# Patient Record
Sex: MALE | Race: WHITE | Employment: OTHER | URBAN - METROPOLITAN AREA
[De-identification: names, ages, dates, MRNs, and addresses within clinical notes are randomized per-mention and may not be internally consistent; named-entity substitution may affect disease eponyms.]

---

## 2017-01-30 ENCOUNTER — OFFICE VISIT (OUTPATIENT)
Dept: FAMILY MEDICINE CLINIC | Age: 72
End: 2017-01-30

## 2017-01-30 VITALS
TEMPERATURE: 97.7 F | WEIGHT: 264 LBS | HEART RATE: 73 BPM | SYSTOLIC BLOOD PRESSURE: 136 MMHG | DIASTOLIC BLOOD PRESSURE: 78 MMHG | BODY MASS INDEX: 33.9 KG/M2

## 2017-01-30 DIAGNOSIS — M10.9 ACUTE GOUT, UNSPECIFIED CAUSE, UNSPECIFIED SITE: ICD-10-CM

## 2017-01-30 DIAGNOSIS — I10 ESSENTIAL HYPERTENSION: Primary | ICD-10-CM

## 2017-01-30 PROCEDURE — 99214 OFFICE O/P EST MOD 30 MIN: CPT | Performed by: FAMILY MEDICINE

## 2017-01-30 RX ORDER — ALLOPURINOL 300 MG/1
300 TABLET ORAL DAILY
Qty: 30 TABLET | Refills: 5 | Status: SHIPPED | OUTPATIENT
Start: 2017-01-30 | End: 2017-07-13 | Stop reason: SDUPTHER

## 2017-02-02 RX ORDER — AMLODIPINE BESYLATE 5 MG/1
5 TABLET ORAL DAILY
Qty: 30 TABLET | Refills: 6 | Status: SHIPPED | OUTPATIENT
Start: 2017-02-02 | End: 2017-08-16 | Stop reason: SDUPTHER

## 2017-02-09 ENCOUNTER — TELEPHONE (OUTPATIENT)
Dept: FAMILY MEDICINE CLINIC | Age: 72
End: 2017-02-09

## 2017-02-09 NOTE — TELEPHONE ENCOUNTER
I don't think a muscle relaxer will help- just anti inflammatory medication like Aleve.  He can take 2 twice a day

## 2017-02-09 NOTE — TELEPHONE ENCOUNTER
Pt states that he caught the \"crud\" that has been going around. He states that he has been coughing so hard that he thinks he pulled a muscle in his chest. He doesn't think it's a broken rib because he has had them in the past and this pain is not constant like that was. He says the pain gets worse when coughing and sneezing. He is flying out the day after tomorrow but wanted to see if he needed a muscle relaxer or something until this is improved. He is willing to be seen if you need him to come in.  Please advise

## 2017-02-18 DIAGNOSIS — I10 ESSENTIAL HYPERTENSION: ICD-10-CM

## 2017-02-20 RX ORDER — ATORVASTATIN CALCIUM 20 MG/1
TABLET, FILM COATED ORAL
Qty: 90 TABLET | Refills: 3 | Status: SHIPPED | OUTPATIENT
Start: 2017-02-20 | End: 2018-02-06 | Stop reason: SDUPTHER

## 2017-02-20 RX ORDER — HYDROCHLOROTHIAZIDE 25 MG/1
TABLET ORAL
Qty: 90 TABLET | Refills: 3 | Status: SHIPPED | OUTPATIENT
Start: 2017-02-20 | End: 2017-12-05 | Stop reason: SDUPTHER

## 2017-03-27 RX ORDER — CITALOPRAM 20 MG/1
TABLET ORAL
Qty: 90 TABLET | Refills: 1 | Status: SHIPPED | OUTPATIENT
Start: 2017-03-27 | End: 2017-09-01 | Stop reason: SDUPTHER

## 2017-03-30 ENCOUNTER — OFFICE VISIT (OUTPATIENT)
Dept: FAMILY MEDICINE CLINIC | Age: 72
End: 2017-03-30

## 2017-03-30 VITALS
DIASTOLIC BLOOD PRESSURE: 52 MMHG | WEIGHT: 261 LBS | BODY MASS INDEX: 33.51 KG/M2 | SYSTOLIC BLOOD PRESSURE: 110 MMHG | OXYGEN SATURATION: 98 % | HEART RATE: 61 BPM

## 2017-03-30 DIAGNOSIS — S39.011D ABDOMINAL MUSCLE STRAIN, SUBSEQUENT ENCOUNTER: Primary | ICD-10-CM

## 2017-03-30 PROCEDURE — 99213 OFFICE O/P EST LOW 20 MIN: CPT | Performed by: FAMILY MEDICINE

## 2017-03-30 RX ORDER — HYDROXYZINE HYDROCHLORIDE 25 MG/1
TABLET, FILM COATED ORAL
Qty: 90 TABLET | Refills: 3 | Status: SHIPPED | OUTPATIENT
Start: 2017-03-30 | End: 2019-03-11 | Stop reason: SDUPTHER

## 2017-04-24 RX ORDER — PROPRANOLOL HYDROCHLORIDE 120 MG/1
CAPSULE, EXTENDED RELEASE ORAL
Qty: 30 CAPSULE | Refills: 0 | Status: SHIPPED | OUTPATIENT
Start: 2017-04-24 | End: 2017-05-21 | Stop reason: SDUPTHER

## 2017-05-22 RX ORDER — PROPRANOLOL HYDROCHLORIDE 120 MG/1
CAPSULE, EXTENDED RELEASE ORAL
Qty: 30 CAPSULE | Refills: 0 | Status: SHIPPED | OUTPATIENT
Start: 2017-05-22 | End: 2017-06-16 | Stop reason: SDUPTHER

## 2017-06-19 RX ORDER — PROPRANOLOL HYDROCHLORIDE 120 MG/1
CAPSULE, EXTENDED RELEASE ORAL
Qty: 30 CAPSULE | Refills: 1 | Status: SHIPPED | OUTPATIENT
Start: 2017-06-19 | End: 2017-08-16 | Stop reason: SDUPTHER

## 2017-07-03 ENCOUNTER — OFFICE VISIT (OUTPATIENT)
Dept: FAMILY MEDICINE CLINIC | Age: 72
End: 2017-07-03

## 2017-07-03 VITALS
TEMPERATURE: 98.3 F | BODY MASS INDEX: 33.11 KG/M2 | HEART RATE: 57 BPM | SYSTOLIC BLOOD PRESSURE: 115 MMHG | WEIGHT: 258 LBS | OXYGEN SATURATION: 98 % | DIASTOLIC BLOOD PRESSURE: 66 MMHG | HEIGHT: 74 IN

## 2017-07-03 DIAGNOSIS — Z00.00 ROUTINE GENERAL MEDICAL EXAMINATION AT A HEALTH CARE FACILITY: Primary | ICD-10-CM

## 2017-07-03 PROCEDURE — G0438 PPPS, INITIAL VISIT: HCPCS | Performed by: FAMILY MEDICINE

## 2017-07-03 ASSESSMENT — LIFESTYLE VARIABLES
HOW OFTEN DO YOU HAVE A DRINK CONTAINING ALCOHOL: 3
HAVE YOU OR SOMEONE ELSE BEEN INJURED AS A RESULT OF YOUR DRINKING: 0
HOW OFTEN DURING THE LAST YEAR HAVE YOU FAILED TO DO WHAT WAS NORMALLY EXPECTED FROM YOU BECAUSE OF DRINKING: 0
HOW OFTEN DURING THE LAST YEAR HAVE YOU HAD A FEELING OF GUILT OR REMORSE AFTER DRINKING: 0
HAS A RELATIVE, FRIEND, DOCTOR, OR ANOTHER HEALTH PROFESSIONAL EXPRESSED CONCERN ABOUT YOUR DRINKING OR SUGGESTED YOU CUT DOWN: 0
HOW OFTEN DURING THE LAST YEAR HAVE YOU BEEN UNABLE TO REMEMBER WHAT HAPPENED THE NIGHT BEFORE BECAUSE YOU HAD BEEN DRINKING: 0
HOW MANY STANDARD DRINKS CONTAINING ALCOHOL DO YOU HAVE ON A TYPICAL DAY: 0
AUDIT-C TOTAL SCORE: 3
HOW OFTEN DURING THE LAST YEAR HAVE YOU NEEDED AN ALCOHOLIC DRINK FIRST THING IN THE MORNING TO GET YOURSELF GOING AFTER A NIGHT OF HEAVY DRINKING: 0
HOW OFTEN DO YOU HAVE SIX OR MORE DRINKS ON ONE OCCASION: 0

## 2017-07-03 ASSESSMENT — PATIENT HEALTH QUESTIONNAIRE - PHQ9
SUM OF ALL RESPONSES TO PHQ QUESTIONS 1-9: 2
SUM OF ALL RESPONSES TO PHQ QUESTIONS 1-9: 2

## 2017-07-03 ASSESSMENT — ANXIETY QUESTIONNAIRES: GAD7 TOTAL SCORE: 1

## 2017-07-05 ENCOUNTER — NURSE ONLY (OUTPATIENT)
Dept: FAMILY MEDICINE CLINIC | Age: 72
End: 2017-07-05

## 2017-07-05 DIAGNOSIS — E78.5 HYPERLIPIDEMIA, UNSPECIFIED HYPERLIPIDEMIA TYPE: ICD-10-CM

## 2017-07-05 DIAGNOSIS — Z00.00 ROUTINE GENERAL MEDICAL EXAMINATION AT A HEALTH CARE FACILITY: ICD-10-CM

## 2017-07-05 DIAGNOSIS — I10 ESSENTIAL HYPERTENSION: ICD-10-CM

## 2017-07-05 LAB
A/G RATIO: 1.8 (ref 1.1–2.2)
ALBUMIN SERPL-MCNC: 4.2 G/DL (ref 3.4–5)
ALP BLD-CCNC: 77 U/L (ref 40–129)
ALT SERPL-CCNC: 26 U/L (ref 10–40)
ANION GAP SERPL CALCULATED.3IONS-SCNC: 12 MMOL/L (ref 3–16)
AST SERPL-CCNC: 19 U/L (ref 15–37)
BILIRUB SERPL-MCNC: 0.3 MG/DL (ref 0–1)
BUN BLDV-MCNC: 20 MG/DL (ref 7–20)
CALCIUM SERPL-MCNC: 9.4 MG/DL (ref 8.3–10.6)
CHLORIDE BLD-SCNC: 100 MMOL/L (ref 99–110)
CHOLESTEROL, TOTAL: 188 MG/DL (ref 0–199)
CO2: 28 MMOL/L (ref 21–32)
CREAT SERPL-MCNC: 1.1 MG/DL (ref 0.8–1.3)
GFR AFRICAN AMERICAN: >60
GFR NON-AFRICAN AMERICAN: >60
GLOBULIN: 2.4 G/DL
GLUCOSE BLD-MCNC: 112 MG/DL (ref 70–99)
HDLC SERPL-MCNC: 57 MG/DL (ref 40–60)
LDL CHOLESTEROL CALCULATED: 96 MG/DL
POTASSIUM SERPL-SCNC: 4.5 MMOL/L (ref 3.5–5.1)
PROSTATE SPECIFIC ANTIGEN: 1.02 NG/ML (ref 0–4)
SODIUM BLD-SCNC: 140 MMOL/L (ref 136–145)
TOTAL PROTEIN: 6.6 G/DL (ref 6.4–8.2)
TRIGL SERPL-MCNC: 174 MG/DL (ref 0–150)
URIC ACID, SERUM: 4.4 MG/DL (ref 3.5–7.2)
VLDLC SERPL CALC-MCNC: 35 MG/DL

## 2017-07-05 PROCEDURE — 36415 COLL VENOUS BLD VENIPUNCTURE: CPT | Performed by: FAMILY MEDICINE

## 2017-07-06 LAB
ESTIMATED AVERAGE GLUCOSE: 116.9 MG/DL
HBA1C MFR BLD: 5.7 %

## 2017-07-13 DIAGNOSIS — M10.9 ACUTE GOUT, UNSPECIFIED CAUSE, UNSPECIFIED SITE: ICD-10-CM

## 2017-07-13 RX ORDER — ALLOPURINOL 300 MG/1
TABLET ORAL
Qty: 30 TABLET | Refills: 0 | Status: SHIPPED | OUTPATIENT
Start: 2017-07-13 | End: 2017-08-16 | Stop reason: SDUPTHER

## 2017-09-01 ENCOUNTER — OFFICE VISIT (OUTPATIENT)
Dept: FAMILY MEDICINE CLINIC | Age: 72
End: 2017-09-01

## 2017-09-01 VITALS
SYSTOLIC BLOOD PRESSURE: 114 MMHG | WEIGHT: 264 LBS | BODY MASS INDEX: 33.9 KG/M2 | HEART RATE: 51 BPM | OXYGEN SATURATION: 98 % | DIASTOLIC BLOOD PRESSURE: 58 MMHG

## 2017-09-01 DIAGNOSIS — F32.A DEPRESSION, UNSPECIFIED DEPRESSION TYPE: Primary | ICD-10-CM

## 2017-09-01 PROCEDURE — 99213 OFFICE O/P EST LOW 20 MIN: CPT | Performed by: FAMILY MEDICINE

## 2017-09-01 RX ORDER — CITALOPRAM 40 MG/1
40 TABLET ORAL DAILY
Qty: 30 TABLET | Refills: 3 | Status: SHIPPED | OUTPATIENT
Start: 2017-09-01 | End: 2017-12-15 | Stop reason: SDUPTHER

## 2017-12-05 DIAGNOSIS — I10 ESSENTIAL HYPERTENSION: ICD-10-CM

## 2017-12-05 RX ORDER — HYDROCHLOROTHIAZIDE 25 MG/1
TABLET ORAL
Qty: 90 TABLET | Refills: 0 | Status: SHIPPED | OUTPATIENT
Start: 2017-12-05 | End: 2018-02-28 | Stop reason: SDUPTHER

## 2017-12-06 DIAGNOSIS — M10.9 ACUTE GOUT, UNSPECIFIED CAUSE, UNSPECIFIED SITE: ICD-10-CM

## 2017-12-06 RX ORDER — AMLODIPINE BESYLATE 5 MG/1
TABLET ORAL
Qty: 30 TABLET | Refills: 0 | Status: SHIPPED | OUTPATIENT
Start: 2017-12-06 | End: 2018-01-03 | Stop reason: SDUPTHER

## 2017-12-06 RX ORDER — PROPRANOLOL HYDROCHLORIDE 120 MG/1
CAPSULE, EXTENDED RELEASE ORAL
Qty: 30 CAPSULE | Refills: 0 | Status: SHIPPED | OUTPATIENT
Start: 2017-12-06 | End: 2018-01-03 | Stop reason: SDUPTHER

## 2017-12-06 RX ORDER — ALLOPURINOL 300 MG/1
TABLET ORAL
Qty: 30 TABLET | Refills: 0 | Status: SHIPPED | OUTPATIENT
Start: 2017-12-06 | End: 2018-01-08 | Stop reason: SDUPTHER

## 2017-12-15 ENCOUNTER — OFFICE VISIT (OUTPATIENT)
Dept: FAMILY MEDICINE CLINIC | Age: 72
End: 2017-12-15

## 2017-12-15 VITALS
BODY MASS INDEX: 34.28 KG/M2 | WEIGHT: 267 LBS | OXYGEN SATURATION: 98 % | DIASTOLIC BLOOD PRESSURE: 68 MMHG | TEMPERATURE: 97.5 F | SYSTOLIC BLOOD PRESSURE: 124 MMHG | HEART RATE: 55 BPM

## 2017-12-15 DIAGNOSIS — F51.01 PRIMARY INSOMNIA: Primary | ICD-10-CM

## 2017-12-15 DIAGNOSIS — M25.561 CHRONIC PAIN OF RIGHT KNEE: ICD-10-CM

## 2017-12-15 DIAGNOSIS — F32.A DEPRESSION, UNSPECIFIED DEPRESSION TYPE: ICD-10-CM

## 2017-12-15 DIAGNOSIS — G89.29 CHRONIC PAIN OF RIGHT KNEE: ICD-10-CM

## 2017-12-15 DIAGNOSIS — R10.9 LEFT SIDED ABDOMINAL PAIN: ICD-10-CM

## 2017-12-15 PROCEDURE — 99214 OFFICE O/P EST MOD 30 MIN: CPT | Performed by: FAMILY MEDICINE

## 2017-12-15 RX ORDER — ZOLPIDEM TARTRATE 10 MG/1
10 TABLET ORAL NIGHTLY PRN
Qty: 14 TABLET | Refills: 0 | Status: SHIPPED | OUTPATIENT
Start: 2017-12-15 | End: 2017-12-29

## 2017-12-15 RX ORDER — CITALOPRAM 40 MG/1
TABLET ORAL
Qty: 30 TABLET | Refills: 5 | Status: SHIPPED | OUTPATIENT
Start: 2017-12-15 | End: 2018-04-24 | Stop reason: SDUPTHER

## 2017-12-15 NOTE — PROGRESS NOTES
Subjective:      Patient ID: Coty Rawls is a 67 y.o. male. Coty Rawls is a 67 y.o. male. Patient presents with:  Cough    Patient has been having left sided abdominal pain that is worse with coughing. Notes he had a \"tear\" of the abdominal wall in April. Has been dealing with the pain well. Notes no chest pain or shortness of breath. Has had increasing insomnia. The patients PMH, surgical history, family history, medications, allergies were all reviewed and updated as appropriate today. Knee Pain    The incident occurred more than 1 week ago. There was no injury mechanism. The pain is present in the right knee. The quality of the pain is described as aching. The pain is moderate. The pain has been constant since onset. He reports no foreign bodies present. Nothing aggravates the symptoms. He has tried nothing for the symptoms. The treatment provided no relief. Review of Systems    Objective:   Physical Exam   Constitutional: He is oriented to person, place, and time. He appears well-developed and well-nourished. HENT:   Head: Normocephalic and atraumatic. Right Ear: External ear normal.   Left Ear: External ear normal.   Nose: Nose normal.   Mouth/Throat: Oropharynx is clear and moist.   Eyes: Conjunctivae and EOM are normal. Pupils are equal, round, and reactive to light. Neck: Normal range of motion. Neck supple. Cardiovascular: Normal rate, regular rhythm, normal heart sounds and intact distal pulses. Exam reveals no gallop and no friction rub. No murmur heard. Pulmonary/Chest: Effort normal and breath sounds normal. No respiratory distress. He has no wheezes. Abdominal: Soft. Bowel sounds are normal. He exhibits no distension. There is no tenderness. Musculoskeletal: Normal range of motion. He exhibits no edema or tenderness. Neurological: He is alert and oriented to person, place, and time. He has normal reflexes. Skin: Skin is warm and dry.    Psychiatric:

## 2018-01-03 RX ORDER — PROPRANOLOL HYDROCHLORIDE 120 MG/1
CAPSULE, EXTENDED RELEASE ORAL
Qty: 30 CAPSULE | Refills: 5 | Status: SHIPPED | OUTPATIENT
Start: 2018-01-03 | End: 2018-06-10 | Stop reason: SDUPTHER

## 2018-01-03 RX ORDER — AMLODIPINE BESYLATE 5 MG/1
TABLET ORAL
Qty: 30 TABLET | Refills: 5 | Status: SHIPPED | OUTPATIENT
Start: 2018-01-03 | End: 2018-08-13 | Stop reason: SDUPTHER

## 2018-01-08 DIAGNOSIS — M10.9 ACUTE GOUT, UNSPECIFIED CAUSE, UNSPECIFIED SITE: ICD-10-CM

## 2018-01-09 RX ORDER — ALLOPURINOL 300 MG/1
TABLET ORAL
Qty: 30 TABLET | Refills: 3 | Status: SHIPPED | OUTPATIENT
Start: 2018-01-09 | End: 2018-07-30 | Stop reason: SDUPTHER

## 2018-01-22 ENCOUNTER — OFFICE VISIT (OUTPATIENT)
Dept: DERMATOLOGY | Age: 73
End: 2018-01-22

## 2018-01-22 DIAGNOSIS — L57.0 ACTINIC KERATOSES: ICD-10-CM

## 2018-01-22 DIAGNOSIS — D22.9 MULTIPLE BENIGN NEVI: Primary | ICD-10-CM

## 2018-01-22 DIAGNOSIS — Z12.83 SCREENING EXAM FOR SKIN CANCER: ICD-10-CM

## 2018-01-22 DIAGNOSIS — Z85.828 HISTORY OF NONMELANOMA SKIN CANCER: ICD-10-CM

## 2018-01-22 DIAGNOSIS — D48.5 NEOPLASM OF UNCERTAIN BEHAVIOR OF SKIN: ICD-10-CM

## 2018-01-22 PROCEDURE — 17000 DESTRUCT PREMALG LESION: CPT | Performed by: DERMATOLOGY

## 2018-01-22 PROCEDURE — 11100 PR BIOPSY OF SKIN LESION: CPT | Performed by: DERMATOLOGY

## 2018-01-22 PROCEDURE — 17003 DESTRUCT PREMALG LES 2-14: CPT | Performed by: DERMATOLOGY

## 2018-01-22 PROCEDURE — 99213 OFFICE O/P EST LOW 20 MIN: CPT | Performed by: DERMATOLOGY

## 2018-01-22 NOTE — PATIENT INSTRUCTIONS
rubbing alcohol or hydrogen peroxide.  Continue this regimen until the area is pink and healed. Depending on the size and location of your cryosurgery site, healing may take 2 to 4 weeks.  The area may continue to be pink for several weeks, and over the next few months may become darker or lighter than the surrounding skin. This may be a permanent change. Biopsy Wound Care Instructions    · Keep the bandage in place for 24 hours. · Cleanse the wound with mild soapy water daily   Gently dry the area.  Apply Vaseline or petroleum jelly to the wound using a cotton tipped applicator.  Cover with a clean bandage.  Repeat this process until the biopsy site is healed.  If you had stitches placed, continue treating the site until the stitches are removed. Remember to make an appointment to return to have your stitches removed by our staff.  You may shower and bathe as usual.       ** Biopsy results generally take around 7 business days to come back. If you have not heard from us by then, please call the office at [phone] between 8AM and 4PM Monday through Friday.

## 2018-01-22 NOTE — PROGRESS NOTES
[facility]) Dermatology  Kiersten AyalaRondasharri [address]      Zay Castorena  1945    67 y.o. male     Date of Visit: 1/22/2018    Last Visit: 8mo    Chief Complaint: Skin check    History of Present Illness:  1. Here for skin/mole check. No new moles. No moles changing in size, shape, color. No moles associated w/ pain, bleeding, pruritus.   -Tries to avoid sun when possible. Wears SPF 30 sunscreen intermittently. 2. 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 2a. Complains of a painful lesion on R lower leg. 3. History of actinic keratoses s/p cryotherapy. Unsure of new lesions. Derm History:   -Asteatotic dermatitis - lidex oint     Review of Systems:  Constitutional: Reports general sense of well-being. Skin: No interval severe sunburns. Allergies: Reviewed and updated. Past Medical History, Surgical History, Medications and Allergies reviewed.      Past Medical History:   Diagnosis Date    Hypercholesteremia     Hypertension     Low back pain 7/7/2014    Strabismus      Past Surgical History:   Procedure Laterality Date    COLONOSCOPY  2011    CYST REMOVAL      ESOPHAGOGASTRIC FUNDOPLICATION      EXTERNAL EAR SURGERY      cancer spots    EYE SURGERY      STRABISMUS SURGERY      TONSILLECTOMY      UPPER GASTROINTESTINAL ENDOSCOPY  2011    UPPER GASTROINTESTINAL ENDOSCOPY  9/26/2014    gastritis barretts biopsy taken       Allergies   Allergen Reactions    Sulfa Antibiotics Anaphylaxis    Lisinopril Swelling     Face swelled     Outpatient Prescriptions Marked as Taking for the 1/22/18 encounter (Office Visit) with Kiersten Ayala MD   Medication Sig Dispense Refill    allopurinol (ZYLOPRIM) 300 MG tablet TAKE 1 TABLET BY MOUTH DAILY 30 tablet 3    propranolol (INDERAL LA) 120 MG extended release capsule TAKE 1 CAPSULE BY MOUTH DAILY 30 capsule 5    amLODIPine (NORVASC) 5 MG tablet TAKE 1 TABLET BY MOUTH DAILY 30 tablet 5    citalopram (CELEXA) 40 MG tablet TAKE 1 TABLET BY MOUTH DAILY 30 tablet 5    hydrochlorothiazide (HYDRODIURIL) 25 MG tablet TAKE 1 TABLET BY MOUTH DAILY 90 tablet 0    atorvastatin (LIPITOR) 20 MG tablet TAKE 1 TABLET BY MOUTH EVERY DAY 90 tablet 3    FIBER PO Take by mouth      fluocinonide (LIDEX) 0.05 % ointment Apply sparingly to affected area(s) up to bid prn 60 g 1    omeprazole (PRILOSEC) 20 MG capsule Take 40 mg by mouth daily      aspirin 81 MG tablet Take 81 mg by mouth daily.  Multiple Vitamins-Minerals (THERAPEUTIC MULTIVITAMIN-MINERALS) tablet Take 1 tablet by mouth daily.  loratadine (CLARITIN) 10 MG tablet Take 10 mg by mouth daily. Physical Examination     The following were examined and determined to be normal: Psych/Neuro, Scalp/hair, Conjunctivae/eyelids, Gums/teeth/lips, Neck, Nails/digits and Genitalia/groin/buttocks. The following were examined and determined to be abnormal: Head/face, Breast/axilla/chest, Abdomen, Back, RUE, LUE, RLE and LLE. -General: Well-appearing, NAD  1. Scattered on the trunk and extremities are multiple well-defined round and oval symmetric smoothly-bordered uniformly brown macules and papules. 2. L posterior ear - scar clear  2a. R pretibial - 1cm round ill-defined thick keratotic pink papule       3. R forehead 2 - ill-defined irregularly-shaped roughly-scaling thin pink macules/papules       Assessment and Plan     1. Benign acquired melanocytic nevi  -Recommend monthly self skin exams   -Educated regarding the ABCDEs of melanoma detection   -Call for any new/changing moles or concerning lesions  -Reviewed sun protective behavior -- sun avoidance during the peak hours of the day, sun-protective clothing (including hat and sunglasses), sunscreen use (water resistant, broad spectrum, SPF at least 30, need for reapplication every 2 to 3 hours), avoidance of tanning beds   -Return for full skin exam in 1 year (sooner if indicated)        2. History of NMSC   2a.  Neoplasm of uncertain behavior of skin - R/o SCC, R pretibial  -Discussed possible diagnosis. Patient agreeable to biopsy. Verbal consent obtained after risks (infection, bleeding, scar), benefits and alternatives explained. -Area(s) to be biopsied were marked with a surgical pen. Site(s) were cleansed with alcohol. Local anesthesia achieved with 1% lidocaine with epinephrine/sodium bicarbonate. Shave biopsy performed with a razor blade. Hemostasis was achieved with aluminum chloride. The wound(s) were dressed with petrolatum and covered with a bandage. Specimen(s) sent to pathology. Pt educated re: risk of bleeding, infection, scar and wound care instructions. 3. Actinic keratosis(es)  -Edu re: relationship with chronic cumulative sun exposure, low premalignant potential.   -2 lesion(s) treated w/ liquid nitrogen x 2 cycles - forehead. Edu re: risk of blister formation, discomfort, scar, hypopigmentation. Discussed wound care.

## 2018-01-26 ENCOUNTER — TELEPHONE (OUTPATIENT)
Dept: DERMATOLOGY | Age: 73
End: 2018-01-26

## 2018-01-26 RX ORDER — ZOLPIDEM TARTRATE 10 MG/1
TABLET ORAL
Qty: 14 TABLET | Refills: 0 | Status: SHIPPED | OUTPATIENT
Start: 2018-01-26 | End: 2018-02-25

## 2018-01-26 NOTE — TELEPHONE ENCOUNTER
Spoke with patient and informed him of biopsy result from 1-22-18. 1. Location: Right Pretibial      Result: Hypertrophic actinic keratosis, focally transected at deep margin. Plan: Pre-cancer. Return in 1-2- months for cryotherapy.     Patient scheduled for 4-2-18 Katherine  2:30

## 2018-01-26 NOTE — TELEPHONE ENCOUNTER
Last seen 12/15/17  Filled 12/15/17 #14  Med agreement not on file  UDS not on file  OARRS 1/26/18 scanned and routed to you

## 2018-02-06 RX ORDER — ATORVASTATIN CALCIUM 20 MG/1
TABLET, FILM COATED ORAL
Qty: 90 TABLET | Refills: 0 | Status: SHIPPED | OUTPATIENT
Start: 2018-02-06 | End: 2018-05-02 | Stop reason: SDUPTHER

## 2018-02-09 RX ORDER — HYDROXYZINE HYDROCHLORIDE 25 MG/1
TABLET, FILM COATED ORAL
Qty: 90 TABLET | Refills: 0 | Status: SHIPPED | OUTPATIENT
Start: 2018-02-09 | End: 2019-11-15 | Stop reason: SDUPTHER

## 2018-02-28 DIAGNOSIS — I10 ESSENTIAL HYPERTENSION: ICD-10-CM

## 2018-02-28 RX ORDER — HYDROCHLOROTHIAZIDE 25 MG/1
TABLET ORAL
Qty: 90 TABLET | Refills: 1 | Status: SHIPPED | OUTPATIENT
Start: 2018-02-28 | End: 2018-08-17 | Stop reason: SDUPTHER

## 2018-03-09 RX ORDER — ZOLPIDEM TARTRATE 10 MG/1
TABLET ORAL
Qty: 14 TABLET | Refills: 0 | Status: SHIPPED | OUTPATIENT
Start: 2018-03-09 | End: 2018-04-05 | Stop reason: SDUPTHER

## 2018-04-02 ENCOUNTER — PROCEDURE VISIT (OUTPATIENT)
Dept: DERMATOLOGY | Age: 73
End: 2018-04-02

## 2018-04-02 DIAGNOSIS — L57.0 ACTINIC KERATOSES: Primary | ICD-10-CM

## 2018-04-02 PROCEDURE — 17000 DESTRUCT PREMALG LESION: CPT | Performed by: DERMATOLOGY

## 2018-04-05 RX ORDER — ZOLPIDEM TARTRATE 10 MG/1
TABLET ORAL
Qty: 14 TABLET | Refills: 0 | Status: SHIPPED | OUTPATIENT
Start: 2018-04-05 | End: 2018-06-14 | Stop reason: SDUPTHER

## 2018-04-24 DIAGNOSIS — F32.A DEPRESSION, UNSPECIFIED DEPRESSION TYPE: ICD-10-CM

## 2018-04-24 RX ORDER — CITALOPRAM 40 MG/1
40 TABLET ORAL DAILY
Qty: 30 TABLET | Refills: 5 | Status: SHIPPED | OUTPATIENT
Start: 2018-04-24 | End: 2018-10-17 | Stop reason: SDUPTHER

## 2018-05-02 RX ORDER — ATORVASTATIN CALCIUM 20 MG/1
TABLET, FILM COATED ORAL
Qty: 90 TABLET | Refills: 0 | Status: SHIPPED | OUTPATIENT
Start: 2018-05-02 | End: 2018-07-30 | Stop reason: SDUPTHER

## 2018-06-11 RX ORDER — PROPRANOLOL HYDROCHLORIDE 120 MG/1
CAPSULE, EXTENDED RELEASE ORAL
Qty: 30 CAPSULE | Refills: 0 | Status: SHIPPED | OUTPATIENT
Start: 2018-06-11 | End: 2018-07-08 | Stop reason: SDUPTHER

## 2018-06-14 DIAGNOSIS — F51.01 PRIMARY INSOMNIA: Primary | ICD-10-CM

## 2018-06-14 RX ORDER — ZOLPIDEM TARTRATE 10 MG/1
TABLET ORAL
Qty: 14 TABLET | Refills: 0 | Status: SHIPPED | OUTPATIENT
Start: 2018-06-14 | End: 2018-07-26 | Stop reason: SDUPTHER

## 2018-07-02 ENCOUNTER — TELEPHONE (OUTPATIENT)
Dept: FAMILY MEDICINE CLINIC | Age: 73
End: 2018-07-02

## 2018-07-02 ENCOUNTER — OFFICE VISIT (OUTPATIENT)
Dept: ORTHOPEDIC SURGERY | Age: 73
End: 2018-07-02

## 2018-07-02 VITALS
HEIGHT: 74 IN | WEIGHT: 260 LBS | DIASTOLIC BLOOD PRESSURE: 78 MMHG | BODY MASS INDEX: 33.37 KG/M2 | SYSTOLIC BLOOD PRESSURE: 128 MMHG | HEART RATE: 88 BPM

## 2018-07-02 DIAGNOSIS — M25.571 RIGHT ANKLE PAIN, UNSPECIFIED CHRONICITY: Primary | ICD-10-CM

## 2018-07-02 DIAGNOSIS — S86.811A STRAIN OF CALF MUSCLE, RIGHT, INITIAL ENCOUNTER: ICD-10-CM

## 2018-07-02 PROCEDURE — L4361 PNEUMA/VAC WALK BOOT PRE OTS: HCPCS | Performed by: PHYSICIAN ASSISTANT

## 2018-07-02 PROCEDURE — 99203 OFFICE O/P NEW LOW 30 MIN: CPT | Performed by: PHYSICIAN ASSISTANT

## 2018-07-02 NOTE — PROGRESS NOTES
/ rigid orthosis to improve their function. The orthosis will assist in protecting the affected area, provide functional support and facilitate healing. Patient was instructed to progress ambulation weight bearing as tolerated in the device. The patient was educated and fit by a healthcare professional with expert knowledge and specialization in brace application while under the direct supervision of the physician. Verbal and written instructions for the use of and application of this item were provided. They were instructed to contact the office immediately should the brace result in increased pain, decreased sensation, increased swelling or worsening of the condition. Treatment Plan: Today we've gone over the diagnosis and the recommendations. We will go ahead and get him into a high tide walking boot. Also recommended crutches for protected weightbearing as needed. He can continue with ice and oral anti-inflammatories. I'll have him follow-up with one of our foot and ankle specialists and 2 weeks for repeat clinical exam and reevaluation.

## 2018-07-09 ENCOUNTER — TELEPHONE (OUTPATIENT)
Dept: ORTHOPEDIC SURGERY | Age: 73
End: 2018-07-09

## 2018-07-09 RX ORDER — PROPRANOLOL HYDROCHLORIDE 120 MG/1
CAPSULE, EXTENDED RELEASE ORAL
Qty: 30 CAPSULE | Refills: 0 | Status: SHIPPED | OUTPATIENT
Start: 2018-07-09 | End: 2018-08-07 | Stop reason: SDUPTHER

## 2018-07-26 DIAGNOSIS — F51.01 PRIMARY INSOMNIA: ICD-10-CM

## 2018-07-27 RX ORDER — ZOLPIDEM TARTRATE 10 MG/1
TABLET ORAL
Qty: 14 TABLET | Refills: 0 | Status: SHIPPED | OUTPATIENT
Start: 2018-07-27 | End: 2018-08-30 | Stop reason: SDUPTHER

## 2018-07-30 DIAGNOSIS — M10.9 ACUTE GOUT, UNSPECIFIED CAUSE, UNSPECIFIED SITE: ICD-10-CM

## 2018-07-30 RX ORDER — ATORVASTATIN CALCIUM 20 MG/1
TABLET, FILM COATED ORAL
Qty: 90 TABLET | Refills: 0 | Status: SHIPPED | OUTPATIENT
Start: 2018-07-30 | End: 2018-10-23 | Stop reason: SDUPTHER

## 2018-07-30 RX ORDER — ALLOPURINOL 300 MG/1
TABLET ORAL
Qty: 30 TABLET | Refills: 0 | Status: SHIPPED | OUTPATIENT
Start: 2018-07-30 | End: 2018-08-23 | Stop reason: SDUPTHER

## 2018-08-07 RX ORDER — PROPRANOLOL HYDROCHLORIDE 120 MG/1
CAPSULE, EXTENDED RELEASE ORAL
Qty: 30 CAPSULE | Refills: 5 | Status: SHIPPED | OUTPATIENT
Start: 2018-08-07 | End: 2019-01-02 | Stop reason: SDUPTHER

## 2018-08-13 RX ORDER — AMLODIPINE BESYLATE 5 MG/1
TABLET ORAL
Qty: 30 TABLET | Refills: 5 | Status: SHIPPED | OUTPATIENT
Start: 2018-08-13 | End: 2019-01-09 | Stop reason: SDUPTHER

## 2018-08-17 DIAGNOSIS — I10 ESSENTIAL HYPERTENSION: ICD-10-CM

## 2018-08-17 RX ORDER — HYDROCHLOROTHIAZIDE 25 MG/1
TABLET ORAL
Qty: 90 TABLET | Refills: 0 | Status: SHIPPED | OUTPATIENT
Start: 2018-08-17 | End: 2018-11-08 | Stop reason: SDUPTHER

## 2018-08-23 DIAGNOSIS — M10.9 ACUTE GOUT, UNSPECIFIED CAUSE, UNSPECIFIED SITE: ICD-10-CM

## 2018-08-23 RX ORDER — ALLOPURINOL 300 MG/1
TABLET ORAL
Qty: 30 TABLET | Refills: 0 | Status: SHIPPED | OUTPATIENT
Start: 2018-08-23 | End: 2018-09-17 | Stop reason: SDUPTHER

## 2018-08-30 DIAGNOSIS — F51.01 PRIMARY INSOMNIA: ICD-10-CM

## 2018-08-31 RX ORDER — ZOLPIDEM TARTRATE 10 MG/1
TABLET ORAL
Qty: 14 TABLET | Refills: 0 | Status: SHIPPED | OUTPATIENT
Start: 2018-08-31 | End: 2018-10-03 | Stop reason: SDUPTHER

## 2018-09-21 ENCOUNTER — TELEPHONE (OUTPATIENT)
Dept: FAMILY MEDICINE CLINIC | Age: 73
End: 2018-09-21

## 2018-09-21 DIAGNOSIS — E78.5 HYPERLIPIDEMIA, UNSPECIFIED HYPERLIPIDEMIA TYPE: ICD-10-CM

## 2018-09-21 DIAGNOSIS — I10 ESSENTIAL HYPERTENSION: ICD-10-CM

## 2018-09-21 DIAGNOSIS — Z12.5 SCREENING FOR PROSTATE CANCER: ICD-10-CM

## 2018-09-21 DIAGNOSIS — M10.9 GOUT, UNSPECIFIED CAUSE, UNSPECIFIED CHRONICITY, UNSPECIFIED SITE: Primary | ICD-10-CM

## 2018-09-21 DIAGNOSIS — R73.9 HYPERGLYCEMIA: ICD-10-CM

## 2018-10-01 ENCOUNTER — NURSE ONLY (OUTPATIENT)
Dept: FAMILY MEDICINE CLINIC | Age: 73
End: 2018-10-01

## 2018-10-01 DIAGNOSIS — R73.9 HYPERGLYCEMIA: ICD-10-CM

## 2018-10-01 DIAGNOSIS — M10.9 GOUT, UNSPECIFIED CAUSE, UNSPECIFIED CHRONICITY, UNSPECIFIED SITE: ICD-10-CM

## 2018-10-01 DIAGNOSIS — I10 ESSENTIAL HYPERTENSION: ICD-10-CM

## 2018-10-01 DIAGNOSIS — E78.5 HYPERLIPIDEMIA, UNSPECIFIED HYPERLIPIDEMIA TYPE: ICD-10-CM

## 2018-10-01 DIAGNOSIS — Z12.5 SCREENING FOR PROSTATE CANCER: ICD-10-CM

## 2018-10-01 DIAGNOSIS — I10 ESSENTIAL HYPERTENSION: Primary | ICD-10-CM

## 2018-10-01 LAB
A/G RATIO: 1.7 (ref 1.1–2.2)
ALBUMIN SERPL-MCNC: 4.4 G/DL (ref 3.4–5)
ALP BLD-CCNC: 84 U/L (ref 40–129)
ALT SERPL-CCNC: 22 U/L (ref 10–40)
ANION GAP SERPL CALCULATED.3IONS-SCNC: 14 MMOL/L (ref 3–16)
AST SERPL-CCNC: 18 U/L (ref 15–37)
BILIRUB SERPL-MCNC: 0.4 MG/DL (ref 0–1)
BUN BLDV-MCNC: 16 MG/DL (ref 7–20)
CALCIUM SERPL-MCNC: 9.1 MG/DL (ref 8.3–10.6)
CHLORIDE BLD-SCNC: 101 MMOL/L (ref 99–110)
CHOLESTEROL, TOTAL: 175 MG/DL (ref 0–199)
CO2: 25 MMOL/L (ref 21–32)
CREAT SERPL-MCNC: 1.2 MG/DL (ref 0.8–1.3)
GFR AFRICAN AMERICAN: >60
GFR NON-AFRICAN AMERICAN: 59
GLOBULIN: 2.6 G/DL
GLUCOSE BLD-MCNC: 123 MG/DL (ref 70–99)
HDLC SERPL-MCNC: 49 MG/DL (ref 40–60)
LDL CHOLESTEROL CALCULATED: 90 MG/DL
POTASSIUM SERPL-SCNC: 4.6 MMOL/L (ref 3.5–5.1)
PROSTATE SPECIFIC ANTIGEN: 0.83 NG/ML (ref 0–4)
SODIUM BLD-SCNC: 140 MMOL/L (ref 136–145)
TOTAL PROTEIN: 7 G/DL (ref 6.4–8.2)
TRIGL SERPL-MCNC: 180 MG/DL (ref 0–150)
URIC ACID, SERUM: 4.3 MG/DL (ref 3.5–7.2)
VLDLC SERPL CALC-MCNC: 36 MG/DL

## 2018-10-01 PROCEDURE — 36415 COLL VENOUS BLD VENIPUNCTURE: CPT | Performed by: FAMILY MEDICINE

## 2018-10-02 LAB
ESTIMATED AVERAGE GLUCOSE: 119.8 MG/DL
HBA1C MFR BLD: 5.8 %

## 2018-10-03 ENCOUNTER — TELEPHONE (OUTPATIENT)
Dept: FAMILY MEDICINE CLINIC | Age: 73
End: 2018-10-03

## 2018-10-03 DIAGNOSIS — F51.01 PRIMARY INSOMNIA: ICD-10-CM

## 2018-10-03 NOTE — TELEPHONE ENCOUNTER
Dr. Clemente Richardson:    Notes: Maxx Chan completed on 10/1    This patient has an upcoming appointment with you for Hyperlipidemia. In planning for that visit I have completed the following pre-visit planning:     Pre-Visit Planning Checklist:  Patient contacted: yes  Verified patient by name and date of birth: yes    Health Maintenance items reviewed:    Colon Cancer Screening:  patient would like to discuss at next visit. Labs and procedures pended: None  Labs and procedures discussed with patient: yes  Reminded patient to check with their insurance company about coverage for lab tests and lab location: no    Preliminary Medication Reconciliation: was performed. Reminded patient to arrive early: yes    Please complete the med-reconciliation and sign the appropriate labs as soon as possible.       Bria Ware MA  Pre-Services Specialist

## 2018-10-04 RX ORDER — ZOLPIDEM TARTRATE 10 MG/1
TABLET ORAL
Qty: 14 TABLET | Refills: 0 | Status: SHIPPED | OUTPATIENT
Start: 2018-10-04 | End: 2018-11-03

## 2018-10-17 ENCOUNTER — OFFICE VISIT (OUTPATIENT)
Dept: FAMILY MEDICINE CLINIC | Age: 73
End: 2018-10-17
Payer: MEDICARE

## 2018-10-17 VITALS
DIASTOLIC BLOOD PRESSURE: 60 MMHG | HEART RATE: 63 BPM | BODY MASS INDEX: 34.15 KG/M2 | OXYGEN SATURATION: 98 % | WEIGHT: 266 LBS | SYSTOLIC BLOOD PRESSURE: 126 MMHG

## 2018-10-17 DIAGNOSIS — Z23 NEED FOR PROPHYLACTIC VACCINATION AND INOCULATION AGAINST INFLUENZA: ICD-10-CM

## 2018-10-17 DIAGNOSIS — F32.A DEPRESSION, UNSPECIFIED DEPRESSION TYPE: Primary | ICD-10-CM

## 2018-10-17 DIAGNOSIS — I10 ESSENTIAL HYPERTENSION: ICD-10-CM

## 2018-10-17 DIAGNOSIS — Z12.11 SCREENING FOR COLON CANCER: ICD-10-CM

## 2018-10-17 PROCEDURE — G0008 ADMIN INFLUENZA VIRUS VAC: HCPCS | Performed by: FAMILY MEDICINE

## 2018-10-17 PROCEDURE — 99214 OFFICE O/P EST MOD 30 MIN: CPT | Performed by: FAMILY MEDICINE

## 2018-10-17 PROCEDURE — 90662 IIV NO PRSV INCREASED AG IM: CPT | Performed by: FAMILY MEDICINE

## 2018-10-17 RX ORDER — CITALOPRAM 40 MG/1
40 TABLET ORAL DAILY
Qty: 30 TABLET | Refills: 5 | Status: SHIPPED | OUTPATIENT
Start: 2018-10-17 | End: 2019-03-15 | Stop reason: SDUPTHER

## 2018-10-17 ASSESSMENT — PATIENT HEALTH QUESTIONNAIRE - PHQ9
SUM OF ALL RESPONSES TO PHQ QUESTIONS 1-9: 2
2. FEELING DOWN, DEPRESSED OR HOPELESS: 1
SUM OF ALL RESPONSES TO PHQ9 QUESTIONS 1 & 2: 2
1. LITTLE INTEREST OR PLEASURE IN DOING THINGS: 1
SUM OF ALL RESPONSES TO PHQ QUESTIONS 1-9: 2

## 2018-10-23 RX ORDER — ATORVASTATIN CALCIUM 20 MG/1
TABLET, FILM COATED ORAL
Qty: 90 TABLET | Refills: 1 | Status: SHIPPED | OUTPATIENT
Start: 2018-10-23 | End: 2019-04-14 | Stop reason: SDUPTHER

## 2019-01-03 RX ORDER — PROPRANOLOL HYDROCHLORIDE 120 MG/1
CAPSULE, EXTENDED RELEASE ORAL
Qty: 30 CAPSULE | Refills: 5 | Status: SHIPPED | OUTPATIENT
Start: 2019-01-03 | End: 2019-03-11

## 2019-01-09 RX ORDER — AMLODIPINE BESYLATE 5 MG/1
TABLET ORAL
Qty: 30 TABLET | Refills: 0 | Status: SHIPPED | OUTPATIENT
Start: 2019-01-09 | End: 2019-02-08 | Stop reason: SDUPTHER

## 2019-01-28 ENCOUNTER — OFFICE VISIT (OUTPATIENT)
Dept: DERMATOLOGY | Age: 74
End: 2019-01-28
Payer: MEDICARE

## 2019-01-28 DIAGNOSIS — L57.0 ACTINIC KERATOSES: ICD-10-CM

## 2019-01-28 DIAGNOSIS — Z12.83 SCREENING EXAM FOR SKIN CANCER: ICD-10-CM

## 2019-01-28 DIAGNOSIS — D22.9 MULTIPLE BENIGN NEVI: Primary | ICD-10-CM

## 2019-01-28 DIAGNOSIS — Z85.828 HISTORY OF NONMELANOMA SKIN CANCER: ICD-10-CM

## 2019-01-28 PROCEDURE — 17003 DESTRUCT PREMALG LES 2-14: CPT | Performed by: DERMATOLOGY

## 2019-01-28 PROCEDURE — 17000 DESTRUCT PREMALG LESION: CPT | Performed by: DERMATOLOGY

## 2019-01-28 PROCEDURE — 99213 OFFICE O/P EST LOW 20 MIN: CPT | Performed by: DERMATOLOGY

## 2019-02-11 RX ORDER — AMLODIPINE BESYLATE 5 MG/1
TABLET ORAL
Qty: 90 TABLET | Refills: 0 | Status: SHIPPED | OUTPATIENT
Start: 2019-02-11 | End: 2019-06-06 | Stop reason: SDUPTHER

## 2019-02-11 RX ORDER — AMLODIPINE BESYLATE 5 MG/1
TABLET ORAL
Qty: 30 TABLET | Refills: 0 | Status: SHIPPED | OUTPATIENT
Start: 2019-02-11 | End: 2019-02-11 | Stop reason: SDUPTHER

## 2019-03-11 ENCOUNTER — OFFICE VISIT (OUTPATIENT)
Dept: FAMILY MEDICINE CLINIC | Age: 74
End: 2019-03-11
Payer: MEDICARE

## 2019-03-11 VITALS
HEART RATE: 57 BPM | OXYGEN SATURATION: 98 % | WEIGHT: 269 LBS | DIASTOLIC BLOOD PRESSURE: 66 MMHG | SYSTOLIC BLOOD PRESSURE: 114 MMHG | BODY MASS INDEX: 34.54 KG/M2

## 2019-03-11 DIAGNOSIS — R00.1 BRADYCARDIA: Primary | ICD-10-CM

## 2019-03-11 DIAGNOSIS — R01.1 MURMUR: ICD-10-CM

## 2019-03-11 PROCEDURE — G0444 DEPRESSION SCREEN ANNUAL: HCPCS | Performed by: FAMILY MEDICINE

## 2019-03-11 PROCEDURE — 99214 OFFICE O/P EST MOD 30 MIN: CPT | Performed by: FAMILY MEDICINE

## 2019-03-11 ASSESSMENT — PATIENT HEALTH QUESTIONNAIRE - PHQ9
6. FEELING BAD ABOUT YOURSELF - OR THAT YOU ARE A FAILURE OR HAVE LET YOURSELF OR YOUR FAMILY DOWN: 0
SUM OF ALL RESPONSES TO PHQ9 QUESTIONS 1 & 2: 6
SUM OF ALL RESPONSES TO PHQ QUESTIONS 1-9: 17
5. POOR APPETITE OR OVEREATING: 3
3. TROUBLE FALLING OR STAYING ASLEEP: 3
7. TROUBLE CONCENTRATING ON THINGS, SUCH AS READING THE NEWSPAPER OR WATCHING TELEVISION: 0
1. LITTLE INTEREST OR PLEASURE IN DOING THINGS: 3
SUM OF ALL RESPONSES TO PHQ QUESTIONS 1-9: 17
9. THOUGHTS THAT YOU WOULD BE BETTER OFF DEAD, OR OF HURTING YOURSELF: 0
4. FEELING TIRED OR HAVING LITTLE ENERGY: 3
10. IF YOU CHECKED OFF ANY PROBLEMS, HOW DIFFICULT HAVE THESE PROBLEMS MADE IT FOR YOU TO DO YOUR WORK, TAKE CARE OF THINGS AT HOME, OR GET ALONG WITH OTHER PEOPLE: 3
2. FEELING DOWN, DEPRESSED OR HOPELESS: 3
8. MOVING OR SPEAKING SO SLOWLY THAT OTHER PEOPLE COULD HAVE NOTICED. OR THE OPPOSITE, BEING SO FIGETY OR RESTLESS THAT YOU HAVE BEEN MOVING AROUND A LOT MORE THAN USUAL: 2

## 2019-03-18 ENCOUNTER — HOSPITAL ENCOUNTER (OUTPATIENT)
Dept: NON INVASIVE DIAGNOSTICS | Age: 74
Discharge: HOME | End: 2019-03-18
Payer: MEDICARE

## 2019-03-18 DIAGNOSIS — R01.1 MURMUR: ICD-10-CM

## 2019-03-18 LAB
LV EF: 58 %
LVEF MODALITY: NORMAL

## 2019-03-18 PROCEDURE — 6360000004 HC RX CONTRAST MEDICATION: Performed by: FAMILY MEDICINE

## 2019-03-18 PROCEDURE — C8929 TTE W OR WO FOL WCON,DOPPLER: HCPCS

## 2019-03-18 RX ADMIN — PERFLUTREN 2 MG: 6.52 INJECTION, SUSPENSION INTRAVENOUS at 14:27

## 2019-03-19 ENCOUNTER — TELEPHONE (OUTPATIENT)
Dept: FAMILY MEDICINE CLINIC | Age: 74
End: 2019-03-19

## 2019-04-08 ENCOUNTER — OFFICE VISIT (OUTPATIENT)
Dept: CARDIOLOGY CLINIC | Age: 74
End: 2019-04-08
Payer: MEDICARE

## 2019-04-08 VITALS
HEIGHT: 74 IN | DIASTOLIC BLOOD PRESSURE: 86 MMHG | WEIGHT: 270.2 LBS | SYSTOLIC BLOOD PRESSURE: 152 MMHG | BODY MASS INDEX: 34.68 KG/M2 | HEART RATE: 48 BPM

## 2019-04-08 DIAGNOSIS — R07.9 CHEST PAIN, UNSPECIFIED TYPE: Primary | ICD-10-CM

## 2019-04-08 DIAGNOSIS — I10 ESSENTIAL HYPERTENSION: ICD-10-CM

## 2019-04-08 PROBLEM — I25.10 CORONARY ARTERY DISEASE INVOLVING NATIVE CORONARY ARTERY OF NATIVE HEART WITHOUT ANGINA PECTORIS: Status: RESOLVED | Noted: 2019-04-08 | Resolved: 2019-04-08

## 2019-04-08 PROBLEM — Z95.1 S/P CABG (CORONARY ARTERY BYPASS GRAFT): Status: ACTIVE | Noted: 2019-04-08

## 2019-04-08 PROBLEM — I25.10 CORONARY ARTERY DISEASE INVOLVING NATIVE CORONARY ARTERY OF NATIVE HEART WITHOUT ANGINA PECTORIS: Status: ACTIVE | Noted: 2019-04-08

## 2019-04-08 PROCEDURE — 93000 ELECTROCARDIOGRAM COMPLETE: CPT | Performed by: INTERNAL MEDICINE

## 2019-04-08 PROCEDURE — 99204 OFFICE O/P NEW MOD 45 MIN: CPT | Performed by: INTERNAL MEDICINE

## 2019-04-08 RX ORDER — PROPRANOLOL HYDROCHLORIDE 120 MG/1
120 CAPSULE, EXTENDED RELEASE ORAL DAILY
COMMUNITY
End: 2019-11-06

## 2019-04-08 NOTE — PATIENT INSTRUCTIONS
1. Continue current medications and treatment    2. Increase activity level    3.  Follow up in 1 year

## 2019-04-08 NOTE — PROGRESS NOTES
[address] [facility]   Cardiovascular Evaluation    PATIENT: Buffy Luciano  DATE: 2019  MRN: [id number]  CSN: [id number]  : 1945    Primary Care Doctor/Referring provider: Johnathan Soulier, MD    Reason for evaluation/Chief complaint:   Bradycardia; Chest Pain; and New Patient    Subjective:    History of present illness on initial date of evaluation:   Buffy Luciano is a 68 y.o. patient who presents for evaluation of bradycardia. He reports that he has had some chest pain lately. This is not new for him. He states that during a recent colonoscopy his heart rate dropped to 39. He is on propranolol for migraine prevention and blood pressure. He reports he has not been active lately because he has been in a depression due to his wife being diagnosed with biliary cancer. Patient Active Problem List   Diagnosis    Low back pain    HLD (hyperlipidemia)    HTN (hypertension)    Unstable angina ([facility] [address]    Chest pain    ED (erectile dysfunction)    Angioedema         Cardiac Testing: I have reviewed the findings below. EKG:  ECHO:   STRESS TEST:  CATH:  BYPASS:  VASCULAR:    Past Medical History:   has a past medical history of Bradycardia, Coronary artery disease involving native coronary artery of native heart without angina pectoris, Hypercholesteremia, Hypertension, Low back pain, and Strabismus. Surgical History:   has a past surgical history that includes cyst removal; Esophagogastric fundoplication; Tonsillectomy; eye surgery; Strabismus surgery; External ear surgery; Colonoscopy (); Upper gastrointestinal endoscopy (); and Upper gastrointestinal endoscopy (2014). Social History:   reports that he has never smoked. He has never used smokeless tobacco. He reports that he drinks about 1.5 oz of alcohol per week. He reports that he does not use drugs.      Family History:  No evidence for sudden cardiac death or premature CAD    Medications:  Reviewed and are listed positives identified in the HPI, all other review of symptoms findings as below.      Review of Systems - History obtained from the patient  General ROS: negative for - chills, fever or night sweats  Psychological ROS: negative for - disorientation or hallucinations  Ophthalmic ROS: negative for - dry eyes, eye pain or loss of vision  ENT ROS: negative for - nasal discharge or sore throat  Allergy and Immunology ROS: negative for - hives or itchy/watery eyes  Hematological and Lymphatic ROS: negative for - jaundice or night sweats  Endocrine ROS: negative for - mood swings or temperature intolerance  Breast ROS: deferred  Respiratory ROS: negative for - hemoptysis or stridor  Cardiovascular ROS: negative for - chest pain, dyspnea on exertion or palpitations  Gastrointestinal ROS: no abdominal pain, change in bowel habits, or black or bloody stools  Genito-Urinary ROS: no dysuria, trouble voiding, or hematuria  Musculoskeletal ROS: negative for - gait disturbance, joint pain or joint stiffness  Neurological ROS: negative for - seizures or speech problems  Dermatological ROS: negative for - rash or skin lesion changes      Physical Examination:    BP (!) 152/86   Pulse (!) 48   Ht 6' 2\" (1.88 m)   Wt 270 lb 3.2 oz (122.6 kg)   BMI 34.69 kg/m²   BP (!) 152/86   Pulse (!) 48   Ht 6' 2\" (1.88 m)   Wt 270 lb 3.2 oz (122.6 kg)   BMI 34.69 kg/m²    Weight: 270 lb 3.2 oz (122.6 kg)     Wt Readings from Last 3 Encounters:   04/08/19 270 lb 3.2 oz (122.6 kg)   03/11/19 269 lb (122 kg)   10/17/18 266 lb (120.7 kg)     No intake or output data in the 24 hours ending 04/08/19 0835    General Appearance:  Alert, cooperative, no distress, appears stated age   Head:  Normocephalic, without obvious abnormality, atraumatic   Eyes:  PERRL, conjunctiva/corneas clear       Nose: Nares normal, no drainage or sinus tenderness   Throat: Lips, mucosa, and tongue normal   Neck: Supple, symmetrical, trachea midline, no adenopathy, thyroid: not enlarged, symmetric, no tenderness/mass/nodules, no carotid bruit or JVD       Lungs:   Clear to auscultation bilaterally, respirations unlabored   Chest Wall:  No tenderness or deformity   Heart:  Regular rhythm and normal rate; S1, S2 are normal; no murmur noted; no rub or gallop   Abdomen:   Soft, non-tender, bowel sounds active all four quadrants,  no masses, no organomegaly           Extremities: Extremities normal, atraumatic, no cyanosis or edema   Pulses: 2+ and symmetric   Skin: Skin color, texture, turgor normal, no rashes or lesions   Pysch: Normal mood and affect   Neurologic: Normal gross motor and sensory exam.         Labs  No results for input(s): WBC, HGB, HCT, MCV, PLT in the last 72 hours. No results for input(s): CREATININE, BUN, NA, K, CL, CO2 in the last 72 hours. No results for input(s): INR, PROTIME in the last 72 hours. No results for input(s): TROPONINI in the last 72 hours. Invalid input(s): PRO-BNP  No results for input(s): CHOL, HDL in the last 72 hours. Invalid input(s): LDL, TG      Imaging:  I have reviewed the below testing personally and my interpretation is below. EKG:  CXR:      Assessment:  68 y.o. patient with:  Problem List Items Addressed This Visit     HTN (hypertension)    Relevant Medications    propranolol (INDERAL LA) 120 MG extended release capsule    Chest pain - Primary    Relevant Orders    EKG 12 Lead (Completed)          Plan:  1. The patient was seen for >25 minutes. >50% of the time was devoted to giving the patient detailed instructions instructions on addressing diet, regular exercise, weight control, smoking abstention, medication compliance, and stress minimization. The patient was provided written and verbal instructions regarding risk factor modification. 2. Follow up in 1 year    Scribe's attestation:   This note was scribed in the presence of Dr. Aliza Boo by Daniella Armas RN    I, Dr. Aliza Boo, personally performed the services described in this documentation, as scribed by the above signed scribe in my presence. It is both accurate and complete to my knowledge. I agree with the details independently gathered by the clinical support staff, while the remaining scribed note accurately describes my personal service to the patient. All questions and concerns were addressed to the patient/family. Alternatives to my treatment were discussed. The note was completed using EMR. Every effort was made to ensure accuracy; however, inadvertent computerized transcription errors may be present.     Sondra Medina MD, [address], [place], [place]  [phone] [facility]  [phone] [facility] central  4/8/2019  8:35 AM

## 2019-04-15 ENCOUNTER — PATIENT MESSAGE (OUTPATIENT)
Dept: OTHER | Facility: CLINIC | Age: 74
End: 2019-04-15

## 2019-05-06 NOTE — TELEPHONE ENCOUNTER
From: Zac Kumar RN  To: Stalin Amado  Sent: 4/15/2019 3:00 PM EDT  Subject: You may be due for Colorectal Cancer Screening    April 15, 2019      Stalin Amado,     We are contacting you because our records show you might be due for a screening test for colorectal cancer, which is the most preventable cancer. Screening tests for colorectal cancer not only include colonoscopy, but also simple tests that you can do from the comfort of your home at little to no cost for you. You can work with your healthcare provider to determine which screening test is right for you and how often you should be screened. Please reply to this message if you would like to schedule a test.     If you have already had a colorectal cancer screening, nice work! Please reply to this message with when and where you had the test performed, so we can add a copy to your chart. Did you know that colorectal cancer is the second leading cause of cancer deaths for men and women 54-65 years of age? When colorectal cancer is caught early, there's over a 90% chance it can be treated successfully. However, the first signs of this type of cancer often go undetected, so screening tests are important even if you have no symptoms.     Sincerely,      Your health care team

## 2019-06-06 RX ORDER — AMLODIPINE BESYLATE 5 MG/1
TABLET ORAL
Qty: 90 TABLET | Refills: 3 | Status: SHIPPED | OUTPATIENT
Start: 2019-06-06 | End: 2019-12-09 | Stop reason: SDUPTHER

## 2019-06-06 NOTE — TELEPHONE ENCOUNTER
Requested Prescriptions     Pending Prescriptions Disp Refills    amLODIPine (NORVASC) 5 MG tablet 90 tablet 0

## 2019-06-26 DIAGNOSIS — F32.A DEPRESSION, UNSPECIFIED DEPRESSION TYPE: ICD-10-CM

## 2019-06-26 RX ORDER — CITALOPRAM 40 MG/1
40 TABLET ORAL DAILY
Qty: 90 TABLET | Refills: 5 | Status: SHIPPED | OUTPATIENT
Start: 2019-06-26 | End: 2020-08-17

## 2019-06-26 RX ORDER — CITALOPRAM 40 MG/1
40 TABLET ORAL DAILY
Qty: 90 TABLET | Refills: 0 | Status: CANCELLED | OUTPATIENT
Start: 2019-06-26

## 2019-08-07 RX ORDER — PROPRANOLOL HYDROCHLORIDE 120 MG/1
CAPSULE, EXTENDED RELEASE ORAL
Qty: 30 CAPSULE | Refills: 0 | OUTPATIENT
Start: 2019-08-07

## 2019-11-06 ENCOUNTER — OFFICE VISIT (OUTPATIENT)
Dept: FAMILY MEDICINE CLINIC | Age: 74
End: 2019-11-06
Payer: MEDICARE

## 2019-11-06 VITALS
WEIGHT: 267 LBS | SYSTOLIC BLOOD PRESSURE: 134 MMHG | BODY MASS INDEX: 34.28 KG/M2 | HEART RATE: 56 BPM | DIASTOLIC BLOOD PRESSURE: 82 MMHG | OXYGEN SATURATION: 99 %

## 2019-11-06 DIAGNOSIS — M10.9 GOUT, UNSPECIFIED CAUSE, UNSPECIFIED CHRONICITY, UNSPECIFIED SITE: ICD-10-CM

## 2019-11-06 DIAGNOSIS — I10 ESSENTIAL HYPERTENSION: ICD-10-CM

## 2019-11-06 DIAGNOSIS — Z12.5 SCREENING FOR PROSTATE CANCER: ICD-10-CM

## 2019-11-06 DIAGNOSIS — E78.5 HYPERLIPIDEMIA, UNSPECIFIED HYPERLIPIDEMIA TYPE: ICD-10-CM

## 2019-11-06 DIAGNOSIS — R73.9 HYPERGLYCEMIA: ICD-10-CM

## 2019-11-06 DIAGNOSIS — Z23 NEED FOR PROPHYLACTIC VACCINATION AND INOCULATION AGAINST INFLUENZA: Primary | ICD-10-CM

## 2019-11-06 LAB
A/G RATIO: 1.7 (ref 1.1–2.2)
ALBUMIN SERPL-MCNC: 4.5 G/DL (ref 3.4–5)
ALP BLD-CCNC: 78 U/L (ref 40–129)
ALT SERPL-CCNC: 20 U/L (ref 10–40)
ANION GAP SERPL CALCULATED.3IONS-SCNC: 16 MMOL/L (ref 3–16)
AST SERPL-CCNC: 16 U/L (ref 15–37)
BILIRUB SERPL-MCNC: 0.5 MG/DL (ref 0–1)
BUN BLDV-MCNC: 20 MG/DL (ref 7–20)
CALCIUM SERPL-MCNC: 9.7 MG/DL (ref 8.3–10.6)
CHLORIDE BLD-SCNC: 99 MMOL/L (ref 99–110)
CHOLESTEROL, TOTAL: 197 MG/DL (ref 0–199)
CO2: 26 MMOL/L (ref 21–32)
CREAT SERPL-MCNC: 1 MG/DL (ref 0.8–1.3)
GFR AFRICAN AMERICAN: >60
GFR NON-AFRICAN AMERICAN: >60
GLOBULIN: 2.6 G/DL
GLUCOSE BLD-MCNC: 123 MG/DL (ref 70–99)
HDLC SERPL-MCNC: 63 MG/DL (ref 40–60)
LDL CHOLESTEROL CALCULATED: 115 MG/DL
POTASSIUM SERPL-SCNC: 4.4 MMOL/L (ref 3.5–5.1)
PROSTATE SPECIFIC ANTIGEN: 0.72 NG/ML (ref 0–4)
SODIUM BLD-SCNC: 141 MMOL/L (ref 136–145)
TOTAL PROTEIN: 7.1 G/DL (ref 6.4–8.2)
TRIGL SERPL-MCNC: 97 MG/DL (ref 0–150)
URIC ACID, SERUM: 6.3 MG/DL (ref 3.5–7.2)
VLDLC SERPL CALC-MCNC: 19 MG/DL

## 2019-11-06 PROCEDURE — 36415 COLL VENOUS BLD VENIPUNCTURE: CPT | Performed by: FAMILY MEDICINE

## 2019-11-06 PROCEDURE — 99214 OFFICE O/P EST MOD 30 MIN: CPT | Performed by: FAMILY MEDICINE

## 2019-11-06 PROCEDURE — G0008 ADMIN INFLUENZA VIRUS VAC: HCPCS | Performed by: FAMILY MEDICINE

## 2019-11-06 PROCEDURE — 90653 IIV ADJUVANT VACCINE IM: CPT | Performed by: FAMILY MEDICINE

## 2019-11-07 LAB
ESTIMATED AVERAGE GLUCOSE: 119.8 MG/DL
HBA1C MFR BLD: 5.8 %

## 2019-11-15 RX ORDER — HYDROXYZINE HYDROCHLORIDE 25 MG/1
TABLET, FILM COATED ORAL
Qty: 90 TABLET | Refills: 0 | Status: SHIPPED | OUTPATIENT
Start: 2019-11-15 | End: 2020-05-20

## 2019-11-19 ENCOUNTER — OFFICE VISIT (OUTPATIENT)
Dept: FAMILY MEDICINE CLINIC | Age: 74
End: 2019-11-19
Payer: MEDICARE

## 2019-11-19 VITALS
DIASTOLIC BLOOD PRESSURE: 80 MMHG | HEART RATE: 66 BPM | SYSTOLIC BLOOD PRESSURE: 120 MMHG | OXYGEN SATURATION: 97 % | WEIGHT: 267 LBS | BODY MASS INDEX: 34.28 KG/M2

## 2019-11-19 DIAGNOSIS — L30.9 ECZEMA, UNSPECIFIED TYPE: Primary | ICD-10-CM

## 2019-11-19 PROCEDURE — 99213 OFFICE O/P EST LOW 20 MIN: CPT | Performed by: FAMILY MEDICINE

## 2019-11-19 PROCEDURE — 96372 THER/PROPH/DIAG INJ SC/IM: CPT | Performed by: FAMILY MEDICINE

## 2019-11-19 RX ORDER — MOMETASONE FUROATE 1 MG/G
CREAM TOPICAL
Qty: 60 G | Refills: 0 | Status: SHIPPED | OUTPATIENT
Start: 2019-11-19 | End: 2022-04-21

## 2019-11-19 RX ORDER — METHYLPREDNISOLONE SODIUM SUCCINATE 125 MG/2ML
125 INJECTION, POWDER, LYOPHILIZED, FOR SOLUTION INTRAMUSCULAR; INTRAVENOUS ONCE
Status: COMPLETED | OUTPATIENT
Start: 2019-11-19 | End: 2019-11-19

## 2019-11-19 RX ADMIN — METHYLPREDNISOLONE SODIUM SUCCINATE 125 MG: 125 INJECTION, POWDER, LYOPHILIZED, FOR SOLUTION INTRAMUSCULAR; INTRAVENOUS at 15:06

## 2019-11-27 ENCOUNTER — HOSPITAL ENCOUNTER (EMERGENCY)
Age: 74
Discharge: HOME | End: 2019-11-27
Attending: EMERGENCY MEDICINE
Payer: MEDICARE

## 2019-11-27 ENCOUNTER — TELEPHONE (OUTPATIENT)
Dept: FAMILY MEDICINE CLINIC | Age: 74
End: 2019-11-27

## 2019-11-27 VITALS
TEMPERATURE: 97.7 F | RESPIRATION RATE: 18 BRPM | HEIGHT: 74 IN | WEIGHT: 265 LBS | BODY MASS INDEX: 34.01 KG/M2 | OXYGEN SATURATION: 96 % | SYSTOLIC BLOOD PRESSURE: 165 MMHG | HEART RATE: 56 BPM | DIASTOLIC BLOOD PRESSURE: 98 MMHG

## 2019-11-27 DIAGNOSIS — L30.9 ECZEMA, UNSPECIFIED TYPE: ICD-10-CM

## 2019-11-27 DIAGNOSIS — R21 RASH AND OTHER NONSPECIFIC SKIN ERUPTION: Primary | ICD-10-CM

## 2019-11-27 PROCEDURE — 99282 EMERGENCY DEPT VISIT SF MDM: CPT

## 2019-11-27 PROCEDURE — 6370000000 HC RX 637 (ALT 250 FOR IP): Performed by: EMERGENCY MEDICINE

## 2019-11-27 RX ORDER — DIPHENHYDRAMINE HCL 25 MG
25 CAPSULE ORAL EVERY 4 HOURS PRN
Qty: 30 CAPSULE | Refills: 0 | Status: SHIPPED | OUTPATIENT
Start: 2019-11-27 | End: 2019-12-07

## 2019-11-27 RX ORDER — PREDNISONE 20 MG/1
40 TABLET ORAL DAILY
Qty: 10 TABLET | Refills: 0 | Status: SHIPPED | OUTPATIENT
Start: 2019-11-27 | End: 2019-12-02

## 2019-11-27 RX ORDER — PREDNISONE 20 MG/1
40 TABLET ORAL ONCE
Status: COMPLETED | OUTPATIENT
Start: 2019-11-27 | End: 2019-11-27

## 2019-11-27 RX ADMIN — PREDNISONE 40 MG: 20 TABLET ORAL at 06:22

## 2019-11-27 ASSESSMENT — ENCOUNTER SYMPTOMS
CONSTIPATION: 0
NAUSEA: 0
SHORTNESS OF BREATH: 0
VOMITING: 0
COUGH: 0
BACK PAIN: 0
DIARRHEA: 0
ABDOMINAL PAIN: 0
SORE THROAT: 0

## 2019-12-02 DIAGNOSIS — I10 ESSENTIAL HYPERTENSION: ICD-10-CM

## 2019-12-02 RX ORDER — HYDROCHLOROTHIAZIDE 25 MG/1
TABLET ORAL
Qty: 90 TABLET | Refills: 0 | Status: SHIPPED | OUTPATIENT
Start: 2019-12-02 | End: 2019-12-11

## 2019-12-09 RX ORDER — AMLODIPINE BESYLATE 5 MG/1
5 TABLET ORAL 2 TIMES DAILY
Qty: 180 TABLET | Refills: 3 | Status: SHIPPED | OUTPATIENT
Start: 2019-12-09 | End: 2019-12-27 | Stop reason: SINTOL

## 2019-12-11 ENCOUNTER — TELEPHONE (OUTPATIENT)
Dept: ORTHOPEDIC SURGERY | Age: 74
End: 2019-12-11

## 2019-12-11 ENCOUNTER — OFFICE VISIT (OUTPATIENT)
Dept: ORTHOPEDIC SURGERY | Age: 74
End: 2019-12-11
Payer: MEDICARE

## 2019-12-11 VITALS — HEIGHT: 74 IN | BODY MASS INDEX: 34.01 KG/M2 | WEIGHT: 264.99 LBS

## 2019-12-11 DIAGNOSIS — M25.561 RIGHT KNEE PAIN, UNSPECIFIED CHRONICITY: Primary | ICD-10-CM

## 2019-12-11 PROCEDURE — 99212 OFFICE O/P EST SF 10 MIN: CPT | Performed by: ORTHOPAEDIC SURGERY

## 2019-12-16 ENCOUNTER — OFFICE VISIT (OUTPATIENT)
Dept: ORTHOPEDIC SURGERY | Age: 74
End: 2019-12-16
Payer: MEDICARE

## 2019-12-16 VITALS — BODY MASS INDEX: 34.01 KG/M2 | HEIGHT: 74 IN | WEIGHT: 264.99 LBS

## 2019-12-16 DIAGNOSIS — S83.231D COMPLEX TEAR OF MEDIAL MENISCUS OF RIGHT KNEE AS CURRENT INJURY, SUBSEQUENT ENCOUNTER: Primary | ICD-10-CM

## 2019-12-16 PROCEDURE — 99214 OFFICE O/P EST MOD 30 MIN: CPT | Performed by: ORTHOPAEDIC SURGERY

## 2019-12-26 NOTE — PROGRESS NOTES
Constdarwinia Bimler    Age 76 y.o.    male    1945    N [id number]    1/7/2020  Arrival Time_____________  OR Time____________[address]     Procedure(s):  EXAM UNDER ANESTHESIA VIDEO ARTHROSCOPY RIGHT KNEE, PARTIAL MEDIAL MENISCECTOMY, CHONDROPLASTY, SYNOVECTOMY                      General    Surgeon(s):  Nohemy Stanley, MD       Phone [phone] ([place])     [address] [place] [address] [facility]  Cell Work  ______________________________________________________________________________________________________________________________________________________________________________________________________________________________________________________________________________________________________________________________________________________________    PCP _____________________________ Phone_________________     [email or web address] Date/Time_________________      H&P__________________Bringing      Chart              Epic    DOS           Called_______  EKG__________________Bringing      Chart              Epic    DOS           Called_______  LAB__________________ Bringing      Chart              Epic    DOS           Called_______  CardiacClearance _______Bringing      Chart              Epic    DOS           Called_______      Cardiologist________________________ Phone___________________________      ? Judaism concerns / Waiver on Chart            [employer]________________  ? Pre-op Instructions Given [place]          _________________________________  ? Directions to Surgery Center                          _________________________________  ? Transportation Home_______________      _________________________________  ?  Crutches/Walker__________________        _________________________________      ________Pre-op Orders   _______Transcribed    _______Wt.  ________Pharmacy          _______SCD  ______VTE     ______Beta Blocker  ________Consent             ________RAVINDER Culver

## 2020-01-03 ENCOUNTER — OFFICE VISIT (OUTPATIENT)
Dept: FAMILY MEDICINE CLINIC | Age: 75
End: 2020-01-03
Payer: MEDICARE

## 2020-01-03 VITALS
DIASTOLIC BLOOD PRESSURE: 80 MMHG | OXYGEN SATURATION: 97 % | BODY MASS INDEX: 34.28 KG/M2 | SYSTOLIC BLOOD PRESSURE: 144 MMHG | WEIGHT: 267 LBS | HEART RATE: 53 BPM

## 2020-01-03 PROCEDURE — 93000 ELECTROCARDIOGRAM COMPLETE: CPT | Performed by: FAMILY MEDICINE

## 2020-01-03 PROCEDURE — 99213 OFFICE O/P EST LOW 20 MIN: CPT | Performed by: FAMILY MEDICINE

## 2020-01-03 RX ORDER — PROMETHAZINE HYDROCHLORIDE AND CODEINE PHOSPHATE 6.25; 1 MG/5ML; MG/5ML
5 SYRUP ORAL 4 TIMES DAILY PRN
Qty: 240 ML | Refills: 0 | Status: SHIPPED | OUTPATIENT
Start: 2020-01-03 | End: 2020-01-08

## 2020-01-03 RX ORDER — AZITHROMYCIN 250 MG/1
250 TABLET, FILM COATED ORAL SEE ADMIN INSTRUCTIONS
Qty: 12 TABLET | Refills: 0 | Status: SHIPPED | OUTPATIENT
Start: 2020-01-03 | End: 2020-01-08

## 2020-01-03 ASSESSMENT — PATIENT HEALTH QUESTIONNAIRE - PHQ9
SUM OF ALL RESPONSES TO PHQ QUESTIONS 1-9: 0
1. LITTLE INTEREST OR PLEASURE IN DOING THINGS: 0
SUM OF ALL RESPONSES TO PHQ9 QUESTIONS 1 & 2: 0
SUM OF ALL RESPONSES TO PHQ QUESTIONS 1-9: 0
2. FEELING DOWN, DEPRESSED OR HOPELESS: 0

## 2020-01-03 NOTE — PROGRESS NOTES
this visit. Allergies:  Sulfa antibiotics; Amlodipine; and Lisinopril  History of allergic reaction to anesthesia:  No     Social History     Tobacco Use   Smoking Status Never Smoker   Smokeless Tobacco Never Used     The patient states he drinks 7 per week. Family History   Problem Relation Age of Onset    Stroke Mother 47    High Blood Pressure Mother     High Cholesterol Mother     Cancer Father         lung    Cancer Sister         breast    Cancer Brother         multiple myeloma       REVIEW OF SYSTEMS:    CONSTITUTIONAL:  negative  EYES:  negative  HEENT:  negative  RESPIRATORY:  negative  CARDIOVASCULAR:  negative  GASTROINTESTINAL:  negative  GENITOURINARY:  negative  INTEGUMENT/BREAST:  negative  HEMATOLOGIC/LYMPHATIC:  negative  ALLERGIC/IMMUNOLOGIC:  negative  ENDOCRINE:  negative  MUSCULOSKELETAL:  negative  NEUROLOGICAL:  negative    PHYSICAL EXAM:      BP (!) 144/80   Pulse 53   Wt 267 lb (121.1 kg)   SpO2 97%   BMI 34.28 kg/m²     CONSTITUTIONAL:  awake, alert, cooperative, no apparent distress, and appears stated age    Eyes:  Lids and lashes normal, pupils equal, round and reactive to light, extra ocular muscles intact, sclera clear, conjunctiva normal    Head/ENT:  Normocephalic, without obvious abnormality, atramatic, sinuses nontender on palpation, external ears without lesions, oral pharynx with moist mucus membranes, tonsils without erythema or exudates, gums normal and good dentition.     Neck:  Supple, symmetrical, trachea midline, no adenopathy, thyroid symmetric, not enlarged and no tenderness, skin normal, No carotid bruit    Heart:  Normal apical impulse, regular rate and rhythm, normal S1 and S2, no S3 or S4, and no murmur noted    Lungs:  No increased work of breathing, good air exchange, clear to auscultation bilaterally, no crackles or wheezing    Abdomen:  No scars, normal bowel sounds, soft, non-distended, non-tender, no masses palpated, no hepatosplenomegally    Extremities:  No clubbing, cyanosis, or edema    NEUROLOGIC:  Awake, alert, oriented to name, place and time. Cranial nerves II-XII are grossly intact. Motor is 5 out of 5 bilaterally. DATA:  EKG:  Date:  1/3/2020  I have reviewed EKG with the following interpretation:  Impression:  normal          ASSESSMENT AND PLAN:    1. Patient is a 76 y.o. male with above specified procedure planned on 1/7/2020 with Dr. Killian Escalante at [address] [place] [address] 2. Stop ASA/NSAIDS medications 7-10 days prior to procedure. 3.  Cleared for surgery  4.  Copy being sent to Dr. Macrina Johnston M.D    [address] [place] [address], [address] [place] [address]  [phone]

## 2020-01-06 ENCOUNTER — ANESTHESIA EVENT (OUTPATIENT)
Dept: OPERATING ROOM | Age: 75
End: 2020-01-06
Payer: MEDICARE

## 2020-01-06 ENCOUNTER — TELEPHONE (OUTPATIENT)
Dept: ORTHOPEDIC SURGERY | Age: 75
End: 2020-01-06

## 2020-01-07 ENCOUNTER — HOSPITAL ENCOUNTER (OUTPATIENT)
Age: 75
Setting detail: OUTPATIENT SURGERY
Discharge: HOME | End: 2020-01-07
Attending: ORTHOPAEDIC SURGERY | Admitting: ORTHOPAEDIC SURGERY
Payer: MEDICARE

## 2020-01-07 ENCOUNTER — ANESTHESIA (OUTPATIENT)
Dept: OPERATING ROOM | Age: 75
End: 2020-01-07
Payer: MEDICARE

## 2020-01-07 VITALS
DIASTOLIC BLOOD PRESSURE: 78 MMHG | SYSTOLIC BLOOD PRESSURE: 172 MMHG | TEMPERATURE: 97.3 F | HEART RATE: 64 BPM | BODY MASS INDEX: 33.88 KG/M2 | WEIGHT: 264 LBS | HEIGHT: 74 IN | RESPIRATION RATE: 20 BRPM | OXYGEN SATURATION: 98 %

## 2020-01-07 VITALS
TEMPERATURE: 97.7 F | RESPIRATION RATE: 5 BRPM | DIASTOLIC BLOOD PRESSURE: 73 MMHG | SYSTOLIC BLOOD PRESSURE: 121 MMHG | OXYGEN SATURATION: 98 %

## 2020-01-07 PROCEDURE — 7100000010 HC PHASE II RECOVERY - FIRST 15 MIN: Performed by: ORTHOPAEDIC SURGERY

## 2020-01-07 PROCEDURE — 2500000003 HC RX 250 WO HCPCS: Performed by: ANESTHESIOLOGY

## 2020-01-07 PROCEDURE — 2580000003 HC RX 258: Performed by: ORTHOPAEDIC SURGERY

## 2020-01-07 PROCEDURE — 3600000004 HC SURGERY LEVEL 4 BASE: Performed by: ORTHOPAEDIC SURGERY

## 2020-01-07 PROCEDURE — 3700000000 HC ANESTHESIA ATTENDED CARE: Performed by: ORTHOPAEDIC SURGERY

## 2020-01-07 PROCEDURE — 7100000011 HC PHASE II RECOVERY - ADDTL 15 MIN: Performed by: ORTHOPAEDIC SURGERY

## 2020-01-07 PROCEDURE — 7100000000 HC PACU RECOVERY - FIRST 15 MIN: Performed by: ORTHOPAEDIC SURGERY

## 2020-01-07 PROCEDURE — 6360000002 HC RX W HCPCS: Performed by: NURSE ANESTHETIST, CERTIFIED REGISTERED

## 2020-01-07 PROCEDURE — 2580000003 HC RX 258: Performed by: ANESTHESIOLOGY

## 2020-01-07 PROCEDURE — 2500000003 HC RX 250 WO HCPCS: Performed by: NURSE ANESTHETIST, CERTIFIED REGISTERED

## 2020-01-07 PROCEDURE — 2709999900 HC NON-CHARGEABLE SUPPLY: Performed by: ORTHOPAEDIC SURGERY

## 2020-01-07 PROCEDURE — 3600000014 HC SURGERY LEVEL 4 ADDTL 15MIN: Performed by: ORTHOPAEDIC SURGERY

## 2020-01-07 PROCEDURE — 6370000000 HC RX 637 (ALT 250 FOR IP): Performed by: ANESTHESIOLOGY

## 2020-01-07 PROCEDURE — 2500000003 HC RX 250 WO HCPCS: Performed by: ORTHOPAEDIC SURGERY

## 2020-01-07 PROCEDURE — 7100000001 HC PACU RECOVERY - ADDTL 15 MIN: Performed by: ORTHOPAEDIC SURGERY

## 2020-01-07 PROCEDURE — 6360000002 HC RX W HCPCS: Performed by: ANESTHESIOLOGY

## 2020-01-07 PROCEDURE — 3700000001 HC ADD 15 MINUTES (ANESTHESIA): Performed by: ORTHOPAEDIC SURGERY

## 2020-01-07 PROCEDURE — 6360000002 HC RX W HCPCS: Performed by: ORTHOPAEDIC SURGERY

## 2020-01-07 RX ORDER — DIPHENHYDRAMINE HYDROCHLORIDE 50 MG/ML
12.5 INJECTION INTRAMUSCULAR; INTRAVENOUS
Status: DISCONTINUED | OUTPATIENT
Start: 2020-01-07 | End: 2020-01-07 | Stop reason: HOSPADM

## 2020-01-07 RX ORDER — SODIUM CHLORIDE 0.9 % (FLUSH) 0.9 %
10 SYRINGE (ML) INJECTION EVERY 12 HOURS SCHEDULED
Status: DISCONTINUED | OUTPATIENT
Start: 2020-01-07 | End: 2020-01-07 | Stop reason: HOSPADM

## 2020-01-07 RX ORDER — OXYCODONE HYDROCHLORIDE AND ACETAMINOPHEN 5; 325 MG/1; MG/1
1 TABLET ORAL PRN
Status: COMPLETED | OUTPATIENT
Start: 2020-01-07 | End: 2020-01-07

## 2020-01-07 RX ORDER — SODIUM CHLORIDE, SODIUM LACTATE, POTASSIUM CHLORIDE, AND CALCIUM CHLORIDE .6; .31; .03; .02 G/100ML; G/100ML; G/100ML; G/100ML
IRRIGANT IRRIGATION PRN
Status: DISCONTINUED | OUTPATIENT
Start: 2020-01-07 | End: 2020-01-07 | Stop reason: ALTCHOICE

## 2020-01-07 RX ORDER — MORPHINE SULFATE 2 MG/ML
1 INJECTION, SOLUTION INTRAMUSCULAR; INTRAVENOUS EVERY 5 MIN PRN
Status: DISCONTINUED | OUTPATIENT
Start: 2020-01-07 | End: 2020-01-07 | Stop reason: HOSPADM

## 2020-01-07 RX ORDER — OXYCODONE HYDROCHLORIDE AND ACETAMINOPHEN 5; 325 MG/1; MG/1
2 TABLET ORAL PRN
Status: COMPLETED | OUTPATIENT
Start: 2020-01-07 | End: 2020-01-07

## 2020-01-07 RX ORDER — HYDROCODONE BITARTRATE AND ACETAMINOPHEN 5; 325 MG/1; MG/1
1 TABLET ORAL EVERY 4 HOURS PRN
Qty: 40 TABLET | Refills: 0 | Status: SHIPPED | OUTPATIENT
Start: 2020-01-07 | End: 2020-01-14

## 2020-01-07 RX ORDER — SODIUM CHLORIDE 0.9 % (FLUSH) 0.9 %
10 SYRINGE (ML) INJECTION PRN
Status: DISCONTINUED | OUTPATIENT
Start: 2020-01-07 | End: 2020-01-07 | Stop reason: HOSPADM

## 2020-01-07 RX ORDER — HYDRALAZINE HYDROCHLORIDE 20 MG/ML
5 INJECTION INTRAMUSCULAR; INTRAVENOUS EVERY 10 MIN PRN
Status: DISCONTINUED | OUTPATIENT
Start: 2020-01-07 | End: 2020-01-07 | Stop reason: HOSPADM

## 2020-01-07 RX ORDER — PROPOFOL 10 MG/ML
INJECTION, EMULSION INTRAVENOUS PRN
Status: DISCONTINUED | OUTPATIENT
Start: 2020-01-07 | End: 2020-01-07 | Stop reason: SDUPTHER

## 2020-01-07 RX ORDER — LABETALOL HYDROCHLORIDE 5 MG/ML
5 INJECTION, SOLUTION INTRAVENOUS EVERY 10 MIN PRN
Status: DISCONTINUED | OUTPATIENT
Start: 2020-01-07 | End: 2020-01-07 | Stop reason: HOSPADM

## 2020-01-07 RX ORDER — ONDANSETRON 2 MG/ML
4 INJECTION INTRAMUSCULAR; INTRAVENOUS
Status: DISCONTINUED | OUTPATIENT
Start: 2020-01-07 | End: 2020-01-07 | Stop reason: HOSPADM

## 2020-01-07 RX ORDER — MIDAZOLAM HYDROCHLORIDE 1 MG/ML
INJECTION INTRAMUSCULAR; INTRAVENOUS PRN
Status: DISCONTINUED | OUTPATIENT
Start: 2020-01-07 | End: 2020-01-07 | Stop reason: SDUPTHER

## 2020-01-07 RX ORDER — LIDOCAINE HYDROCHLORIDE 10 MG/ML
1 INJECTION, SOLUTION EPIDURAL; INFILTRATION; INTRACAUDAL; PERINEURAL
Status: DISCONTINUED | OUTPATIENT
Start: 2020-01-07 | End: 2020-01-07 | Stop reason: HOSPADM

## 2020-01-07 RX ORDER — HYDRALAZINE HYDROCHLORIDE 20 MG/ML
5 INJECTION INTRAMUSCULAR; INTRAVENOUS ONCE
Status: COMPLETED | OUTPATIENT
Start: 2020-01-07 | End: 2020-01-07

## 2020-01-07 RX ORDER — PROMETHAZINE HYDROCHLORIDE 25 MG/ML
6.25 INJECTION, SOLUTION INTRAMUSCULAR; INTRAVENOUS
Status: DISCONTINUED | OUTPATIENT
Start: 2020-01-07 | End: 2020-01-07 | Stop reason: HOSPADM

## 2020-01-07 RX ORDER — MORPHINE SULFATE 2 MG/ML
2 INJECTION, SOLUTION INTRAMUSCULAR; INTRAVENOUS EVERY 5 MIN PRN
Status: DISCONTINUED | OUTPATIENT
Start: 2020-01-07 | End: 2020-01-07 | Stop reason: HOSPADM

## 2020-01-07 RX ORDER — MEPERIDINE HYDROCHLORIDE 50 MG/ML
12.5 INJECTION INTRAMUSCULAR; INTRAVENOUS; SUBCUTANEOUS EVERY 5 MIN PRN
Status: DISCONTINUED | OUTPATIENT
Start: 2020-01-07 | End: 2020-01-07 | Stop reason: HOSPADM

## 2020-01-07 RX ORDER — ONDANSETRON 2 MG/ML
INJECTION INTRAMUSCULAR; INTRAVENOUS PRN
Status: DISCONTINUED | OUTPATIENT
Start: 2020-01-07 | End: 2020-01-07 | Stop reason: SDUPTHER

## 2020-01-07 RX ORDER — ASPIRIN 325 MG
325 TABLET, DELAYED RELEASE (ENTERIC COATED) ORAL DAILY
Qty: 14 TABLET | Refills: 0 | Status: SHIPPED | OUTPATIENT
Start: 2020-01-07 | End: 2022-09-09

## 2020-01-07 RX ORDER — SODIUM CHLORIDE, SODIUM LACTATE, POTASSIUM CHLORIDE, CALCIUM CHLORIDE 600; 310; 30; 20 MG/100ML; MG/100ML; MG/100ML; MG/100ML
INJECTION, SOLUTION INTRAVENOUS CONTINUOUS
Status: DISCONTINUED | OUTPATIENT
Start: 2020-01-07 | End: 2020-01-07 | Stop reason: HOSPADM

## 2020-01-07 RX ORDER — GLYCOPYRROLATE 0.2 MG/ML
INJECTION INTRAMUSCULAR; INTRAVENOUS PRN
Status: DISCONTINUED | OUTPATIENT
Start: 2020-01-07 | End: 2020-01-07 | Stop reason: SDUPTHER

## 2020-01-07 RX ORDER — FENTANYL CITRATE 50 UG/ML
INJECTION, SOLUTION INTRAMUSCULAR; INTRAVENOUS PRN
Status: DISCONTINUED | OUTPATIENT
Start: 2020-01-07 | End: 2020-01-07 | Stop reason: SDUPTHER

## 2020-01-07 RX ORDER — LIDOCAINE HYDROCHLORIDE 20 MG/ML
INJECTION, SOLUTION INFILTRATION; PERINEURAL PRN
Status: DISCONTINUED | OUTPATIENT
Start: 2020-01-07 | End: 2020-01-07 | Stop reason: SDUPTHER

## 2020-01-07 RX ORDER — DEXAMETHASONE SODIUM PHOSPHATE 10 MG/ML
INJECTION INTRAMUSCULAR; INTRAVENOUS PRN
Status: DISCONTINUED | OUTPATIENT
Start: 2020-01-07 | End: 2020-01-07 | Stop reason: SDUPTHER

## 2020-01-07 RX ORDER — BUPIVACAINE HYDROCHLORIDE 2.5 MG/ML
INJECTION, SOLUTION INFILTRATION; PERINEURAL PRN
Status: DISCONTINUED | OUTPATIENT
Start: 2020-01-07 | End: 2020-01-07 | Stop reason: ALTCHOICE

## 2020-01-07 RX ADMIN — FENTANYL CITRATE 50 MCG: 50 INJECTION INTRAMUSCULAR; INTRAVENOUS at 10:44

## 2020-01-07 RX ADMIN — LABETALOL HYDROCHLORIDE 5 MG: 5 INJECTION INTRAVENOUS at 12:07

## 2020-01-07 RX ADMIN — DEXAMETHASONE SODIUM PHOSPHATE 10 MG: 10 INJECTION INTRAMUSCULAR; INTRAVENOUS at 10:43

## 2020-01-07 RX ADMIN — Medication 3 G: at 10:35

## 2020-01-07 RX ADMIN — HYDRALAZINE HYDROCHLORIDE 5 MG: 20 INJECTION INTRAMUSCULAR; INTRAVENOUS at 12:28

## 2020-01-07 RX ADMIN — PROPOFOL 200 MG: 10 INJECTION, EMULSION INTRAVENOUS at 10:40

## 2020-01-07 RX ADMIN — GLYCOPYRROLATE 0.2 MG: 0.2 INJECTION, SOLUTION INTRAMUSCULAR; INTRAVENOUS at 10:48

## 2020-01-07 RX ADMIN — ONDANSETRON 4 MG: 2 INJECTION INTRAMUSCULAR; INTRAVENOUS at 10:43

## 2020-01-07 RX ADMIN — SODIUM CHLORIDE, POTASSIUM CHLORIDE, SODIUM LACTATE AND CALCIUM CHLORIDE: 600; 310; 30; 20 INJECTION, SOLUTION INTRAVENOUS at 09:50

## 2020-01-07 RX ADMIN — OXYCODONE HYDROCHLORIDE AND ACETAMINOPHEN 2 TABLET: 5; 325 TABLET ORAL at 12:00

## 2020-01-07 RX ADMIN — LIDOCAINE HYDROCHLORIDE 60 MG: 20 INJECTION, SOLUTION INFILTRATION; PERINEURAL at 10:39

## 2020-01-07 RX ADMIN — MIDAZOLAM HYDROCHLORIDE 1 MG: 2 INJECTION, SOLUTION INTRAMUSCULAR; INTRAVENOUS at 10:35

## 2020-01-07 RX ADMIN — FENTANYL CITRATE 50 MCG: 50 INJECTION INTRAMUSCULAR; INTRAVENOUS at 10:55

## 2020-01-07 RX ADMIN — GLYCOPYRROLATE 0.2 MG: 0.2 INJECTION, SOLUTION INTRAMUSCULAR; INTRAVENOUS at 10:58

## 2020-01-07 RX ADMIN — FENTANYL CITRATE 50 MCG: 50 INJECTION INTRAMUSCULAR; INTRAVENOUS at 10:51

## 2020-01-07 RX ADMIN — SODIUM CHLORIDE, POTASSIUM CHLORIDE, SODIUM LACTATE AND CALCIUM CHLORIDE: 600; 310; 30; 20 INJECTION, SOLUTION INTRAVENOUS at 10:35

## 2020-01-07 ASSESSMENT — PULMONARY FUNCTION TESTS
PIF_VALUE: 9
PIF_VALUE: 10
PIF_VALUE: 16
PIF_VALUE: 13
PIF_VALUE: 16
PIF_VALUE: 15
PIF_VALUE: 17
PIF_VALUE: 18
PIF_VALUE: 1
PIF_VALUE: 11
PIF_VALUE: 15
PIF_VALUE: 15
PIF_VALUE: 13
PIF_VALUE: 2
PIF_VALUE: 16
PIF_VALUE: 1
PIF_VALUE: 9
PIF_VALUE: 2
PIF_VALUE: 12
PIF_VALUE: 13
PIF_VALUE: 13
PIF_VALUE: 19
PIF_VALUE: 11
PIF_VALUE: 11
PIF_VALUE: 15
PIF_VALUE: 13
PIF_VALUE: 0
PIF_VALUE: 32
PIF_VALUE: 5
PIF_VALUE: 14
PIF_VALUE: 19
PIF_VALUE: 14
PIF_VALUE: 2
PIF_VALUE: 12
PIF_VALUE: 15
PIF_VALUE: 13
PIF_VALUE: 1
PIF_VALUE: 11

## 2020-01-07 ASSESSMENT — PAIN SCALES - GENERAL
PAINLEVEL_OUTOF10: 6
PAINLEVEL_OUTOF10: 2
PAINLEVEL_OUTOF10: 7
PAINLEVEL_OUTOF10: 5
PAINLEVEL_OUTOF10: 7
PAINLEVEL_OUTOF10: 4
PAINLEVEL_OUTOF10: 7

## 2020-01-07 ASSESSMENT — PAIN - FUNCTIONAL ASSESSMENT: PAIN_FUNCTIONAL_ASSESSMENT: 0-10

## 2020-01-07 ASSESSMENT — PAIN DESCRIPTION - DESCRIPTORS: DESCRIPTORS: DULL;SHARP

## 2020-01-07 NOTE — PROGRESS NOTES
Advanced pt from lying to sitting without adverse event/pt denies complaints of dizziness/ponv/ RESP  WNL/ surgical drsg/circulation checks on operative limb unchanged from my  last pacu assessment entry     Pt states pain is at a tolerable level-2     instructed pt if no void in 8 hours contact physician or go to ER    Discharge instructions reviewed with patient/responsible adult and understanding verbalized. Discharge instructions signed and copies given. Rx    Hydrocodone/asa                Given to pts responsible adult/instructed not to drive/ingest alcohol while taking narcotic medications. Instructed pt/family member Last dose of narcotics given in recovery room was      Percocet    2 tablets      @ 1200            Am/pm  . Medication information  sheet given to pt/family-all questions answered     If you are taking any anxioytics  or additional pain medicine please confirm with you ordering physician before  you start a new medicine      Please follow narcotic administration as prescribed by your surgeon- any questions call your surgeon. If you have any problems contact your surgeon and  Go to the emergency room.     Operative drsg unchanged from my initial pacu assessment

## 2020-01-07 NOTE — H&P
I have reviewed the history and physical and examined the patient and find no relevant changes. I have reviewed with the patient and/or family the risks, benefits, and alternatives to the procedure. Controlled Substance Monitoring:    Acute and Chronic Pain Monitoring:   RX Monitoring 1/7/2020   Attestation -   Acute Pain Prescriptions Severe pain not adequately treated with lower dose. Periodic Controlled Substance Monitoring No signs of potential drug abuse or diversion identified. Patient being given increased dosage/quantity of opoid pain medication in excess of OSMB guidelines which noted a 30 MED daily of opioids due to the fact that he/she has undergone orthopaedic surgery as outlined in rule 4731-11-13. Dosages and further duration of the pain medication will be re-evaluated at her post op visit. Patient was educated on dosing expectations and limits of prescribing as a result of the new [employer] [employer] rules enacted August 31, 2017. [place]ARRS report has also been utilized to screen for any abuse history or suspicious activity as outlined in [place]. All efforts have been taken to prevent abuse potential and misuse of opioid medications including education, screening, and close clinical follow up.

## 2020-01-07 NOTE — ANESTHESIA PRE PROCEDURE
Historical Provider, MD   Multiple Vitamins-Minerals (THERAPEUTIC MULTIVITAMIN-MINERALS) tablet Take 1 tablet by mouth daily. Yes Historical Provider, MD   hydrOXYzine (ATARAX) 25 MG tablet TAKE 1 TABLET BY MOUTH THREE TIMES DAILY AS NEEDED FOR ITCHING 11/15/19   KANDY Isaacs - CNP   fluocinonide (LIDEX) 0.05 % ointment Apply sparingly to affected area(s) up to bid prn 2/1/16   Kimi Muniz MD       Current medications:    Current Facility-Administered Medications   Medication Dose Route Frequency Provider Last Rate Last Dose    ceFAZolin (ANCEF) 3 g in dextrose 5 % 100 mL IVPB  3 g Intravenous On Call to Akosua George MD        lactated ringers infusion   Intravenous Continuous Zoie Way  mL/hr at 01/07/20 0950      sodium chloride flush 0.9 % injection 10 mL  10 mL Intravenous 2 times per day Zoie Way MD        sodium chloride flush 0.9 % injection 10 mL  10 mL Intravenous PRN Zoie Way MD        lidocaine PF 1 % injection 1 mL  1 mL Intradermal Once PRN Zoie Way MD           Allergies: Allergies   Allergen Reactions    Sulfa Antibiotics Anaphylaxis    Amlodipine Hives    Lisinopril Swelling     Face swelled       Problem List:    Patient Active Problem List   Diagnosis Code    Low back pain M54.5    HLD (hyperlipidemia) E78.5    HTN (hypertension) I10    Unstable angina (HCC) I20.0    Chest pain R07.9    ED (erectile dysfunction) N52.9    Angioedema T78. 3XXA       Past Medical History:        Diagnosis Date    Bradycardia     ED (erectile dysfunction)     Hypercholesteremia     Hypertension     Low back pain 7/7/2014    Strabismus        Past Surgical History:        Procedure Laterality Date    COLONOSCOPY  2011    CYST REMOVAL      ESOPHAGOGASTRIC FUNDOPLICATION      EXTERNAL EAR SURGERY      cancer spots    EYE SURGERY Left     for strabsismus    STRABISMUS SURGERY      TONSILLECTOMY      UPPER GASTROINTESTINAL ENDOSCOPY  2011 input(s): POCGLU, POCNA, POCK, POCCL, POCBUN, POCHEMO, POCHCT in the last 72 hours. Coags: No results found for: PROTIME, INR, APTT    HCG (If Applicable): No results found for: PREGTESTUR, PREGSERUM, HCG, HCGQUANT     ABGs: No results found for: PHART, PO2ART, FDM9XAJ, RIR4VCQ, BEART, E7GWXABL     Type & Screen (If Applicable):  No results found for: LABABO, LABRH    Anesthesia Evaluation    Airway: Mallampati: III  TM distance: <3 FB   Neck ROM: limited  Mouth opening: > = 3 FB Dental: normal exam         Pulmonary:                             ROS comment: Resolving Bronchitis, dry cough remaining   Cardiovascular:    (+) hypertension:, hyperlipidemia    (-)  angina                Neuro/Psych:   Negative Neuro/Psych ROS  (+) neuromuscular disease:,             GI/Hepatic/Renal: Neg GI/Hepatic/Renal ROS            Endo/Other: Negative Endo/Other ROS                    Abdominal:           Vascular: negative vascular ROS. Anesthesia Plan      general     ASA 3     (Risks, benefits and alternatives of GA discussed with pt. Questions answered. Willing to proceed.)  Induction: intravenous. Anesthetic plan and risks discussed with patient.                       Tre Lawson MD   1/7/2020

## 2020-01-07 NOTE — OP NOTE
elected to  go ahead and have arthroscopic surgery done to address his meniscus  pathology. We talked about the fact that his arthritis could not be  cured by arthroscopy. He realized concerns regarding infection, deep  vein thrombosis, pulmonary embolism, arthrofibrosis, delayed  rehabilitation, anesthetic complications, death, cardiopulmonary issues,  etc.  All questions were answered. I did nilton this man's leg in the  preanesthesia holding area. DETAILS OF THE PROCEDURE:  The patient was taken to the operating room  and placed on the operating table in the supine position. General  anesthesia was induced and maintained without difficulty. A time-out  was done. The leg was then prepped and draped. Examination under  anesthesia demonstrated some mild patellofemoral crepitus, but no other  significant findings. After the leg was positioned, prepped, and draped, routine arthroscopic  portals were established and a tricompartmental examination was done. The medial compartment was examined first.    The medial compartment demonstrated an obvious large degenerative tear  of the posterior horn of the medial meniscus with a large flap catching  the joint. Using a combination of the straight and upbiting Duckbill  baskets as well as a 4.5 incisor resector, a partial medial meniscectomy  was completed removing about 30% of the meniscus posteriorly. This was  brought back to a carefully balanced stable rim upon probing. There was some diffuse grade 3 chondromalacia of the medial femoral  condyle and a chondroplasty was performed again utilizing the 4.5  incisor. Moving to the intercondylar notch, the ACL was visible and the PCL was  intact. The lateral compartment demonstrated some early finding of lateral  tibial plateau and a very limited chondroplasty was completed here. The  meniscus itself was okay.     The patellofemoral compartment demonstrated grade 3 chondromalacia of  the patella and a chondroplasty was completed utilizing the 4.5 incisor  smoothening this tissue as much as possible. There was an adhesion running from the anterior aspect of the medial  meniscus to the fat pad and associated synovitis of the medial gutter,  lateral gutter, and across the anterior aspect of the knee. All  pathologic synovium was resected again utilizing the 4.5 incisor. No further pathology was demonstrated. The knee was copiously irrigated  and the instruments were removed. A 30 mL of Marcaine was instilled  into the portals and then dry sterile dressing was applied over  Steri-Strips and Monocryl. The patient went to the recovery room post  procedure having tolerated the operation well.         Kristy Ross MD    D: 01/07/2020 11:16:53       T: 01/07/2020 15:42:52     [email or web address]  Job#: [id number]     Doc#: [id number]    CC:

## 2020-01-07 NOTE — BRIEF OP NOTE
Brief Postoperative Note  ______________________________________________________________    Patient: Damir Meadows  YOB: 1945  MRN: [id number]  Date of Procedure: 1/7/2020    Pre-Op Diagnosis: MEDIAL MENISCUS TEAR RIGHT KNEE    Post-Op Diagnosis: Same       Procedure(s):  EXAM UNDER ANESTHESIA VIDEO ARTHROSCOPY RIGHT KNEE, PARTIAL MEDIAL MENISCECTOMY, PATELLA FEMORAL CHONDROPLASTY, SYNOVECTOMY    Anesthesia: General    Surgeon(s):  Baylee Richard MD    Assistant: Carri Padilla SA    Estimated Blood Loss (mL): less than 50     Complications: None    Specimens:   * No specimens in log *    Implants:  * No implants in log *      Drains: * No LDAs found *    Findings: same    KETAN Babcock  Date: 1/7/2020  Time: 2:16 PM

## 2020-01-07 NOTE — PROGRESS NOTES
Advancing hob without compaints  Tolerating Drinking and eating : Attempted to implement non pharmacological methods  of pain management-repositioned/ ice in place   Medicated for pain with improvement level= 6  No ponv  Pt alert and appropriate  Respirations  Easy/ unlabored/ no Chest pain or SOB   Surgical drsg unchanged from initial assessment  Circulation checks on operative limb unchanged from last entry in pacu

## 2020-01-09 ENCOUNTER — HOSPITAL ENCOUNTER (OUTPATIENT)
Dept: PHYSICAL THERAPY | Age: 75
Setting detail: THERAPIES SERIES
Discharge: HOME | End: 2020-01-09
Payer: MEDICARE

## 2020-01-09 PROCEDURE — 97161 PT EVAL LOW COMPLEX 20 MIN: CPT | Performed by: PHYSICAL THERAPIST

## 2020-01-09 PROCEDURE — 97110 THERAPEUTIC EXERCISES: CPT | Performed by: PHYSICAL THERAPIST

## 2020-01-09 PROCEDURE — 97016 VASOPNEUMATIC DEVICE THERAPY: CPT | Performed by: PHYSICAL THERAPIST

## 2020-01-09 NOTE — FLOWSHEET NOTE
[address] [facility], [address] [place] [address] [address]  Phone: [phone]   Fax:     [phone]      Physical Therapy Treatment Note/ Progress Report:     Date:  2020    Patient Name:  Tania Gusman    :  1945  MRN: [id number]  Restrictions/Precautions:    Medical/Treatment Diagnosis Information:  ·   S83.231D (ICD-10-CM) - Complex tear of medial meniscus of right knee  ·  M25.561 R knee pain  Insurance/Certification information:   [facility]  Physician Information:   Shona Cedeno  Has the plan of care been signed (Y/N):        []  Yes  [x]  No     Date of Patient follow up with Physician: 1/10/2020      Is this a Progress Report:     []  Yes  [x]  No        If Yes:  Date Range for reporting period:  Beginning2020  Ending    Progress report will be due (10 Rx or 30 days whichever is less): 9825       Recertification will be due (POC Duration  / 90 days whichever is less): 3/9/2020       Visit # Insurance Allowable Auth Required   1 Med Marshall Spatz []  Yes [x]  No        Functional Scale: lefs 65%    Date assessed:  2020      Latex Allergy:  [x]NO      []YES  Preferred Language for Healthcare:   [x]English       []other:      Pain level:  1/10     SUBJECTIVE:  See eval    OBJECTIVE: See eval   Observation:    Test measurements:  Positive swelling    RESTRICTIONS/PRECAUTIONS: none    Exercises/Interventions:   Therapeutic Ex (38854) Sets/sec Reps Notes/CUES HEP   QS  SLR  SSLR  Heel  Slides  HSS seated  Calf stretch standing  HR  10x10s  x10  x10  x5  3x30s  2x30s  x10                                                                                  Manual Intervention (60131)                                                 NMR re-education (68755)   CUES NEEDED                                                                   Therapeutic Activity (43157)                                                     Therapeutic Exercise and NMR EXR  [x] (50299) Provided verbal/tactile cueing for activities related to strengthening, flexibility, endurance, ROM for improvements in LE, proximal hip, and core control with self care, mobility, lifting, ambulation. [x] (52580) Provided verbal/tactile cueing for activities related to improving balance, coordination, kinesthetic sense, posture, motor skill, proprioception to assist with LE, proximal hip, and core control in self-care, mobility, lifting, ambulation and eccentric single leg control.      NMR and Therapeutic Activities:    [x] (92082 or 31269) Provided verbal/tactile cueing for activities related to improving balance, coordination, kinesthetic sense, posture, motor skill, proprioception and motor activation to allow for proper function of core, proximal hip and LE with self-care and ADLs and functional mobility.   [] (96502) Gait Re-education- Provided training and instruction to the patient for proper LE, core and proximal hip recruitment and positioning and eccentric body weight control with ambulation re-education including up and down stairs     Home Exercise Program:    [x] (03552) Reviewed/Progressed HEP activities related to strengthening, flexibility, endurance, ROM of core, proximal hip and LE for functional self-care, mobility, lifting and ambulation/stair navigation   [] (39745) Reviewed/Progressed HEP activities related to improving balance, coordination, kinesthetic sense, posture, motor skill, proprioception of core, proximal hip and LE for self-care, mobility, lifting, and ambulation/stair navigation      Manual Treatments:  PROM / STM / Oscillations-Mobs:  G-I, II, III, IV (PA's, Inf., Post.)  [] (78613) Provided manual therapy to mobilize LE, proximal hip and/or LS spine soft tissue/joints for the purpose of modulating pain, promoting relaxation, increasing ROM, reducing/eliminating soft tissue swelling/inflammation/restriction, improving soft tissue extensibility and allowing for gait comm distances 100% of timr(patient specific functional goal)    []? Progressing: []? Met: []? Not Met: []? Adjusted                 Overall Progression Towards Functional goals/ Treatment Progress Update:  [] Patient is progressing as expected towards functional goals listed. [] Progression is slowed due to complexities/Impairments listed. [] Progression has been slowed due to co-morbidities. [x] Plan just implemented, too soon to assess goals progression <30days   [] Goals require adjustment due to lack of progress  [] Patient is not progressing as expected and requires additional follow up with physician  [] Other    Prognosis for POC: [x] Good [] Fair  [] Poor      Patient requires continued skilled intervention: [x] Yes  [] No    Treatment/Activity Tolerance:  [x] Patient able to complete treatment  [] Patient limited by fatigue  [] Patient limited by pain    [] Patient limited by other medical complications  [] Other:     Return to Play: (if applicable)   []  Stage 1: Intro to Strength   []  Stage 2: Return to Run and Strength   []  Stage 3: Return to Jump and Strength   []  Stage 4: Dynamic Strength and Agility   []  Stage 5: Sport Specific Training     []  Ready to Return to Play, Meets All Above Stages   []  Not Ready for Return to Sports   Comments:                          PLAN: See eval  [] Continue per plan of care [] Alter current plan (see comments above)  [x] Plan of care initiated [] Hold pending MD visit [] Discharge    Electronically signed by:  Anastasiya Kerr PT    Note: If patient does not return for scheduled/ recommended follow up visits, this note will serve as a discharge from care along with most recent update on progress.

## 2020-01-09 NOTE — PLAN OF CARE
[facility] [place] [address] [address] [facility], [facility] [address]  Phone: [phone]   Fax:     [phone]       Physical Therapy Certification    Dear  Dr Souleymane Ortiz,    We had the pleasure of evaluating the following patient for physical therapy services at [address] [place] [address]. A summary of our findings can be found in the initial assessment below. This includes our plan of care. If you have any questions or concerns regarding these findings, please do not hesitate to contact me at the office phone number checked above. Thank you for the referral.       Physician Signature:_______________________________Date:__________________  By signing above (or electronic signature), therapists plan is approved by physician      Patient: Marcy Claude   : 1945   MRN: [id number]  Referring Physician:  Souleymane Ortiz      Evaluation Date: 2020      Medical Diagnosis Information:    L33.613R (ICD-10-CM) - Complex tear of medial meniscus of right knee     M25.561 R knee pain                                        Insurance information:  [facility]     Precautions/ Contra-indications: none  Latex Allergy:  [x]NO      []YES  Preferred Language for Healthcare:   [x]English       []other:    SUBJECTIVE: Patient stated complaint: wear and tear. 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    Living Status/Prior Level of Function: Independent with ADLs and IADLs, weight lifting    OBJECTIVE:     ROM LEFT RIGHT   HIP Flex     HIP Abd     HIP Ext     HIP IR     HIP ER     Knee ext Disorders  []Anxiety (F41.9)  []Depression (F32.9)   []Other:   []Other:          Barriers to/and or personal factors that will affect rehab potential:              []Age  []Sex              []Motivation/Lack of Motivation                        []Co-Morbidities              []Cognitive Function, education/learning barriers              []Environmental, home barriers              []profession/work barriers  []past PT/medical experience  []other:  Justification:     Falls Risk Assessment (30 days): 0  [] Falls Risk assessed and no intervention required.   [] Falls Risk assessed and Patient requires intervention due to being higher risk   TUG score (>12s at risk):     [] Falls education provided, including       G-Codes:       ASSESSMENT:   Functional Impairments:     []Noted lumbar/proximal hip/LE joint hypomobility   [x]Decreased LE functional ROM   []Decreased core/proximal hip strength and neuromuscular control   [x]Decreased LE functional strength   []Reduced balance/proprioceptive control   []other:      Functional Activity Limitations (from functional questionnaire and intake)   []Reduced ability to tolerate prolonged functional positions   []Reduced ability or difficulty with changes of positions or transfers between positions   []Reduced ability to maintain good posture and demonstrate good body mechanics with sitting, bending, and lifting   []Reduced ability to sleep   [] Reduced ability or tolerance with driving and/or computer work   []Reduced ability to perform lifting, carrying tasks   [x]Reduced ability to squat   []Reduced ability to forward bend   [x]Reduced ability to ambulate prolonged functional periods/distances/surfaces   [x]Reduced ability to ascend/descend stairs   []Reduced ability to run, hop, cut or jump   []other:    Participation Restrictions   []Reduced participation in self care activities   [x]Reduced participation in home management activities   [x]Reduced participation in work activities   [x]Reduced participation in social activities. [x]Reduced participation in sport/recreation activities. Classification :    [x]Signs/symptoms consistent with post-surgical status including decreased ROM, strength and function. []Signs/symptoms consistent with joint sprain/strain   []Signs/symptoms consistent with patella-femoral syndrome   []Signs/symptoms consistent with knee OA/hip OA   []Signs/symptoms consistent with internal derangement of knee/Hip   []Signs/symptoms consistent with functional hip weakness/NMR control      []Signs/symptoms consistent with tendinitis/tendinosis    []signs/symptoms consistent with pathology which may benefit from Dry needling      []other:      Prognosis/Rehab Potential:      []Excellent   [x]Good    []Fair   []Poor    Tolerance of evaluation/treatment:    []Excellent   [x]Good    []Fair   []Poor    Physical Therapy Evaluation Complexity Justification  [x] A history of present problem with:  [x] no personal factors and/or comorbidities that impact the plan of care;  []1-2 personal factors and/or comorbidities that impact the plan of care  []3 personal factors and/or comorbidities that impact the plan of care  [x] An examination of body systems using standardized tests and measures addressing any of the following: body structures and functions (impairments), activity limitations, and/or participation restrictions;:  [] a total of 1-2 or more elements   [x] a total of 3 or more elements   [] a total of 4 or more elements   [x] A clinical presentation with:  [x] stable and/or uncomplicated characteristics   [] evolving clinical presentation with changing characteristics  [] unstable and unpredictable characteristics;   [x] Clinical decision making of [x] low, [] moderate, [] high complexity using standardized patient assessment instrument and/or measurable assessment of functional outcome.     [x] EVAL (LOW) 84903 (typically 20 minutes face-to-face)  [] EVAL (MOD) 21543 (typically 30 minutes face-to-face)  [] EVAL (HIGH) 26703 (typically 45 minutes face-to-face)  [] RE-EVAL     PLAN:   Frequency/Duration:  1 days per week for 4 Weeks:  Interventions:  [x]  Therapeutic exercise including: strength training, ROM, for Lower extremity and core   [x]  NMR activation and proprioception for LE, Glutes and Core   [x]  Manual therapy as indicated for LE, Hip and spine to include: Dry Needling/IASTM, STM, PROM, Gr I-IV mobilizations, manipulation. [x] Modalities as needed that may include: thermal agents, E-stim, Biofeedback, US, iontophoresis as indicated  [x] Patient education on joint protection, postural re-education, activity modification, progression of HEP. HEP instruction: (see scanned forms)    GOALS:  Patient stated goal: full adl's    Therapist goals for Patient:   Short Term Goals: To be achieved in: 2 weeks  1. Independent in HEP and progression per patient tolerance, in order to prevent re-injury. [] Progressing: [] Met: [] Not Met: [] Adjusted     2. Patient will have a decrease in pain to facilitate improvement in movement, function, and ADLs as indicated by Functional Deficits. [] Progressing: [] Met: [] Not Met: [] Adjusted     Long Term Goals: To be achieved in: 4 weeks  1. Disability index score of 32% or less for the LEFS to assist with reaching prior level of function. [] Progressing: [] Met: [] Not Met: [] Adjusted     2. Patient will demonstrate increased AROM to 0-135 to allow for proper joint functioning as indicated by patients Functional Deficits. [] Progressing: [] Met: [] Not Met: [] Adjusted     3. Patient will demonstrate an increase in Strength to good proximal hip strength and control, within 5/5 MMT in LE to allow for proper functional mobility as indicated by patients Functional Deficits. [] Progressing: [] Met: [] Not Met: [] Adjusted     4. Patient will return to full functional activities without increased symptoms or restriction. [] Progressing: [] Met: [] Not Met: [] Adjusted     5.  Independent gait comm distances 100% of timr(patient specific functional goal)    [] Progressing: [] Met: [] Not Met: [] Adjusted      Electronically signed by:  Marifer Ballard PT

## 2020-01-10 ENCOUNTER — OFFICE VISIT (OUTPATIENT)
Dept: ORTHOPEDIC SURGERY | Age: 75
End: 2020-01-10

## 2020-01-10 VITALS — WEIGHT: 263.89 LBS | BODY MASS INDEX: 33.87 KG/M2 | HEIGHT: 74 IN

## 2020-01-10 PROCEDURE — 99024 POSTOP FOLLOW-UP VISIT: CPT | Performed by: PHYSICIAN ASSISTANT

## 2020-01-24 ENCOUNTER — OFFICE VISIT (OUTPATIENT)
Dept: ORTHOPEDIC SURGERY | Age: 75
End: 2020-01-24

## 2020-01-24 VITALS — HEIGHT: 74 IN | BODY MASS INDEX: 33.87 KG/M2 | WEIGHT: 263.89 LBS

## 2020-01-24 PROCEDURE — 99024 POSTOP FOLLOW-UP VISIT: CPT | Performed by: ORTHOPAEDIC SURGERY

## 2020-01-24 RX ORDER — DICLOFENAC SODIUM 75 MG/1
75 TABLET, DELAYED RELEASE ORAL 2 TIMES DAILY WITH MEALS
Qty: 60 TABLET | Refills: 1 | Status: SHIPPED | OUTPATIENT
Start: 2020-01-24 | End: 2020-04-01

## 2020-01-24 NOTE — PROGRESS NOTES
ADMIT DATE: 01/07/2020  PROVIDER:     Phoebe Mondragon MD     DATE OF PROCEDURE:  01/07/2020     SERVICE:  Orthopedic Surgery.     SURGEON:  Rachel Matthews MD     FIRST ASSISTANT:  Jakub Strauss SA     PREOPERATIVE DIAGNOSES:  Osteoarthritis of the right knee with the  medial meniscus tear, right knee.     POSTOPERATIVE DIAGNOSES:  1. Complex degenerative tear of the posterior horn of the medial  meniscus, right knee with a large degenerative flap involving  approximately 30% of the meniscus posteriorly. 2.  Diffuse grade 3 chondromalacia of the medial femoral condyle. 3.  Grade 3 chondromalacia of the patellofemoral compartment. 4.  Synovitis and adhesion formation in the anterior aspect of the  medial meniscus to the fat pad.     OPERATIVE PROCEDURE:  1. Exam under anesthesia and video arthroscopy of the right knee. 2.  Partial medial meniscectomy. 3.  Chondroplasty of the patellofemoral compartment. 4.  Chondroplasty of the medial compartment. 5.  Resection of adhesions and synovectomy. She was doing great and then he overdid it and had a giving way episode where his knee became swollen and painful. Is getting modestly better with ice. He has not taken any anti-inflammatories. There is a 2+ effusion. Diffusely sore over the knee. Nothing focal.  No ligamentous instability. No Homans sign. Giving way episode after doing well with respect to his knee arthroscopy. I recommended rest ice compression elevation and some Aleve or Advil. For him a cortisone shot but he will also hold off since he is getting better. Due to giving way episode 3 xray views of the right were obtained today. No acute abnormality noted. Moderate medial joint space narrowing    3 weeks. He is going to utilize a cane inflammatory-Voltaren    I have personally performed and/or participated in the history, exam and medical decision making and agree with all pertinent clinical information.  I have also reviewed and agree with the past medical, family and social history unless otherwise noted. This dictation was performed with a verbal recognition program (DRAGON) and it was checked for errors. It is possible that there are still dictated errors within this office note. If so, please bring any errors to my attention for an addendum. All efforts were made to ensure that this office note is accurate.           Casilda Brittle, MD

## 2020-04-01 RX ORDER — DICLOFENAC SODIUM 75 MG/1
TABLET, DELAYED RELEASE ORAL
Qty: 60 TABLET | Refills: 1 | Status: SHIPPED | OUTPATIENT
Start: 2020-04-01 | End: 2020-06-22 | Stop reason: ALTCHOICE

## 2020-04-20 RX ORDER — ATORVASTATIN CALCIUM 20 MG/1
TABLET, FILM COATED ORAL
Qty: 90 TABLET | Refills: 3 | Status: SHIPPED | OUTPATIENT
Start: 2020-04-20 | End: 2021-03-24

## 2020-05-04 RX ORDER — NEBIVOLOL HYDROCHLORIDE 10 MG/1
TABLET ORAL
Qty: 30 TABLET | Refills: 5 | Status: ON HOLD | OUTPATIENT
Start: 2020-05-04 | End: 2020-05-22 | Stop reason: HOSPADM

## 2020-05-20 ENCOUNTER — APPOINTMENT (OUTPATIENT)
Dept: GENERAL RADIOLOGY | Age: 75
End: 2020-05-20
Payer: MEDICARE

## 2020-05-20 ENCOUNTER — HOSPITAL ENCOUNTER (OUTPATIENT)
Age: 75
Setting detail: OBSERVATION
Discharge: HOME | End: 2020-05-22
Attending: EMERGENCY MEDICINE | Admitting: INTERNAL MEDICINE
Payer: MEDICARE

## 2020-05-20 PROBLEM — R00.1 BRADYCARDIA: Status: ACTIVE | Noted: 2020-05-20

## 2020-05-20 PROBLEM — R55 PRE-SYNCOPE: Status: ACTIVE | Noted: 2020-05-20

## 2020-05-20 LAB
A/G RATIO: 1.4 (ref 1.1–2.2)
ALBUMIN SERPL-MCNC: 3.9 G/DL (ref 3.4–5)
ALP BLD-CCNC: 63 U/L (ref 40–129)
ALT SERPL-CCNC: 25 U/L (ref 10–40)
ANION GAP SERPL CALCULATED.3IONS-SCNC: 9 MMOL/L (ref 3–16)
AST SERPL-CCNC: 37 U/L (ref 15–37)
BASOPHILS ABSOLUTE: 0 K/UL (ref 0–0.2)
BASOPHILS RELATIVE PERCENT: 0.6 %
BILIRUB SERPL-MCNC: 0.3 MG/DL (ref 0–1)
BILIRUBIN URINE: NEGATIVE
BLOOD, URINE: NEGATIVE
BUN BLDV-MCNC: 18 MG/DL (ref 7–20)
CALCIUM SERPL-MCNC: 9 MG/DL (ref 8.3–10.6)
CHLORIDE BLD-SCNC: 102 MMOL/L (ref 99–110)
CLARITY: CLEAR
CO2: 25 MMOL/L (ref 21–32)
COLOR: NORMAL
CREAT SERPL-MCNC: 1 MG/DL (ref 0.8–1.3)
EOSINOPHILS ABSOLUTE: 0.1 K/UL (ref 0–0.6)
EOSINOPHILS RELATIVE PERCENT: 1.7 %
GFR AFRICAN AMERICAN: >60
GFR NON-AFRICAN AMERICAN: >60
GLOBULIN: 2.8 G/DL
GLUCOSE BLD-MCNC: 80 MG/DL (ref 70–99)
GLUCOSE URINE: NEGATIVE MG/DL
HCT VFR BLD CALC: 40.7 % (ref 40.5–52.5)
HEMOGLOBIN: 13.9 G/DL (ref 13.5–17.5)
KETONES, URINE: NEGATIVE MG/DL
LEUKOCYTE ESTERASE, URINE: NEGATIVE
LYMPHOCYTES ABSOLUTE: 1.4 K/UL (ref 1–5.1)
LYMPHOCYTES RELATIVE PERCENT: 21.5 %
MCH RBC QN AUTO: 32.9 PG (ref 26–34)
MCHC RBC AUTO-ENTMCNC: 34.2 G/DL (ref 31–36)
MCV RBC AUTO: 96.4 FL (ref 80–100)
MICROSCOPIC EXAMINATION: NORMAL
MONOCYTES ABSOLUTE: 0.8 K/UL (ref 0–1.3)
MONOCYTES RELATIVE PERCENT: 12.7 %
NEUTROPHILS ABSOLUTE: 4.1 K/UL (ref 1.7–7.7)
NEUTROPHILS RELATIVE PERCENT: 63.5 %
NITRITE, URINE: NEGATIVE
PDW BLD-RTO: 13.4 % (ref 12.4–15.4)
PH UA: 6 (ref 5–8)
PLATELET # BLD: 200 K/UL (ref 135–450)
PMV BLD AUTO: 8.3 FL (ref 5–10.5)
POTASSIUM SERPL-SCNC: 5 MMOL/L (ref 3.5–5.1)
PROTEIN UA: NEGATIVE MG/DL
RBC # BLD: 4.22 M/UL (ref 4.2–5.9)
SODIUM BLD-SCNC: 136 MMOL/L (ref 136–145)
SPECIFIC GRAVITY UA: 1.02 (ref 1–1.03)
TOTAL PROTEIN: 6.7 G/DL (ref 6.4–8.2)
TROPONIN: <0.01 NG/ML
URINE TYPE: NORMAL
UROBILINOGEN, URINE: 0.2 E.U./DL
WBC # BLD: 6.4 K/UL (ref 4–11)

## 2020-05-20 PROCEDURE — 85025 COMPLETE CBC W/AUTO DIFF WBC: CPT

## 2020-05-20 PROCEDURE — 6360000002 HC RX W HCPCS: Performed by: PHYSICIAN ASSISTANT

## 2020-05-20 PROCEDURE — 2580000003 HC RX 258: Performed by: INTERNAL MEDICINE

## 2020-05-20 PROCEDURE — 84484 ASSAY OF TROPONIN QUANT: CPT

## 2020-05-20 PROCEDURE — G0378 HOSPITAL OBSERVATION PER HR: HCPCS

## 2020-05-20 PROCEDURE — 81003 URINALYSIS AUTO W/O SCOPE: CPT

## 2020-05-20 PROCEDURE — 96374 THER/PROPH/DIAG INJ IV PUSH: CPT

## 2020-05-20 PROCEDURE — 96361 HYDRATE IV INFUSION ADD-ON: CPT

## 2020-05-20 PROCEDURE — 6370000000 HC RX 637 (ALT 250 FOR IP): Performed by: PHYSICIAN ASSISTANT

## 2020-05-20 PROCEDURE — 2580000003 HC RX 258: Performed by: PHYSICIAN ASSISTANT

## 2020-05-20 PROCEDURE — 93005 ELECTROCARDIOGRAM TRACING: CPT | Performed by: EMERGENCY MEDICINE

## 2020-05-20 PROCEDURE — 71045 X-RAY EXAM CHEST 1 VIEW: CPT

## 2020-05-20 PROCEDURE — 99285 EMERGENCY DEPT VISIT HI MDM: CPT

## 2020-05-20 PROCEDURE — 80053 COMPREHEN METABOLIC PANEL: CPT

## 2020-05-20 RX ORDER — SODIUM CHLORIDE 0.9 % (FLUSH) 0.9 %
10 SYRINGE (ML) INJECTION EVERY 12 HOURS SCHEDULED
Status: DISCONTINUED | OUTPATIENT
Start: 2020-05-20 | End: 2020-05-22 | Stop reason: HOSPADM

## 2020-05-20 RX ORDER — ONDANSETRON 2 MG/ML
4 INJECTION INTRAMUSCULAR; INTRAVENOUS ONCE
Status: COMPLETED | OUTPATIENT
Start: 2020-05-20 | End: 2020-05-20

## 2020-05-20 RX ORDER — ASPIRIN 81 MG/1
81 TABLET ORAL DAILY
Status: DISCONTINUED | OUTPATIENT
Start: 2020-05-21 | End: 2020-05-22 | Stop reason: HOSPADM

## 2020-05-20 RX ORDER — HYDROCHLOROTHIAZIDE 25 MG/1
25 TABLET ORAL DAILY
Status: DISCONTINUED | OUTPATIENT
Start: 2020-05-21 | End: 2020-05-20

## 2020-05-20 RX ORDER — PANTOPRAZOLE SODIUM 20 MG/1
20 TABLET, DELAYED RELEASE ORAL NIGHTLY
Status: DISCONTINUED | OUTPATIENT
Start: 2020-05-20 | End: 2020-05-22 | Stop reason: HOSPADM

## 2020-05-20 RX ORDER — ACETAMINOPHEN 650 MG/1
650 SUPPOSITORY RECTAL EVERY 6 HOURS PRN
Status: DISCONTINUED | OUTPATIENT
Start: 2020-05-20 | End: 2020-05-22 | Stop reason: HOSPADM

## 2020-05-20 RX ORDER — ONDANSETRON 2 MG/ML
4 INJECTION INTRAMUSCULAR; INTRAVENOUS EVERY 6 HOURS PRN
Status: DISCONTINUED | OUTPATIENT
Start: 2020-05-20 | End: 2020-05-22 | Stop reason: HOSPADM

## 2020-05-20 RX ORDER — ATORVASTATIN CALCIUM 10 MG/1
20 TABLET, FILM COATED ORAL NIGHTLY
Status: DISCONTINUED | OUTPATIENT
Start: 2020-05-20 | End: 2020-05-22 | Stop reason: HOSPADM

## 2020-05-20 RX ORDER — CITALOPRAM 20 MG/1
40 TABLET ORAL DAILY
Status: DISCONTINUED | OUTPATIENT
Start: 2020-05-21 | End: 2020-05-22 | Stop reason: HOSPADM

## 2020-05-20 RX ORDER — PROMETHAZINE HYDROCHLORIDE 25 MG/1
12.5 TABLET ORAL EVERY 6 HOURS PRN
Status: DISCONTINUED | OUTPATIENT
Start: 2020-05-20 | End: 2020-05-22 | Stop reason: HOSPADM

## 2020-05-20 RX ORDER — HYDROCHLOROTHIAZIDE 25 MG/1
25 TABLET ORAL NIGHTLY
Status: DISCONTINUED | OUTPATIENT
Start: 2020-05-20 | End: 2020-05-22 | Stop reason: HOSPADM

## 2020-05-20 RX ORDER — SODIUM CHLORIDE 0.9 % (FLUSH) 0.9 %
10 SYRINGE (ML) INJECTION PRN
Status: DISCONTINUED | OUTPATIENT
Start: 2020-05-20 | End: 2020-05-22 | Stop reason: HOSPADM

## 2020-05-20 RX ORDER — ACETAMINOPHEN 325 MG/1
650 TABLET ORAL EVERY 6 HOURS PRN
Status: DISCONTINUED | OUTPATIENT
Start: 2020-05-20 | End: 2020-05-22 | Stop reason: HOSPADM

## 2020-05-20 RX ORDER — POLYETHYLENE GLYCOL 3350 17 G/17G
17 POWDER, FOR SOLUTION ORAL DAILY PRN
Status: DISCONTINUED | OUTPATIENT
Start: 2020-05-20 | End: 2020-05-22 | Stop reason: HOSPADM

## 2020-05-20 RX ORDER — ATORVASTATIN CALCIUM 10 MG/1
20 TABLET, FILM COATED ORAL DAILY
Status: DISCONTINUED | OUTPATIENT
Start: 2020-05-21 | End: 2020-05-20

## 2020-05-20 RX ORDER — 0.9 % SODIUM CHLORIDE 0.9 %
1000 INTRAVENOUS SOLUTION INTRAVENOUS ONCE
Status: COMPLETED | OUTPATIENT
Start: 2020-05-20 | End: 2020-05-20

## 2020-05-20 RX ADMIN — Medication 10 ML: at 21:51

## 2020-05-20 RX ADMIN — SODIUM CHLORIDE 1000 ML: 9 INJECTION, SOLUTION INTRAVENOUS at 17:09

## 2020-05-20 RX ADMIN — ONDANSETRON 4 MG: 2 INJECTION INTRAMUSCULAR; INTRAVENOUS at 17:09

## 2020-05-20 RX ADMIN — ASPIRIN 325 MG: 325 TABLET, COATED ORAL at 18:53

## 2020-05-20 ASSESSMENT — ENCOUNTER SYMPTOMS
COLOR CHANGE: 0
BACK PAIN: 0
COUGH: 0
SHORTNESS OF BREATH: 0
ABDOMINAL PAIN: 0
NAUSEA: 1
VOMITING: 0

## 2020-05-20 NOTE — ED PROVIDER NOTES
SINUS-MAX DAY/NIGHT PO)    Take by mouth 2 times daily         ALLERGIES:    Sulfa antibiotics; Amlodipine; and Lisinopril    FAMILY HISTORY:       Family History   Problem Relation Age of Onset    Stroke Mother 47    High Blood Pressure Mother     High Cholesterol Mother     Cancer Father         lung    Cancer Sister         breast    Cancer Brother         multiple myeloma          SOCIAL HISTORY:       Social History     Socioeconomic History    Marital status:      Spouse name: None    Number of children: None    Years of education: None    Highest education level: None   Occupational History    None   Social Needs    Financial resource strain: None    Food insecurity     Worry: None     Inability: None    Transportation needs     Medical: None     Non-medical: None   Tobacco Use    Smoking status: Never Smoker    Smokeless tobacco: Never Used   Substance and Sexual Activity    Alcohol use:  Yes     Alcohol/week: 2.5 standard drinks     Types: 3 Standard drinks or equivalent per week     Comment: 2 drinks about 4 nights a week    Drug use: No    Sexual activity: None   Lifestyle    Physical activity     Days per week: None     Minutes per session: None    Stress: None   Relationships    Social connections     Talks on phone: None     Gets together: None     Attends [employer] service: None     Active member of club or organization: None     Attends meetings of clubs or organizations: None     Relationship status: None    Intimate partner violence     Fear of current or ex partner: None     Emotionally abused: None     Physically abused: None     Forced sexual activity: None   Other Topics Concern    None   Social History Narrative    None       SCREENINGS:    Cristian Coma Scale  Eye Opening: Spontaneous  Best Verbal Response: Oriented  Best Motor Response: Obeys commands  [place] Coma Scale Score: 15        PHYSICAL EXAM:       ED Triage Vitals [05/20/20 1650]   BP Temp Temp Source diaphoretic Reason for Exam: Dizziness (pt felt he was getting an ocular migraine and got dizzy) Acuity: Acute Type of Exam: Initial FINDINGS: Heart size and pulmonary vessels stable. Lungs clear. Costophrenic angles sharp     No active cardiopulmonary disease       EKG: The Ekg interpreted by me in the absence of a cardiologist shows. sinus bradycardia, rate=47     See also interpretation by Maria Fernanda Randhawa MD.      PROCEDURES:   N/A    CRITICAL CARE TIME:       None    CONSULTS:  IP CONSULT TO HOSPITALIST      EMERGENCY DEPARTMENT COURSE and DIFFERENTIAL DIAGNOSIS/MDM:   Vitals:    Vitals:    05/20/20 1650 05/20/20 1744 05/20/20 1809 05/20/20 1939   BP: (!) 140/80 (!) 142/83 134/72 (!) 165/78   Pulse: 54 (!) 49 (!) 47 (!) 49   Resp: 18 13 16 16   Temp: 98.4 °F (36.9 °C)      TempSrc: Oral      SpO2: 95% 98% 97% 98%   Weight: 265 lb (120.2 kg)          Patient was given the following medications:  Medications   0.9 % sodium chloride bolus (0 mLs Intravenous Stopped 5/20/20 1810)   ondansetron (ZOFRAN) injection 4 mg (4 mg Intravenous Given 5/20/20 1709)   aspirin EC tablet 325 mg (325 mg Oral Given 5/20/20 1853)         Patient was evaluated by both myself and Maria Fernanda Randhawa MD.   Old records were reviewed. Patient arrived by EMS after having an episode at work where he was suddenly lightheaded and felt like he would pass out. He was diaphoretic, nauseous and feels weak now. Patient has a history of CAD, hypertension and hyperlipidemia. His last cardiac evaluation was 2014. He does have known coronary artery disease though has not required stent placement at this point. Patient given 1 L of normal saline IV in the ED along with Zofran 4 mg IV and ultimately aspirin 325 mg IV. Has been bradycardic in the 40s and 50s with an EKG showing sinus bradycardia in the 50s. No acute ischemia.   CBC is normal.  CMP normal.  Troponin negative and urinalysis normal.  Portable chest x-ray is additionally normal. Patient has been stable with exception of bradycardia. However his episode today is concerning for anginal equivalent/cardiac event and I will request admission by hospitalist.  I spoke with Dr. Suzy Osorio. We thoroughly discussed the history, physical exam, laboratory and imaging studies, as well as, emergency department course. Based upon that discussion, we've decided to admit Violeta Marie for further observation and evaluation of Alessandra Gauthier's episode of near syncope, diaphoresis, nausea. As I have deemed necessary from their history, physical, and studies, I have considered and evaluated Violeta Marie for the following diagnoses:  ACUTE CORONARY SYNDROME, PERICARDIAL TAMPONADE, CHF, SEPSIS, COPD EXACERBATION, PNEUMONIA, PNEUMOTHORAX, PULMONARY EMBOLISM, and THORACIC DISSECTION. FINAL IMPRESSION:      1. Near syncope    2. Lightheadedness    3. Diaphoresis    4. Nausea    5. Generalized weakness    6. Coronary artery disease involving native heart with angina pectoris, unspecified vessel or lesion type ([address]    7.  Sinus bradycardia          DISPOSITION/PLAN:   DISPOSITION Decision To Admit             (Please note thatportions of this note were completed with a voice recognition program.  Efforts were made to edit the dictations, but occasionally words are mis-transcribed.)    Loraine Molina PA-C (electronicallysigned)              Chepe Lehman  05/20/20 2030

## 2020-05-21 LAB
EKG ATRIAL RATE: 47 BPM
EKG DIAGNOSIS: NORMAL
EKG P AXIS: -6 DEGREES
EKG P-R INTERVAL: 168 MS
EKG Q-T INTERVAL: 468 MS
EKG QRS DURATION: 84 MS
EKG QTC CALCULATION (BAZETT): 414 MS
EKG R AXIS: 28 DEGREES
EKG T AXIS: 48 DEGREES
EKG VENTRICULAR RATE: 47 BPM
TROPONIN: <0.01 NG/ML
TROPONIN: <0.01 NG/ML

## 2020-05-21 PROCEDURE — 2580000003 HC RX 258: Performed by: INTERNAL MEDICINE

## 2020-05-21 PROCEDURE — 6370000000 HC RX 637 (ALT 250 FOR IP): Performed by: INTERNAL MEDICINE

## 2020-05-21 PROCEDURE — 36415 COLL VENOUS BLD VENIPUNCTURE: CPT

## 2020-05-21 PROCEDURE — 93010 ELECTROCARDIOGRAM REPORT: CPT | Performed by: INTERNAL MEDICINE

## 2020-05-21 PROCEDURE — 99215 OFFICE O/P EST HI 40 MIN: CPT | Performed by: INTERNAL MEDICINE

## 2020-05-21 PROCEDURE — G0378 HOSPITAL OBSERVATION PER HR: HCPCS

## 2020-05-21 PROCEDURE — 99214 OFFICE O/P EST MOD 30 MIN: CPT | Performed by: INTERNAL MEDICINE

## 2020-05-21 PROCEDURE — 84484 ASSAY OF TROPONIN QUANT: CPT

## 2020-05-21 RX ADMIN — PANTOPRAZOLE SODIUM 20 MG: 20 TABLET, DELAYED RELEASE ORAL at 21:03

## 2020-05-21 RX ADMIN — Medication 10 ML: at 21:03

## 2020-05-21 RX ADMIN — ASPIRIN 81 MG: 81 TABLET, COATED ORAL at 08:14

## 2020-05-21 RX ADMIN — HYDROCHLOROTHIAZIDE 25 MG: 25 TABLET ORAL at 21:03

## 2020-05-21 RX ADMIN — ATORVASTATIN CALCIUM 20 MG: 10 TABLET, FILM COATED ORAL at 00:04

## 2020-05-21 RX ADMIN — PANTOPRAZOLE SODIUM 20 MG: 20 TABLET, DELAYED RELEASE ORAL at 00:04

## 2020-05-21 RX ADMIN — HYDROCHLOROTHIAZIDE 25 MG: 25 TABLET ORAL at 00:04

## 2020-05-21 RX ADMIN — ATORVASTATIN CALCIUM 20 MG: 10 TABLET, FILM COATED ORAL at 21:03

## 2020-05-21 RX ADMIN — CITALOPRAM HYDROBROMIDE 40 MG: 20 TABLET ORAL at 08:14

## 2020-05-21 NOTE — CONSULTS
[address] [facility]   Cardiovascular Evaluation    PATIENT: Germaine Norton  DATE: 2020  MRN: [id number]  CSN: [id number]  : 1945    Primary Care Doctor/Referring provider: Roseann Lott MD    Reason for evaluation/Chief complaint:   Dizziness (pt felt he was getting an ocular migraine and got dizzy)    Subjective:    History of present illness on initial date of evaluation:   Germaine Norton is a 76 y.o. patient who presents for the evaluation of an episode of near syncope. Patient states that he was at his place of business and developed onset of lightheadedness and dizziness while carrying some gardening equipment. States that he felt the sensation that he was going to pass out. He felt as if there was some dizziness sensation. He denies any overt syncope. He denies any neurological complaints beyond the lightheadedness. Patient states that he did feel somewhat diaphoretic at this time. He has had similar episodes like this in the past but nothing to this severity. Patient typically does develop mild dizziness when he has migraines, but he felt this episode was a little bit more severe than previous. The patient subsequently was brought to the hospital for evaluation. Due to the patient's urgent symptoms he called EMS. On arrival here patient was noted to have no acute cardiovascular issues but noted to have sinus bradycardia. He was also admitted to the hospital for further evaluation and concern for possible syncope. Patient Active Problem List   Diagnosis    Low back pain    HLD (hyperlipidemia)    HTN (hypertension)    Unstable angina (HCC)    Chest pain    ED (erectile dysfunction)    Angioedema    Pre-syncope    Bradycardia         Cardiac Testing: I have reviewed the findings below.   EKG:  ECHO:   STRESS TEST:  CATH:  BYPASS:  VASCULAR:    Past Medical History:   has a past medical history of Bradycardia, ED (erectile dysfunction), Hypercholesteremia, Hypertension, Low back pain, and Strabismus. Surgical History:   has a past surgical history that includes cyst removal; Esophagogastric fundoplication; Tonsillectomy; Strabismus surgery; External ear surgery; Colonoscopy (2011); Upper gastrointestinal endoscopy (2011); Upper gastrointestinal endoscopy (9/26/2014); eye surgery (Left); and Knee arthroscopy (Right, 1/7/2020). Social History:   reports that he has never smoked. He has never used smokeless tobacco. He reports current alcohol use of about 2.5 standard drinks of alcohol per week. He reports that he does not use drugs. Family History:  No evidence for sudden cardiac death or premature CAD    Medications:  Reviewed and are listed in nursing record. and/or listed below  Outpatient Medications:  Prior to Admission medications    Medication Sig Start Date End Date Taking? Authorizing Provider   BYSTOLIC 10 MG tablet TAKE 1 TABLET BY MOUTH DAILY 5/4/20  Yes Azra Walker MD   atorvastatin (LIPITOR) 20 MG tablet TAKE 1 TABLET BY MOUTH EVERY DAY 4/20/20  Yes Azra Walker MD   hydroCHLOROthiazide (HYDRODIURIL) 25 MG tablet TAKE 1 TABLET BY MOUTH DAILY 2/26/20  Yes Azra Walker MD   citalopram (CELEXA) 40 MG tablet TAKE 1 TABLET BY MOUTH DAILY 6/26/19  Yes Azra Walker MD   FIBER PO Take by mouth   Yes Historical Provider, MD   omeprazole (PRILOSEC) 20 MG capsule Take 40 mg by mouth daily   Yes Historical Provider, MD   Multiple Vitamins-Minerals (THERAPEUTIC MULTIVITAMIN-MINERALS) tablet Take 1 tablet by mouth daily. Yes Historical Provider, MD   diclofenac (VOLTAREN) 75 MG EC tablet TAKE 1 TABLET BY MOUTH TWICE DAILY WITH MEALS 4/1/20   Javid Badillo MD   Akvvaqtdz-Tjmqecqwm-CO-APAP ([address] SINUS-MAX DAY/NIGHT PO) Take by mouth 2 times daily    Historical Provider, MD   aspirin 325 MG EC tablet Take 1 tablet by mouth daily for 14 days 1/7/20 1/21/20  KETAN Mckeon   mometasone (ELOCON) 0.1 % cream Apply topically daily.  11/19/19   Christine Sine change was foundConfirmed by Riaz Rnodon MD, [email or web address] (1986) on 5/21/2020 7:32:10 AM  CXR:      Assessment:  76 y.o. patient with:  Problem List Items Addressed This Visit     None      Visit Diagnoses     Near syncope    -  Primary    Lightheadedness        Diaphoresis        Nausea        Generalized weakness        Coronary artery disease involving native heart with angina pectoris, unspecified vessel or lesion type (HCC)        Relevant Medications    aspirin EC tablet 325 mg (Completed)    aspirin EC tablet 81 mg    enoxaparin (LOVENOX) injection 40 mg    hydroCHLOROthiazide (HYDRODIURIL) tablet 25 mg    atorvastatin (LIPITOR) tablet 20 mg    Sinus bradycardia          Active Problems:    HLD (hyperlipidemia)    HTN (hypertension)    Pre-syncope    Bradycardia  Resolved Problems:    * No resolved hospital problems. *      Plan:  1. GXT to assess for chronotropic competence. In addition we will add myocardial perfusion imaging for ischemic work-up. 2. EP team to evaluate for further recommendations regarding the patient's bradycardia. 3. Monitor on telemetry  4. Orthostatics    All questions and concerns were addressed to the patient/family. Alternatives to my treatment were discussed. The note was completed using EMR. Every effort was made to ensure accuracy; however, inadvertent computerized transcription errors may be present.     Tammy Rey MD, [facility] [place], [place]  [phone] [facility] Room office  [phone] [facility]  5/21/2020  12:39 PM

## 2020-05-21 NOTE — CARE COORDINATION
CASE MANAGEMENT INITIAL ASSESSMENT      Reviewed chart and completed assessment via telephone with: wife, pt did not answer phone. Explained Case Management role/services. Primary contact information: Eveline Vega [phone]    Admit date/status: observation  Diagnosis: pre-syncope  Is this a Readmission?:  No    Insurance: [employer] [address] required- yes   3 night stay required - no    Living arrangements, Adls, care needs, prior to admission: pt lives in a single story house with a loft with a wife. Independent in all ADL's     Transportation: private    [facility]    Services in the home and/or outpatient, prior to admission: none    PT/OT recs: 2 [place] Notification (HEN): not initiated    Barriers to discharge: none    Plan/comments:   Writer attempted to call into patient room for initial assessment. 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 States he does much heavy lifting in the store and is concerned this could be contributing to his admission. Denies any additional needs.   Umu Boyd RN

## 2020-05-21 NOTE — PLAN OF CARE
Pt remained free from falls during shift. Pt has skid resistant footwear on feet. Pt is alert and oriented. Pt was observed walking with a steady gait and calls out appropriately. Pt has call light and bedside table within reach. Pt is checked on every hour by staff.

## 2020-05-22 ENCOUNTER — APPOINTMENT (OUTPATIENT)
Dept: NUCLEAR MEDICINE | Age: 75
End: 2020-05-22
Payer: MEDICARE

## 2020-05-22 VITALS
TEMPERATURE: 97.7 F | HEIGHT: 74 IN | DIASTOLIC BLOOD PRESSURE: 93 MMHG | HEART RATE: 51 BPM | WEIGHT: 267.3 LBS | SYSTOLIC BLOOD PRESSURE: 174 MMHG | RESPIRATION RATE: 16 BRPM | OXYGEN SATURATION: 96 % | BODY MASS INDEX: 34.31 KG/M2

## 2020-05-22 LAB
LV EF: 52 %
LVEF MODALITY: NORMAL

## 2020-05-22 PROCEDURE — 78452 HT MUSCLE IMAGE SPECT MULT: CPT

## 2020-05-22 PROCEDURE — G0378 HOSPITAL OBSERVATION PER HR: HCPCS

## 2020-05-22 PROCEDURE — 6370000000 HC RX 637 (ALT 250 FOR IP): Performed by: INTERNAL MEDICINE

## 2020-05-22 PROCEDURE — 93226 XTRNL ECG REC<48 HR SCAN A/R: CPT

## 2020-05-22 PROCEDURE — 93225 XTRNL ECG REC<48 HRS REC: CPT

## 2020-05-22 PROCEDURE — 6360000002 HC RX W HCPCS: Performed by: INTERNAL MEDICINE

## 2020-05-22 PROCEDURE — 3430000000 HC RX DIAGNOSTIC RADIOPHARMACEUTICAL: Performed by: INTERNAL MEDICINE

## 2020-05-22 PROCEDURE — 99225 PR SBSQ OBSERVATION CARE/DAY 25 MINUTES: CPT | Performed by: NURSE PRACTITIONER

## 2020-05-22 PROCEDURE — 93017 CV STRESS TEST TRACING ONLY: CPT

## 2020-05-22 PROCEDURE — A9502 TC99M TETROFOSMIN: HCPCS | Performed by: INTERNAL MEDICINE

## 2020-05-22 RX ADMIN — ASPIRIN 81 MG: 81 TABLET, COATED ORAL at 11:14

## 2020-05-22 RX ADMIN — REGADENOSON 0.4 MG: 0.08 INJECTION, SOLUTION INTRAVENOUS at 09:27

## 2020-05-22 RX ADMIN — TETROFOSMIN 10.4 MILLICURIE: 1.38 INJECTION, POWDER, LYOPHILIZED, FOR SOLUTION INTRAVENOUS at 07:25

## 2020-05-22 RX ADMIN — CITALOPRAM HYDROBROMIDE 40 MG: 20 TABLET ORAL at 11:14

## 2020-05-22 RX ADMIN — TETROFOSMIN 30 MILLICURIE: 1.38 INJECTION, POWDER, LYOPHILIZED, FOR SOLUTION INTRAVENOUS at 09:27

## 2020-05-22 ASSESSMENT — PAIN SCALES - GENERAL: PAINLEVEL_OUTOF10: 0

## 2020-05-22 NOTE — PROGRESS NOTES
4 Eyes Skin Assessment     The patient is being assess for  Admission    I agree that 2 RN's have performed a thorough Head to Toe Skin Assessment on the patient. ALL assessment sites listed below have been assessed. Areas assessed by both nurses: Shy Richardson RN  [x]   Head, Face, and Ears   [x]   Shoulders, Back, and Chest  [x]   Arms, Elbows, and Hands   [x]   Coccyx, Sacrum, and Ischum  [x]   Legs, Feet, and Heels        Does the Patient have Skin Breakdown?   No         Ajay Prevention initiated:  No   Wound Care Orders initiated:  No      [facility] nurse consulted for Pressure Injury (Stage 3,4, Unstageable, DTI, NWPT, and Complex wounds):  No      Nurse 1 eSignature: Electronically signed by Grace Sykes RN on 5/20/20 at 10:04 PM EDT    **SHARE this note so that the co-signing nurse is able to place an eSignature**    Nurse 2 eSignature: Electronically signed by John Spann RN on 5/21/20 at 5:16 AM EDT
Hospitalist Progress Note      PCP: Cande Sanchez MD    Date of Admission: 5/20/2020    Chief Complaint: [place] Hospital Course:  76 y.o. 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 He immediately felt like he was about to pass out, so he went and sat down, and then proceeded to have continuing lightheadedness, sweating with nausea but no vomiting. He denied any chest pain, shortness of breath, palpitations. He reports this lasted for a few minutes. He requested his wife to take him to the hospital, but instead his wife called EMS who brought him to the ED. patient denies similar episodes in the past.    Patient does report he gets lightheaded sometimes when he tries to stand up and walk quickly while sitting down for a while.     Patient had a cardiac cath back in 2014 which was essentially nonobstructive.      Patient had a echocardiogram back in March 2019 which was normal.     Patient does report that his heart rate usually runs in the 50s to 07N, he is on Bystolic       Subjective: Feels ok today. HR remains in the 40s-50s (not on any marah blocking agents). He complains of SOB with exertion.        Medications:  Reviewed    Infusion Medications   Scheduled Medications    aspirin  81 mg Oral Daily    citalopram  40 mg Oral Daily    sodium chloride flush  10 mL Intravenous 2 times per day    enoxaparin  40 mg Subcutaneous Daily    hydroCHLOROthiazide  25 mg Oral Nightly    atorvastatin  20 mg Oral Nightly    pantoprazole  20 mg Oral Nightly     PRN Meds: sodium chloride flush, acetaminophen **OR** acetaminophen, polyethylene glycol, promethazine **OR** ondansetron, perflutren lipid microspheres      Intake/Output Summary (Last 24 hours) at
Patient admitted to room 108 from ED. Patient oriented to room, call light, bed rails, phone, lights and bathroom. Patient instructed about the schedule of the day including: vital sign frequency, lab draws, possible tests, frequency of MD and staff rounds, including RN/MD rounding together at bedside, daily weights, and I &O's. Patient instructed about prescribed diet, how to use 8MENU, and television. Telemetry box  in place, patient aware of placement and reason. Bed locked, in lowest position, side rails up 2/4, call light within reach. Will continue to monitor.
Patient attempted GXT but was unable to reach target heart rate. Converted to Merlinda Nestle. The patient tolerated the procedure well. Awaiting stress imaging at this time.
Patient is alert and oriented;  VSS;  Shift assessment completed; Bed locked and in lowest position. Bedside table and call light within reach; Pt denies further needs. Will continue to monitor.
Patient okay for discharge per MD. Discharge instructions and script given. IV removed and site assessment clean, dry, and intact. Telebox removed and CMU notified. Patient discharged home in stable conditions with all belongings including cell phone. Lock box has been emptied. No questions or concerns at this time. Patient escorted to personal car.
[place]  [phone]

## 2020-05-22 NOTE — FLOWSHEET NOTE
05/22/20 1033   Encounter Summary   Services provided to: Patient not available   Referral/Consult From: [id number] [facility] none   Continue Visiting   (5/22 Tel-no answer on room phone)

## 2020-05-26 ENCOUNTER — TELEPHONE (OUTPATIENT)
Dept: FAMILY MEDICINE CLINIC | Age: 75
End: 2020-05-26

## 2020-05-27 LAB
ACQUISITION DURATION: NORMAL S
AVERAGE HEART RATE: 58 BPM
EKG DIAGNOSIS: NORMAL
FASTEST SUPRAVENTRICULAR RATE: 150 BPM
HOLTER MAX HEART RATE: 99 BPM
HOOKUP DATE: NORMAL
HOOKUP TIME: NORMAL
LONGEST SUPRAVENTRICULAR RATE: 150 BPM
MAX HEART RATE TIME/DATE: NORMAL
MIN HEART RATE TIME/DATE: NORMAL
MIN HEART RATE: 45 BPM
NUMBER OF FASTEST SUPRAVENTRICULAR BEATS: 8
NUMBER OF LONGEST SUPRAVENTRICULAR BEATS: 8
NUMBER OF QRS COMPLEXES: NORMAL
NUMBER OF SUPRAVENTRICULAR BEATS IN RUNS: 14
NUMBER OF SUPRAVENTRICULAR COUPLETS: 24
NUMBER OF SUPRAVENTRICULAR ECTOPICS: 296
NUMBER OF SUPRAVENTRICULAR ISOLATED BEATS: 234
NUMBER OF SUPRAVENTRICULAR RUNS: 3
NUMBER OF VENTRICULAR BEATS IN RUNS: 0
NUMBER OF VENTRICULAR BIGEMINAL CYCLES: 0
NUMBER OF VENTRICULAR COUPLETS: 0
NUMBER OF VENTRICULAR ECTOPICS: 65
NUMBER OF VENTRICULAR ISOLATED BEATS: 65
NUMBER OF VENTRICULAR RUNS: 0

## 2020-06-01 NOTE — DISCHARGE SUMMARY
Hospital Medicine Discharge Summary    Patient ID: Liborio Ortega      Patient's PCP: Itzel Estrada MD    Admit Date: 5/20/2020     Discharge Date: 5/22/2020     Admitting Physician: Trenda Opitz, MD     Discharge Physician: KANDY Andujar - CNP     Discharge Diagnoses: Active Hospital Problems    Diagnosis    Pre-syncope [R55]    Bradycardia [R00.1]    HLD (hyperlipidemia) [E78.5]    HTN (hypertension) [I10]       The patient was seen and examined on day of discharge and this discharge summary is in conjunction with any daily progress note from day of discharge. Hospital Course:     76 y. o. 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  He immediately felt like he was about to pass out, so he went and sat down, and then proceeded to have continuing lightheadedness, sweating with nausea but no vomiting.  He denied any chest pain, shortness of breath, palpitations.  He reports this lasted for a few minutes.  He requested his wife to take him to the hospital, but instead his wife called EMS who brought him to the ED. patient denies similar episodes in the past.    Patient does report he gets lightheaded sometimes when he tries to stand up and walk quickly while sitting down for a while.     Patient had a cardiac cath back in 2014 which was essentially nonobstructive.      Patient had a echocardiogram back in March 2019 which was normal.     Patient does report that his heart rate usually runs in the 50s to 39M, he is on Bystolic      Pre-syncope   - Sounds most likely vasovagal episode based on description, less likely cardiogenic  - EKG and troponin negative.   - 2D echo in 2019 was essentially normal, no need to repeat 2D echo  - 2014 cardiac cath no

## 2020-06-19 NOTE — PROGRESS NOTES
Cardiovascular: Normal rate, regular rhythm and normal heart sounds. Pulmonary/Chest: Effort normal and breath sounds normal.   Abdominal: Soft. No tenderness. Musculoskeletal: Normal range of motion. He exhibits no edema. Neurological: He is alert and oriented to person, place, and time. Skin: Skin is warm and dry. Psychiatric: He has a normal mood and affect. Assessment:  1. Near syncope- etiology unclear. Myoview GXT was normal. There has been no recurrence. 2. Sinus bradycardia- mild SB, appears to be asymptomatic. Plan:  1. Monitor for symptoms of recurrence   2. Make apt as needed    This note was scribed in the presence of Roberto Powell MD by Lexi Rand RN.     I, Dr. Roberto Powell, personally performed the services described in this documentation as scribed by Lexi Rand RN in my presence, and it is both accurate and complete.         Roberto Powell M.D.

## 2020-06-22 ENCOUNTER — OFFICE VISIT (OUTPATIENT)
Dept: CARDIOLOGY CLINIC | Age: 75
End: 2020-06-22
Payer: MEDICARE

## 2020-06-22 VITALS
DIASTOLIC BLOOD PRESSURE: 70 MMHG | SYSTOLIC BLOOD PRESSURE: 142 MMHG | HEIGHT: 74 IN | WEIGHT: 266.5 LBS | HEART RATE: 58 BPM | OXYGEN SATURATION: 98 % | BODY MASS INDEX: 34.2 KG/M2

## 2020-06-22 PROCEDURE — 99214 OFFICE O/P EST MOD 30 MIN: CPT | Performed by: INTERNAL MEDICINE

## 2020-06-22 PROCEDURE — 93000 ELECTROCARDIOGRAM COMPLETE: CPT | Performed by: INTERNAL MEDICINE

## 2020-07-07 ENCOUNTER — PATIENT MESSAGE (OUTPATIENT)
Dept: CARDIOLOGY CLINIC | Age: 75
End: 2020-07-07

## 2020-07-08 NOTE — TELEPHONE ENCOUNTER
Spoke to pt. He said that he feels fine today. Pt denies any CP, SOB, Dizziness, and did not have any episodes of syncope. However, I did advise pt to go to the ED and get checked out if he ever has the same prior symptoms as he did the other day.  Pt V/U.

## 2020-08-13 ENCOUNTER — PATIENT MESSAGE (OUTPATIENT)
Dept: CARDIOLOGY CLINIC | Age: 75
End: 2020-08-13

## 2020-08-17 RX ORDER — CITALOPRAM 40 MG/1
40 TABLET ORAL DAILY
Qty: 90 TABLET | Refills: 5 | Status: SHIPPED | OUTPATIENT
Start: 2020-08-17 | End: 2021-10-04

## 2020-08-27 ENCOUNTER — TELEPHONE (OUTPATIENT)
Dept: FAMILY MEDICINE CLINIC | Age: 75
End: 2020-08-27

## 2020-09-22 ENCOUNTER — OFFICE VISIT (OUTPATIENT)
Dept: FAMILY MEDICINE CLINIC | Age: 75
End: 2020-09-22
Payer: MEDICARE

## 2020-09-22 VITALS
TEMPERATURE: 97.7 F | HEART RATE: 66 BPM | OXYGEN SATURATION: 97 % | DIASTOLIC BLOOD PRESSURE: 66 MMHG | SYSTOLIC BLOOD PRESSURE: 130 MMHG | WEIGHT: 264 LBS | BODY MASS INDEX: 33.9 KG/M2

## 2020-09-22 PROCEDURE — 99397 PER PM REEVAL EST PAT 65+ YR: CPT | Performed by: FAMILY MEDICINE

## 2020-09-22 PROCEDURE — 36415 COLL VENOUS BLD VENIPUNCTURE: CPT | Performed by: FAMILY MEDICINE

## 2020-09-22 RX ORDER — HYDROCHLOROTHIAZIDE 25 MG/1
TABLET ORAL
COMMUNITY
Start: 2020-08-15 | End: 2021-02-03

## 2020-09-23 LAB
A/G RATIO: 1.8 (ref 1.1–2.2)
ALBUMIN SERPL-MCNC: 4.4 G/DL (ref 3.4–5)
ALP BLD-CCNC: 76 U/L (ref 40–129)
ALT SERPL-CCNC: 28 U/L (ref 10–40)
ANION GAP SERPL CALCULATED.3IONS-SCNC: 12 MMOL/L (ref 3–16)
AST SERPL-CCNC: 21 U/L (ref 15–37)
BILIRUB SERPL-MCNC: 0.5 MG/DL (ref 0–1)
BUN BLDV-MCNC: 22 MG/DL (ref 7–20)
CALCIUM SERPL-MCNC: 9.7 MG/DL (ref 8.3–10.6)
CHLORIDE BLD-SCNC: 101 MMOL/L (ref 99–110)
CHOLESTEROL, FASTING: 209 MG/DL (ref 0–199)
CO2: 26 MMOL/L (ref 21–32)
CREAT SERPL-MCNC: 1 MG/DL (ref 0.8–1.3)
GFR AFRICAN AMERICAN: >60
GFR NON-AFRICAN AMERICAN: >60
GLOBULIN: 2.5 G/DL
GLUCOSE BLD-MCNC: 95 MG/DL (ref 70–99)
HDLC SERPL-MCNC: 61 MG/DL (ref 40–60)
LDL CHOLESTEROL CALCULATED: 128 MG/DL
POTASSIUM SERPL-SCNC: 4.4 MMOL/L (ref 3.5–5.1)
PROSTATE SPECIFIC ANTIGEN: 0.96 NG/ML (ref 0–4)
SARS-COV-2 ANTIBODY, TOTAL: NEGATIVE
SODIUM BLD-SCNC: 139 MMOL/L (ref 136–145)
TOTAL PROTEIN: 6.9 G/DL (ref 6.4–8.2)
TRIGLYCERIDE, FASTING: 102 MG/DL (ref 0–150)
VLDLC SERPL CALC-MCNC: 20 MG/DL

## 2020-10-03 ASSESSMENT — ENCOUNTER SYMPTOMS
BACK PAIN: 0
RHINORRHEA: 0
SHORTNESS OF BREATH: 0
DIARRHEA: 0
CHEST TIGHTNESS: 0
CONSTIPATION: 0
EYE PAIN: 0
TROUBLE SWALLOWING: 0
COLOR CHANGE: 0

## 2020-10-20 ENCOUNTER — OFFICE VISIT (OUTPATIENT)
Dept: FAMILY MEDICINE CLINIC | Age: 75
End: 2020-10-20
Payer: MEDICARE

## 2020-10-20 VITALS
OXYGEN SATURATION: 98 % | BODY MASS INDEX: 34.54 KG/M2 | HEART RATE: 57 BPM | WEIGHT: 269 LBS | DIASTOLIC BLOOD PRESSURE: 80 MMHG | SYSTOLIC BLOOD PRESSURE: 138 MMHG | TEMPERATURE: 97.1 F

## 2020-10-20 PROCEDURE — 99213 OFFICE O/P EST LOW 20 MIN: CPT | Performed by: FAMILY MEDICINE

## 2020-10-20 PROCEDURE — 20610 DRAIN/INJ JOINT/BURSA W/O US: CPT | Performed by: FAMILY MEDICINE

## 2020-10-20 RX ORDER — METHYLPREDNISOLONE ACETATE 80 MG/ML
80 INJECTION, SUSPENSION INTRA-ARTICULAR; INTRALESIONAL; INTRAMUSCULAR; SOFT TISSUE ONCE
Status: COMPLETED | OUTPATIENT
Start: 2020-10-20 | End: 2020-10-20

## 2020-10-20 RX ADMIN — METHYLPREDNISOLONE ACETATE 80 MG: 80 INJECTION, SUSPENSION INTRA-ARTICULAR; INTRALESIONAL; INTRAMUSCULAR; SOFT TISSUE at 11:35

## 2020-11-01 NOTE — PROGRESS NOTES
tablet by mouth daily. Yes Historical Provider, MD   mometasone (ELOCON) 0.1 % cream Apply topically daily. Patient not taking: Reported on 9/22/2020  Pia Kessler MD        Social History     Tobacco Use    Smoking status: Never Smoker    Smokeless tobacco: Never Used   Substance Use Topics    Alcohol use: Yes     Alcohol/week: 2.5 standard drinks     Types: 3 Standard drinks or equivalent per week     Comment: 2 drinks about 4 nights a week        Vitals:    10/20/20 1118 10/20/20 1127   BP: (!) 158/80 138/80   Pulse: 57    Temp: 97.1 °F (36.2 °C)    TempSrc: Temporal    SpO2: 98%    Weight: 269 lb (122 kg)      Estimated body mass index is 34.54 kg/m² as calculated from the following:    Height as of 6/22/20: 6' 2\" (1.88 m). Weight as of this encounter: 269 lb (122 kg). Physical Exam  Vitals signs and nursing note reviewed. Constitutional:       Appearance: Normal appearance. He is well-developed. HENT:      Head: Normocephalic and atraumatic. Right Ear: External ear normal.      Left Ear: External ear normal.      Nose: Nose normal.   Eyes:      Conjunctiva/sclera: Conjunctivae normal.      Pupils: Pupils are equal, round, and reactive to light. Neck:      Musculoskeletal: Normal range of motion and neck supple. Cardiovascular:      Rate and Rhythm: Normal rate and regular rhythm. Musculoskeletal:      Right knee: He exhibits decreased range of motion. Tenderness found. Medial joint line and lateral joint line tenderness noted. Comments: A steroid injection was performed at right knee using 1% plain Lidocaine and 80 mg of depoMedrol. This was well tolerated. Skin:     General: Skin is warm and dry. Neurological:      Mental Status: He is alert and oriented to person, place, and time. Deep Tendon Reflexes: Reflexes are normal and symmetric. Psychiatric:         Behavior: Behavior normal.         Thought Content:  Thought content normal.         Judgment: Judgment normal.         ASSESSMENT/PLAN:  1. Chronic pain of right knee  Degenerative arthritis. - methylPREDNISolone acetate (DEPO-MEDROL) injection 80 mg  - 20610 - LA DRAIN/INJECT LARGE JOINT/BURSA      No follow-ups on file. An electronic signature was used to authenticate this note.     --Francisco J Enriquez MD on 11/1/2020 at 11:56 AM

## 2020-11-23 NOTE — PROGRESS NOTES
[facility]) Dermatology  [place] LukeValentín vallecillo  1945    76 y.o. male     Date of Visit: 11/24/2020    Last Visit: <2yr    Chief Complaint: Skin check    History of Present Illness:  1. Here for skin/mole check. No new moles. No moles changing in size, shape, color. No moles associated w/ pain, bleeding, pruritus.   -Tries to avoid sun when possible. Wears SPF 30 sunscreen intermittently. 2. 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 3. History of actinic keratoses s/p cryotherapy. Unsure of new lesions. 4. F/u asteatotic dermatitis. Previously improved w/ lidex oint. Has been applying lotion daily. Reports intermittent mild flares on lower legs and lower back. Applies vaseline instead of TCS to flares w/improvement     Review of Systems:  Constitutional: Reports general sense of well-being. Skin: No interval severe sunburns. Allergies: Reviewed and updated. Past Medical History, Surgical History, Medications and Allergies reviewed.      Past Medical History:   Diagnosis Date    Bradycardia     ED (erectile dysfunction)     Hypercholesteremia     Hypertension     Low back pain 7/7/2014    Strabismus      Past Surgical History:   Procedure Laterality Date    COLONOSCOPY  2011    CYST REMOVAL      ESOPHAGOGASTRIC FUNDOPLICATION      EXTERNAL EAR SURGERY      cancer spots    EYE SURGERY Left     for strabsismus    KNEE ARTHROSCOPY Right 1/7/2020    EXAM UNDER ANESTHESIA VIDEO ARTHROSCOPY RIGHT KNEE, PARTIAL MEDIAL MENISCECTOMY, PATELLA FEMORAL CHONDROPLASTY, SYNOVECTOMY performed by Cecile Ashley MD at [address] [place] [address] GASTROINTESTINAL ENDOSCOPY  2011    UPPER GASTROINTESTINAL ENDOSCOPY  9/26/2014    gastritis barretts biopsy taken       Allergies   Allergen Reactions    Sulfa Antibiotics Anaphylaxis     hives    Amlodipine Hives     Severe hives    Lisinopril Swelling     Face swelled/arms and legs swelled     Outpatient Medications Marked as Taking for the 11/24/20 encounter (Office Visit) with Brianna Doty MD   Medication Sig Dispense Refill    hydroCHLOROthiazide (HYDRODIURIL) 25 MG tablet TK 1 T PO D      citalopram (CELEXA) 40 MG tablet TAKE 1 TABLET BY MOUTH DAILY 90 tablet 5    CO ENZYME Q-10 PO Take by mouth      Garlic-Calcium (BL GARLIC PO) Take by mouth      atorvastatin (LIPITOR) 20 MG tablet TAKE 1 TABLET BY MOUTH EVERY DAY 90 tablet 3    aspirin 325 MG EC tablet Take 1 tablet by mouth daily for 14 days 14 tablet 0    FIBER PO Take by mouth      omeprazole (PRILOSEC) 20 MG capsule Take 40 mg by mouth daily      Multiple Vitamins-Minerals (THERAPEUTIC MULTIVITAMIN-MINERALS) tablet Take 1 tablet by mouth daily. 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 Wife in remission from cholangiocarcinoma. Physical Examination     The following were examined and determined to be normal: Psych/Neuro, Scalp/hair, Conjunctivae/eyelids, Gums/teeth/lips, Neck, Nails/digits and Genitalia/groin/buttocks. The following were examined and determined to be abnormal: Head/face, Breast/axilla/chest, Abdomen, Back, RUE, LUE, RLE and LLE. -General: Well-appearing, NAD  1. Scattered on the trunk and extremities are multiple well-defined round and oval symmetric smoothly-bordered uniformly brown macules and papules. 2. L posterior ear - scar clear  2a. L upper medial chest - 1.5cm irregularly-shaped focally crusted pink plaque     3. Dorsum L hand 1, L 1 and R 2 forearms, L tragus 1, R cheek 1, R 4 and L 4 temples, nasal bridge 1 - ill-defined irregularly-shaped roughly-scaling thin pink macule(s)/papule(s)   4. Distal lower legs, lower back - ill-defined mildly scaly thin pink plaques     Assessment and Plan     1.  Benign acquired melanocytic nevi  -Recommend monthly self skin exams   -Educated regarding the ABCDEs of melanoma detection   -Call for any new/changing moles or concerning lesions  -Reviewed sun protective behavior -- sun avoidance during the peak hours of the day, sun-protective clothing (including hat and sunglasses), sunscreen use (water resistant, broad spectrum, SPF at least 30, need for reapplication every 2 to 3 hours), avoidance of tanning beds   -Return for full skin exam in 1 year (sooner if indicated)        2. History of NMSC   2a, Neoplasm of uncertain behavior of skin - R/o BCC, L upper medial chest   -Discussed possible diagnosis. Patient agreeable to biopsy. Verbal consent obtained after risks (infection, bleeding, scar), benefits and alternatives explained. -Area(s) to be biopsied were marked with a surgical pen. Site(s) were cleansed with alcohol. Local anesthesia achieved with 1% lidocaine with epinephrine/sodium bicarbonate. Shave biopsy performed with a razor blade. Hemostasis was achieved with aluminum chloride. The wound(s) were dressed with petrolatum and covered with a bandage. Specimen(s) sent to pathology. Pt educated re: risk of bleeding, infection, scar and wound care instructions. 3. Actinic keratosis(es)  -Edu re: relationship with chronic cumulative sun exposure, low premalignant potential.   -15 lesion(s) treated w/ liquid nitrogen x 2 cycles - Dorsum L hand 1, L 1 and R 2 forearms, L tragus 1, R cheek 1, R 4 and L 4 temples, nasal bridge 1. Edu re: risk of blister formation, discomfort, scar, hypopigmentation. Discussed wound care.        4. Asteatotic dermatitis - mildly flared  -Pt was educated re: chronicity, use of topical steroids for flares, importance of dry skin care   -Advised to use lidex oint bid prn rather than vaseline for flares

## 2020-11-23 NOTE — PATIENT INSTRUCTIONS
rubbing alcohol or hydrogen peroxide.  Continue this regimen until the area is pink and healed. Depending on the size and location of your cryosurgery site, healing may take 2 to 4 weeks.  The area may continue to be pink for several weeks, and over the next few months may become darker or lighter than the surrounding skin. This may be a permanent change. Biopsy Wound Care Instructions    · Keep the bandage in place for 24 hours. · Cleanse the wound with mild soapy water daily   Gently dry the area.  Apply Vaseline or petroleum jelly to the wound using a cotton tipped applicator.  Cover with a clean bandage.  Repeat this process until the biopsy site is healed.  If you had stitches placed, continue treating the site until the stitches are removed. Remember to make an appointment to return to have your stitches removed by our staff.  You may shower and bathe as usual.       ** Biopsy results generally take around 7 business days to come back. If you have not heard from us by then, please call the office at [phone] between 8AM and 4PM Monday through Friday.

## 2020-11-24 ENCOUNTER — OFFICE VISIT (OUTPATIENT)
Dept: DERMATOLOGY | Age: 75
End: 2020-11-24
Payer: MEDICARE

## 2020-11-24 VITALS — TEMPERATURE: 97.4 F

## 2020-11-24 PROCEDURE — 11102 TANGNTL BX SKIN SINGLE LES: CPT | Performed by: DERMATOLOGY

## 2020-11-24 PROCEDURE — 17004 DESTROY PREMAL LESIONS 15/>: CPT | Performed by: DERMATOLOGY

## 2020-11-24 PROCEDURE — 99213 OFFICE O/P EST LOW 20 MIN: CPT | Performed by: DERMATOLOGY

## 2020-11-30 LAB — DERMATOLOGY PATHOLOGY REPORT: NORMAL

## 2021-01-25 ENCOUNTER — OFFICE VISIT (OUTPATIENT)
Dept: FAMILY MEDICINE CLINIC | Age: 76
End: 2021-01-25
Payer: MEDICARE

## 2021-01-25 VITALS
BODY MASS INDEX: 34.05 KG/M2 | HEART RATE: 77 BPM | OXYGEN SATURATION: 97 % | WEIGHT: 265.2 LBS | DIASTOLIC BLOOD PRESSURE: 70 MMHG | TEMPERATURE: 96.8 F | SYSTOLIC BLOOD PRESSURE: 124 MMHG

## 2021-01-25 DIAGNOSIS — H26.9 CATARACT, UNSPECIFIED CATARACT TYPE, UNSPECIFIED LATERALITY: Primary | ICD-10-CM

## 2021-01-25 PROCEDURE — 99212 OFFICE O/P EST SF 10 MIN: CPT | Performed by: FAMILY MEDICINE

## 2021-01-25 ASSESSMENT — PATIENT HEALTH QUESTIONNAIRE - PHQ9
SUM OF ALL RESPONSES TO PHQ QUESTIONS 1-9: 1
SUM OF ALL RESPONSES TO PHQ9 QUESTIONS 1 & 2: 1
SUM OF ALL RESPONSES TO PHQ QUESTIONS 1-9: 1
2. FEELING DOWN, DEPRESSED OR HOPELESS: 1
1. LITTLE INTEREST OR PLEASURE IN DOING THINGS: 0

## 2021-01-25 NOTE — PROGRESS NOTES
[facility]  [phone]  Fax: [phone]   Pre-operative History and Physical      DIAGNOSIS:  Left eye cataract    PROCEDURE:  Left eye phaco with iol      History Obtained From:  patient    HISTORY OF PRESENT ILLNESS:    The patient is a 76 y.o. male with significant past medical history of double vision of left eye for a while with poor vision who presents for preop exam.       Past Medical History:   Diagnosis Date    Bradycardia     ED (erectile dysfunction)     Hypercholesteremia     Hypertension     Low back pain 7/7/2014    Strabismus      Past Surgical History:   Procedure Laterality Date    COLONOSCOPY  2011    CYST REMOVAL      ESOPHAGOGASTRIC FUNDOPLICATION      EXTERNAL EAR SURGERY      cancer spots    EYE SURGERY Left     for strabsismus    KNEE ARTHROSCOPY Right 1/7/2020    EXAM UNDER ANESTHESIA VIDEO ARTHROSCOPY RIGHT KNEE, PARTIAL MEDIAL MENISCECTOMY, PATELLA FEMORAL CHONDROPLASTY, SYNOVECTOMY performed by Kinga Chen MD at [address] [place] [address] GASTROINTESTINAL ENDOSCOPY  2011    UPPER GASTROINTESTINAL ENDOSCOPY  9/26/2014    gastritis barretts biopsy taken     Current Outpatient Medications   Medication Sig Dispense Refill    hydroCHLOROthiazide (HYDRODIURIL) 25 MG tablet TK 1 T PO D      citalopram (CELEXA) 40 MG tablet TAKE 1 TABLET BY MOUTH DAILY 90 tablet 5    CO ENZYME Q-10 PO Take by mouth      Garlic-Calcium (BL GARLIC PO) Take by mouth      atorvastatin (LIPITOR) 20 MG tablet TAKE 1 TABLET BY MOUTH EVERY DAY 90 tablet 3    aspirin 325 MG EC tablet Take 1 tablet by mouth daily for 14 days 14 tablet 0    mometasone (ELOCON) 0.1 % cream Apply topically daily.  60 g 0    FIBER PO Take by mouth      fluocinonide (LIDEX) 0.05 % ointment Apply sparingly to affected area(s) up to bid prn 60 g 1    omeprazole (PRILOSEC) 20 MG capsule Take 40 mg by mouth daily  Multiple Vitamins-Minerals (THERAPEUTIC MULTIVITAMIN-MINERALS) tablet Take 1 tablet by mouth daily. No current facility-administered medications for this visit. Allergies:  Sulfa antibiotics, Amlodipine, and Lisinopril  History of allergic reaction to anesthesia:  No     Social History     Tobacco Use   Smoking Status Never Smoker   Smokeless Tobacco Never Used     The patient states he drinks zero per week. Family History   Problem Relation Age of Onset    Stroke Mother 47    High Blood Pressure Mother     High Cholesterol Mother     Cancer Father         lung    Cancer Sister         breast    Cancer Brother         multiple myeloma       REVIEW OF SYSTEMS:    CONSTITUTIONAL:  negative  EYES:  negative  HEENT:  negative  RESPIRATORY:  negative  CARDIOVASCULAR:  negative  GASTROINTESTINAL:  negative  GENITOURINARY:  negative  INTEGUMENT/BREAST:  negative  HEMATOLOGIC/LYMPHATIC:  negative  ALLERGIC/IMMUNOLOGIC:  negative  ENDOCRINE:  negative  MUSCULOSKELETAL:  negative  NEUROLOGICAL:  negative    PHYSICAL EXAM:      /70   Pulse 77   Temp 96.8 °F (36 °C) (Temporal)   Wt 265 lb 3.2 oz (120.3 kg)   SpO2 97%   BMI 34.05 kg/m²     CONSTITUTIONAL:  awake, alert, cooperative, no apparent distress, and appears stated age    Eyes:  Lids and lashes normal, pupils equal, round and reactive to light, extra ocular muscles intact, sclera clear, conjunctiva normal    Head/ENT:  Normocephalic, without obvious abnormality, atramatic, sinuses nontender on palpation, external ears without lesions, oral pharynx with moist mucus membranes, tonsils without erythema or exudates, gums normal and good dentition.     Neck:  Supple, symmetrical, trachea midline, no adenopathy, thyroid symmetric, not enlarged and no tenderness, skin normal, No carotid bruit    Heart:  Normal apical impulse, regular rate and rhythm, normal S1 and S2, no S3 or S4, and no murmur noted Lungs: No increased work of breathing, good air exchange, clear to auscultation bilaterally, no crackles or wheezing    Abdomen:  No scars, normal bowel sounds, soft, non-distended, non-tender, no masses palpated, no hepatosplenomegally    Extremities:  No clubbing, cyanosis, or edema    NEUROLOGIC:  Awake, alert, oriented to name, place and time. Cranial nerves II-XII are grossly intact. Motor is 5 out of 5 bilaterally. ASSESSMENT AND PLAN:    1. Patient is a 76 y.o. male with above specified procedure planned on 1/25/2021 with Dr. Valdemar Mcgraw at [facility]. 2. Cleared for surgery.      Cat Kerns M.D    [facility] [place] [address],  [place] [address]  [phone]

## 2021-01-28 ENCOUNTER — TELEPHONE (OUTPATIENT)
Dept: FAMILY MEDICINE CLINIC | Age: 76
End: 2021-01-28

## 2021-03-03 ENCOUNTER — VIRTUAL VISIT (OUTPATIENT)
Dept: FAMILY MEDICINE CLINIC | Age: 76
End: 2021-03-03
Payer: MEDICARE

## 2021-03-03 VITALS — BODY MASS INDEX: 34.01 KG/M2 | HEIGHT: 74 IN | TEMPERATURE: 97.5 F | WEIGHT: 265 LBS

## 2021-03-03 DIAGNOSIS — Z00.00 ROUTINE GENERAL MEDICAL EXAMINATION AT A HEALTH CARE FACILITY: Primary | ICD-10-CM

## 2021-03-03 PROCEDURE — G0439 PPPS, SUBSEQ VISIT: HCPCS | Performed by: FAMILY MEDICINE

## 2021-03-03 ASSESSMENT — PATIENT HEALTH QUESTIONNAIRE - PHQ9
SUM OF ALL RESPONSES TO PHQ QUESTIONS 1-9: 0
1. LITTLE INTEREST OR PLEASURE IN DOING THINGS: 0
SUM OF ALL RESPONSES TO PHQ QUESTIONS 1-9: 0
SUM OF ALL RESPONSES TO PHQ9 QUESTIONS 1 & 2: 0

## 2021-03-03 NOTE — PROGRESS NOTES
Medicare Annual Wellness Visit  Name: Griffin Nelson Date: 3/3/2021   MRN: [id number] Sex: Male   Age: 76 y.o. Ethnicity: Non-/Non    : 1945 Race: [place] is here for Medicare AWV    Screenings for behavioral, psychosocial and functional/safety risks, and cognitive dysfunction are all negative except as indicated below. These results, as well as other patient data from the [address] form, are documented in Flowsheets linked to this Encounter. Allergies   Allergen Reactions    Sulfa Antibiotics Anaphylaxis     hives    Amlodipine Hives     Severe hives    Lisinopril Swelling     Face swelled/arms and legs swelled       Prior to Visit Medications    Medication Sig Taking? Authorizing Provider   hydroCHLOROthiazide (HYDRODIURIL) 25 MG tablet TAKE 1 TABLET BY MOUTH DAILY  Berna Olmedo MD   citalopram (CELEXA) 40 MG tablet TAKE 1 TABLET BY MOUTH DAILY  KANDY Alexis - CNP   CO ENZYME Q-10 PO Take by mouth  Historical Provider, MD   Garlic-Calcium (BL GARLIC PO) Take by mouth  Historical Provider, MD   atorvastatin (LIPITOR) 20 MG tablet TAKE 1 TABLET BY MOUTH EVERY DAY  Berna Olmedo MD   aspirin 325 MG EC tablet Take 1 tablet by mouth daily for 14 days  KETAN Gilmore   mometasone (ELOCON) 0.1 % cream Apply topically daily. Berna Olmedo MD   FIBER PO Take by mouth  Historical Provider, MD   fluocinonide (LIDEX) 0.05 % ointment Apply sparingly to affected area(s) up to bid prn  Giles Simmons MD   omeprazole (PRILOSEC) 20 MG capsule Take 40 mg by mouth daily  Historical Provider, MD   Multiple Vitamins-Minerals (THERAPEUTIC MULTIVITAMIN-MINERALS) tablet Take 1 tablet by mouth daily.   Historical Provider, MD       Past Medical History:   Diagnosis Date    Bradycardia     ED (erectile dysfunction)     Hypercholesteremia     Hypertension     Low back pain 2014    Strabismus        Past Surgical History:   Procedure Laterality Date Fatigue, Loneliness, Social Isolation, Stress or Anger?: None of These  Do you get the social and emotional support that you need?: Yes  Do you have a Living Will?: Yes  Advance Directives     Power of  Living Will ACP-Advance Directive ACP-Power of     Not on File Not on File Not on File Not on File      General Health Risk Interventions:  · No Living Will: ACP documents already completed- patient asked to provide copy to the office    Health Habits/Nutrition:  Health Habits/Nutrition  Do you exercise for at least 20 minutes 2-3 times per week?: Yes  Have you lost any weight without trying in the past 3 months?: No  Do you eat only one meal per day?: No  Have you seen the dentist within the past year?: Yes  Body mass index: (!) 34.02  Health Habits/Nutrition Interventions:  · Inadequate physical activity:  educational materials provided to promote increased physical activity       Personalized Preventive Plan   Current Health Maintenance Status  Immunization History   Administered Date(s) Administered    COVID-19, Moderna, PF, 100mcg/0.5mL 01/15/2021    Hepatitis A 07/27/2016    Influenza Virus Vaccine 09/27/2014, 09/16/2015    Influenza, High Dose (Fluzone 65 yrs and older) 09/23/2016, 10/17/2018, 09/04/2020    Influenza, Triv, inactivated, subunit, adjuvanted, IM (Fluad 65 yrs and older) 11/06/2019    Pneumococcal Conjugate 13-valent ([id number]) 07/27/2016    Pneumococcal Polysaccharide ([id number]) 11/16/2010, 09/27/2014    Td, unspecified formulation 07/20/1995    Tdap (Boostrix, Adacel) 03/12/2013    Yellow Fever (YF-Vax) 08/08/2016        Health Maintenance   Topic Date Due    Hepatitis C screen  Never done    Shingles Vaccine (1 of 2) Never done   [employer] Visit (AWV)  Never done    A1C test (Diabetic or Prediabetic)  11/06/2020    COVID-19 Vaccine (2 of 2 - Moderna series) 02/12/2021    Lipid screen  09/22/2021    Potassium monitoring  09/22/2021    Creatinine monitoring  09/22/2021    Colon cancer screen colonoscopy  02/26/2022    DTaP/Tdap/Td vaccine (2 - Td) 03/12/2023    Flu vaccine  Completed    Pneumococcal 65+ years Vaccine  Completed    Hepatitis A vaccine  Aged Out    Hepatitis B vaccine  Aged Out    Hib vaccine  Aged Out    Meningococcal (ACWY) vaccine  Aged Out     Recommendations for [employer] Due: see orders and patient instructions/AVS.  . Recommended screening schedule for the next 5-10 years is provided to the patient in written form: see Patient Instructions/AVS.    Janeth MARIO LPN, 6/7/[birth year]114, performed the documented evaluation under the direct supervision of the attending physician. Kathy Law, was evaluated through a synchronous (real-time) audio-video encounter. The patient (or guardian if applicable) is aware that this is a billable service. Verbal consent to proceed has been obtained within the past 12 months. The visit was conducted pursuant to the emergency declaration under the [address] [place] [address] authority and the [employer] and [employer] General Act. Patient identification was verified, and a caregiver was present when appropriate. The patient was located in a state where the provider was credentialed to provide care. Total time spent for this encounter: Not billed by time     Monique Durant LPN on 4/5/7426 at 46:08 AM    An electronic signature was used to authenticate this note. This encounter was performed under Vivi orr MDs, direct supervision, 3/3/2021.

## 2021-03-03 NOTE — PATIENT INSTRUCTIONS
Personalized Preventive Plan for Kareen Shaikh - 3/3/2021  Medicare offers a range of preventive health benefits. Some of the tests and screenings are paid in full while other may be subject to a deductible, co-insurance, and/or copay. Some of these benefits include a comprehensive review of your medical history including lifestyle, illnesses that may run in your family, and various assessments and screenings as appropriate. After reviewing your medical record and screening and assessments performed today your provider may have ordered immunizations, labs, imaging, and/or referrals for you. A list of these orders (if applicable) as well as your Preventive Care list are included within your After Visit Summary for your review. Other Preventive Recommendations:    · A preventive eye exam performed by an eye specialist is recommended every 1-2 years to screen for glaucoma; cataracts, macular degeneration, and other eye disorders. · A preventive dental visit is recommended every 6 months. · Try to get at least 150 minutes of exercise per week or 10,000 steps per day on a pedometer . · Order or download the FREE \"Exercise & Physical Activity: Your Everyday Guide\" from [employer]. Call 9-987.958.4170 or search [employer] online. · You need 8939-4395 mg of calcium and 9307-7591 IU of vitamin D per day. It is possible to meet your calcium requirement with diet alone, but a vitamin D supplement is usually necessary to meet this goal.  · When exposed to the sun, use a sunscreen that protects against both UVA and UVB radiation with an SPF of 30 or greater. Reapply every 2 to 3 hours or after sweating, drying off with a towel, or swimming. · Always wear a seat belt when traveling in a car. Always wear a helmet when riding a bicycle or motorcycle. Learning About Medical Power of   What is a medical power of ? A medical power of , also called a durable power of  for health care, is one type of the legal forms called advance directives. It lets you name the person you want to make treatment decisions for you if you can't speak or decide for yourself. The person you choose is called your health care agent. This person is also called a health care proxy or health care surrogate. A medical power of  may be called something else in your state. How do you choose a health care agent? Choose your health care agent carefully. This person may or may not be a family member. Talk to the person before you make your final decision. Make sure he or she is comfortable with this responsibility. It's a good idea to choose someone who:  Is at least 25years old. Knows you well and understands what makes life meaningful for you. Understands your Jewish and moral values. Will do what you want, not what he or she wants. Will be able to make difficult choices at a stressful time. Will be able to refuse or stop treatment, if that is what you would want, even if you could die. Will be firm and confident with health professionals if needed. Will ask questions to get needed information. Lives near you or agrees to travel to you if needed. Your family may help you make medical decisions while you can still be part of that process. But it's important to choose one person to be your health care agent in case you aren't able to make decisions for yourself. If you don't fill out the legal form and name a health care agent, the decisions your family can make may be limited. A health care agent may be called something else in your state. Who will make decisions for you if you don't have a health care agent? If you don't have a health care agent or a living will, you may not get the care you want. Decisions may be made by family members who disagree about your medical care. Or decisions may be made by a medical professional who doesn't know you well. In some cases, a  makes the decisions. When you name a health care agent, it is very clear who has the power to make health decisions for you. How do you name a health care agent? You name your health care agent on a legal form. This form is usually called a medical power of . Ask your hospital, state bar association, or office on aging where to find these forms. You must sign the form to make it legal. Some states require you to get the form notarized. This means that a person called a  watches you sign the form and then he or she signs the form. Some states also require that two or more witnesses sign the form. Be sure to tell your family members and doctors who your health care agent is. Where can you learn more? Go to https://[email or web address] org and sign in to your [employer] account. Enter [id number] in the [employer] box to learn more about \"Learning About [address]. \"     If you do not have an account, please click on the \"Sign Up Now\" link. Current as of: December 9, 2019               Content Version: 12.6  © 2850-1264 [employer]. Care instructions adapted under license by [facility] ([facility]). If you have questions about a medical condition or this instruction, always ask your healthcare professional. [address] any warranty or liability for your use of this information.     ·

## 2021-03-24 RX ORDER — ATORVASTATIN CALCIUM 20 MG/1
TABLET, FILM COATED ORAL
Qty: 90 TABLET | Refills: 3 | Status: ON HOLD | OUTPATIENT
Start: 2021-03-24 | End: 2021-06-08 | Stop reason: HOSPADM

## 2021-05-20 LAB
ALBUMIN SERPL-MCNC: 4.4 G/DL (ref 3.4–5)
ANION GAP SERPL CALCULATED.3IONS-SCNC: 11 MMOL/L (ref 3–16)
BUN BLDV-MCNC: 21 MG/DL (ref 7–20)
CALCIUM SERPL-MCNC: 9.7 MG/DL (ref 8.3–10.6)
CHLORIDE BLD-SCNC: 100 MMOL/L (ref 99–110)
CO2: 28 MMOL/L (ref 21–32)
CREAT SERPL-MCNC: 1.2 MG/DL (ref 0.8–1.3)
GFR AFRICAN AMERICAN: >60
GFR NON-AFRICAN AMERICAN: 59
GLUCOSE BLD-MCNC: 93 MG/DL (ref 70–99)
MAGNESIUM: 2 MG/DL (ref 1.8–2.4)
PHOSPHORUS: 3.2 MG/DL (ref 2.5–4.9)
POTASSIUM SERPL-SCNC: 4.8 MMOL/L (ref 3.5–5.1)
SODIUM BLD-SCNC: 139 MMOL/L (ref 136–145)
VITAMIN D 25-HYDROXY: 30.2 NG/ML

## 2021-05-29 ENCOUNTER — APPOINTMENT (OUTPATIENT)
Dept: GENERAL RADIOLOGY | Age: 76
End: 2021-05-29
Payer: MEDICARE

## 2021-05-29 ENCOUNTER — HOSPITAL ENCOUNTER (EMERGENCY)
Age: 76
Discharge: ANOTHER HOSPITAL | End: 2021-05-30
Attending: EMERGENCY MEDICINE
Payer: MEDICARE

## 2021-05-29 ENCOUNTER — APPOINTMENT (OUTPATIENT)
Dept: CT IMAGING | Age: 76
End: 2021-05-29
Payer: MEDICARE

## 2021-05-29 DIAGNOSIS — I65.21 ICAO (INTERNAL CAROTID ARTERY OCCLUSION), RIGHT: Primary | ICD-10-CM

## 2021-05-29 LAB
A/G RATIO: 1.3 (ref 1.1–2.2)
ALBUMIN SERPL-MCNC: 4 G/DL (ref 3.4–5)
ALP BLD-CCNC: 72 U/L (ref 40–129)
ALT SERPL-CCNC: 19 U/L (ref 10–40)
ANION GAP SERPL CALCULATED.3IONS-SCNC: 13 MMOL/L (ref 3–16)
AST SERPL-CCNC: 21 U/L (ref 15–37)
BASOPHILS ABSOLUTE: 0.1 K/UL (ref 0–0.2)
BASOPHILS RELATIVE PERCENT: 0.6 %
BILIRUB SERPL-MCNC: <0.2 MG/DL (ref 0–1)
BUN BLDV-MCNC: 16 MG/DL (ref 7–20)
CALCIUM SERPL-MCNC: 9.5 MG/DL (ref 8.3–10.6)
CHLORIDE BLD-SCNC: 100 MMOL/L (ref 99–110)
CO2: 25 MMOL/L (ref 21–32)
CREAT SERPL-MCNC: 1.1 MG/DL (ref 0.8–1.3)
EOSINOPHILS ABSOLUTE: 0.2 K/UL (ref 0–0.6)
EOSINOPHILS RELATIVE PERCENT: 2.4 %
GFR AFRICAN AMERICAN: >60
GFR NON-AFRICAN AMERICAN: >60
GLOBULIN: 3 G/DL
GLUCOSE BLD-MCNC: 105 MG/DL (ref 70–99)
GLUCOSE BLD-MCNC: 111 MG/DL (ref 70–99)
HCT VFR BLD CALC: 42.5 % (ref 40.5–52.5)
HEMOGLOBIN: 14.7 G/DL (ref 13.5–17.5)
LIPASE: 36 U/L (ref 13–60)
LYMPHOCYTES ABSOLUTE: 2.1 K/UL (ref 1–5.1)
LYMPHOCYTES RELATIVE PERCENT: 23.8 %
MCH RBC QN AUTO: 32.8 PG (ref 26–34)
MCHC RBC AUTO-ENTMCNC: 34.5 G/DL (ref 31–36)
MCV RBC AUTO: 95.1 FL (ref 80–100)
MONOCYTES ABSOLUTE: 1.2 K/UL (ref 0–1.3)
MONOCYTES RELATIVE PERCENT: 13.3 %
NEUTROPHILS ABSOLUTE: 5.4 K/UL (ref 1.7–7.7)
NEUTROPHILS RELATIVE PERCENT: 59.9 %
PDW BLD-RTO: 13.3 % (ref 12.4–15.4)
PERFORMED ON: ABNORMAL
PLATELET # BLD: 203 K/UL (ref 135–450)
PMV BLD AUTO: 8.5 FL (ref 5–10.5)
POTASSIUM REFLEX MAGNESIUM: 3.9 MMOL/L (ref 3.5–5.1)
PRO-BNP: 166 PG/ML (ref 0–449)
RBC # BLD: 4.47 M/UL (ref 4.2–5.9)
SODIUM BLD-SCNC: 138 MMOL/L (ref 136–145)
TOTAL PROTEIN: 7 G/DL (ref 6.4–8.2)
TROPONIN: <0.01 NG/ML
WBC # BLD: 9 K/UL (ref 4–11)

## 2021-05-29 PROCEDURE — 93005 ELECTROCARDIOGRAM TRACING: CPT | Performed by: EMERGENCY MEDICINE

## 2021-05-29 PROCEDURE — 96374 THER/PROPH/DIAG INJ IV PUSH: CPT

## 2021-05-29 PROCEDURE — 85025 COMPLETE CBC W/AUTO DIFF WBC: CPT

## 2021-05-29 PROCEDURE — 96365 THER/PROPH/DIAG IV INF INIT: CPT

## 2021-05-29 PROCEDURE — 99285 EMERGENCY DEPT VISIT HI MDM: CPT

## 2021-05-29 PROCEDURE — 71045 X-RAY EXAM CHEST 1 VIEW: CPT

## 2021-05-29 PROCEDURE — 96366 THER/PROPH/DIAG IV INF ADDON: CPT

## 2021-05-29 PROCEDURE — 80053 COMPREHEN METABOLIC PANEL: CPT

## 2021-05-29 PROCEDURE — 99291 CRITICAL CARE FIRST HOUR: CPT

## 2021-05-29 PROCEDURE — 70450 CT HEAD/BRAIN W/O DYE: CPT

## 2021-05-29 PROCEDURE — 70498 CT ANGIOGRAPHY NECK: CPT

## 2021-05-29 PROCEDURE — 96376 TX/PRO/DX INJ SAME DRUG ADON: CPT

## 2021-05-29 PROCEDURE — 96375 TX/PRO/DX INJ NEW DRUG ADDON: CPT

## 2021-05-29 PROCEDURE — 83690 ASSAY OF LIPASE: CPT

## 2021-05-29 PROCEDURE — 83880 ASSAY OF NATRIURETIC PEPTIDE: CPT

## 2021-05-29 PROCEDURE — 6360000004 HC RX CONTRAST MEDICATION: Performed by: EMERGENCY MEDICINE

## 2021-05-29 PROCEDURE — 84484 ASSAY OF TROPONIN QUANT: CPT

## 2021-05-29 RX ADMIN — IOPAMIDOL 75 ML: 755 INJECTION, SOLUTION INTRAVENOUS at 21:46

## 2021-05-29 ASSESSMENT — PAIN DESCRIPTION - PAIN TYPE: TYPE: ACUTE PAIN

## 2021-05-29 ASSESSMENT — PAIN DESCRIPTION - LOCATION: LOCATION: HEAD

## 2021-05-29 ASSESSMENT — PAIN SCALES - GENERAL: PAINLEVEL_OUTOF10: 9

## 2021-05-30 ENCOUNTER — APPOINTMENT (OUTPATIENT)
Dept: CT IMAGING | Age: 76
DRG: 039 | End: 2021-05-30
Attending: INTERNAL MEDICINE
Payer: MEDICARE

## 2021-05-30 ENCOUNTER — HOSPITAL ENCOUNTER (INPATIENT)
Age: 76
LOS: 9 days | Discharge: HOME | DRG: 039 | End: 2021-06-08
Attending: INTERNAL MEDICINE | Admitting: INTERNAL MEDICINE
Payer: MEDICARE

## 2021-05-30 VITALS
SYSTOLIC BLOOD PRESSURE: 182 MMHG | DIASTOLIC BLOOD PRESSURE: 81 MMHG | HEART RATE: 55 BPM | WEIGHT: 260 LBS | RESPIRATION RATE: 16 BRPM | OXYGEN SATURATION: 98 % | TEMPERATURE: 98.9 F | BODY MASS INDEX: 33.37 KG/M2 | HEIGHT: 74 IN

## 2021-05-30 PROBLEM — I65.29: Status: ACTIVE | Noted: 2021-05-30

## 2021-05-30 LAB
ALBUMIN SERPL-MCNC: 3.9 G/DL (ref 3.4–5)
ANION GAP SERPL CALCULATED.3IONS-SCNC: 9 MMOL/L (ref 3–16)
ANTI-XA UNFRAC HEPARIN: 0.84 IU/ML (ref 0.3–0.7)
ANTI-XA UNFRAC HEPARIN: 0.9 IU/ML (ref 0.3–0.7)
ANTI-XA UNFRAC HEPARIN: 1.17 IU/ML (ref 0.3–0.7)
BASOPHILS ABSOLUTE: 0 K/UL (ref 0–0.2)
BASOPHILS RELATIVE PERCENT: 0.5 %
BUN BLDV-MCNC: 16 MG/DL (ref 7–20)
CALCIUM SERPL-MCNC: 9.2 MG/DL (ref 8.3–10.6)
CHLORIDE BLD-SCNC: 101 MMOL/L (ref 99–110)
CO2: 28 MMOL/L (ref 21–32)
CREAT SERPL-MCNC: 1.1 MG/DL (ref 0.8–1.3)
EKG ATRIAL RATE: 70 BPM
EKG DIAGNOSIS: NORMAL
EKG P AXIS: 43 DEGREES
EKG P-R INTERVAL: 176 MS
EKG Q-T INTERVAL: 386 MS
EKG QRS DURATION: 90 MS
EKG QTC CALCULATION (BAZETT): 416 MS
EKG R AXIS: 16 DEGREES
EKG T AXIS: 87 DEGREES
EKG VENTRICULAR RATE: 70 BPM
EOSINOPHILS ABSOLUTE: 0.2 K/UL (ref 0–0.6)
EOSINOPHILS RELATIVE PERCENT: 3.6 %
GFR AFRICAN AMERICAN: >60
GFR NON-AFRICAN AMERICAN: >60
GLUCOSE BLD-MCNC: 136 MG/DL (ref 70–99)
HCT VFR BLD CALC: 40.4 % (ref 40.5–52.5)
HEMOGLOBIN: 13.5 G/DL (ref 13.5–17.5)
LYMPHOCYTES ABSOLUTE: 2.2 K/UL (ref 1–5.1)
LYMPHOCYTES RELATIVE PERCENT: 32.3 %
MAGNESIUM: 2 MG/DL (ref 1.8–2.4)
MCH RBC QN AUTO: 32.4 PG (ref 26–34)
MCHC RBC AUTO-ENTMCNC: 33.5 G/DL (ref 31–36)
MCV RBC AUTO: 96.6 FL (ref 80–100)
MONOCYTES ABSOLUTE: 0.8 K/UL (ref 0–1.3)
MONOCYTES RELATIVE PERCENT: 12.3 %
NEUTROPHILS ABSOLUTE: 3.5 K/UL (ref 1.7–7.7)
NEUTROPHILS RELATIVE PERCENT: 51.3 %
PDW BLD-RTO: 13.7 % (ref 12.4–15.4)
PHOSPHORUS: 3.2 MG/DL (ref 2.5–4.9)
PLATELET # BLD: 188 K/UL (ref 135–450)
PMV BLD AUTO: 8.6 FL (ref 5–10.5)
POTASSIUM SERPL-SCNC: 4 MMOL/L (ref 3.5–5.1)
RBC # BLD: 4.18 M/UL (ref 4.2–5.9)
SARS-COV-2, NAAT: NOT DETECTED
SODIUM BLD-SCNC: 138 MMOL/L (ref 136–145)
WBC # BLD: 6.7 K/UL (ref 4–11)

## 2021-05-30 PROCEDURE — 6360000002 HC RX W HCPCS: Performed by: STUDENT IN AN ORGANIZED HEALTH CARE EDUCATION/TRAINING PROGRAM

## 2021-05-30 PROCEDURE — 96374 THER/PROPH/DIAG INJ IV PUSH: CPT

## 2021-05-30 PROCEDURE — 70496 CT ANGIOGRAPHY HEAD: CPT

## 2021-05-30 PROCEDURE — 96375 TX/PRO/DX INJ NEW DRUG ADDON: CPT

## 2021-05-30 PROCEDURE — 96365 THER/PROPH/DIAG IV INF INIT: CPT

## 2021-05-30 PROCEDURE — 6360000002 HC RX W HCPCS

## 2021-05-30 PROCEDURE — 6360000002 HC RX W HCPCS: Performed by: PHYSICIAN ASSISTANT

## 2021-05-30 PROCEDURE — 99223 1ST HOSP IP/OBS HIGH 75: CPT | Performed by: INTERNAL MEDICINE

## 2021-05-30 PROCEDURE — 83735 ASSAY OF MAGNESIUM: CPT

## 2021-05-30 PROCEDURE — 2000000000 HC ICU R&B

## 2021-05-30 PROCEDURE — 96376 TX/PRO/DX INJ SAME DRUG ADON: CPT

## 2021-05-30 PROCEDURE — 96366 THER/PROPH/DIAG IV INF ADDON: CPT

## 2021-05-30 PROCEDURE — 93010 ELECTROCARDIOGRAM REPORT: CPT | Performed by: INTERNAL MEDICINE

## 2021-05-30 PROCEDURE — 6360000002 HC RX W HCPCS: Performed by: EMERGENCY MEDICINE

## 2021-05-30 PROCEDURE — 2580000003 HC RX 258: Performed by: STUDENT IN AN ORGANIZED HEALTH CARE EDUCATION/TRAINING PROGRAM

## 2021-05-30 PROCEDURE — 36415 COLL VENOUS BLD VENIPUNCTURE: CPT

## 2021-05-30 PROCEDURE — 85520 HEPARIN ASSAY: CPT

## 2021-05-30 PROCEDURE — 6370000000 HC RX 637 (ALT 250 FOR IP): Performed by: STUDENT IN AN ORGANIZED HEALTH CARE EDUCATION/TRAINING PROGRAM

## 2021-05-30 PROCEDURE — 6360000004 HC RX CONTRAST MEDICATION: Performed by: STUDENT IN AN ORGANIZED HEALTH CARE EDUCATION/TRAINING PROGRAM

## 2021-05-30 PROCEDURE — 80069 RENAL FUNCTION PANEL: CPT

## 2021-05-30 PROCEDURE — 85025 COMPLETE CBC W/AUTO DIFF WBC: CPT

## 2021-05-30 PROCEDURE — 87635 SARS-COV-2 COVID-19 AMP PRB: CPT

## 2021-05-30 PROCEDURE — 2500000003 HC RX 250 WO HCPCS: Performed by: STUDENT IN AN ORGANIZED HEALTH CARE EDUCATION/TRAINING PROGRAM

## 2021-05-30 RX ORDER — METOCLOPRAMIDE HYDROCHLORIDE 5 MG/ML
10 INJECTION INTRAMUSCULAR; INTRAVENOUS ONCE
Status: COMPLETED | OUTPATIENT
Start: 2021-05-30 | End: 2021-05-30

## 2021-05-30 RX ORDER — HYDRALAZINE HYDROCHLORIDE 20 MG/ML
10 INJECTION INTRAMUSCULAR; INTRAVENOUS EVERY 6 HOURS PRN
Status: DISCONTINUED | OUTPATIENT
Start: 2021-05-30 | End: 2021-05-30

## 2021-05-30 RX ORDER — HEPARIN SODIUM 10000 [USP'U]/100ML
17.4 INJECTION, SOLUTION INTRAVENOUS CONTINUOUS
Status: DISCONTINUED | OUTPATIENT
Start: 2021-05-30 | End: 2021-05-30 | Stop reason: HOSPADM

## 2021-05-30 RX ORDER — PROMETHAZINE HYDROCHLORIDE 25 MG/1
12.5 TABLET ORAL EVERY 6 HOURS PRN
Status: DISCONTINUED | OUTPATIENT
Start: 2021-05-30 | End: 2021-06-08 | Stop reason: HOSPADM

## 2021-05-30 RX ORDER — DIPHENHYDRAMINE HYDROCHLORIDE 50 MG/ML
25 INJECTION INTRAMUSCULAR; INTRAVENOUS ONCE
Status: DISCONTINUED | OUTPATIENT
Start: 2021-05-30 | End: 2021-05-30 | Stop reason: HOSPADM

## 2021-05-30 RX ORDER — ACETAMINOPHEN 325 MG/1
650 TABLET ORAL EVERY 6 HOURS PRN
Status: DISCONTINUED | OUTPATIENT
Start: 2021-05-30 | End: 2021-06-08 | Stop reason: HOSPADM

## 2021-05-30 RX ORDER — DIPHENHYDRAMINE HCL 25 MG
25 TABLET ORAL ONCE
Status: DISCONTINUED | OUTPATIENT
Start: 2021-05-30 | End: 2021-05-30 | Stop reason: HOSPADM

## 2021-05-30 RX ORDER — HEPARIN SODIUM 1000 [USP'U]/ML
80 INJECTION, SOLUTION INTRAVENOUS; SUBCUTANEOUS PRN
Status: DISCONTINUED | OUTPATIENT
Start: 2021-05-30 | End: 2021-06-08

## 2021-05-30 RX ORDER — ONDANSETRON 2 MG/ML
4 INJECTION INTRAMUSCULAR; INTRAVENOUS EVERY 6 HOURS PRN
Status: DISCONTINUED | OUTPATIENT
Start: 2021-05-30 | End: 2021-06-08 | Stop reason: HOSPADM

## 2021-05-30 RX ORDER — HEPARIN SODIUM 1000 [USP'U]/ML
3900 INJECTION, SOLUTION INTRAVENOUS; SUBCUTANEOUS PRN
Status: DISCONTINUED | OUTPATIENT
Start: 2021-05-30 | End: 2021-05-30 | Stop reason: HOSPADM

## 2021-05-30 RX ORDER — BUTALBITAL, ACETAMINOPHEN AND CAFFEINE 50; 325; 40 MG/1; MG/1; MG/1
1 TABLET ORAL EVERY 4 HOURS PRN
Status: DISCONTINUED | OUTPATIENT
Start: 2021-05-30 | End: 2021-06-08 | Stop reason: HOSPADM

## 2021-05-30 RX ORDER — HEPARIN SODIUM 1000 [USP'U]/ML
40 INJECTION, SOLUTION INTRAVENOUS; SUBCUTANEOUS PRN
Status: DISCONTINUED | OUTPATIENT
Start: 2021-05-30 | End: 2021-06-08

## 2021-05-30 RX ORDER — MECOBALAMIN 5000 MCG
10 TABLET,DISINTEGRATING ORAL NIGHTLY PRN
Status: DISCONTINUED | OUTPATIENT
Start: 2021-05-30 | End: 2021-06-08 | Stop reason: HOSPADM

## 2021-05-30 RX ORDER — HEPARIN SODIUM 1000 [USP'U]/ML
7700 INJECTION, SOLUTION INTRAVENOUS; SUBCUTANEOUS ONCE
Status: COMPLETED | OUTPATIENT
Start: 2021-05-30 | End: 2021-05-30

## 2021-05-30 RX ORDER — HEPARIN SODIUM 1000 [USP'U]/ML
7700 INJECTION, SOLUTION INTRAVENOUS; SUBCUTANEOUS PRN
Status: DISCONTINUED | OUTPATIENT
Start: 2021-05-30 | End: 2021-05-30 | Stop reason: HOSPADM

## 2021-05-30 RX ORDER — SODIUM CHLORIDE 0.9 % (FLUSH) 0.9 %
5-40 SYRINGE (ML) INJECTION EVERY 12 HOURS SCHEDULED
Status: DISCONTINUED | OUTPATIENT
Start: 2021-05-30 | End: 2021-06-08 | Stop reason: HOSPADM

## 2021-05-30 RX ORDER — HEPARIN SODIUM 10000 [USP'U]/100ML
9 INJECTION, SOLUTION INTRAVENOUS CONTINUOUS
Status: DISCONTINUED | OUTPATIENT
Start: 2021-05-30 | End: 2021-06-06

## 2021-05-30 RX ORDER — DIPHENHYDRAMINE HYDROCHLORIDE 50 MG/ML
INJECTION INTRAMUSCULAR; INTRAVENOUS
Status: COMPLETED
Start: 2021-05-30 | End: 2021-05-30

## 2021-05-30 RX ORDER — POLYETHYLENE GLYCOL 3350 17 G/17G
17 POWDER, FOR SOLUTION ORAL DAILY PRN
Status: DISCONTINUED | OUTPATIENT
Start: 2021-05-30 | End: 2021-06-08 | Stop reason: HOSPADM

## 2021-05-30 RX ORDER — HYDRALAZINE HYDROCHLORIDE 20 MG/ML
10 INJECTION INTRAMUSCULAR; INTRAVENOUS EVERY 6 HOURS PRN
Status: DISCONTINUED | OUTPATIENT
Start: 2021-05-30 | End: 2021-06-02

## 2021-05-30 RX ORDER — HYDRALAZINE HYDROCHLORIDE 20 MG/ML
INJECTION INTRAMUSCULAR; INTRAVENOUS
Status: COMPLETED
Start: 2021-05-30 | End: 2021-05-30

## 2021-05-30 RX ORDER — CITALOPRAM 40 MG/1
40 TABLET ORAL DAILY
Status: DISCONTINUED | OUTPATIENT
Start: 2021-05-30 | End: 2021-06-08 | Stop reason: HOSPADM

## 2021-05-30 RX ORDER — ACETAMINOPHEN 650 MG/1
650 SUPPOSITORY RECTAL EVERY 6 HOURS PRN
Status: DISCONTINUED | OUTPATIENT
Start: 2021-05-30 | End: 2021-06-08 | Stop reason: HOSPADM

## 2021-05-30 RX ORDER — SODIUM CHLORIDE 0.9 % (FLUSH) 0.9 %
5-40 SYRINGE (ML) INJECTION PRN
Status: DISCONTINUED | OUTPATIENT
Start: 2021-05-30 | End: 2021-06-08 | Stop reason: HOSPADM

## 2021-05-30 RX ORDER — SODIUM CHLORIDE 9 MG/ML
25 INJECTION, SOLUTION INTRAVENOUS PRN
Status: DISCONTINUED | OUTPATIENT
Start: 2021-05-30 | End: 2021-06-07

## 2021-05-30 RX ORDER — METOCLOPRAMIDE HYDROCHLORIDE 5 MG/ML
10 INJECTION INTRAMUSCULAR; INTRAVENOUS ONCE
Status: DISCONTINUED | OUTPATIENT
Start: 2021-05-30 | End: 2021-05-30 | Stop reason: SDUPTHER

## 2021-05-30 RX ADMIN — HEPARIN SODIUM 2122.2 UNITS/HR: 10000 INJECTION, SOLUTION INTRAVENOUS at 04:42

## 2021-05-30 RX ADMIN — ACETAMINOPHEN 650 MG: 325 TABLET ORAL at 11:04

## 2021-05-30 RX ADMIN — Medication 10 ML: at 20:42

## 2021-05-30 RX ADMIN — HEPARIN SODIUM 17.4 ML/HR: 10000 INJECTION, SOLUTION INTRAVENOUS at 00:29

## 2021-05-30 RX ADMIN — DIPHENHYDRAMINE HYDROCHLORIDE 25 MG: 50 INJECTION, SOLUTION INTRAMUSCULAR; INTRAVENOUS at 03:02

## 2021-05-30 RX ADMIN — ACETAMINOPHEN 650 MG: 325 TABLET ORAL at 22:06

## 2021-05-30 RX ADMIN — METOCLOPRAMIDE HYDROCHLORIDE 10 MG: 5 INJECTION INTRAMUSCULAR; INTRAVENOUS at 03:03

## 2021-05-30 RX ADMIN — Medication 10 MG: at 22:06

## 2021-05-30 RX ADMIN — ACETAMINOPHEN 650 MG: 325 TABLET ORAL at 04:41

## 2021-05-30 RX ADMIN — IOPAMIDOL 80 ML: 755 INJECTION, SOLUTION INTRAVENOUS at 05:52

## 2021-05-30 RX ADMIN — NICARDIPINE HYDROCHLORIDE 5 MG/HR: 2.5 INJECTION, SOLUTION INTRAVENOUS at 21:56

## 2021-05-30 RX ADMIN — HEPARIN SODIUM 7700 UNITS: 1000 INJECTION INTRAVENOUS; SUBCUTANEOUS at 00:28

## 2021-05-30 RX ADMIN — NICARDIPINE HYDROCHLORIDE 5 MG/HR: 2.5 INJECTION, SOLUTION INTRAVENOUS at 17:39

## 2021-05-30 RX ADMIN — Medication 10 ML: at 09:18

## 2021-05-30 RX ADMIN — HYDRALAZINE HYDROCHLORIDE 10 MG: 20 INJECTION INTRAMUSCULAR; INTRAVENOUS at 17:36

## 2021-05-30 RX ADMIN — CITALOPRAM 40 MG: 40 TABLET, FILM COATED ORAL at 17:09

## 2021-05-30 RX ADMIN — ACETAMINOPHEN 650 MG: 325 TABLET ORAL at 17:09

## 2021-05-30 ASSESSMENT — PAIN SCALES - GENERAL
PAINLEVEL_OUTOF10: 3
PAINLEVEL_OUTOF10: 0
PAINLEVEL_OUTOF10: 4
PAINLEVEL_OUTOF10: 3
PAINLEVEL_OUTOF10: 1
PAINLEVEL_OUTOF10: 0
PAINLEVEL_OUTOF10: 1
PAINLEVEL_OUTOF10: 0
PAINLEVEL_OUTOF10: 0

## 2021-05-30 ASSESSMENT — ENCOUNTER SYMPTOMS
WHEEZING: 0
GASTROINTESTINAL NEGATIVE: 1
RESPIRATORY NEGATIVE: 1
BACK PAIN: 0
PHOTOPHOBIA: 0
VOMITING: 0
COLOR CHANGE: 0
NAUSEA: 0
SHORTNESS OF BREATH: 0
RHINORRHEA: 0

## 2021-05-30 ASSESSMENT — PAIN DESCRIPTION - PAIN TYPE: TYPE: ACUTE PAIN

## 2021-05-30 ASSESSMENT — PAIN DESCRIPTION - LOCATION: LOCATION: HEAD

## 2021-05-30 NOTE — ED NOTES
FSBS 111.  Dr Cross Clock at bedside during triage for stroke eval.      Neale Kanner, RN  05/29/21 2041

## 2021-05-30 NOTE — PLAN OF CARE
STROKE TEAM PLAN OF CARE    Name:  Rick Crawford (4 y.o., male)  : 1945  MRN:  [id number]  Today's Date: 2021    Stroke team contacted at: 22:51  History obtained from: emergency physician    Rick Crawford is a 76 y.o. male who presented with subjective hand numbness. Briefly, pt was LKW at appx 23:00 on . Woke up on  at appx 05:30 with subjective feeling of numbness in bilateral hands. He also felt that his \"reaction time\" was delayed and that he felt \"clumsy. \" He reports not strength deficits, and the exam in the ED showed no objective deficits, for an NIHSS of 0. Imaging was obtained that showed a critical stenosis of the prox cervical ICA, possibly thrombus, and Stroke Team was consulted. NIHSS: 0    NCCT: No acute   CTA: critical stenosis of the prox cervical ICA, possibly thrombus    Acute Ischemic Stroke Clinical Decision Making:    (1) Intravenous tPA:    The patient is not a candidate for iv TPA based on:  Time greater than 4.5h from symptom onset    (2) Angiography / Thrombectomy:  Regarding critical ICA stenosis, pt currently without strength deficit for NIHSS, and is not an immediate EVT candidate. However, with significant stenosis and appearance, I discussed with Dr. Sera Girard who reviewed images. Recommend heparin gtt and transfer to [facility] ICU for close monitoring. Dr. Sera Girard also recommended repeat CTA after a period on heparin gtt. These recommendations were relayed to the primary emergency medicine team.    For any additional questions regarding acute stroke care, please feel free to contact the  Stroke Team at [phone].      MD Cesar   Stroke Team   2021 12:05 AM

## 2021-05-30 NOTE — PROGRESS NOTES
Results for Camryn Lizarraga (MRN [id number]) as of 5/30/2021 14:46   Ref. Range 5/30/2021 06:54 5/30/2021 13:45   Anti-XA Unfrac Heparin Latest Ref Range: 0.30 - 0.70 IU/mL 1.17 (HH) 0.90 (H)     Titrating Heparin Gtt per protocol. Anti-XA Q6hrs.

## 2021-05-30 NOTE — PROGRESS NOTES
Patient has tingling and numbness at finger tips, same symptoms at admission. ICU residents notified. No other changes at this time.  Remains alert and oriented, GCS 4-5-6, NIH 0.
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
## 2021-05-30 NOTE — ED NOTES
5/29/2021  2254- Code Stroke announced and  Stroke Team paged  [phone]- CT and Lab made aware of pt  2253- Dr. Gayle Waller called to speak with Dr. Chantelle Louise  05/30/21 1968
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
## 2021-05-30 NOTE — ED NOTES
[facility] @ [id number]  RE: Transfer to [facility]  Dr. Mercedes Madrigal called back @ [phone]; Pt has been admitted  [facility] called [phone] [facility] @ [phone] to have ED call them back when COVID results are back  This writer called [facility] @ [phone] to inform COVID results   Room number assigned: [id number] [facility] eta: 0121 Gee Louise  05/30/21 0157       Juju Louise  05/30/21 8319
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
## 2021-05-30 NOTE — PROGRESS NOTES
Patients BP in the 200s, ICU residence notified. Cardene gtt initiated per protocol. Will continue to monitor.
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
## 2021-05-31 LAB
ALBUMIN SERPL-MCNC: 3.7 G/DL (ref 3.4–5)
ANION GAP SERPL CALCULATED.3IONS-SCNC: 8 MMOL/L (ref 3–16)
ANTI-XA UNFRAC HEPARIN: 0.61 IU/ML (ref 0.3–0.7)
ANTI-XA UNFRAC HEPARIN: 0.65 IU/ML (ref 0.3–0.7)
BASOPHILS ABSOLUTE: 0 K/UL (ref 0–0.2)
BASOPHILS RELATIVE PERCENT: 0.6 %
BUN BLDV-MCNC: 13 MG/DL (ref 7–20)
CALCIUM SERPL-MCNC: 9.1 MG/DL (ref 8.3–10.6)
CHLORIDE BLD-SCNC: 102 MMOL/L (ref 99–110)
CO2: 27 MMOL/L (ref 21–32)
CREAT SERPL-MCNC: 1 MG/DL (ref 0.8–1.3)
EOSINOPHILS ABSOLUTE: 0.2 K/UL (ref 0–0.6)
EOSINOPHILS RELATIVE PERCENT: 3.8 %
GFR AFRICAN AMERICAN: >60
GFR NON-AFRICAN AMERICAN: >60
GLUCOSE BLD-MCNC: 130 MG/DL (ref 70–99)
HCT VFR BLD CALC: 40 % (ref 40.5–52.5)
HEMOGLOBIN: 13.6 G/DL (ref 13.5–17.5)
LYMPHOCYTES ABSOLUTE: 1.4 K/UL (ref 1–5.1)
LYMPHOCYTES RELATIVE PERCENT: 24.7 %
MAGNESIUM: 2 MG/DL (ref 1.8–2.4)
MCH RBC QN AUTO: 32.4 PG (ref 26–34)
MCHC RBC AUTO-ENTMCNC: 34.1 G/DL (ref 31–36)
MCV RBC AUTO: 95 FL (ref 80–100)
MONOCYTES ABSOLUTE: 0.6 K/UL (ref 0–1.3)
MONOCYTES RELATIVE PERCENT: 10.5 %
NEUTROPHILS ABSOLUTE: 3.4 K/UL (ref 1.7–7.7)
NEUTROPHILS RELATIVE PERCENT: 60.4 %
PDW BLD-RTO: 13.3 % (ref 12.4–15.4)
PHOSPHORUS: 3.1 MG/DL (ref 2.5–4.9)
PLATELET # BLD: 192 K/UL (ref 135–450)
PMV BLD AUTO: 8.1 FL (ref 5–10.5)
POTASSIUM SERPL-SCNC: 3.8 MMOL/L (ref 3.5–5.1)
RBC # BLD: 4.21 M/UL (ref 4.2–5.9)
SODIUM BLD-SCNC: 137 MMOL/L (ref 136–145)
WBC # BLD: 5.6 K/UL (ref 4–11)

## 2021-05-31 PROCEDURE — 2000000000 HC ICU R&B

## 2021-05-31 PROCEDURE — 6360000002 HC RX W HCPCS: Performed by: STUDENT IN AN ORGANIZED HEALTH CARE EDUCATION/TRAINING PROGRAM

## 2021-05-31 PROCEDURE — 2500000003 HC RX 250 WO HCPCS: Performed by: STUDENT IN AN ORGANIZED HEALTH CARE EDUCATION/TRAINING PROGRAM

## 2021-05-31 PROCEDURE — 6370000000 HC RX 637 (ALT 250 FOR IP): Performed by: STUDENT IN AN ORGANIZED HEALTH CARE EDUCATION/TRAINING PROGRAM

## 2021-05-31 PROCEDURE — 80069 RENAL FUNCTION PANEL: CPT

## 2021-05-31 PROCEDURE — 83735 ASSAY OF MAGNESIUM: CPT

## 2021-05-31 PROCEDURE — 99232 SBSQ HOSP IP/OBS MODERATE 35: CPT | Performed by: INTERNAL MEDICINE

## 2021-05-31 PROCEDURE — 2580000003 HC RX 258: Performed by: STUDENT IN AN ORGANIZED HEALTH CARE EDUCATION/TRAINING PROGRAM

## 2021-05-31 PROCEDURE — 36415 COLL VENOUS BLD VENIPUNCTURE: CPT

## 2021-05-31 PROCEDURE — 85025 COMPLETE CBC W/AUTO DIFF WBC: CPT

## 2021-05-31 PROCEDURE — 85520 HEPARIN ASSAY: CPT

## 2021-05-31 RX ORDER — ATORVASTATIN CALCIUM 20 MG/1
20 TABLET, FILM COATED ORAL NIGHTLY
Status: DISCONTINUED | OUTPATIENT
Start: 2021-05-31 | End: 2021-05-31

## 2021-05-31 RX ORDER — ATORVASTATIN CALCIUM 80 MG/1
80 TABLET, FILM COATED ORAL NIGHTLY
Status: DISCONTINUED | OUTPATIENT
Start: 2021-05-31 | End: 2021-06-08 | Stop reason: HOSPADM

## 2021-05-31 RX ORDER — HYDROCHLOROTHIAZIDE 25 MG/1
25 TABLET ORAL DAILY
Status: DISCONTINUED | OUTPATIENT
Start: 2021-05-31 | End: 2021-06-02

## 2021-05-31 RX ADMIN — ATORVASTATIN CALCIUM 80 MG: 80 TABLET, FILM COATED ORAL at 20:40

## 2021-05-31 RX ADMIN — BUTALBITAL, ACETAMINOPHEN, AND CAFFEINE 1 TABLET: 50; 325; 40 TABLET ORAL at 20:40

## 2021-05-31 RX ADMIN — BUTALBITAL, ACETAMINOPHEN, AND CAFFEINE 1 TABLET: 50; 325; 40 TABLET ORAL at 14:37

## 2021-05-31 RX ADMIN — NICARDIPINE HYDROCHLORIDE 5 MG/HR: 2.5 INJECTION, SOLUTION INTRAVENOUS at 06:53

## 2021-05-31 RX ADMIN — CITALOPRAM 40 MG: 40 TABLET, FILM COATED ORAL at 08:48

## 2021-05-31 RX ADMIN — HYDROCHLOROTHIAZIDE 25 MG: 25 TABLET ORAL at 12:45

## 2021-05-31 RX ADMIN — Medication 10 MG: at 20:40

## 2021-05-31 RX ADMIN — HEPARIN SODIUM 11 UNITS/KG/HR: 10000 INJECTION, SOLUTION INTRAVENOUS at 05:38

## 2021-05-31 ASSESSMENT — ENCOUNTER SYMPTOMS
RESPIRATORY NEGATIVE: 1
GASTROINTESTINAL NEGATIVE: 1

## 2021-05-31 ASSESSMENT — PAIN SCALES - GENERAL
PAINLEVEL_OUTOF10: 3
PAINLEVEL_OUTOF10: 3

## 2021-05-31 NOTE — PROGRESS NOTES
Patient is alert and oriented x 4. Neuro exam is consistent and unchanged, patient continues to endorse black lines in R eye associated with blinking and a mild headache, rates a 2-3/10. Heparin gtt infusing per orders. Titrating cardene gtt for SBP goal < 140 per orders. Will continue to monitor.

## 2021-05-31 NOTE — PLAN OF CARE
Problem: Falls - Risk of:  Goal: Will remain free from falls  Description: Will remain free from falls  Patient is free from falls. Fall precautions are in place: bed is locked and in lowest position, side rails are up x 3, bed alarm is on, nonskid socks are on, bedside table and call light are within reach. Will continue to monitor. Outcome: Ongoing     Problem: Pain:  Goal: Pain level will decrease  Description: Pain level will decrease  Patient endorses mild headache, rates 2-3/10. Patient satisfied with pain control at this time. Will continue to monitor.     Outcome: Ongoing

## 2021-05-31 NOTE — PROGRESS NOTES
Patient stating he sees a black streak out of right eye. Headache and numbness and tingling of fingertips come and go. Dr. Nai Goddard made aware.
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
## 2021-05-31 NOTE — PROGRESS NOTES
ICU Progress Note    Admit Date: 5/30/2021  Day: 2  Diet: DIET CARDIAC;    CC: Headache    Interval history: Complaining of seeing dark lines in the right eye. Comes and goes with blinking. Has been up and out of bed with assistance. Reports trouble with balance especially on the right. PT/OT to assess. Medications:     Scheduled Meds:   sodium chloride flush  5-40 mL Intravenous 2 times per day    citalopram  40 mg Oral Daily     Continuous Infusions:   sodium chloride      heparin (PORCINE) Infusion 11 Units/kg/hr (05/31/21 0538)    niCARdipine Stopped (05/31/21 0742)     PRN Meds:sodium chloride flush, sodium chloride, promethazine **OR** ondansetron, polyethylene glycol, acetaminophen **OR** acetaminophen, heparin (porcine), heparin (porcine), butalbital-acetaminophen-caffeine, hydrALAZINE, melatonin    Objective:   Vitals:   T-max:  Patient Vitals for the past 8 hrs:   BP Temp Temp src Pulse Resp Weight   05/31/21 0600 128/72 -- -- 56 14 259 lb 7.7 oz (117.7 kg)   05/31/21 0500 (!) 114/59 -- -- 60 14 --   05/31/21 0400 127/72 97.7 °F (36.5 °C) Oral 58 12 --   05/31/21 0300 (!) 145/74 -- -- 56 14 --   05/31/21 0200 129/77 -- -- 60 15 --   05/31/21 0100 (!) 141/76 -- -- 67 15 --   05/31/21 0000 (!) 114/48 97.9 °F (36.6 °C) Oral 70 12 --       Intake/Output Summary (Last 24 hours) at 5/31/2021 0744  Last data filed at 5/31/2021 0700  Gross per 24 hour   Intake 790 ml   Output 1900 ml   Net -1110 ml       Review of Systems   Constitutional: Negative. HENT: Negative. Respiratory: Negative. Cardiovascular: Negative. Gastrointestinal: Negative. Neurological:        Vision changes  Balance issues    Hematological: Negative. Psychiatric/Behavioral: Negative. Physical Exam  Constitutional:       General: He is not in acute distress. Appearance: Normal appearance. He is not ill-appearing. Cardiovascular:      Rate and Rhythm: Normal rate and regular rhythm.       Pulses: Normal pulses. Heart sounds: Normal heart sounds. Pulmonary:      Effort: Pulmonary effort is normal.      Breath sounds: Normal breath sounds. Abdominal:      General: Abdomen is flat. Bowel sounds are normal.      Palpations: Abdomen is soft. Musculoskeletal:         General: Normal range of motion. Skin:     General: Skin is warm and dry. Capillary Refill: Capillary refill takes less than 2 seconds. Neurological:      General: No focal deficit present. Mental Status: He is alert and oriented to person, place, and time. Comments: Finger to nose test intact  Did not assess gait            LABS:    CBC:   Recent Labs     05/29/21 2004 05/30/21 0547 05/31/21 0328   WBC 9.0 6.7 5.6   HGB 14.7 13.5 13.6   HCT 42.5 40.4* 40.0*    188 192   MCV 95.1 96.6 95.0     Renal:    Recent Labs     05/29/21 2004 05/30/21 0547 05/31/21 0329    138 137   K 3.9 4.0 3.8    101 102   CO2 25 28 27   BUN 16 16 13   CREATININE 1.1 1.1 1.0   GLUCOSE 105* 136* 130*   CALCIUM 9.5 9.2 9.1   MG  --  2.00 2.00   PHOS  --  3.2 3.1   ANIONGAP 13 9 8     Hepatic:   Recent Labs     05/29/21 2004 05/30/21 0547 05/31/21 0329   AST 21  --   --    ALT 19  --   --    BILITOT <0.2  --   --    PROT 7.0  --   --    LABALBU 4.0 3.9 3.7   ALKPHOS 72  --   --      Troponin:   Recent Labs     05/29/21 2004   TROPONINI <0.01     BNP: No results for input(s): BNP in the last 72 hours. Lipids: No results for input(s): CHOL, HDL in the last 72 hours. Invalid input(s): LDLCALCU, TRIGLYCERIDE  ABGs:  No results for input(s): PHART, ERA8TDI, PO2ART, GLS9VYB, BEART, THGBART, J1TGUZFN, GSD1BWB in the last 72 hours. INR: No results for input(s): INR in the last 72 hours. Lactate: No results for input(s): LACTATE in the last 72 hours.   Cultures:  -----------------------------------------------------------------  RAD:   CTA HEAD NECK W CONTRAST   Final Result      CTA Head:   No arterial steno-occlusive disease or aneurysm. CTA Neck:   High-grade, 80-90% stenosis of the proximal right cervical internal carotid artery secondary to extensive noncalcified plaque. No other significant arterial steno-occlusive disease or evidence of dissection. MRI BRAIN WO CONTRAST    (Results Pending)         Assessment/Plan:   Brittaney Martínez a 76 y. o. male with PMH symptomatic bradycardia, hypertension, hyperlipidemia who was transferred from [facility] after CT revealed greater than 90% stenosis in the proximal right ICA secondary to a thrombus/plaque.       Right ICA stenosis 2/2 to thrombus/plaque  CTA revealed greater than 90% stenosis in the proximal right cervical ICA secondary to 2.5x0.5 thrombus/noncalcified atherosclerotic plaque.  -Last known well approximately 2300 on 5/28. Not a candidate for TPA  - Heparin gtt  - Target BP <140/90. Cardene as needed  - Stroke team and neurosurgery aware. For carotid revascularization to be determined after follow-up CTA on 6/4  -F/u MRI      Chronic hypertension  Chronic hyperlipidemia  - Cardene for BP control   - Hold home HCTZ  -Home lipitor        Code Status: Full Code  FEN: Cardiac    PPX: Heparin gtt  DISPO: ICU     This patient has been staffed and discussed with Dr Helen Bolaños MD, PGY-1  05/31/21  7:44 AM      Patient seen, examined and discussed with the resident and I agree with the assessment and plan. Some mild visual changes this morning  Continue heparin  Spoke to nsgy and they'd like to keep him in the ICU frequent neuro checks for one more day and get MRI today.       Ottoniel Villanueva MD

## 2021-06-01 ENCOUNTER — APPOINTMENT (OUTPATIENT)
Dept: MRI IMAGING | Age: 76
DRG: 039 | End: 2021-06-01
Attending: INTERNAL MEDICINE
Payer: MEDICARE

## 2021-06-01 LAB
ALBUMIN SERPL-MCNC: 3.6 G/DL (ref 3.4–5)
ANION GAP SERPL CALCULATED.3IONS-SCNC: 10 MMOL/L (ref 3–16)
ANTI-XA UNFRAC HEPARIN: 0.61 IU/ML (ref 0.3–0.7)
BASOPHILS ABSOLUTE: 0 K/UL (ref 0–0.2)
BASOPHILS RELATIVE PERCENT: 0.5 %
BUN BLDV-MCNC: 15 MG/DL (ref 7–20)
CALCIUM SERPL-MCNC: 9.2 MG/DL (ref 8.3–10.6)
CHLORIDE BLD-SCNC: 105 MMOL/L (ref 99–110)
CHOLESTEROL, TOTAL: 133 MG/DL (ref 0–199)
CO2: 24 MMOL/L (ref 21–32)
CREAT SERPL-MCNC: 1.1 MG/DL (ref 0.8–1.3)
EOSINOPHILS ABSOLUTE: 0.2 K/UL (ref 0–0.6)
EOSINOPHILS RELATIVE PERCENT: 4.5 %
ESTIMATED AVERAGE GLUCOSE: 122.6 MG/DL
GFR AFRICAN AMERICAN: >60
GFR NON-AFRICAN AMERICAN: >60
GLUCOSE BLD-MCNC: 118 MG/DL (ref 70–99)
HBA1C MFR BLD: 5.9 %
HCT VFR BLD CALC: 40.3 % (ref 40.5–52.5)
HDLC SERPL-MCNC: 49 MG/DL (ref 40–60)
HEMOGLOBIN: 14 G/DL (ref 13.5–17.5)
LDL CHOLESTEROL CALCULATED: 65 MG/DL
LV EF: 58 %
LVEF MODALITY: NORMAL
LYMPHOCYTES ABSOLUTE: 1.8 K/UL (ref 1–5.1)
LYMPHOCYTES RELATIVE PERCENT: 37.7 %
MAGNESIUM: 2 MG/DL (ref 1.8–2.4)
MCH RBC QN AUTO: 32.9 PG (ref 26–34)
MCHC RBC AUTO-ENTMCNC: 34.6 G/DL (ref 31–36)
MCV RBC AUTO: 94.9 FL (ref 80–100)
MONOCYTES ABSOLUTE: 0.6 K/UL (ref 0–1.3)
MONOCYTES RELATIVE PERCENT: 12.3 %
NEUTROPHILS ABSOLUTE: 2.1 K/UL (ref 1.7–7.7)
NEUTROPHILS RELATIVE PERCENT: 45 %
PDW BLD-RTO: 13.2 % (ref 12.4–15.4)
PHOSPHORUS: 3.2 MG/DL (ref 2.5–4.9)
PLATELET # BLD: 190 K/UL (ref 135–450)
PMV BLD AUTO: 8 FL (ref 5–10.5)
POTASSIUM SERPL-SCNC: 3.9 MMOL/L (ref 3.5–5.1)
RBC # BLD: 4.25 M/UL (ref 4.2–5.9)
SODIUM BLD-SCNC: 139 MMOL/L (ref 136–145)
TRIGL SERPL-MCNC: 97 MG/DL (ref 0–150)
VLDLC SERPL CALC-MCNC: 19 MG/DL
WBC # BLD: 4.7 K/UL (ref 4–11)

## 2021-06-01 PROCEDURE — 85025 COMPLETE CBC W/AUTO DIFF WBC: CPT

## 2021-06-01 PROCEDURE — 99232 SBSQ HOSP IP/OBS MODERATE 35: CPT | Performed by: INTERNAL MEDICINE

## 2021-06-01 PROCEDURE — 6370000000 HC RX 637 (ALT 250 FOR IP): Performed by: STUDENT IN AN ORGANIZED HEALTH CARE EDUCATION/TRAINING PROGRAM

## 2021-06-01 PROCEDURE — 2500000003 HC RX 250 WO HCPCS: Performed by: STUDENT IN AN ORGANIZED HEALTH CARE EDUCATION/TRAINING PROGRAM

## 2021-06-01 PROCEDURE — C8929 TTE W OR WO FOL WCON,DOPPLER: HCPCS

## 2021-06-01 PROCEDURE — 97530 THERAPEUTIC ACTIVITIES: CPT

## 2021-06-01 PROCEDURE — 36415 COLL VENOUS BLD VENIPUNCTURE: CPT

## 2021-06-01 PROCEDURE — 2580000003 HC RX 258: Performed by: STUDENT IN AN ORGANIZED HEALTH CARE EDUCATION/TRAINING PROGRAM

## 2021-06-01 PROCEDURE — 80061 LIPID PANEL: CPT

## 2021-06-01 PROCEDURE — 97161 PT EVAL LOW COMPLEX 20 MIN: CPT

## 2021-06-01 PROCEDURE — 2000000000 HC ICU R&B

## 2021-06-01 PROCEDURE — 83036 HEMOGLOBIN GLYCOSYLATED A1C: CPT

## 2021-06-01 PROCEDURE — 85520 HEPARIN ASSAY: CPT

## 2021-06-01 PROCEDURE — 97535 SELF CARE MNGMENT TRAINING: CPT

## 2021-06-01 PROCEDURE — 70551 MRI BRAIN STEM W/O DYE: CPT

## 2021-06-01 PROCEDURE — 97116 GAIT TRAINING THERAPY: CPT

## 2021-06-01 PROCEDURE — 83735 ASSAY OF MAGNESIUM: CPT

## 2021-06-01 PROCEDURE — 97165 OT EVAL LOW COMPLEX 30 MIN: CPT

## 2021-06-01 PROCEDURE — 80069 RENAL FUNCTION PANEL: CPT

## 2021-06-01 RX ORDER — PANTOPRAZOLE SODIUM 20 MG/1
20 TABLET, DELAYED RELEASE ORAL
Status: DISCONTINUED | OUTPATIENT
Start: 2021-06-02 | End: 2021-06-08 | Stop reason: HOSPADM

## 2021-06-01 RX ORDER — METOPROLOL TARTRATE 50 MG/1
50 TABLET, FILM COATED ORAL 2 TIMES DAILY
Status: DISCONTINUED | OUTPATIENT
Start: 2021-06-01 | End: 2021-06-04

## 2021-06-01 RX ORDER — PANTOPRAZOLE SODIUM 40 MG/1
40 TABLET, DELAYED RELEASE ORAL ONCE
Status: COMPLETED | OUTPATIENT
Start: 2021-06-01 | End: 2021-06-01

## 2021-06-01 RX ADMIN — CITALOPRAM 40 MG: 40 TABLET, FILM COATED ORAL at 09:09

## 2021-06-01 RX ADMIN — Medication 10 MG: at 21:03

## 2021-06-01 RX ADMIN — Medication 10 ML: at 09:09

## 2021-06-01 RX ADMIN — NICARDIPINE HYDROCHLORIDE 3 MG/HR: 2.5 INJECTION, SOLUTION INTRAVENOUS at 16:39

## 2021-06-01 RX ADMIN — BUTALBITAL, ACETAMINOPHEN, AND CAFFEINE 1 TABLET: 50; 325; 40 TABLET ORAL at 21:06

## 2021-06-01 RX ADMIN — PANTOPRAZOLE SODIUM 40 MG: 40 TABLET, DELAYED RELEASE ORAL at 21:02

## 2021-06-01 RX ADMIN — BUTALBITAL, ACETAMINOPHEN, AND CAFFEINE 1 TABLET: 50; 325; 40 TABLET ORAL at 11:10

## 2021-06-01 RX ADMIN — ATORVASTATIN CALCIUM 80 MG: 80 TABLET, FILM COATED ORAL at 20:34

## 2021-06-01 RX ADMIN — METOPROLOL TARTRATE 50 MG: 50 TABLET, FILM COATED ORAL at 12:34

## 2021-06-01 RX ADMIN — Medication 5 ML: at 20:35

## 2021-06-01 RX ADMIN — HYDROCHLOROTHIAZIDE 25 MG: 25 TABLET ORAL at 09:09

## 2021-06-01 ASSESSMENT — PAIN DESCRIPTION - PAIN TYPE: TYPE: ACUTE PAIN

## 2021-06-01 ASSESSMENT — PAIN DESCRIPTION - DESCRIPTORS: DESCRIPTORS: ACHING

## 2021-06-01 ASSESSMENT — PAIN DESCRIPTION - PROGRESSION
CLINICAL_PROGRESSION: NOT CHANGED

## 2021-06-01 ASSESSMENT — PAIN SCALES - GENERAL
PAINLEVEL_OUTOF10: 0
PAINLEVEL_OUTOF10: 1
PAINLEVEL_OUTOF10: 0
PAINLEVEL_OUTOF10: 3
PAINLEVEL_OUTOF10: 0
PAINLEVEL_OUTOF10: 3

## 2021-06-01 ASSESSMENT — PAIN - FUNCTIONAL ASSESSMENT: PAIN_FUNCTIONAL_ASSESSMENT: PREVENTS OR INTERFERES SOME ACTIVE ACTIVITIES AND ADLS

## 2021-06-01 ASSESSMENT — PAIN DESCRIPTION - LOCATION: LOCATION: HEAD

## 2021-06-01 ASSESSMENT — PAIN DESCRIPTION - ORIENTATION: ORIENTATION: POSTERIOR

## 2021-06-01 NOTE — PROGRESS NOTES
Neurovascular surgery progress note    Chief complaint: right internal carotid artery critical stenosis with floating thrombus. Subjective: The patient notes a waiting in the right eye. This is been present for a couple of days. Otherwise he denies numbness in his hands. He denies problems with ambulation. He feels as if he is substantially improved from his admission. Objective: He is alert he is oriented ×3. Visual fields are intact to confrontation without perhaps a little contracted. Extraocular movements are intact. There is no facial weakness. There is no drift or tremor in the upper or lower extremities. /81   Pulse 75   Temp 97.5 °F (36.4 °C) (Oral)   Resp 19   Ht 6' 2.02\" (1.88 m)   Wt 259 lb 7.7 oz (117.7 kg)   SpO2 98%   BMI 33.30 kg/m²       Assessment: Overall doing well with improvement in his neurologic status from admission. Plan: MRI is planned for today. Additionally he is to continue on heparin.

## 2021-06-01 NOTE — CARE COORDINATION
prescription(s)  4. Cost of Rx cannot be added to hospital bill. If financial assistance is needed, please contact unit  or ;  or  CANNOT provide pharmacy voucher for patients co-pays  5. Patients can then  the prescription on their way out of the hospital at discharge, or pharmacy can deliver to the bedside if staff is available. (payment due at time of pick-up or delivery - cash, check, or card accepted)     Able to afford home medications/ co-pay costs: Yes    ADLS:  Support Systems: Spouse/Significant Other    PT AM-PAC: 20 /24  OT AM-PAC: 22 /24    Housing:  Home Environment: From home with spouse. Independent at baseline   Steps: yes     Plans to RETURN to current housing: Yes  Barrier(s) to RETURNING to current housing: Clearance from team,. Home Care Information:  Currently ACTIVE with Home Health Care: No  Home Care Agency: Not Applicable    Currently ACTIVE with [place] [facility]: No      Durable Medical Equipment:  DME Provider: None   Equipment: None     Home Oxygen and Respiratory Equipment:  Has HOME OXYGEN prior to admission: No    Dialysis:  Active with HD/PD prior to admission: No    DISCHARGE PLAN:  Disposition: Home with spouse, declines home care needs     Transportation PLAN for discharge: family     Factors facilitating achievement of predicted outcomes: Family support, Motivated, Cooperative and Pleasant    Barriers to discharge: Medical Clearance     Additional Case Management Notes:   SW met with patient at bedside. SW introduced self and role. Patient admitted from home with spouse. Patient is independent at baseline. He denies the use of any DME for mobility. Patient can complete ADL's at home without assistance. Patient plans to return home with his wife and does not feel he will have any need for nursing or therapy in the home. He reported his wife can provide transportation at discharge. No other needs noted.      The Plan for Transition of Care is related to the following treatment goals Carotid artery occlusion without infarction, unspecified laterality [I65.29]      The Patient and/or patient representative was provided with a choice of provider and agrees with the discharge plan Yes    Freedom of choice list was provided with basic dialogue that supports the patient's individualized plan of care/goals and shares the quality data associated with the providers.  Yes    Care Transition patient: Yes    ARMIDA Mcfarlane  [facility] [employer] - ICU   Case Management Department  Ph: [phone]

## 2021-06-01 NOTE — PROGRESS NOTES
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 Wife has MS - pt needs to help her at times.   Cognition        Objective          AROM RLE (degrees)  RLE AROM: WFL  AROM LLE (degrees)  LLE AROM : WFL  Strength RLE  Strength RLE: WFL  Strength LLE  Strength LLE: WFL        Bed mobility  Supine to Sit: Supervision (HOB elevated)  Transfers  Sit to Stand: Stand by assistance  Stand to sit: Stand by assistance  Ambulation  Ambulation?: Yes  Ambulation 1  Device: No Device  Assistance: Stand by assistance (short distances, SB/CGA longer distances)  Quality of Gait: slightly unsteady at times with minor LOBs self recovered with CGA  Distance: 20', 14', 300'          Treatment included: bed mobility, transfers, gt, pt education      Plan   Plan  Times per week: 2-5  Current Treatment Recommendations: Functional Mobility Training, Balance Training, Gait Training, Stair training, Safety Education & Training, Patient/Caregiver Education & Training  Safety Devices  Type of devices: Call light within reach, Chair alarm in place, Left in chair, Nurse notified    G-Code       OutComes Score                                                  AM-PAC Score  AM-PAC Inpatient Mobility Raw Score : 20 (06/01/21 1034)  AM-PAC Inpatient T-Scale Score : 47.67 (06/01/21 1034)  Mobility Inpatient CMS 0-100% Score: 35.83 (06/01/21 1034)  Mobility Inpatient CMS G-Code Modifier : Vikas Aldana (06/01/21 1034)          Goals  Short term goals  Time Frame for Short term goals: By discharge  Short term goal 1: Sup to sit independent  Short
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
## 2021-06-01 NOTE — PROGRESS NOTES
ICU Progress Note  PGY-1    Admit Date: 5/30/2021  ICU Day: Hospital Day: 3  Vent Settings:    / / /   Diet: DIET CARDIAC;  Code Status: Full Code       Interval history:  No acute events overnight. Feels well this morning, denies nay new symptoms. Does have a R eye visual change. Paresthesias have improved   The patient denies fevers, chills, chest pain, shortness of breath, nausea, vomiting, diarrhea, or constipation. Medications:   Scheduled Meds:   metoprolol tartrate  50 mg Oral BID    atorvastatin  80 mg Oral Nightly    hydroCHLOROthiazide  25 mg Oral Daily    sodium chloride flush  5-40 mL Intravenous 2 times per day    citalopram  40 mg Oral Daily       Continuous Infusions:   sodium chloride      heparin (PORCINE) Infusion 11 Units/kg/hr (05/31/21 0538)    niCARdipine Stopped (06/01/21 1140)       PRN Meds:  perflutren lipid microspheres, sodium chloride flush, sodium chloride, promethazine **OR** ondansetron, polyethylene glycol, acetaminophen **OR** acetaminophen, heparin (porcine), heparin (porcine), butalbital-acetaminophen-caffeine, hydrALAZINE, melatonin    Vital/I&O/Physical examination:   VS:  /70   Pulse 60   Temp 97.8 °F (36.6 °C) (Oral)   Resp 15   Ht 6' 2.02\" (1.88 m)   Wt 259 lb 7.7 oz (117.7 kg)   SpO2 98%   BMI 33.30 kg/m²     I/O:    Intake/Output Summary (Last 24 hours) at 6/1/2021 1430  Last data filed at 5/31/2021 1615  Gross per 24 hour   Intake 431.13 ml   Output --   Net 431.13 ml       PE:  Physical Exam  Constitutional:       Appearance: He is obese. Eyes:      Extraocular Movements: Extraocular movements intact. Cardiovascular:      Rate and Rhythm: Regular rhythm. Bradycardia present. Pulses: Normal pulses. Pulmonary:      Effort: Pulmonary effort is normal. No respiratory distress. Breath sounds: No wheezing. Abdominal:      General: There is no distension. Palpations: Abdomen is soft. Tenderness:  There is no abdominal tenderness. Musculoskeletal:      Right lower leg: No edema. Left lower leg: No edema. Skin:     General: Skin is dry. Neurological:      Mental Status: He is alert and oriented to person, place, and time. Sensory: No sensory deficit. Motor: No weakness. Comments: No visual field defict          Labs & Imaging:   LABS:  Renal:   Recent Labs     05/30/21  0547 05/31/21  0329 06/01/21  0500    137 139   K 4.0 3.8 3.9    102 105   CO2 28 27 24   BUN 16 13 15   CREATININE 1.1 1.0 1.1   GLUCOSE 136* 130* 118*   ANIONGAP 9 8 10     CBC:   Recent Labs     05/30/21  0547 05/31/21  0328 06/01/21  0500   WBC 6.7 5.6 4.7   HGB 13.5 13.6 14.0   HCT 40.4* 40.0* 40.3*    192 190   MCV 96.6 95.0 94.9                            Hepatic:   Recent Labs     05/29/21 2004   AST 21   ALT 19   BILITOT <0.2   ALKPHOS 72     Troponin:   Recent Labs     05/29/21 2004   TROPONINI <0.01     BNP: No results for input(s): BNP in the last 72 hours. Lipids:   Recent Labs     06/01/21  0500   CHOL 133   HDL 49     INR: No results for input(s): INR in the last 72 hours. Lactate: No results for input(s): LACTATE in the last 72 hours. ABGs:No results for input(s): PHART, MRH5ERY, PO2ART, UCF9DFZ, BEART, THGBART, I9ISYATN, XJE0BXR in the last 72 hours. UA:No results for input(s): NITRITE, COLORU, PHUR, LABCAST, WBCUA, RBCUA, MUCUS, TRICHOMONAS, YEAST, BACTERIA, CLARITYU, SPECGRAV, LEUKOCYTESUR, UROBILINOGEN, BILIRUBINUR, BLOODU, GLUCOSEU, AMORPHOUS in the last 72 hours. Invalid input(s): Freida Soda     IMAGING:  CTA HEAD NECK W CONTRAST   Final Result      CTA Head:   No arterial steno-occlusive disease or aneurysm. CTA Neck:   High-grade, 80-90% stenosis of the proximal right cervical internal carotid artery secondary to extensive noncalcified plaque. No other significant arterial steno-occlusive disease or evidence of dissection.           MRI BRAIN WO CONTRAST    (Results Pending) Assessment & Plan:    Yunier Vaughn a 76 y. o. male with PMH symptomatic bradycardia, hypertension, hyperlipidemia who was transferred from [facility] after CT revealed greater than 90% stenosis in the proximal right ICA secondary to a thrombus/plaque.       Right ICA stenosis 2/2 to thrombus/plaque  CTA revealed greater than 90% stenosis in the proximal right cervical ICA secondary to 2.5x0.5 thrombus/noncalcified atherosclerotic plaque. Last known well approximately 2300 on 5/28. Not a candidate for TPA  - cont Heparin gtt  - Target BP <160/90. Cardene as needed. Added metorplol to regimen   - MRI pending to r/o stroke/TIA  - TTE w/ bubble pending   - NSGY following - revasculrization possible following CTA on 6/4     Hypertension  - wean off Cardene gtt for SBP < 160  - cont home HCTZ and added metoprolol 50 BID     Chronic hyperlipidemia - cont lipitor 80 mg     PT/OT 20, 22    Code Status: Full Code  FEN: ; DIET CARDIAC;  PPX: heparin gtt   DISPO: ICU      This patient will be discussed with attending, Dr Ignacia Luevano MD MD, PGY- 1  Contact via [employer]  6/1/2021,  2:30 PM    Pulm/CC    Patient seen and examined. I agree with Dr. Nicholas Platt history, physical, lab findings, assessment and plan and edited as needed. Overall patient is stable today. 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 Will start metoprolol 50 mg p.o. twice daily. Continue HCTZ. Wean nicardipine to off. Neurosurgery following in regards to right ICA stenosis. MRI pending. Continue heparin.   Revascularization dependent upon CTA that is planned on Friday    Mendoza Damon MD

## 2021-06-01 NOTE — PROGRESS NOTES
Neurovascular surgery progress note    Chief complaint: Right internal carotid artery stenosis/critical stenosis of bloating thrombus. Subjective: Admits no change in his vision. He denies numbness tingling or weakness or other visual disturbances. Objective:/81   Pulse 75   Temp 97.5 °F (36.4 °C) (Oral)   Resp 19   Ht 6' 2.02\" (1.88 m)   Wt 259 lb 7.7 oz (117.7 kg)   SpO2 98%   BMI 33.30 kg/m²     He is alert he is oriented ×3. He is able to ambulate with a wide-based gait that he states is unchanged from previously. He denies numbness or tingling or weakness in the upper or lower extremities. There is no drift or tremor. He is able to Tylenol. He is able to wash his hands. He can rise from a seated position independently. MRI is still pending. Assessment: Critical stenosis right internal carotid artery with improvement in neurologic status from admission. MRI pending. Plan: MRI for today and continue heparin. Consider revascularization dependent upon the repeat CTA planned for Friday.

## 2021-06-01 NOTE — PLAN OF CARE
Problem: Falls - Risk of:  Goal: Will remain free from falls  Description: Will remain free from falls  Outcome: Ongoing  Goal: Absence of physical injury  Description: Absence of physical injury  Outcome: Ongoing     Problem: Skin Integrity:  Goal: Will show no infection signs and symptoms  Description: Will show no infection signs and symptoms  Outcome: Ongoing  Goal: Absence of new skin breakdown  Description: Absence of new skin breakdown  Outcome: Ongoing     Problem: Bleeding:  Goal: Will show no signs and symptoms of excessive bleeding  Description: Will show no signs and symptoms of excessive bleeding  Outcome: Ongoing     Problem: Pain:  Goal: Pain level will decrease  Description: Pain level will decrease  Outcome: Ongoing  Goal: Control of acute pain  Description: Control of acute pain  Outcome: Ongoing  Goal: Control of chronic pain  Description: Control of chronic pain  Outcome: Ongoing

## 2021-06-01 NOTE — PLAN OF CARE
Problem: Falls - Risk of:  Goal: Will remain free from falls  Description: Will remain free from falls  6/1/2021 0809 by Azra Mandujano RN  Outcome: Ongoing     Problem: Falls - Risk of:  Goal: Absence of physical injury  Description: Absence of physical injury  6/1/2021 0809 by Azra Mandujano RN  Outcome: Ongoing     Problem: Skin Integrity:  Goal: Will show no infection signs and symptoms  Description: Will show no infection signs and symptoms  6/1/2021 0809 by Azra Mandujano RN  Outcome: Ongoing     Problem: Skin Integrity:  Goal: Absence of new skin breakdown  Description: Absence of new skin breakdown  6/1/2021 0809 by Azra Mandujano RN  Outcome: Ongoing     Problem: Bleeding:  Goal: Will show no signs and symptoms of excessive bleeding  Description: Will show no signs and symptoms of excessive bleeding  6/1/2021 0809 by Azra Mandujano RN  Outcome: Ongoing     Problem: Pain:  Goal: Pain level will decrease  Description: Pain level will decrease  6/1/2021 0809 by Azra Mandujano RN  Outcome: Ongoing     Problem: Pain:  Goal: Control of acute pain  Description: Control of acute pain  6/1/2021 0809 by Azra Mandujano RN  Outcome: Ongoing     Problem: Pain:  Goal: Control of chronic pain  Description: Control of chronic pain  6/1/2021 0809 by Azra Mandujano RN  Outcome: Ongoing

## 2021-06-01 NOTE — PROGRESS NOTES
Occupational Therapy   Occupational Therapy Initial Assessment/tx  Date: 2021   Patient Name: Effie Ortiz  MRN: [id number]     : 1945    Date of Service: 2021    Discharge Recommendations:  Effie Ortiz scored a  22/24 on the AM-PAC ADL Inpatient form. Current research shows that an AM-PAC score of 18 or greater is typically associated with a discharge to the patient's home setting. Based on the patient's AM-PAC score, and their current ADL deficits, it is recommended that the patient have 2-3 sessions per week of Occupational Therapy at d/c to increase the patient's independence. At this time, this patient demonstrates the endurance and safety to discharge home with home OT and a follow up treatment frequency of 2-3x/wk. Please see assessment section for further patient specific details. If patient discharges prior to next session this note will serve as a discharge summary. Please see below for the latest assessment towards goals. OT Equipment Recommendations  Equipment Needed: No    Assessment   Performance deficits / Impairments: Decreased functional mobility ; Decreased ADL status  Assessment: Pt is functioning below baseline level, needing CGA with functional transfers and SBA with ADLs. He has L visual field deficit and is aware of this. Recommend discharge to home with wife. No ongoing OT needed at discharge. Treatment Diagnosis: impaired ADLs and mobility  Prognosis: Good  Decision Making: Low Complexity  OT Education: OT Role;Transfer Training  Patient Education: verbalized and demonstrated understanding  REQUIRES OT FOLLOW UP: Yes  Activity Tolerance  Activity Tolerance: Patient Tolerated treatment well  Safety Devices  Safety Devices in place: Yes  Type of devices: Left in chair;Nurse notified;Call light within reach; Chair alarm in place           Patient Diagnosis(es): There were no encounter diagnoses.      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 Wife has MS - pt needs to help her at times. Objective        Orientation  Overall Orientation Status: Within Functional Limits     Balance  Sitting Balance: Independent  Standing Balance: Supervision  Standing Balance  Time: ~5 minutes overall  Activity: ADLs and mobility  Functional Mobility  Functional Mobility Comments: Functional moblity in room and abrams without A device, SBA/CGA-slightly unsteady at times  Toilet Transfers  Toilet - Technique: Ambulating (without A device, SBA/CGA to/from bathroom)  Equipment Used: Standard toilet  Toilet Transfer: Stand by assistance  ADL  Grooming: Independent (with SBA in stance for safety while pt indep washed hands)  LE Dressing: Stand by assistance ([facility] pants)        Bed mobility  Supine to Sit: Stand by assistance (head of bed elevated)  Transfers  Sit to stand: Stand by assistance  Stand to sit: Stand by assistance  Vision - Basic Assessment  Vision Comments: L visual field impairment-pt reports in upper quadrant R eye sees wavy black line-doctors aware  Cognition  Overall Cognitive Status: WNL                 LUE AROM (degrees)  LUE AROM : WNL  Left Hand AROM (degrees)  Left Hand AROM: WNL  RUE AROM (degrees)  RUE AROM : WNL  Right Hand AROM (degrees)  Right Hand AROM: WNL  LUE Strength  Gross LUE Strength: WFL  RUE Strength  Gross RUE Strength: WFL     Hand Dominance  Hand Dominance: Right     Timed Code Treatment Minutes:        Total Treatment Minutes:          Plan   Plan  Times per week: 5-7  Times per day: Daily  Current Treatment Recommendations: Balance Training, Functional Mobility Training, Endurance Training, Self-Care / ADL    G-Code     OutComes Score                                                  AM-PAC Score             Goals  Short term goals  Time Frame for Short term goals: discharge  Short term

## 2021-06-02 LAB
ALBUMIN SERPL-MCNC: 3.7 G/DL (ref 3.4–5)
ANION GAP SERPL CALCULATED.3IONS-SCNC: 8 MMOL/L (ref 3–16)
ANTI-XA UNFRAC HEPARIN: 0.52 IU/ML (ref 0.3–0.7)
BASOPHILS ABSOLUTE: 0 K/UL (ref 0–0.2)
BASOPHILS RELATIVE PERCENT: 0.7 %
BUN BLDV-MCNC: 15 MG/DL (ref 7–20)
CALCIUM SERPL-MCNC: 9.7 MG/DL (ref 8.3–10.6)
CHLORIDE BLD-SCNC: 102 MMOL/L (ref 99–110)
CO2: 26 MMOL/L (ref 21–32)
CREAT SERPL-MCNC: 1 MG/DL (ref 0.8–1.3)
EOSINOPHILS ABSOLUTE: 0.2 K/UL (ref 0–0.6)
EOSINOPHILS RELATIVE PERCENT: 3.7 %
GFR AFRICAN AMERICAN: >60
GFR NON-AFRICAN AMERICAN: >60
GLUCOSE BLD-MCNC: 118 MG/DL (ref 70–99)
HCT VFR BLD CALC: 43.2 % (ref 40.5–52.5)
HEMOGLOBIN: 14.5 G/DL (ref 13.5–17.5)
LYMPHOCYTES ABSOLUTE: 1.8 K/UL (ref 1–5.1)
LYMPHOCYTES RELATIVE PERCENT: 31.4 %
MAGNESIUM: 1.9 MG/DL (ref 1.8–2.4)
MCH RBC QN AUTO: 32.3 PG (ref 26–34)
MCHC RBC AUTO-ENTMCNC: 33.6 G/DL (ref 31–36)
MCV RBC AUTO: 95.9 FL (ref 80–100)
MONOCYTES ABSOLUTE: 0.7 K/UL (ref 0–1.3)
MONOCYTES RELATIVE PERCENT: 12.8 %
NEUTROPHILS ABSOLUTE: 2.9 K/UL (ref 1.7–7.7)
NEUTROPHILS RELATIVE PERCENT: 51.4 %
PDW BLD-RTO: 13.3 % (ref 12.4–15.4)
PHOSPHORUS: 3.5 MG/DL (ref 2.5–4.9)
PLATELET # BLD: 204 K/UL (ref 135–450)
PMV BLD AUTO: 8 FL (ref 5–10.5)
POTASSIUM SERPL-SCNC: 3.8 MMOL/L (ref 3.5–5.1)
RBC # BLD: 4.5 M/UL (ref 4.2–5.9)
SODIUM BLD-SCNC: 136 MMOL/L (ref 136–145)
WBC # BLD: 5.7 K/UL (ref 4–11)

## 2021-06-02 PROCEDURE — 6370000000 HC RX 637 (ALT 250 FOR IP): Performed by: STUDENT IN AN ORGANIZED HEALTH CARE EDUCATION/TRAINING PROGRAM

## 2021-06-02 PROCEDURE — 99232 SBSQ HOSP IP/OBS MODERATE 35: CPT | Performed by: INTERNAL MEDICINE

## 2021-06-02 PROCEDURE — 6370000000 HC RX 637 (ALT 250 FOR IP): Performed by: INTERNAL MEDICINE

## 2021-06-02 PROCEDURE — 36415 COLL VENOUS BLD VENIPUNCTURE: CPT

## 2021-06-02 PROCEDURE — 6360000002 HC RX W HCPCS: Performed by: STUDENT IN AN ORGANIZED HEALTH CARE EDUCATION/TRAINING PROGRAM

## 2021-06-02 PROCEDURE — 85025 COMPLETE CBC W/AUTO DIFF WBC: CPT

## 2021-06-02 PROCEDURE — 2580000003 HC RX 258: Performed by: STUDENT IN AN ORGANIZED HEALTH CARE EDUCATION/TRAINING PROGRAM

## 2021-06-02 PROCEDURE — 2000000000 HC ICU R&B

## 2021-06-02 PROCEDURE — 97530 THERAPEUTIC ACTIVITIES: CPT

## 2021-06-02 PROCEDURE — 83735 ASSAY OF MAGNESIUM: CPT

## 2021-06-02 PROCEDURE — 80069 RENAL FUNCTION PANEL: CPT

## 2021-06-02 PROCEDURE — 85520 HEPARIN ASSAY: CPT

## 2021-06-02 PROCEDURE — 97535 SELF CARE MNGMENT TRAINING: CPT

## 2021-06-02 RX ORDER — HYDRALAZINE HYDROCHLORIDE 20 MG/ML
20 INJECTION INTRAMUSCULAR; INTRAVENOUS EVERY 6 HOURS PRN
Status: DISCONTINUED | OUTPATIENT
Start: 2021-06-02 | End: 2021-06-03

## 2021-06-02 RX ORDER — CLONIDINE 0.2 MG/24H
1 PATCH, EXTENDED RELEASE TRANSDERMAL WEEKLY
Status: DISCONTINUED | OUTPATIENT
Start: 2021-06-02 | End: 2021-06-02

## 2021-06-02 RX ORDER — HYDRALAZINE HYDROCHLORIDE 50 MG/1
25 TABLET, FILM COATED ORAL EVERY 8 HOURS SCHEDULED
Status: DISCONTINUED | OUTPATIENT
Start: 2021-06-02 | End: 2021-06-05

## 2021-06-02 RX ORDER — HYDRALAZINE HYDROCHLORIDE 20 MG/ML
10 INJECTION INTRAMUSCULAR; INTRAVENOUS ONCE
Status: COMPLETED | OUTPATIENT
Start: 2021-06-02 | End: 2021-06-02

## 2021-06-02 RX ORDER — CHLORTHALIDONE 25 MG/1
25 TABLET ORAL DAILY
Status: DISCONTINUED | OUTPATIENT
Start: 2021-06-02 | End: 2021-06-08 | Stop reason: HOSPADM

## 2021-06-02 RX ADMIN — METOPROLOL TARTRATE 50 MG: 50 TABLET, FILM COATED ORAL at 08:36

## 2021-06-02 RX ADMIN — HYDRALAZINE HYDROCHLORIDE 25 MG: 50 TABLET, FILM COATED ORAL at 19:47

## 2021-06-02 RX ADMIN — CITALOPRAM 40 MG: 40 TABLET, FILM COATED ORAL at 08:35

## 2021-06-02 RX ADMIN — Medication 10 ML: at 08:36

## 2021-06-02 RX ADMIN — BUTALBITAL, ACETAMINOPHEN, AND CAFFEINE 1 TABLET: 50; 325; 40 TABLET ORAL at 20:10

## 2021-06-02 RX ADMIN — PANTOPRAZOLE SODIUM 20 MG: 20 TABLET, DELAYED RELEASE ORAL at 06:16

## 2021-06-02 RX ADMIN — HYDRALAZINE HYDROCHLORIDE 10 MG: 20 INJECTION INTRAMUSCULAR; INTRAVENOUS at 09:07

## 2021-06-02 RX ADMIN — ATORVASTATIN CALCIUM 80 MG: 80 TABLET, FILM COATED ORAL at 20:36

## 2021-06-02 RX ADMIN — Medication 10 MG: at 20:37

## 2021-06-02 RX ADMIN — HYDRALAZINE HYDROCHLORIDE 10 MG: 20 INJECTION INTRAMUSCULAR; INTRAVENOUS at 06:16

## 2021-06-02 RX ADMIN — CHLORTHALIDONE 25 MG: 25 TABLET ORAL at 08:36

## 2021-06-02 ASSESSMENT — PAIN SCALES - GENERAL
PAINLEVEL_OUTOF10: 1
PAINLEVEL_OUTOF10: 0
PAINLEVEL_OUTOF10: 4
PAINLEVEL_OUTOF10: 0
PAINLEVEL_OUTOF10: 1
PAINLEVEL_OUTOF10: 0

## 2021-06-02 ASSESSMENT — PAIN DESCRIPTION - PROGRESSION
CLINICAL_PROGRESSION: NOT CHANGED

## 2021-06-02 NOTE — PROGRESS NOTES
ICU Progress Note  PGY-1    Admit Date: 5/30/2021  ICU Day: Hospital Day: 4  Vent Settings:    / / /   Diet: DIET CARDIAC;  Code Status: Full Code       Interval history:  No acute events overnight. Feels well this morning, denies nay new symptoms. No weakness, headache, parasthesia. The patient denies fevers, chills, chest pain, shortness of breath, nausea, vomiting, diarrhea, or constipation. BP have been elevated off the cardene gtt, receives dome prns     Medications:   Scheduled Meds:   chlorthalidone  25 mg Oral Daily    hydrALAZINE  10 mg Intravenous Once    metoprolol tartrate  50 mg Oral BID    pantoprazole  20 mg Oral QAM AC    atorvastatin  80 mg Oral Nightly    sodium chloride flush  5-40 mL Intravenous 2 times per day    citalopram  40 mg Oral Daily       Continuous Infusions:   sodium chloride      heparin (PORCINE) Infusion 11 Units/kg/hr (05/31/21 0538)    niCARdipine Stopped (06/02/21 0100)       PRN Meds:  hydrALAZINE, perflutren lipid microspheres, sodium chloride flush, sodium chloride, promethazine **OR** ondansetron, polyethylene glycol, acetaminophen **OR** acetaminophen, heparin (porcine), heparin (porcine), butalbital-acetaminophen-caffeine, melatonin    Vital/I&O/Physical examination:   VS:  BP (!) 154/75   Pulse 51   Temp 98 °F (36.7 °C) (Oral)   Resp 15   Ht 6' 2.02\" (1.88 m)   Wt 259 lb 7.7 oz (117.7 kg)   SpO2 98%   BMI 33.30 kg/m²     I/O:  No intake or output data in the 24 hours ending 06/02/21 0831    PE:  Physical Exam  Constitutional:       Appearance: He is obese. Eyes:      Extraocular Movements: Extraocular movements intact. Cardiovascular:      Rate and Rhythm: Regular rhythm. Bradycardia present. Pulses: Normal pulses. Pulmonary:      Effort: Pulmonary effort is normal. No respiratory distress. Breath sounds: No wheezing. Abdominal:      General: There is no distension. Palpations: Abdomen is soft. Tenderness:  There is no CTA Neck:   High-grade, 80-90% stenosis of the proximal right cervical internal carotid artery secondary to extensive noncalcified plaque. No other significant arterial steno-occlusive disease or evidence of dissection. Assessment & Plan:    Tim Sesay a 76 y. o. male with PMH symptomatic bradycardia, hypertension, hyperlipidemia who was transferred from [facility] after CT revealed greater than 90% stenosis in the proximal right ICA secondary to a thrombus/plaque.       Right ICA stenosis 2/2 to thrombus/plaque  CTA revealed greater than 90% stenosis in the proximal right cervical ICA secondary to 2.5x0.5 thrombus/noncalcified atherosclerotic plaque. Last known well approximately 2300 on 5/28. Not a candidate for TPA  - cont Heparin gtt  - Target BP <160/90. Added metorplol to regimen pt does have teena cardia and significant allergies to BP meds. Will switch HCTZ to chorthalidone.   - MRI w/ some patchy area of ischemia in R cerebellum   - TTE w/o shunt, normal findings   - NSGY following - revasculrization possible following CTA on 6/4     Hypertension  - keep off Cardene gtt for SBP < 160  - cont metoprolol 50 BID  - switch HCTZ to chorthalidone as more potent  - hydralazine 10 mg prn     Chronic hyperlipidemia - cont lipitor 80 mg     PT/OT 20, 22    Code Status: Full Code  FEN: ; DIET CARDIAC;  PPX: heparin gtt   DISPO: ICU      This patient will be discussed with attending, Dr Familia Bucio MD MD, PGY- 1  Contact via [email or web address]  6/2/2021,  8:31 AM    Pulm/CC     Patient seen and examined. I agree with Dr. Carlee Dahl history, physical, lab findings, assessment and plan and edited as needed.     Overall patient is stable today. Blood pressure still not optimally controlled despite metoprolol 50 mg p.o. twice daily and chlorthalidone. He has bradycardic so we cannot titrate metoprolol any further. He has allergies to lisinopril and Norvasc.   We will start oral hydralazine     Neurosurgery following in regards to right ICA stenosis. MRI shows ischemic CVA. Continue heparin.   Revascularization dependent upon CTA that is planned on Friday    Disposition from ICU per neurosurgery     Sharmaine Garcia MD
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
## 2021-06-03 LAB
ALBUMIN SERPL-MCNC: 4.2 G/DL (ref 3.4–5)
ANION GAP SERPL CALCULATED.3IONS-SCNC: 11 MMOL/L (ref 3–16)
ANTI-XA UNFRAC HEPARIN: 0.6 IU/ML (ref 0.3–0.7)
BASOPHILS ABSOLUTE: 0 K/UL (ref 0–0.2)
BASOPHILS RELATIVE PERCENT: 0.5 %
BUN BLDV-MCNC: 19 MG/DL (ref 7–20)
CALCIUM SERPL-MCNC: 9.6 MG/DL (ref 8.3–10.6)
CHLORIDE BLD-SCNC: 102 MMOL/L (ref 99–110)
CO2: 25 MMOL/L (ref 21–32)
CREAT SERPL-MCNC: 1.2 MG/DL (ref 0.8–1.3)
EOSINOPHILS ABSOLUTE: 0.2 K/UL (ref 0–0.6)
EOSINOPHILS RELATIVE PERCENT: 2.8 %
GFR AFRICAN AMERICAN: >60
GFR NON-AFRICAN AMERICAN: 59
GLUCOSE BLD-MCNC: 115 MG/DL (ref 70–99)
HCT VFR BLD CALC: 42.6 % (ref 40.5–52.5)
HEMOGLOBIN: 14.7 G/DL (ref 13.5–17.5)
LYMPHOCYTES ABSOLUTE: 2.4 K/UL (ref 1–5.1)
LYMPHOCYTES RELATIVE PERCENT: 33.1 %
MAGNESIUM: 2 MG/DL (ref 1.8–2.4)
MCH RBC QN AUTO: 32.8 PG (ref 26–34)
MCHC RBC AUTO-ENTMCNC: 34.5 G/DL (ref 31–36)
MCV RBC AUTO: 95 FL (ref 80–100)
MONOCYTES ABSOLUTE: 0.8 K/UL (ref 0–1.3)
MONOCYTES RELATIVE PERCENT: 10.9 %
NEUTROPHILS ABSOLUTE: 3.9 K/UL (ref 1.7–7.7)
NEUTROPHILS RELATIVE PERCENT: 52.7 %
PDW BLD-RTO: 13.8 % (ref 12.4–15.4)
PHOSPHORUS: 3.8 MG/DL (ref 2.5–4.9)
PLATELET # BLD: 236 K/UL (ref 135–450)
PMV BLD AUTO: 8.5 FL (ref 5–10.5)
POTASSIUM SERPL-SCNC: 4.1 MMOL/L (ref 3.5–5.1)
RBC # BLD: 4.49 M/UL (ref 4.2–5.9)
SODIUM BLD-SCNC: 138 MMOL/L (ref 136–145)
WBC # BLD: 7.3 K/UL (ref 4–11)

## 2021-06-03 PROCEDURE — 99232 SBSQ HOSP IP/OBS MODERATE 35: CPT | Performed by: INTERNAL MEDICINE

## 2021-06-03 PROCEDURE — 6370000000 HC RX 637 (ALT 250 FOR IP): Performed by: STUDENT IN AN ORGANIZED HEALTH CARE EDUCATION/TRAINING PROGRAM

## 2021-06-03 PROCEDURE — 2000000000 HC ICU R&B

## 2021-06-03 PROCEDURE — 6370000000 HC RX 637 (ALT 250 FOR IP): Performed by: INTERNAL MEDICINE

## 2021-06-03 PROCEDURE — 85025 COMPLETE CBC W/AUTO DIFF WBC: CPT

## 2021-06-03 PROCEDURE — 83735 ASSAY OF MAGNESIUM: CPT

## 2021-06-03 PROCEDURE — 6360000002 HC RX W HCPCS: Performed by: STUDENT IN AN ORGANIZED HEALTH CARE EDUCATION/TRAINING PROGRAM

## 2021-06-03 PROCEDURE — 85520 HEPARIN ASSAY: CPT

## 2021-06-03 PROCEDURE — 36415 COLL VENOUS BLD VENIPUNCTURE: CPT

## 2021-06-03 PROCEDURE — 6360000002 HC RX W HCPCS: Performed by: INTERNAL MEDICINE

## 2021-06-03 PROCEDURE — 80069 RENAL FUNCTION PANEL: CPT

## 2021-06-03 RX ORDER — NIFEDIPINE 10 MG/1
10 CAPSULE ORAL EVERY 8 HOURS SCHEDULED
Status: DISCONTINUED | OUTPATIENT
Start: 2021-06-03 | End: 2021-06-03

## 2021-06-03 RX ORDER — HYDRALAZINE HYDROCHLORIDE 20 MG/ML
20 INJECTION INTRAMUSCULAR; INTRAVENOUS EVERY 6 HOURS PRN
Status: DISCONTINUED | OUTPATIENT
Start: 2021-06-03 | End: 2021-06-08 | Stop reason: HOSPADM

## 2021-06-03 RX ORDER — NIFEDIPINE 30 MG/1
30 TABLET, FILM COATED, EXTENDED RELEASE ORAL DAILY
Status: DISCONTINUED | OUTPATIENT
Start: 2021-06-03 | End: 2021-06-04

## 2021-06-03 RX ORDER — NIFEDIPINE 30 MG/1
60 TABLET, FILM COATED, EXTENDED RELEASE ORAL DAILY
Status: DISCONTINUED | OUTPATIENT
Start: 2021-06-03 | End: 2021-06-03

## 2021-06-03 RX ADMIN — Medication 10 MG: at 22:37

## 2021-06-03 RX ADMIN — METOPROLOL TARTRATE 50 MG: 50 TABLET, FILM COATED ORAL at 20:19

## 2021-06-03 RX ADMIN — CITALOPRAM 40 MG: 40 TABLET, FILM COATED ORAL at 08:36

## 2021-06-03 RX ADMIN — BUTALBITAL, ACETAMINOPHEN, AND CAFFEINE 1 TABLET: 50; 325; 40 TABLET ORAL at 12:08

## 2021-06-03 RX ADMIN — HYDRALAZINE HYDROCHLORIDE 20 MG: 20 INJECTION INTRAMUSCULAR; INTRAVENOUS at 09:16

## 2021-06-03 RX ADMIN — CHLORTHALIDONE 25 MG: 25 TABLET ORAL at 08:35

## 2021-06-03 RX ADMIN — HYDRALAZINE HYDROCHLORIDE 25 MG: 50 TABLET, FILM COATED ORAL at 13:49

## 2021-06-03 RX ADMIN — HYDRALAZINE HYDROCHLORIDE 25 MG: 50 TABLET, FILM COATED ORAL at 22:07

## 2021-06-03 RX ADMIN — HYDRALAZINE HYDROCHLORIDE 25 MG: 50 TABLET, FILM COATED ORAL at 05:27

## 2021-06-03 RX ADMIN — HEPARIN SODIUM 11 UNITS/KG/HR: 10000 INJECTION, SOLUTION INTRAVENOUS at 13:49

## 2021-06-03 RX ADMIN — PANTOPRAZOLE SODIUM 20 MG: 20 TABLET, DELAYED RELEASE ORAL at 05:28

## 2021-06-03 RX ADMIN — ATORVASTATIN CALCIUM 80 MG: 80 TABLET, FILM COATED ORAL at 20:19

## 2021-06-03 RX ADMIN — NIFEDIPINE 30 MG: 30 TABLET, EXTENDED RELEASE ORAL at 09:01

## 2021-06-03 ASSESSMENT — PAIN SCALES - GENERAL
PAINLEVEL_OUTOF10: 0
PAINLEVEL_OUTOF10: 1
PAINLEVEL_OUTOF10: 0

## 2021-06-03 ASSESSMENT — PAIN DESCRIPTION - PROGRESSION
CLINICAL_PROGRESSION: NOT CHANGED

## 2021-06-03 NOTE — PROGRESS NOTES
ICU Progress Note  PGY-1    Admit Date: 5/30/2021  ICU Day: Hospital Day: 5  Vent Settings:    / / /   Diet: DIET CARDIAC;  Code Status: Full Code       Interval history:  No acute events overnight. BP were well controlled yesterday off the Cardene gtt and on hydralazine 25 mg TID  Feels well this morning, denies any new symptoms. No weakness, headache, parasthesia. The patient denies fevers, chills, chest pain, shortness of breath, nausea, vomiting, diarrhea, or constipation. BP this morning elevated, morning dose of metoprolol. Held 2/2 teena cardia HR < 55      Medications:   Scheduled Meds:   chlorthalidone  25 mg Oral Daily    hydrALAZINE  25 mg Oral 3 times per day    metoprolol tartrate  50 mg Oral BID    pantoprazole  20 mg Oral QAM AC    atorvastatin  80 mg Oral Nightly    sodium chloride flush  5-40 mL Intravenous 2 times per day    citalopram  40 mg Oral Daily       Continuous Infusions:   sodium chloride      heparin (PORCINE) Infusion 11 Units/kg/hr (06/02/21 1942)    niCARdipine Stopped (06/02/21 0100)       PRN Meds:  hydrALAZINE, perflutren lipid microspheres, sodium chloride flush, sodium chloride, promethazine **OR** ondansetron, polyethylene glycol, acetaminophen **OR** acetaminophen, heparin (porcine), heparin (porcine), butalbital-acetaminophen-caffeine, melatonin    Vital/I&O/Physical examination:   VS:  BP (!) 161/79   Pulse (!) 49   Temp 97.9 °F (36.6 °C) (Axillary)   Resp 19   Ht 6' 2.02\" (1.88 m)   Wt 259 lb 7.7 oz (117.7 kg)   SpO2 98%   BMI 33.30 kg/m²     I/O:  No intake or output data in the 24 hours ending 06/03/21 0744    PE:  Physical Exam  Constitutional:       Appearance: He is obese. Eyes:      Extraocular Movements: Extraocular movements intact. Cardiovascular:      Rate and Rhythm: Regular rhythm. Bradycardia present. Pulses: Normal pulses. Pulmonary:      Effort: Pulmonary effort is normal. No respiratory distress. Breath sounds:  No wheezing. Abdominal:      General: There is no distension. Palpations: Abdomen is soft. Tenderness: There is no abdominal tenderness. Musculoskeletal:      Right lower leg: No edema. Left lower leg: No edema. Skin:     General: Skin is dry. Neurological:      Mental Status: He is alert and oriented to person, place, and time. Sensory: No sensory deficit. Motor: No weakness. Comments: No visual field defict          Labs & Imaging:   LABS:  Renal:   Recent Labs     06/01/21  0500 06/02/21  0521 06/03/21  0420    136 138   K 3.9 3.8 4.1    102 102   CO2 24 26 25   BUN 15 15 19   CREATININE 1.1 1.0 1.2   GLUCOSE 118* 118* 115*   ANIONGAP 10 8 11     CBC:   Recent Labs     06/01/21  0500 06/02/21  0521 06/03/21  0420   WBC 4.7 5.7 7.3   HGB 14.0 14.5 14.7   HCT 40.3* 43.2 42.6    204 236   MCV 94.9 95.9 95.0                            Hepatic:   No results for input(s): AST, ALT, ALB, BILITOT, ALKPHOS in the last 72 hours. Troponin:   No results for input(s): TROPONINI in the last 72 hours. BNP: No results for input(s): BNP in the last 72 hours. Lipids:   Recent Labs     06/01/21  0500   CHOL 133   HDL 49     INR: No results for input(s): INR in the last 72 hours. Lactate: No results for input(s): LACTATE in the last 72 hours. ABGs:No results for input(s): PHART, MIL1LIP, PO2ART, SAQ1STI, BEART, THGBART, S0RELQQE, XFX5YWU in the last 72 hours. UA:No results for input(s): NITRITE, COLORU, PHUR, LABCAST, WBCUA, RBCUA, MUCUS, TRICHOMONAS, YEAST, BACTERIA, CLARITYU, SPECGRAV, LEUKOCYTESUR, UROBILINOGEN, BILIRUBINUR, BLOODU, GLUCOSEU, AMORPHOUS in the last 72 hours. Invalid input(s): Chucho Melendez     IMAGING:  MRI BRAIN WO CONTRAST   Final Result      Multifocal patchy areas of recent infarction within the right cerebral hemisphere as detailed. No intracranial hemorrhage.       Moderate nonspecific white matter disease, likely chronic small vessel ischemic change. CTA HEAD NECK W CONTRAST   Final Result      CTA Head:   No arterial steno-occlusive disease or aneurysm. CTA Neck:   High-grade, 80-90% stenosis of the proximal right cervical internal carotid artery secondary to extensive noncalcified plaque. No other significant arterial steno-occlusive disease or evidence of dissection. CTA HEAD W WO CONTRAST    (Results Pending)       Assessment & Plan:    Kimber Tolentino a 76 y. o. male with PMH symptomatic bradycardia, hypertension, hyperlipidemia who was transferred from [facility] after CT revealed greater than 90% stenosis in the proximal right ICA secondary to a thrombus/plaque.       Right ICA stenosis 2/2 to thrombus/plaque  CTA revealed greater than 90% stenosis in the proximal right cervical ICA secondary to 2.5x0.5 thrombus/noncalcified atherosclerotic plaque. Last known well approximately 2300 on 5/28. Not a candidate for TPA  - cont Heparin gtt  - Target BP <160/90. Added metorplol to regimen pt does have teena cardia and significant allergies to BP meds. chorthalidone.   - MRI w/ some patchy area of ischemia in R cerebellum   - TTE w/o shunt, normal findings   - NSGY following - CTA scheduled for 6 am on 6/2/21, revascularization TBD pending CTA    Acute Ischemic stroke   R ICA stenosis.  MRI w/ patchy area of ischema in R cerebellum  - HgA1c 5.9 (6/1/2021)  - better control off BP   - TTE w/o shunt   - LDL 65, cont statin 80 mg  - heparin gtt - asa on d/c       Hypertension  - keep off Cardene gtt for SBP < 160  - decrease metoprolol 25 BID (parameters to hold for HR < 55), cont chorthalidone  - hydralazine 25 mg TID started, started nifedipine 60 mg, d/c oral hydralazine as able     Chronic hyperlipidemia - cont lipitor 80 mg   Chronic asymptomatic bradycardia - cont to monitor     PT/OT 20, 22    Code Status: Full Code  FEN: DIET CARDIAC;  PPX: heparin gtt   DISPO: ICU    This patient will be discussed with attending,  Bertha Klinefelter, MD MD, PGY- 1  Contact via [employer]  6/3/2021,  7:44 AM    Pulm/CC     Patient seen and examined.  I agree with Dr. Holt's history, physical, lab findings, assessment and plan and edited as needed.     Overall patient is stable today.    Blood pressure better this afternoon now on a regimen of of metoprolol 50 mg twice daily, nifedipine 30 mg daily, hydralazine 25 mg 3 times daily and chlorthalidone 25 mg daily     Neurosurgery following in regards to right ICA stenosis.  MRI shows ischemic CVA.  Continue heparin.  Revascularization dependent upon CTA that is planned for tomorrow     Disposition from ICU per neurosurgery     Luci Nunez MD
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
## 2021-06-03 NOTE — PLAN OF CARE
Care plan reviewed.       Problem: Falls - Risk of:  Goal: Will remain free from falls  Description: Will remain free from falls  Outcome: Ongoing     Problem: Skin Integrity:  Goal: Will show no infection signs and symptoms  Description: Will show no infection signs and symptoms  Outcome: Ongoing     Problem: Bleeding:  Goal: Will show no signs and symptoms of excessive bleeding  Description: Will show no signs and symptoms of excessive bleeding  Outcome: Ongoing

## 2021-06-03 NOTE — CARE COORDINATION
Case Management Daily Note                    Date: 6/3/2021     Patient Name: Nova Crabtree    Date of Admission: 2021  4:13 AM  YOB: 1945    Length of Stay: 4  GMLOS: 3.3         Patient Admission Status: Inpatient  Diagnosis:Carotid artery occlusion without infarction, unspecified laterality [I65.29]     ________________________________________________________________________________________  Discharge Plan: Home  Home w/family, no needs noted    Insurance: Payor: Lb Ortiz / Plan: Doug Adame ESSENTIAL/PLUS / Product Type: *No Product type* /   Is pre-cert/notification needed: N/A     Tentative discharge date:     Current barriers: Medical Clearance by team     Referrals completed: Not Applicable    Resources/ information provided: Not indicated at this time   ________________________________________________________________________________________  PT AM-PAC:  per last evaluation on:     OT AM-PAC:  per last evaluation on:     DME Needs for discharge: None   ________________________________________________________________________________________  Notes/Plan of Care:   SW met with patient again at bedside today. Patient in good spirits. Plan remains for home at discharge. No home care needs noted at this time. Family to transport at discharge. Jeremy Kellie and/or his family were provided with choice of provider; he and/or his family are in agreement with the discharge plan at this time.     Care Transition Patient: ARMIDA Wahl  [facility] - ICU  Case Management Department  Ph: [phone]

## 2021-06-03 NOTE — PROGRESS NOTES
NEUROSURGERY PROGRESS NOTE    6/3/2021 3:49 PM                               Tamia BROWN Gauthier                      LOS: 4 days     Subjective: Patient sitting in chair upon entering the room. No acute events overnight. Patient has no specific complaints this am. Neurologically stable. Physical Exam:  Patient seen and examined    Vitals:    06/03/21 1500   BP: 132/68   Pulse: 57   Resp: 14   Temp:    SpO2: 96%     GCS:  4 - Opens eyes on own  5 - Alert and oriented  6 - Follows simple motor commands  General: Well developed. Alert and cooperative in no acute distress. HENT: atraumatic, neck supple  Eyes: Optic discs: Not tested  Pulmonary: unlabored respiratory effort  Cardiovascular:  Warm well perfused. No peripheral edema  Gastrointestinal: abdomen soft, NT, ND    Neurological:  Mental Status: Awake, alert, oriented x 4, speech clear and appropriate  Attention: Intact  Language: No aphasia or dysarthria noted  Sensation: Intact to all extremities to light touch  Coordination: Intact    Cranial Nerves:  II: Visual acuity not tested, denies new visual changes / diplopia  III, IV, VI: PERRL, 3 mm bilaterally, EOMI, no nystagmus noted  V: Facial sensation intact bilaterally to touch  VII: Face symmetric  VIII: Hearing intact bilaterally to spoken voice  IX: Palate movement equal bilaterally  XI: Shoulder shrug equal bilaterally  XII: Tongue midline    Musculoskeletal:   Gait: Not tested   Assist devices: None   Tone: Normal  Motor strength:    Right  Left    Right  Left    Deltoid  5 5  Hip Flex  5 5   Biceps  5 5  Knee Extensors  5 5   Triceps  5 5  Knee Flexors  5 5   Wrist Ext  5 5  Ankle Dorsiflex. 5 5   Wrist Flex  5 5  Ankle Plantarflex. 5 5   Handgrip  5 5  Ext Octavio Longus  5 5   Thumb Ext  5 5         Radiological Findings:  CT Head WO Contrast  Result Date: 5/30/2021  No acute intracranial hemorrhage or mass effect.     CTA HEAD NECK W CONTRAST  Result Date: 5/30/2021  CTA Head: No arterial steno-occlusive disease or aneurysm. CTA Neck: High-grade, 80-90% stenosis of the proximal right cervical internal carotid artery secondary to extensive noncalcified plaque. No other significant arterial steno-occlusive disease or evidence of dissection. MRI BRAIN WO CONTRAST  Result Date: 6/1/2021  Multifocal patchy areas of recent infarction within the right cerebral hemisphere as detailed. No intracranial hemorrhage. Moderate nonspecific white matter disease, likely chronic small vessel ischemic change. Labs:  Recent Labs     06/03/21  0420   WBC 7.3   HGB 14.7   HCT 42.6          Recent Labs     06/03/21  0420      K 4.1      CO2 25   BUN 19   CREATININE 1.2   GLUCOSE 115*   CALCIUM 9.6   PHOS 3.8   MG 2.00       No results for input(s): PROTIME, INR, APTT in the last 72 hours. Patient Active Problem List    Diagnosis Date Noted    Carotid artery occlusion without infarction, unspecified laterality 05/30/2021    Pre-syncope 05/20/2020    Bradycardia 05/20/2020    Angioedema 09/14/2015    ED (erectile dysfunction) 06/01/2015    Chest pain     Unstable angina ([address]     Low back pain 07/07/2014    HLD (hyperlipidemia) 07/07/2014    HTN (hypertension) 07/07/2014       Assessment:  Patient is a 76 y.o. male w/critical stenosis right internal carotid artery with intraluminal thrombus. Plan:  1. Neurologic exams frequency: Q4H  2. For change in exam MUST contact neurosurgery team along with critical care or primary team  3. JT Stenosis with intraluminal thrombus  - IV Low Dose No Bolus Heparin gtt through Friday  - Target normal BP (<140/90)  - MRI Brain multifocal patchy areas of recent infarction within the right cerebral hemisphere  - Repeat CTA Friday AM  - Need for carotid revascularization to be determined after f/u CTA  4. Mobility: Advance as tolerates  5. Diet: Advance as tolerates  6. DVT Prophylaxis: SCD's  7. Will follow inpatient.   Please call with any questions or decline in neurological status    DISPO: Remain inpatient from neurosurgery standpoint. Dispo timing to be determined by primary team once patient is medically stable for discharge. Patient was discussed with Dr. Ailyn Nuno who agrees with above assessment and plan.      Electronically signed by: KANDY Linares CNP, APRN-CNP, 6/3/2021 3:49 PM  [phone]

## 2021-06-03 NOTE — PLAN OF CARE
Problem: Falls - Risk of:  Goal: Will remain free from falls  Description: Will remain free from falls  6/3/2021 1008 by Aman Martínez RN  Outcome: Ongoing  6/3/2021 0523 by Morro Duron RN  Outcome: Ongoing  Goal: Absence of physical injury  Description: Absence of physical injury  Outcome: Ongoing     Problem: Skin Integrity:  Goal: Will show no infection signs and symptoms  Description: Will show no infection signs and symptoms  6/3/2021 1008 by Aman Martínez RN  Outcome: Ongoing  6/3/2021 0523 by Morro Duron RN  Outcome: Ongoing  Goal: Absence of new skin breakdown  Description: Absence of new skin breakdown  Outcome: Ongoing     Problem: Bleeding:  Goal: Will show no signs and symptoms of excessive bleeding  Description: Will show no signs and symptoms of excessive bleeding  6/3/2021 1008 by Aman Martínez RN  Outcome: Ongoing  6/3/2021 0523 by Morro Duron RN  Outcome: Ongoing     Problem: Pain:  Goal: Pain level will decrease  Description: Pain level will decrease  Outcome: Ongoing  Goal: Control of acute pain  Description: Control of acute pain  Outcome: Ongoing  Goal: Control of chronic pain  Description: Control of chronic pain  Outcome: Ongoing

## 2021-06-03 NOTE — PROGRESS NOTES
Pt AXOX4. Ambulated pt around unit without difficulty. Pt denies dizziness or difficulty ambulating. Pt does report a slight headache, prn fioricet given. Pt voices no needs at this time. Will continue to monitor closely and reassess.

## 2021-06-04 ENCOUNTER — ANESTHESIA EVENT (OUTPATIENT)
Dept: OPERATING ROOM | Age: 76
DRG: 039 | End: 2021-06-04
Payer: MEDICARE

## 2021-06-04 ENCOUNTER — APPOINTMENT (OUTPATIENT)
Dept: CT IMAGING | Age: 76
DRG: 039 | End: 2021-06-04
Attending: INTERNAL MEDICINE
Payer: MEDICARE

## 2021-06-04 LAB
ALBUMIN SERPL-MCNC: 3.8 G/DL (ref 3.4–5)
ANION GAP SERPL CALCULATED.3IONS-SCNC: 13 MMOL/L (ref 3–16)
ANTI-XA UNFRAC HEPARIN: 0.57 IU/ML (ref 0.3–0.7)
BASOPHILS ABSOLUTE: 0.1 K/UL (ref 0–0.2)
BASOPHILS RELATIVE PERCENT: 1 %
BUN BLDV-MCNC: 20 MG/DL (ref 7–20)
CALCIUM SERPL-MCNC: 9.5 MG/DL (ref 8.3–10.6)
CHLORIDE BLD-SCNC: 101 MMOL/L (ref 99–110)
CO2: 22 MMOL/L (ref 21–32)
CREAT SERPL-MCNC: 1.1 MG/DL (ref 0.8–1.3)
EOSINOPHILS ABSOLUTE: 0.1 K/UL (ref 0–0.6)
EOSINOPHILS RELATIVE PERCENT: 2.1 %
GFR AFRICAN AMERICAN: >60
GFR NON-AFRICAN AMERICAN: >60
GLUCOSE BLD-MCNC: 110 MG/DL (ref 70–99)
GLUCOSE BLD-MCNC: 116 MG/DL (ref 70–99)
HCT VFR BLD CALC: 42.2 % (ref 40.5–52.5)
HEMOGLOBIN: 14.3 G/DL (ref 13.5–17.5)
LYMPHOCYTES ABSOLUTE: 1.8 K/UL (ref 1–5.1)
LYMPHOCYTES RELATIVE PERCENT: 29.3 %
MAGNESIUM: 2 MG/DL (ref 1.8–2.4)
MCH RBC QN AUTO: 32.6 PG (ref 26–34)
MCHC RBC AUTO-ENTMCNC: 34 G/DL (ref 31–36)
MCV RBC AUTO: 95.8 FL (ref 80–100)
MONOCYTES ABSOLUTE: 0.9 K/UL (ref 0–1.3)
MONOCYTES RELATIVE PERCENT: 14.2 %
NEUTROPHILS ABSOLUTE: 3.3 K/UL (ref 1.7–7.7)
NEUTROPHILS RELATIVE PERCENT: 53.4 %
PDW BLD-RTO: 13.4 % (ref 12.4–15.4)
PERFORMED ON: ABNORMAL
PHOSPHORUS: 3.6 MG/DL (ref 2.5–4.9)
PLATELET # BLD: 202 K/UL (ref 135–450)
PMV BLD AUTO: 8.1 FL (ref 5–10.5)
POTASSIUM SERPL-SCNC: 3.7 MMOL/L (ref 3.5–5.1)
RBC # BLD: 4.41 M/UL (ref 4.2–5.9)
SODIUM BLD-SCNC: 136 MMOL/L (ref 136–145)
WBC # BLD: 6.2 K/UL (ref 4–11)

## 2021-06-04 PROCEDURE — 99232 SBSQ HOSP IP/OBS MODERATE 35: CPT | Performed by: INTERNAL MEDICINE

## 2021-06-04 PROCEDURE — 2000000000 HC ICU R&B

## 2021-06-04 PROCEDURE — 2580000003 HC RX 258: Performed by: STUDENT IN AN ORGANIZED HEALTH CARE EDUCATION/TRAINING PROGRAM

## 2021-06-04 PROCEDURE — 6370000000 HC RX 637 (ALT 250 FOR IP): Performed by: STUDENT IN AN ORGANIZED HEALTH CARE EDUCATION/TRAINING PROGRAM

## 2021-06-04 PROCEDURE — 6360000004 HC RX CONTRAST MEDICATION: Performed by: STUDENT IN AN ORGANIZED HEALTH CARE EDUCATION/TRAINING PROGRAM

## 2021-06-04 PROCEDURE — 83735 ASSAY OF MAGNESIUM: CPT

## 2021-06-04 PROCEDURE — 70450 CT HEAD/BRAIN W/O DYE: CPT

## 2021-06-04 PROCEDURE — 85520 HEPARIN ASSAY: CPT

## 2021-06-04 PROCEDURE — 70498 CT ANGIOGRAPHY NECK: CPT

## 2021-06-04 PROCEDURE — 6370000000 HC RX 637 (ALT 250 FOR IP): Performed by: INTERNAL MEDICINE

## 2021-06-04 PROCEDURE — 36415 COLL VENOUS BLD VENIPUNCTURE: CPT

## 2021-06-04 PROCEDURE — 6360000002 HC RX W HCPCS: Performed by: INTERNAL MEDICINE

## 2021-06-04 PROCEDURE — 80069 RENAL FUNCTION PANEL: CPT

## 2021-06-04 PROCEDURE — 85025 COMPLETE CBC W/AUTO DIFF WBC: CPT

## 2021-06-04 RX ORDER — CEFAZOLIN SODIUM 2 G/50ML
2000 SOLUTION INTRAVENOUS
Status: ACTIVE | OUTPATIENT
Start: 2021-06-04 | End: 2021-06-05

## 2021-06-04 RX ORDER — SODIUM CHLORIDE 9 MG/ML
INJECTION, SOLUTION INTRAVENOUS CONTINUOUS
Status: DISCONTINUED | OUTPATIENT
Start: 2021-06-04 | End: 2021-06-06

## 2021-06-04 RX ORDER — NIFEDIPINE 30 MG/1
30 TABLET, FILM COATED, EXTENDED RELEASE ORAL ONCE
Status: COMPLETED | OUTPATIENT
Start: 2021-06-04 | End: 2021-06-04

## 2021-06-04 RX ORDER — NIFEDIPINE 30 MG/1
60 TABLET, FILM COATED, EXTENDED RELEASE ORAL DAILY
Status: DISCONTINUED | OUTPATIENT
Start: 2021-06-05 | End: 2021-06-08 | Stop reason: HOSPADM

## 2021-06-04 RX ADMIN — METOPROLOL TARTRATE 25 MG: 25 TABLET, FILM COATED ORAL at 08:29

## 2021-06-04 RX ADMIN — HEPARIN SODIUM 11 UNITS/KG/HR: 10000 INJECTION, SOLUTION INTRAVENOUS at 11:26

## 2021-06-04 RX ADMIN — PANTOPRAZOLE SODIUM 20 MG: 20 TABLET, DELAYED RELEASE ORAL at 06:03

## 2021-06-04 RX ADMIN — HYDRALAZINE HYDROCHLORIDE 25 MG: 50 TABLET, FILM COATED ORAL at 06:03

## 2021-06-04 RX ADMIN — CITALOPRAM 40 MG: 40 TABLET, FILM COATED ORAL at 08:27

## 2021-06-04 RX ADMIN — METOPROLOL TARTRATE 25 MG: 25 TABLET, FILM COATED ORAL at 21:00

## 2021-06-04 RX ADMIN — Medication 10 ML: at 08:28

## 2021-06-04 RX ADMIN — HYDRALAZINE HYDROCHLORIDE 25 MG: 50 TABLET, FILM COATED ORAL at 21:01

## 2021-06-04 RX ADMIN — HYDRALAZINE HYDROCHLORIDE 25 MG: 50 TABLET, FILM COATED ORAL at 13:34

## 2021-06-04 RX ADMIN — ATORVASTATIN CALCIUM 80 MG: 80 TABLET, FILM COATED ORAL at 21:01

## 2021-06-04 RX ADMIN — IOPAMIDOL 80 ML: 755 INJECTION, SOLUTION INTRAVENOUS at 05:51

## 2021-06-04 RX ADMIN — CHLORTHALIDONE 25 MG: 25 TABLET ORAL at 08:27

## 2021-06-04 RX ADMIN — Medication 10 MG: at 23:43

## 2021-06-04 RX ADMIN — NIFEDIPINE 30 MG: 30 TABLET, EXTENDED RELEASE ORAL at 13:33

## 2021-06-04 RX ADMIN — NIFEDIPINE 30 MG: 30 TABLET, EXTENDED RELEASE ORAL at 08:27

## 2021-06-04 ASSESSMENT — PAIN SCALES - GENERAL
PAINLEVEL_OUTOF10: 0
PAINLEVEL_OUTOF10: 1
PAINLEVEL_OUTOF10: 0

## 2021-06-04 ASSESSMENT — PAIN DESCRIPTION - ORIENTATION: ORIENTATION: POSTERIOR

## 2021-06-04 ASSESSMENT — PAIN DESCRIPTION - LOCATION: LOCATION: HEAD

## 2021-06-04 ASSESSMENT — PAIN DESCRIPTION - DESCRIPTORS: DESCRIPTORS: ACHING

## 2021-06-04 ASSESSMENT — PAIN DESCRIPTION - PAIN TYPE: TYPE: ACUTE PAIN

## 2021-06-04 NOTE — PLAN OF CARE
Problem: Falls - Risk of:  Goal: Will remain free from falls  Description: Will remain free from falls  Outcome: Ongoing     Problem: Skin Integrity:  Goal: Will show no infection signs and symptoms  Description: Will show no infection signs and symptoms  Outcome: Ongoing     Problem: Bleeding:  Goal: Will show no signs and symptoms of excessive bleeding  Description: Will show no signs and symptoms of excessive bleeding  Outcome: Ongoing     Problem: Pain:  Goal: Pain level will decrease  Description: Pain level will decrease  Outcome: Ongoing     Problem: Pain:  Goal: Control of acute pain  Description: Control of acute pain  Outcome: Ongoing

## 2021-06-04 NOTE — PROGRESS NOTES
NUTRITION NOTE   Admission Date: 5/30/2021     Type and Reason for Visit: Initial    NUTRITION RECOMMENDATIONS:   1. PO Diet: Continue current diet , cardiac  2. ONS: Monitor nutritional adequacy and need for ONS    NUTRITION ASSESSMENT:  Patient assessed for LOS eval. Patient currentlty tolerating po diet with good intake of meals. Pt to be NPO at midnight 6/7 for procedure, will continue to monitor nutritional status and need for intervention. Patient admitted d/t carotid artery occlusion    PMH significant for: HTN, HLD    MALNUTRITION ASSESSMENT  Context of Malnutrition: Acute Illness   Malnutrition Status: No malnutrition    NUTRITION DIAGNOSIS   Problem: Problem #1: No nutrition diagnosis at this time    [facility] and/or Nutrient Delivery: Continue Current Diet           NUTRITION RISK LEVEL: Risk Level: Low        The patient will still be monitored per nutrition standards of care. Consult dietitian if nutrition interventions essential to patient care is needed.      Karis Hoyt, [address] [place] [address]  [place]:  [phone]  Office:  [phone]

## 2021-06-04 NOTE — CARE COORDINATION
Case Management Daily Note                    Date: 6/4/2021     Patient Name: Yoshi Joshi    Date of Admission: 5/30/2021  4:13 AM  YOB: 1945    Length of Stay: 5  GMLOS: 3.3         Patient Admission Status: Inpatient  Diagnosis:Carotid artery occlusion without infarction, unspecified laterality [I65.29]     ________________________________________________________________________________________  Discharge Plan: Home  Home w/family,     Insurance: Payor: Ruel Sol / Plan: Jaymie Poe ESSENTIAL/PLUS / Product Type: *No Product type* /   Is pre-cert/notification needed: N/A     Tentative discharge date: 6/9    Current barriers: Medical Clearance by team, OR Monday     Referrals completed: Not Applicable    Resources/ information provided: Not indicated at this time   ________________________________________________________________________________________  PT AM-PAC: 20 / 24 per last evaluation on: 6/2    OT AM-PAC: 22 / 24 per last evaluation on: 6/2    DME Needs for discharge: None   ________________________________________________________________________________________  Notes/Plan of Care:   SW met with patient again at bedside today. Patient has elected to proceed with surgery on Monday with Neurosurgery. SW to follow post up on therapy evaluations and discharge needs. Mel Wise and/or his family were provided with choice of provider; he and/or his family are in agreement with the discharge plan at this time.     Care Transition Patient: ARMIDA Sandy  [facility] - ICU  Case Management Department  Ph: [phone]

## 2021-06-04 NOTE — PROGRESS NOTES
NEUROSURGERY PROGRESS NOTE    6/4/2021 12:23 PM                               Leola Gauthier                      LOS: 5 days     Subjective: Patient sitting in bed upon entering the room. No acute events overnight. Patient has no specific complaints this am. Neurologically stable. Physical Exam:  Patient seen and examined    Vitals:    06/04/21 1003   BP: (!) 146/81   Pulse: 56   Resp: 15   Temp: 97.7 °F (36.5 °C)   SpO2: 94%     GCS:  4 - Opens eyes on own  5 - Alert and oriented  6 - Follows simple motor commands  General: Well developed. Alert and cooperative in no acute distress. HENT: atraumatic, neck supple  Eyes: Optic discs: Not tested  Pulmonary: unlabored respiratory effort  Cardiovascular:  Warm well perfused. No peripheral edema  Gastrointestinal: abdomen soft, NT, ND    Neurological:  Mental Status: Awake, alert, oriented x 4, speech clear and appropriate  Attention: Intact  Language: No aphasia or dysarthria noted  Sensation: Intact to all extremities to light touch  Coordination: Intact    Cranial Nerves:  II: Visual acuity not tested, denies new visual changes / diplopia  III, IV, VI: PERRL, 3 mm bilaterally, EOMI, no nystagmus noted  V: Facial sensation intact bilaterally to touch  VII: Face symmetric  VIII: Hearing intact bilaterally to spoken voice  IX: Palate movement equal bilaterally  XI: Shoulder shrug equal bilaterally  XII: Tongue midline    Musculoskeletal:   Gait: Not tested   Assist devices: None   Tone: Normal  Motor strength:    Right  Left    Right  Left    Deltoid  5 5  Hip Flex  5 5   Biceps  5 5  Knee Extensors  5 5   Triceps  5 5  Knee Flexors  5 5   Wrist Ext  5 5  Ankle Dorsiflex. 5 5   Wrist Flex  5 5  Ankle Plantarflex. 5 5   Handgrip  5 5  Ext Octavio Longus  5 5   Thumb Ext  5 5         Radiological Findings:  CT Head WO Contrast  Result Date: 5/30/2021  No acute intracranial hemorrhage or mass effect.     CTA HEAD NECK W CONTRAST  Result Date: 5/30/2021  CTA Head: No arterial steno-occlusive disease or aneurysm. CTA Neck: High-grade, 80-90% stenosis of the proximal right cervical internal carotid artery secondary to extensive noncalcified plaque. No other significant arterial steno-occlusive disease or evidence of dissection. MRI BRAIN WO CONTRAST  Result Date: 6/1/2021  Multifocal patchy areas of recent infarction within the right cerebral hemisphere as detailed. No intracranial hemorrhage. Moderate nonspecific white matter disease, likely chronic small vessel ischemic change. CT HEAD WO CONTRAST  Result Date: 6/4/2021  Noncontrast brain, no intracranial hemorrhage     CTA HEAD NECK W CONTRAST  Result Date: 6/4/2021  1. High-grade stenosis of the right internal carotid artery, greater than a present diameter with large soft plaque and distal free-floating component, small thrombus. The findings unchanged from 5/30/2021  2. Left internal carotid artery with less than 10% diameter stenosis  3. Normal vertebral arteries. 4. Normal intracranial circulation. No interval development of large vessel occlusion. Labs:  Recent Labs     06/04/21  0533   WBC 6.2   HGB 14.3   HCT 42.2          Recent Labs     06/04/21  0533      K 3.7      CO2 22   BUN 20   CREATININE 1.1   GLUCOSE 116*   CALCIUM 9.5   PHOS 3.6   MG 2.00       No results for input(s): PROTIME, INR, APTT in the last 72 hours. Patient Active Problem List    Diagnosis Date Noted    Carotid artery occlusion without infarction, unspecified laterality 05/30/2021    Pre-syncope 05/20/2020    Bradycardia 05/20/2020    Angioedema 09/14/2015    ED (erectile dysfunction) 06/01/2015    Chest pain     Unstable angina ([address]     Low back pain 07/07/2014    HLD (hyperlipidemia) 07/07/2014    HTN (hypertension) 07/07/2014       Assessment:  Patient is a 76 y.o. male w/critical stenosis right internal carotid artery with intraluminal thrombus. Plan:  1. Neurologic exams frequency: Q4H  2.  For

## 2021-06-04 NOTE — PROGRESS NOTES
ICU Progress Note  PGY-1    Admit Date: 5/30/2021  ICU Day: Hospital Day: 6  Vent Settings:    / / /   Diet: DIET CARDIAC;  Code Status: Full Code       Interval history:  No acute events overnight. BP were well controlled today. Feels well this morning, denies any new symptoms. No weakness, headache, parasthesia. The patient denies fevers, chills, chest pain, shortness of breath, nausea, vomiting, diarrhea, or constipation. CTA head/neck obtained this morning     Medications:   Scheduled Meds:   metoprolol tartrate  25 mg Oral BID    NIFEdipine  30 mg Oral Daily    chlorthalidone  25 mg Oral Daily    hydrALAZINE  25 mg Oral 3 times per day    pantoprazole  20 mg Oral QAM AC    atorvastatin  80 mg Oral Nightly    sodium chloride flush  5-40 mL Intravenous 2 times per day    citalopram  40 mg Oral Daily       Continuous Infusions:   sodium chloride      heparin (PORCINE) Infusion 11 Units/kg/hr (06/03/21 1349)    niCARdipine Stopped (06/02/21 0100)       PRN Meds:  hydrALAZINE, perflutren lipid microspheres, sodium chloride flush, sodium chloride, promethazine **OR** ondansetron, polyethylene glycol, acetaminophen **OR** acetaminophen, heparin (porcine), heparin (porcine), butalbital-acetaminophen-caffeine, melatonin    Vital/I&O/Physical examination:   VS:  /72   Pulse 51   Temp 97.6 °F (36.4 °C) (Oral)   Resp 18   Ht 6' 2.02\" (1.88 m)   Wt 259 lb 7.7 oz (117.7 kg)   SpO2 98%   BMI 33.30 kg/m²     I/O:    Intake/Output Summary (Last 24 hours) at 6/4/2021 0833  Last data filed at 6/4/2021 0600  Gross per 24 hour   Intake 1683.5 ml   Output 275 ml   Net 1408.5 ml       PE:  Physical Exam  Constitutional:       Appearance: He is obese. Eyes:      Extraocular Movements: Extraocular movements intact. Cardiovascular:      Rate and Rhythm: Regular rhythm. Bradycardia present. Pulses: Normal pulses. Pulmonary:      Effort: Pulmonary effort is normal. No respiratory distress. Breath sounds: No wheezing. Abdominal:      General: There is no distension. Palpations: Abdomen is soft. Tenderness: There is no abdominal tenderness. Musculoskeletal:      Right lower leg: No edema. Left lower leg: No edema. Skin:     General: Skin is dry. Neurological:      Mental Status: He is alert and oriented to person, place, and time. Sensory: No sensory deficit. Motor: No weakness. Comments: No visual field defict          Labs & Imaging:   LABS:  Renal:   Recent Labs     06/02/21 0521 06/03/21 0420 06/04/21  0533    138 136   K 3.8 4.1 3.7    102 101   CO2 26 25 22   BUN 15 19 20   CREATININE 1.0 1.2 1.1   GLUCOSE 118* 115* 116*   ANIONGAP 8 11 13     CBC:   Recent Labs     06/02/21 0521 06/03/21 0420 06/04/21 0533   WBC 5.7 7.3 6.2   HGB 14.5 14.7 14.3   HCT 43.2 42.6 42.2    236 202   MCV 95.9 95.0 95.8                            Hepatic:   No results for input(s): AST, ALT, ALB, BILITOT, ALKPHOS in the last 72 hours. Troponin:   No results for input(s): TROPONINI in the last 72 hours. BNP: No results for input(s): BNP in the last 72 hours. Lipids:   No results for input(s): CHOL, HDL in the last 72 hours. Invalid input(s): LDLCALCU, TRIGLYCERIDE  INR: No results for input(s): INR in the last 72 hours. Lactate: No results for input(s): LACTATE in the last 72 hours. ABGs:No results for input(s): PHART, IPZ5RHC, PO2ART, UHD9DOS, BEART, THGBART, V6BEPVWJ, EJK1QMJ in the last 72 hours. UA:No results for input(s): NITRITE, COLORU, PHUR, LABCAST, WBCUA, RBCUA, MUCUS, TRICHOMONAS, YEAST, BACTERIA, CLARITYU, SPECGRAV, LEUKOCYTESUR, UROBILINOGEN, BILIRUBINUR, BLOODU, GLUCOSEU, AMORPHOUS in the last 72 hours. Invalid input(s): KETONESU     IMAGING:  CTA HEAD NECK W CONTRAST   Final Result   1.  High-grade stenosis of the right internal carotid artery, greater than a present diameter with large soft plaque and distal free-floating component, small thrombus. The findings unchanged from 5/30/2021   2. Left internal carotid artery with less than 10% diameter stenosis   3. Normal vertebral arteries. 4. Normal intracranial circulation. No interval development of large vessel occlusion. 5. Noncontrast brain, no intracranial hemorrhage         CT HEAD WO CONTRAST   Final Result   1. High-grade stenosis of the right internal carotid artery, greater than a present diameter with large soft plaque and distal free-floating component, small thrombus. The findings unchanged from 5/30/2021   2. Left internal carotid artery with less than 10% diameter stenosis   3. Normal vertebral arteries. 4. Normal intracranial circulation. No interval development of large vessel occlusion. 5. Noncontrast brain, no intracranial hemorrhage         MRI BRAIN WO CONTRAST   Final Result      Multifocal patchy areas of recent infarction within the right cerebral hemisphere as detailed. No intracranial hemorrhage. Moderate nonspecific white matter disease, likely chronic small vessel ischemic change. CTA HEAD NECK W CONTRAST   Final Result      CTA Head:   No arterial steno-occlusive disease or aneurysm. CTA Neck:   High-grade, 80-90% stenosis of the proximal right cervical internal carotid artery secondary to extensive noncalcified plaque. No other significant arterial steno-occlusive disease or evidence of dissection. 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       Right ICA stenosis 2/2 to thrombus/plaque  CTA revealed greater than 90% stenosis in the proximal right cervical ICA secondary to 2.5x0.5 thrombus/noncalcified atherosclerotic plaque. Last known well approximately 2300 on 5/28.  Not a candidate for Timing of procedure is currently being established     Analisa Avery MD
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
## 2021-06-05 LAB
ALBUMIN SERPL-MCNC: 3.9 G/DL (ref 3.4–5)
ANION GAP SERPL CALCULATED.3IONS-SCNC: 7 MMOL/L (ref 3–16)
ANTI-XA UNFRAC HEPARIN: 0.63 IU/ML (ref 0.3–0.7)
BASOPHILS ABSOLUTE: 0 K/UL (ref 0–0.2)
BASOPHILS RELATIVE PERCENT: 0.5 %
BUN BLDV-MCNC: 21 MG/DL (ref 7–20)
CALCIUM SERPL-MCNC: 9.5 MG/DL (ref 8.3–10.6)
CHLORIDE BLD-SCNC: 99 MMOL/L (ref 99–110)
CO2: 28 MMOL/L (ref 21–32)
CREAT SERPL-MCNC: 1.3 MG/DL (ref 0.8–1.3)
EOSINOPHILS ABSOLUTE: 0.1 K/UL (ref 0–0.6)
EOSINOPHILS RELATIVE PERCENT: 2 %
GFR AFRICAN AMERICAN: >60
GFR NON-AFRICAN AMERICAN: 54
GLUCOSE BLD-MCNC: 112 MG/DL (ref 70–99)
HCT VFR BLD CALC: 42.7 % (ref 40.5–52.5)
HEMOGLOBIN: 14.7 G/DL (ref 13.5–17.5)
LYMPHOCYTES ABSOLUTE: 2 K/UL (ref 1–5.1)
LYMPHOCYTES RELATIVE PERCENT: 28.3 %
MAGNESIUM: 2.1 MG/DL (ref 1.8–2.4)
MCH RBC QN AUTO: 32.6 PG (ref 26–34)
MCHC RBC AUTO-ENTMCNC: 34.4 G/DL (ref 31–36)
MCV RBC AUTO: 94.9 FL (ref 80–100)
MONOCYTES ABSOLUTE: 0.9 K/UL (ref 0–1.3)
MONOCYTES RELATIVE PERCENT: 12.2 %
NEUTROPHILS ABSOLUTE: 4.1 K/UL (ref 1.7–7.7)
NEUTROPHILS RELATIVE PERCENT: 57 %
PDW BLD-RTO: 13.4 % (ref 12.4–15.4)
PHOSPHORUS: 3.5 MG/DL (ref 2.5–4.9)
PLATELET # BLD: 208 K/UL (ref 135–450)
PMV BLD AUTO: 8.3 FL (ref 5–10.5)
POTASSIUM SERPL-SCNC: 4.2 MMOL/L (ref 3.5–5.1)
RBC # BLD: 4.51 M/UL (ref 4.2–5.9)
SODIUM BLD-SCNC: 134 MMOL/L (ref 136–145)
WBC # BLD: 7.2 K/UL (ref 4–11)

## 2021-06-05 PROCEDURE — 2000000000 HC ICU R&B

## 2021-06-05 PROCEDURE — 2580000003 HC RX 258: Performed by: STUDENT IN AN ORGANIZED HEALTH CARE EDUCATION/TRAINING PROGRAM

## 2021-06-05 PROCEDURE — 36415 COLL VENOUS BLD VENIPUNCTURE: CPT

## 2021-06-05 PROCEDURE — 99232 SBSQ HOSP IP/OBS MODERATE 35: CPT | Performed by: INTERNAL MEDICINE

## 2021-06-05 PROCEDURE — 6370000000 HC RX 637 (ALT 250 FOR IP): Performed by: INTERNAL MEDICINE

## 2021-06-05 PROCEDURE — 83735 ASSAY OF MAGNESIUM: CPT

## 2021-06-05 PROCEDURE — 6370000000 HC RX 637 (ALT 250 FOR IP): Performed by: STUDENT IN AN ORGANIZED HEALTH CARE EDUCATION/TRAINING PROGRAM

## 2021-06-05 PROCEDURE — 85025 COMPLETE CBC W/AUTO DIFF WBC: CPT

## 2021-06-05 PROCEDURE — 85520 HEPARIN ASSAY: CPT

## 2021-06-05 PROCEDURE — 80069 RENAL FUNCTION PANEL: CPT

## 2021-06-05 RX ORDER — HYDRALAZINE HYDROCHLORIDE 50 MG/1
25 TABLET, FILM COATED ORAL EVERY 12 HOURS SCHEDULED
Status: DISCONTINUED | OUTPATIENT
Start: 2021-06-05 | End: 2021-06-05

## 2021-06-05 RX ADMIN — Medication 10 ML: at 09:36

## 2021-06-05 RX ADMIN — METOPROLOL TARTRATE 25 MG: 25 TABLET, FILM COATED ORAL at 20:19

## 2021-06-05 RX ADMIN — CHLORTHALIDONE 25 MG: 25 TABLET ORAL at 09:36

## 2021-06-05 RX ADMIN — ATORVASTATIN CALCIUM 80 MG: 80 TABLET, FILM COATED ORAL at 20:19

## 2021-06-05 RX ADMIN — NIFEDIPINE 60 MG: 30 TABLET, EXTENDED RELEASE ORAL at 09:35

## 2021-06-05 RX ADMIN — HYDRALAZINE HYDROCHLORIDE 25 MG: 50 TABLET, FILM COATED ORAL at 06:24

## 2021-06-05 RX ADMIN — METOPROLOL TARTRATE 25 MG: 25 TABLET, FILM COATED ORAL at 09:36

## 2021-06-05 RX ADMIN — PANTOPRAZOLE SODIUM 20 MG: 20 TABLET, DELAYED RELEASE ORAL at 06:23

## 2021-06-05 RX ADMIN — CITALOPRAM 40 MG: 40 TABLET, FILM COATED ORAL at 09:35

## 2021-06-05 ASSESSMENT — PAIN SCALES - GENERAL
PAINLEVEL_OUTOF10: 0
PAINLEVEL_OUTOF10: 0

## 2021-06-05 NOTE — PROGRESS NOTES
ICU Progress Note  PGY-3    Admit Date: 5/30/2021  ICU Day: Hospital Day: 7  Vent Settings:    / / /   Diet: DIET CARDIAC; Diet NPO Exceptions are: Sips of Water with Meds  Code Status: Full Code       Interval history:  SNEHA O/N. BP well controlled O/N. Pt  denies F/C/N/V, HA, fatigue, CP, dyspnea, abdominal pain, constipation/diarrhea, and urinary symptoms. Medications:   Scheduled Meds:   metoprolol tartrate  25 mg Oral BID    NIFEdipine  60 mg Oral Daily    chlorthalidone  25 mg Oral Daily    hydrALAZINE  25 mg Oral 3 times per day    pantoprazole  20 mg Oral QAM AC    atorvastatin  80 mg Oral Nightly    sodium chloride flush  5-40 mL Intravenous 2 times per day    citalopram  40 mg Oral Daily       Continuous Infusions:   sodium chloride      sodium chloride      heparin (PORCINE) Infusion 11 Units/kg/hr (06/04/21 2103)    niCARdipine Stopped (06/02/21 0100)       PRN Meds:  hydrALAZINE, perflutren lipid microspheres, sodium chloride flush, sodium chloride, promethazine **OR** ondansetron, polyethylene glycol, acetaminophen **OR** acetaminophen, heparin (porcine), heparin (porcine), butalbital-acetaminophen-caffeine, melatonin    Vital/I&O/Physical examination:   VS:  /69   Pulse 50   Temp 98 °F (36.7 °C) (Oral)   Resp 13   Ht 6' 2.02\" (1.88 m)   Wt 259 lb 7.7 oz (117.7 kg)   SpO2 97%   BMI 33.30 kg/m²     I/O:    Intake/Output Summary (Last 24 hours) at 6/5/2021 0811  Last data filed at 6/5/2021 0600  Gross per 24 hour   Intake 782 ml   Output --   Net 782 ml       PE:  Physical Exam  Constitutional:       Appearance: He is obese. Eyes:      Extraocular Movements: Extraocular movements intact. Cardiovascular:      Rate and Rhythm: Normal rate and regular rhythm. Pulses: Normal pulses. Pulmonary:      Effort: Pulmonary effort is normal. No respiratory distress. Breath sounds: No wheezing. Abdominal:      General: There is no distension.       Palpations: Abdomen is soft.      Tenderness: There is no abdominal tenderness. Musculoskeletal:      Right lower leg: No edema. Left lower leg: No edema. Skin:     General: Skin is dry. Neurological:      Mental Status: He is alert and oriented to person, place, and time. Sensory: No sensory deficit. Motor: No weakness. Comments: No visual field defict          Labs & Imaging:   LABS:  Renal:   Recent Labs     06/03/21  0420 06/04/21  0533 06/05/21  0520    136 134*   K 4.1 3.7 4.2    101 99   CO2 25 22 28   BUN 19 20 21*   CREATININE 1.2 1.1 1.3   GLUCOSE 115* 116* 112*   ANIONGAP 11 13 7     CBC:   Recent Labs     06/03/21 0420 06/04/21  0533 06/05/21  0520   WBC 7.3 6.2 7.2   HGB 14.7 14.3 14.7   HCT 42.6 42.2 42.7    202 208   MCV 95.0 95.8 94.9                            Hepatic:   No results for input(s): AST, ALT, ALB, BILITOT, ALKPHOS in the last 72 hours. Troponin:   No results for input(s): TROPONINI in the last 72 hours. BNP: No results for input(s): BNP in the last 72 hours. Lipids:   No results for input(s): CHOL, HDL in the last 72 hours. Invalid input(s): LDLCALCU, TRIGLYCERIDE  INR: No results for input(s): INR in the last 72 hours. Lactate: No results for input(s): LACTATE in the last 72 hours. ABGs:No results for input(s): PHART, BGJ1NLX, PO2ART, VKF5AKZ, BEART, THGBART, U7PWEOBD, PBM5CLE in the last 72 hours. UA:No results for input(s): NITRITE, COLORU, PHUR, LABCAST, WBCUA, RBCUA, MUCUS, TRICHOMONAS, YEAST, BACTERIA, CLARITYU, SPECGRAV, LEUKOCYTESUR, UROBILINOGEN, BILIRUBINUR, BLOODU, GLUCOSEU, AMORPHOUS in the last 72 hours. Invalid input(s): KETONESU     IMAGING:  CTA HEAD NECK W CONTRAST   Final Result   1. High-grade stenosis of the right internal carotid artery, greater than a present diameter with large soft plaque and distal free-floating component, small thrombus. The findings unchanged from 5/30/2021   2.  Left internal carotid artery with less than 10% diameter stenosis   3. Normal vertebral arteries. 4. Normal intracranial circulation. No interval development of large vessel occlusion. 5. Noncontrast brain, no intracranial hemorrhage         CT HEAD WO CONTRAST   Final Result   1. High-grade stenosis of the right internal carotid artery, greater than a present diameter with large soft plaque and distal free-floating component, small thrombus. The findings unchanged from 5/30/2021   2. Left internal carotid artery with less than 10% diameter stenosis   3. Normal vertebral arteries. 4. Normal intracranial circulation. No interval development of large vessel occlusion. 5. Noncontrast brain, no intracranial hemorrhage         MRI BRAIN WO CONTRAST   Final Result      Multifocal patchy areas of recent infarction within the right cerebral hemisphere as detailed. No intracranial hemorrhage. Moderate nonspecific white matter disease, likely chronic small vessel ischemic change. CTA HEAD NECK W CONTRAST   Final Result      CTA Head:   No arterial steno-occlusive disease or aneurysm. CTA Neck:   High-grade, 80-90% stenosis of the proximal right cervical internal carotid artery secondary to extensive noncalcified plaque. No other significant arterial steno-occlusive disease or evidence of dissection. Assessment & Plan:    Rowdy Fernandez a 76 y. o. male with PMH symptomatic bradycardia, hypertension, hyperlipidemia who was transferred from [facility] after CT revealed greater than 90% stenosis in the proximal right ICA secondary to a thrombus/plaque.       Right ICA stenosis 2/2 to thrombus/plaque  CTA revealed greater than 90% stenosis in the proximal right cervical ICA secondary to 2.5x0.5 thrombus/noncalcified atherosclerotic plaque. Last known well approximately 2300 on 5/28. Not a candidate for TPA  - cont Heparin gtt  - Target BP <160/90.  Continue metoprolol 25mg BID and and chlorthalidone 25mg qD, nifedipine 60mg qD. Significant allergies to BP meds. Goals to eliminate hydralazine  - MRI w/ some patchy area of ischemia in R cerebellum   - TTE w/o shunt, normal findings   - NSGY following - CTA 6/4 without significant reduction of R ICA stenosis, small thrombus noted  - will likely need revascularization - NSGY for recs     Acute Ischemic stroke   R ICA stenosis. MRI w/ patchy area of ischema in R cerebellum  - HgA1c 5.9 (6/1/2021)  - better control off BP  - TTE w/o shunt  - LDL 65, cont statin 80 mg  - heparin gtt - asa on d/c       Hypertension  - keep off Cardene gtt for SBP < 160  - metoprolol 25 BID (parameters to hold for HR < 55), cont chorthalidone, nifedipine 60 mg  - hydralazine discontinued    Chronic hyperlipidemia - cont lipitor 80 mg   Chronic asymptomatic bradycardia - cont to monitor     PT/OT 20, 22    Code Status: Full Code  FEN: DIET CARDIAC; Diet NPO Exceptions are: Sips of Water with Meds NPO @ midnight on Sunday  PPX: heparin gtt   DISPO: ICU    This patient will be discussed with attending, Dr Avi Soriano MD MD, PGY-3  Contact via [employer]  6/5/2021,  8:11 AM     Pulm/CC     Patient seen and examined. I agree with Dr. Talamantes's history, physical, lab findings, assessment and plan and edited as needed.     No acute events overnight and neurologically normal. Blood pressure better well-controlled today with blood pressure 115/65. Continue metoprolol 25 mg p.o. twice daily, Adalat CC 60 mg and chlorthalidone 25 mg daily. Will stop hydralazine. If needed can increase Adalat CC to 90 mg     Neurosurgery following in regards to right ICA stenosis.  MRI shows ischemic CVA.  Continue heparin. CTA today shows no change in high-grade stenosis of right ICA with distal free-floating component.   Neurovascular has discussed options with Carmita Hernadez and he has opted for carotid endarterectomy which will be performed on Monday     Monica Crane MD

## 2021-06-05 NOTE — PROGRESS NOTES
Patient remains stable and neurologically intact. BP WNL, Nicardipine drip remains off. Alert and oriented x4, ambulates to the bathroom with no problems, steady on his feet.
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
## 2021-06-06 LAB
ABO/RH: NORMAL
ALBUMIN SERPL-MCNC: 4.1 G/DL (ref 3.4–5)
ANION GAP SERPL CALCULATED.3IONS-SCNC: 9 MMOL/L (ref 3–16)
ANTI-XA UNFRAC HEPARIN: 0.48 IU/ML (ref 0.3–0.7)
ANTI-XA UNFRAC HEPARIN: 0.71 IU/ML (ref 0.3–0.7)
ANTIBODY SCREEN: NORMAL
BASOPHILS ABSOLUTE: 0 K/UL (ref 0–0.2)
BASOPHILS RELATIVE PERCENT: 0.4 %
BUN BLDV-MCNC: 18 MG/DL (ref 7–20)
CALCIUM SERPL-MCNC: 9.4 MG/DL (ref 8.3–10.6)
CHLORIDE BLD-SCNC: 100 MMOL/L (ref 99–110)
CO2: 28 MMOL/L (ref 21–32)
CREAT SERPL-MCNC: 1.2 MG/DL (ref 0.8–1.3)
EOSINOPHILS ABSOLUTE: 0.1 K/UL (ref 0–0.6)
EOSINOPHILS RELATIVE PERCENT: 1.9 %
GFR AFRICAN AMERICAN: >60
GFR NON-AFRICAN AMERICAN: 59
GLUCOSE BLD-MCNC: 110 MG/DL (ref 70–99)
HCT VFR BLD CALC: 41.8 % (ref 40.5–52.5)
HEMOGLOBIN: 14.4 G/DL (ref 13.5–17.5)
LYMPHOCYTES ABSOLUTE: 1.7 K/UL (ref 1–5.1)
LYMPHOCYTES RELATIVE PERCENT: 30.9 %
MAGNESIUM: 2.1 MG/DL (ref 1.8–2.4)
MCH RBC QN AUTO: 32.6 PG (ref 26–34)
MCHC RBC AUTO-ENTMCNC: 34.3 G/DL (ref 31–36)
MCV RBC AUTO: 95 FL (ref 80–100)
MONOCYTES ABSOLUTE: 0.7 K/UL (ref 0–1.3)
MONOCYTES RELATIVE PERCENT: 11.7 %
NEUTROPHILS ABSOLUTE: 3.1 K/UL (ref 1.7–7.7)
NEUTROPHILS RELATIVE PERCENT: 55.1 %
PDW BLD-RTO: 13.4 % (ref 12.4–15.4)
PHOSPHORUS: 3 MG/DL (ref 2.5–4.9)
PLATELET # BLD: 207 K/UL (ref 135–450)
PMV BLD AUTO: 8.3 FL (ref 5–10.5)
POTASSIUM REFLEX MAGNESIUM: 3.7 MMOL/L (ref 3.5–5.1)
POTASSIUM SERPL-SCNC: 3.7 MMOL/L (ref 3.5–5.1)
RBC # BLD: 4.4 M/UL (ref 4.2–5.9)
SODIUM BLD-SCNC: 137 MMOL/L (ref 136–145)
WBC # BLD: 5.6 K/UL (ref 4–11)

## 2021-06-06 PROCEDURE — 83735 ASSAY OF MAGNESIUM: CPT

## 2021-06-06 PROCEDURE — 36415 COLL VENOUS BLD VENIPUNCTURE: CPT

## 2021-06-06 PROCEDURE — 2580000003 HC RX 258: Performed by: STUDENT IN AN ORGANIZED HEALTH CARE EDUCATION/TRAINING PROGRAM

## 2021-06-06 PROCEDURE — 6370000000 HC RX 637 (ALT 250 FOR IP): Performed by: STUDENT IN AN ORGANIZED HEALTH CARE EDUCATION/TRAINING PROGRAM

## 2021-06-06 PROCEDURE — 2000000000 HC ICU R&B

## 2021-06-06 PROCEDURE — 99232 SBSQ HOSP IP/OBS MODERATE 35: CPT | Performed by: INTERNAL MEDICINE

## 2021-06-06 PROCEDURE — 86850 RBC ANTIBODY SCREEN: CPT

## 2021-06-06 PROCEDURE — 6360000002 HC RX W HCPCS: Performed by: NURSE PRACTITIONER

## 2021-06-06 PROCEDURE — 85025 COMPLETE CBC W/AUTO DIFF WBC: CPT

## 2021-06-06 PROCEDURE — 86901 BLOOD TYPING SEROLOGIC RH(D): CPT

## 2021-06-06 PROCEDURE — 80069 RENAL FUNCTION PANEL: CPT

## 2021-06-06 PROCEDURE — 85520 HEPARIN ASSAY: CPT

## 2021-06-06 PROCEDURE — 86900 BLOOD TYPING SEROLOGIC ABO: CPT

## 2021-06-06 RX ORDER — SODIUM CHLORIDE 9 MG/ML
INJECTION, SOLUTION INTRAVENOUS CONTINUOUS
Status: DISCONTINUED | OUTPATIENT
Start: 2021-06-06 | End: 2021-06-08 | Stop reason: HOSPADM

## 2021-06-06 RX ORDER — SODIUM CHLORIDE, SODIUM LACTATE, POTASSIUM CHLORIDE, CALCIUM CHLORIDE 600; 310; 30; 20 MG/100ML; MG/100ML; MG/100ML; MG/100ML
INJECTION, SOLUTION INTRAVENOUS CONTINUOUS
Status: DISCONTINUED | OUTPATIENT
Start: 2021-06-06 | End: 2021-06-07

## 2021-06-06 RX ORDER — SODIUM CHLORIDE 9 MG/ML
25 INJECTION, SOLUTION INTRAVENOUS PRN
Status: DISCONTINUED | OUTPATIENT
Start: 2021-06-06 | End: 2021-06-07 | Stop reason: HOSPADM

## 2021-06-06 RX ORDER — SODIUM CHLORIDE 0.9 % (FLUSH) 0.9 %
10 SYRINGE (ML) INJECTION EVERY 12 HOURS SCHEDULED
Status: DISCONTINUED | OUTPATIENT
Start: 2021-06-06 | End: 2021-06-07 | Stop reason: HOSPADM

## 2021-06-06 RX ORDER — SODIUM CHLORIDE 0.9 % (FLUSH) 0.9 %
10 SYRINGE (ML) INJECTION PRN
Status: DISCONTINUED | OUTPATIENT
Start: 2021-06-06 | End: 2021-06-07 | Stop reason: HOSPADM

## 2021-06-06 RX ADMIN — HEPARIN SODIUM 11 UNITS/KG/HR: 10000 INJECTION, SOLUTION INTRAVENOUS at 04:05

## 2021-06-06 RX ADMIN — Medication 10 ML: at 08:25

## 2021-06-06 RX ADMIN — METOPROLOL TARTRATE 25 MG: 25 TABLET, FILM COATED ORAL at 08:24

## 2021-06-06 RX ADMIN — CHLORTHALIDONE 25 MG: 25 TABLET ORAL at 08:23

## 2021-06-06 RX ADMIN — NIFEDIPINE 60 MG: 30 TABLET, EXTENDED RELEASE ORAL at 08:23

## 2021-06-06 RX ADMIN — ATORVASTATIN CALCIUM 80 MG: 80 TABLET, FILM COATED ORAL at 20:33

## 2021-06-06 RX ADMIN — PANTOPRAZOLE SODIUM 20 MG: 20 TABLET, DELAYED RELEASE ORAL at 08:23

## 2021-06-06 RX ADMIN — CITALOPRAM 40 MG: 40 TABLET, FILM COATED ORAL at 08:23

## 2021-06-06 RX ADMIN — METOPROLOL TARTRATE 25 MG: 25 TABLET, FILM COATED ORAL at 20:33

## 2021-06-06 ASSESSMENT — PAIN SCALES - GENERAL
PAINLEVEL_OUTOF10: 0
PAINLEVEL_OUTOF10: 0

## 2021-06-06 NOTE — PLAN OF CARE
Problem: Falls - Risk of:  Goal: Will remain free from falls  6/6/2021 0229 by Cortney Begum RN  Outcome: Ongoing  Note: Pt is a fall risk. Fall risk protocol in place. See Kameron Frederick Fall Score. Pt bed is in low position, bed alarm is on, side rails up, fall risk bracelet applied. , non-skid footwear in use. Patient/family educated on fall risk protocol, instructed to call for assistance when needed and belongings are in reach. assistance. Will continue with hourly rounds for po intake, pain needs, toileting and repositioning as needed. Will continue to monitor for needs. Problem: Bleeding:  Goal: Will show no signs and symptoms of excessive bleeding  Outcome: Ongoing  Note: On Heparin drip, no signs of bleeding. Problem: Pain:  Goal: Control of acute pain  Outcome: Ongoing  Note: No complaints of pain.

## 2021-06-06 NOTE — PROGRESS NOTES
Neurosurgery Progress Note    Patient personally seen and examined on 06/06/21. From a neurosurgical perspective, please note the following information regarding pertinent 24hr events:    1. Sensorium remains stable  2. No acute events overnight    Please see the hospitalist/intensivist service progress note dated 06/06/21 for information pertaining to pertinent non-neurosurgical related 24hr events. Assessment & Plan: Yolanda Kam is a 68 y.o. male with minor R hemispheric CVA secondary to symptomatic severe R ICA origin stenosis. From a neurosurgical perspective, further recommendations are as follows:    -D/C hep gtt this evening @ midnight  -NPO/MIVF this evening @ midnight  -OR tomorrow AM for R ICA revascularization (CEA)  -Dispo: pending, continue ICU level of care  -Please call with questions    Objective:  Vitals:    06/06/21 1200 06/06/21 1300 06/06/21 1400 06/06/21 1500   BP: 127/79 (!) 97/47 134/71    Pulse: (!) 49 (!) 47 53 58   Resp:       Temp: 98 °F (36.7 °C)   98.5 °F (36.9 °C)   TempSrc:    Oral   SpO2:       Weight:       Height:         I/O last 3 completed shifts: In: 852 [P. O.:560; I.V.:292]  Out: 750 [Urine:750]  Recent Results (from the past 24 hour(s))   CBC Auto Differential    Collection Time: 06/06/21  5:31 AM   Result Value Ref Range    WBC 5.6 4.0 - 11.0 K/uL    RBC 4.40 4.20 - 5.90 M/uL    Hemoglobin 14.4 13.5 - 17.5 g/dL    Hematocrit 41.8 40.5 - 52.5 %    MCV 95.0 80.0 - 100.0 fL    MCH 32.6 26.0 - 34.0 pg    MCHC 34.3 31.0 - 36.0 g/dL    RDW 13.4 12.4 - 15.4 %    Platelets 312 240 - 393 K/uL    MPV 8.3 5.0 - 10.5 fL    Neutrophils % 55.1 %    Lymphocytes % 30.9 %    Monocytes % 11.7 %    Eosinophils % 1.9 %    Basophils % 0.4 %    Neutrophils Absolute 3.1 1.7 - 7.7 K/uL    Lymphocytes Absolute 1.7 1.0 - 5.1 K/uL    Monocytes Absolute 0.7 0.0 - 1.3 K/uL    Eosinophils Absolute 0.1 0.0 - 0.6 K/uL    Basophils Absolute 0.0 0.0 - 0.2 K/uL   Magnesium    Collection Time: 06/06/21  5:31 AM   Result Value Ref Range    Magnesium 2.10 1.80 - 2.40 mg/dL   Renal Function Panel    Collection Time: 06/06/21  5:31 AM   Result Value Ref Range    Sodium 137 136 - 145 mmol/L    Potassium 3.7 3.5 - 5.1 mmol/L    Chloride 100 99 - 110 mmol/L    CO2 28 21 - 32 mmol/L    Anion Gap 9 3 - 16    Glucose 110 (H) 70 - 99 mg/dL    BUN 18 7 - 20 mg/dL    CREATININE 1.2 0.8 - 1.3 mg/dL    GFR Non- 59 (A) >60    GFR African American >60 >60    Calcium 9.4 8.3 - 10.6 mg/dL    Phosphorus 3.0 2.5 - 4.9 mg/dL    Albumin 4.1 3.4 - 5.0 g/dL   HEPARIN LEVEL/ANTI-XA    Collection Time: 06/06/21  5:31 AM   Result Value Ref Range    Anti-XA Unfrac Heparin 0.71 (H) 0.30 - 0.70 IU/mL   Basic Metabolic Panel w/ Reflex to MG    Collection Time: 06/06/21  5:31 AM   Result Value Ref Range    Potassium reflex Magnesium 3.7 3.5 - 5.1 mmol/L   TYPE AND SCREEN    Collection Time: 06/06/21  5:31 AM   Result Value Ref Range    ABO/Rh A POS     Antibody Screen NEG    Heparin Level/Anti-XA    Collection Time: 06/06/21  2:34 PM   Result Value Ref Range    Anti-XA Unfrac Heparin 0.48 0.30 - 0.70 IU/mL     No results found. A&Ox4, conversant   PERRL, EOMI   CN 2-12 grossly intact and symmetric  GCS: 15  E: 4     V: 5    M: 6 follows x 4   Strength:   4+ to 5 out of 5 throughout  Sensation grossly intact to LT and symmetric     In excess of 15 minutes was spent elucidating the patients clinical history, reviewing/interpreting recent laboratory and radiographic findings, performing of a focused physical examination, formulation of a plan of care (from a neurosurgical perspective), providing education with regards to disease process, and providing updates with regards to clinical course to date and plan of care moving forward.     Micheal Carolina MD  [phone]

## 2021-06-06 NOTE — PROGRESS NOTES
HPI  No new complaints  No chest pain  No abnormal bleeding  No fever/chills  No headache  No new weakness/numbness    No abnormal bleeding      Medications: Reviewed      Allergies:  Sulfa antibiotics, Amlodipine, and Lisinopril      Family History:      Problem Relation Age of Onset    Stroke Mother 47    High Blood Pressure Mother     High Cholesterol Mother     Cancer Father         lung    Cancer Sister         breast    Cancer Brother         multiple myeloma         Social History:  Tobacco:  reports that he has never smoked. He has never used smokeless tobacco.  Alcohol:  reports current alcohol use of about 2.5 standard drinks of alcohol per week. Recreational Drugs: Denies recreational drug use  Living Situation: Lives with wife      Review of Systems: Neg except as in HPI      Vitals:    06/06/21 1100 06/06/21 1200 06/06/21 1300 06/06/21 1400   BP: 119/73 127/79 (!) 97/47 134/71   Pulse: 52 (!) 49 (!) 47 53   Resp:       Temp:  98 °F (36.7 °C)     TempSrc:       SpO2:       Weight:       Height:         Physical Exam:    Constitutional: Awake. Well-developed and well-nourished. No acute distress. HEENT: Normocephalic and atraumatic. No scleral icterus. Cardiovascular: Regular bradycardia. No obvious murmur. Pulmonary: Normal work of breathing. CTA B. Room air. Gastrointestinal: Soft. No tenderness. No distention. Musculoskeletal: No peripheral edema. Skin: No cyanosis or pallor. Neurological: Alert and oriented.  Grossly non focal.    I/O:    Intake/Output Summary (Last 24 hours) at 6/6/2021 1452  Last data filed at 6/6/2021 0937  Gross per 24 hour   Intake 492 ml   Output 750 ml   Net -258 ml       Labs:  CBC:   Recent Labs     06/04/21  0533 06/05/21  0520 06/06/21  0531   WBC 6.2 7.2 5.6   HGB 14.3 14.7 14.4   HCT 42.2 42.7 41.8    208 207       BMP:   Recent Labs     06/04/21  0533 06/05/21  0520 06/06/21  0531    134* 137   K 3.7 4.2 3.7  3.7    99 100   CO2 22 hemorrhage. Moderate nonspecific white matter disease, likely chronic small vessel ischemic change. CTA HEAD NECK W CONTRAST   Final Result      CTA Head:   No arterial steno-occlusive disease or aneurysm. CTA Neck:   High-grade, 80-90% stenosis of the proximal right cervical internal carotid artery secondary to extensive noncalcified plaque. No other significant arterial steno-occlusive disease or evidence of dissection. Assessment and Plan:   68 y.o. male with greater than 90% stenosis in the proximal right ICA secondary to a thrombus/plaque. Acute Ischemic stroke sec to Critical Right ICA stenosis with intraluminal thrombus  Acute CVA  LKW approximately 2300 on 5/28  CTA revealed greater than 90% stenosis in the proximal right cervical ICA secondary to 2.5x0.5 thrombus/noncalcified atherosclerotic plaque. HgA1c 5.9 (6/1/2021)  LDL 65, cont statin 80 mg  - Echocardiogram with bubble study: Normal left ventricle size. There is mild concentric left ventricular   hypertrophy. Overall left ventricular systolic function appears normal with   an ejection fraction of 55-60%. No regional wall motion abnormalities are   noted. Diastolic filling parameters suggests normal diastolic function. A bubble study was performed and fails to show evidence of right to left   shunting.  - Heparin gtt  - High intensity statin  - NSGY following - Repeat CTA ^/4 without significant reduction of R ICA stenosis, small thrombus noted  - Planned revascularization - CEA 6/7/21      Essential hypertension  - On HCTZ at home  - Adjust meds as needed to keep BP controlled off cardene gtt       Hyperlipidemia  - Update LDL  - High intensity statin      Hx of bradycardia  - EKG shows NSR. No acute ST- T wave changes  - Monitor on telemetry and re-eval if bradycardia demonstrated. Code Status: Full Code  FEN: IVF: none; DIET CARDIAC;   Diet NPO Exceptions are: Sips of Water with Meds   PPX: Heparin gtt      DISPOSITION:  - Remains in ICU with high-grade stenosis of right ICA with distal free-floating component, and hence, high risk for recurrent stroke. - Revascularization per NSGY: For carotid endarterectomy, tomorrow.        Fran Adames MD MPH  Contact via [employer]  [phone] (Cell)  6/6/2021,  2:52 PM

## 2021-06-06 NOTE — PROGRESS NOTES
ICU Progress Note  PGY-1    Admit Date: 5/30/2021  ICU Day: Hospital Day: 8  Vent Settings:    / / /   Diet: DIET CARDIAC; Diet NPO Exceptions are: Sips of Water with Meds  Code Status: Full Code       Interval history:  NAEO. Pt said he had best night yet in hospital. BP well controlled. No new complaints this am.     Medications:   Scheduled Meds:   metoprolol tartrate  25 mg Oral BID    NIFEdipine  60 mg Oral Daily    chlorthalidone  25 mg Oral Daily    pantoprazole  20 mg Oral QAM AC    atorvastatin  80 mg Oral Nightly    sodium chloride flush  5-40 mL Intravenous 2 times per day    citalopram  40 mg Oral Daily       Continuous Infusions:   sodium chloride      sodium chloride      heparin (PORCINE) Infusion 9 Units/kg/hr (06/06/21 0731)    niCARdipine Stopped (06/02/21 0100)       PRN Meds:  hydrALAZINE, perflutren lipid microspheres, sodium chloride flush, sodium chloride, promethazine **OR** ondansetron, polyethylene glycol, acetaminophen **OR** acetaminophen, heparin (porcine), heparin (porcine), butalbital-acetaminophen-caffeine, melatonin    Vital/I&O/Physical examination:   VS:  BP (!) 146/92   Pulse (!) 48   Temp 97.3 °F (36.3 °C) (Oral)   Resp 16   Ht 6' 2.02\" (1.88 m)   Wt 259 lb 7.7 oz (117.7 kg)   SpO2 98%   BMI 33.30 kg/m²     I/O:    Intake/Output Summary (Last 24 hours) at 6/6/2021 0811  Last data filed at 6/6/2021 0600  Gross per 24 hour   Intake 793 ml   Output 750 ml   Net 43 ml       PE:  Physical Exam  Constitutional:       Appearance: He is obese. Eyes:      Extraocular Movements: Extraocular movements intact. Cardiovascular:      Rate and Rhythm: Normal rate and regular rhythm. Pulses: Normal pulses. Pulmonary:      Effort: Pulmonary effort is normal. No respiratory distress. Breath sounds: No wheezing. Abdominal:      General: There is no distension. Palpations: Abdomen is soft. Tenderness: There is no abdominal tenderness.    Musculoskeletal: Right lower leg: No edema. Left lower leg: No edema. Skin:     General: Skin is dry. Neurological:      Mental Status: He is alert and oriented to person, place, and time. Sensory: No sensory deficit. Motor: No weakness. Comments: No visual field defict          Labs & Imaging:   LABS:  Renal:   Recent Labs     06/04/21  0533 06/05/21  0520 06/06/21  0531    134* 137   K 3.7 4.2 3.7    99 100   CO2 22 28 28   BUN 20 21* 18   CREATININE 1.1 1.3 1.2   GLUCOSE 116* 112* 110*   ANIONGAP 13 7 9     CBC:   Recent Labs     06/04/21  0533 06/05/21  0520 06/06/21  0531   WBC 6.2 7.2 5.6   HGB 14.3 14.7 14.4   HCT 42.2 42.7 41.8    208 207   MCV 95.8 94.9 95.0                            Hepatic:   No results for input(s): AST, ALT, ALB, BILITOT, ALKPHOS in the last 72 hours. Troponin:   No results for input(s): TROPONINI in the last 72 hours. BNP: No results for input(s): BNP in the last 72 hours. Lipids:   No results for input(s): CHOL, HDL in the last 72 hours. Invalid input(s): LDLCALCU, TRIGLYCERIDE  INR: No results for input(s): INR in the last 72 hours. Lactate: No results for input(s): LACTATE in the last 72 hours. ABGs:No results for input(s): PHART, RFA1OVV, PO2ART, JVR3VSW, BEART, THGBART, A5OUQSTY, RNK8WNX in the last 72 hours. UA:No results for input(s): NITRITE, COLORU, PHUR, LABCAST, WBCUA, RBCUA, MUCUS, TRICHOMONAS, YEAST, BACTERIA, CLARITYU, SPECGRAV, LEUKOCYTESUR, UROBILINOGEN, BILIRUBINUR, BLOODU, GLUCOSEU, AMORPHOUS in the last 72 hours. Invalid input(s): KETONESU     IMAGING:  CTA HEAD NECK W CONTRAST   Final Result   1. High-grade stenosis of the right internal carotid artery, greater than a present diameter with large soft plaque and distal free-floating component, small thrombus. The findings unchanged from 5/30/2021   2. Left internal carotid artery with less than 10% diameter stenosis   3. Normal vertebral arteries.    4. Normal intracranial circulation. No interval development of large vessel occlusion. 5. Noncontrast brain, no intracranial hemorrhage         CT HEAD WO CONTRAST   Final Result   1. High-grade stenosis of the right internal carotid artery, greater than a present diameter with large soft plaque and distal free-floating component, small thrombus. The findings unchanged from 5/30/2021   2. Left internal carotid artery with less than 10% diameter stenosis   3. Normal vertebral arteries. 4. Normal intracranial circulation. No interval development of large vessel occlusion. 5. Noncontrast brain, no intracranial hemorrhage         MRI BRAIN WO CONTRAST   Final Result      Multifocal patchy areas of recent infarction within the right cerebral hemisphere as detailed. No intracranial hemorrhage. Moderate nonspecific white matter disease, likely chronic small vessel ischemic change. CTA HEAD NECK W CONTRAST   Final Result      CTA Head:   No arterial steno-occlusive disease or aneurysm. CTA Neck:   High-grade, 80-90% stenosis of the proximal right cervical internal carotid artery secondary to extensive noncalcified plaque. No other significant arterial steno-occlusive disease or evidence of dissection. Assessment & Plan:    Sarha Polanco a 76 y. o. male with PMH symptomatic bradycardia, hypertension, hyperlipidemia who was transferred from [facility] after CT revealed greater than 90% stenosis in the proximal right ICA secondary to a thrombus/plaque.       Right ICA stenosis 2/2 to thrombus/plaque  CTA revealed greater than 90% stenosis in the proximal right cervical ICA secondary to 2.5x0.5 thrombus/noncalcified atherosclerotic plaque. Last known well approximately 2300 on 5/28. Not a candidate for TPA. MRI w/ some patchy area of ischemia in R cerebellum. TTE w/o shunt, normal findings   - cont Heparin gtt  - Target BP <160/90.  Continue metoprolol 25mg BID and and chlorthalidone 25mg qD, nifedipine 60mg qD. Significant allergies to BP meds. Goals to eliminate hydralazine  - NSGY following - CTA 6/4 without significant reduction of R ICA stenosis, small thrombus noted  - will likely need revascularization - NSGY for recs, CEA planned fo 6/7    Acute Ischemic stroke   R ICA stenosis. MRI w/ patchy area of ischema in R cerebellum  - HgA1c 5.9 (6/1/2021)  - better control off BP  - TTE w/o shunt  - LDL 65, cont statin 80 mg  - heparin gtt - asa on d/c       Hypertension  - keep off Cardene gtt for SBP < 160  - metoprolol 25 BID (parameters to hold for HR < 55), cont chorthalidone, nifedipine 60 mg  - hydralazine discontinued    Chronic hyperlipidemia - cont lipitor 80 mg   Chronic asymptomatic bradycardia - cont to monitor     PT/OT 20, 22    Code Status: Full Code  FEN: DIET CARDIAC; Diet NPO Exceptions are: Sips of Water with Meds NPO @ midnight on Sunday  PPX: heparin gtt   DISPO: ICU    This patient will be discussed with attending, Dr Macie Khanna MD MD, PGY-1  Contact via [employer]  6/6/2021,  8:11 AM     Pulm/CC     Patient seen and examined.  I agree with Dr. Talamantes's history, physical, lab findings, assessment and plan and edited as needed.     No acute events overnight and neurologically normal. Blood pressure  well-controlled today with blood pressure.  Continue metoprolol 25 mg p.o. twice daily, Adalat CC 60 mg and chlorthalidone 25 mg daily. Now off hydralazine     Neurosurgery following in regards to right ICA stenosis.  MRI shows ischemic CVA.  Continue heparin.  CTA Friday shows no change in high-grade stenosis of right ICA with distal free-floating component.  Neurovascular has discussed options with Aaron Vasquez and he has opted for carotid endarterectomy which will be performed tmrw     Sharmaine Garcia MD

## 2021-06-07 ENCOUNTER — ANESTHESIA (OUTPATIENT)
Dept: OPERATING ROOM | Age: 76
DRG: 039 | End: 2021-06-07
Payer: MEDICARE

## 2021-06-07 ENCOUNTER — APPOINTMENT (OUTPATIENT)
Dept: VASCULAR LAB | Age: 76
DRG: 039 | End: 2021-06-07
Attending: INTERNAL MEDICINE
Payer: MEDICARE

## 2021-06-07 ENCOUNTER — APPOINTMENT (OUTPATIENT)
Dept: CT IMAGING | Age: 76
DRG: 039 | End: 2021-06-07
Attending: INTERNAL MEDICINE
Payer: MEDICARE

## 2021-06-07 VITALS — TEMPERATURE: 97 F | DIASTOLIC BLOOD PRESSURE: 95 MMHG | SYSTOLIC BLOOD PRESSURE: 208 MMHG | OXYGEN SATURATION: 93 %

## 2021-06-07 LAB
ALBUMIN SERPL-MCNC: 3.9 G/DL (ref 3.4–5)
ANION GAP SERPL CALCULATED.3IONS-SCNC: 9 MMOL/L (ref 3–16)
ANTI-XA UNFRAC HEPARIN: <0.04 IU/ML (ref 0.3–0.7)
BASOPHILS ABSOLUTE: 0 K/UL (ref 0–0.2)
BASOPHILS RELATIVE PERCENT: 0.6 %
BUN BLDV-MCNC: 20 MG/DL (ref 7–20)
CALCIUM SERPL-MCNC: 9.3 MG/DL (ref 8.3–10.6)
CHLORIDE BLD-SCNC: 99 MMOL/L (ref 99–110)
CO2: 26 MMOL/L (ref 21–32)
CREAT SERPL-MCNC: 1.2 MG/DL (ref 0.8–1.3)
EOSINOPHILS ABSOLUTE: 0.1 K/UL (ref 0–0.6)
EOSINOPHILS RELATIVE PERCENT: 1.9 %
GFR AFRICAN AMERICAN: >60
GFR NON-AFRICAN AMERICAN: 59
GLUCOSE BLD-MCNC: 107 MG/DL (ref 70–99)
HCT VFR BLD CALC: 43.2 % (ref 40.5–52.5)
HEMOGLOBIN: 14.6 G/DL (ref 13.5–17.5)
LYMPHOCYTES ABSOLUTE: 1.8 K/UL (ref 1–5.1)
LYMPHOCYTES RELATIVE PERCENT: 29.2 %
MAGNESIUM: 2.1 MG/DL (ref 1.8–2.4)
MCH RBC QN AUTO: 32.3 PG (ref 26–34)
MCHC RBC AUTO-ENTMCNC: 33.8 G/DL (ref 31–36)
MCV RBC AUTO: 95.4 FL (ref 80–100)
MONOCYTES ABSOLUTE: 0.9 K/UL (ref 0–1.3)
MONOCYTES RELATIVE PERCENT: 14.5 %
NEUTROPHILS ABSOLUTE: 3.3 K/UL (ref 1.7–7.7)
NEUTROPHILS RELATIVE PERCENT: 53.8 %
PDW BLD-RTO: 13.7 % (ref 12.4–15.4)
PHOSPHORUS: 3 MG/DL (ref 2.5–4.9)
PLATELET # BLD: 209 K/UL (ref 135–450)
PMV BLD AUTO: 8.4 FL (ref 5–10.5)
POTASSIUM SERPL-SCNC: 4.1 MMOL/L (ref 3.5–5.1)
RBC # BLD: 4.52 M/UL (ref 4.2–5.9)
SODIUM BLD-SCNC: 134 MMOL/L (ref 136–145)
WBC # BLD: 6.1 K/UL (ref 4–11)

## 2021-06-07 PROCEDURE — 2000000000 HC ICU R&B

## 2021-06-07 PROCEDURE — 85347 COAGULATION TIME ACTIVATED: CPT

## 2021-06-07 PROCEDURE — 2500000003 HC RX 250 WO HCPCS: Performed by: NEUROLOGICAL SURGERY

## 2021-06-07 PROCEDURE — C1751 CATH, INF, PER/CENT/MIDLINE: HCPCS | Performed by: NEUROLOGICAL SURGERY

## 2021-06-07 PROCEDURE — 03CH0ZZ EXTIRPATION OF MATTER FROM RIGHT COMMON CAROTID ARTERY, OPEN APPROACH: ICD-10-PCS | Performed by: NEUROLOGICAL SURGERY

## 2021-06-07 PROCEDURE — C1768 GRAFT, VASCULAR: HCPCS | Performed by: NEUROLOGICAL SURGERY

## 2021-06-07 PROCEDURE — 3700000000 HC ANESTHESIA ATTENDED CARE: Performed by: NEUROLOGICAL SURGERY

## 2021-06-07 PROCEDURE — 80069 RENAL FUNCTION PANEL: CPT

## 2021-06-07 PROCEDURE — 85520 HEPARIN ASSAY: CPT

## 2021-06-07 PROCEDURE — 2580000003 HC RX 258: Performed by: ANESTHESIOLOGY

## 2021-06-07 PROCEDURE — 36415 COLL VENOUS BLD VENIPUNCTURE: CPT

## 2021-06-07 PROCEDURE — 85025 COMPLETE CBC W/AUTO DIFF WBC: CPT

## 2021-06-07 PROCEDURE — 6360000002 HC RX W HCPCS: Performed by: NURSE ANESTHETIST, CERTIFIED REGISTERED

## 2021-06-07 PROCEDURE — 88304 TISSUE EXAM BY PATHOLOGIST: CPT

## 2021-06-07 PROCEDURE — 88311 DECALCIFY TISSUE: CPT

## 2021-06-07 PROCEDURE — 3600000014 HC SURGERY LEVEL 4 ADDTL 15MIN: Performed by: NEUROLOGICAL SURGERY

## 2021-06-07 PROCEDURE — 2580000003 HC RX 258: Performed by: NEUROLOGICAL SURGERY

## 2021-06-07 PROCEDURE — 2500000003 HC RX 250 WO HCPCS: Performed by: NURSE ANESTHETIST, CERTIFIED REGISTERED

## 2021-06-07 PROCEDURE — 3600000004 HC SURGERY LEVEL 4 BASE: Performed by: NEUROLOGICAL SURGERY

## 2021-06-07 PROCEDURE — 93882 EXTRACRANIAL UNI/LTD STUDY: CPT

## 2021-06-07 PROCEDURE — P9045 ALBUMIN (HUMAN), 5%, 250 ML: HCPCS | Performed by: NURSE ANESTHETIST, CERTIFIED REGISTERED

## 2021-06-07 PROCEDURE — 3700000001 HC ADD 15 MINUTES (ANESTHESIA): Performed by: NEUROLOGICAL SURGERY

## 2021-06-07 PROCEDURE — 6370000000 HC RX 637 (ALT 250 FOR IP): Performed by: STUDENT IN AN ORGANIZED HEALTH CARE EDUCATION/TRAINING PROGRAM

## 2021-06-07 PROCEDURE — 6360000002 HC RX W HCPCS: Performed by: NEUROLOGICAL SURGERY

## 2021-06-07 PROCEDURE — 2580000003 HC RX 258: Performed by: STUDENT IN AN ORGANIZED HEALTH CARE EDUCATION/TRAINING PROGRAM

## 2021-06-07 PROCEDURE — 2709999900 HC NON-CHARGEABLE SUPPLY: Performed by: NEUROLOGICAL SURGERY

## 2021-06-07 PROCEDURE — 83735 ASSAY OF MAGNESIUM: CPT

## 2021-06-07 PROCEDURE — 2580000003 HC RX 258: Performed by: NURSE ANESTHETIST, CERTIFIED REGISTERED

## 2021-06-07 PROCEDURE — 6360000002 HC RX W HCPCS: Performed by: STUDENT IN AN ORGANIZED HEALTH CARE EDUCATION/TRAINING PROGRAM

## 2021-06-07 PROCEDURE — 70450 CT HEAD/BRAIN W/O DYE: CPT

## 2021-06-07 PROCEDURE — 6370000000 HC RX 637 (ALT 250 FOR IP)

## 2021-06-07 DEVICE — IMPLANTABLE DEVICE: Type: IMPLANTABLE DEVICE | Site: CAROTID | Status: FUNCTIONAL

## 2021-06-07 RX ORDER — GLYCOPYRROLATE 0.2 MG/ML
INJECTION INTRAMUSCULAR; INTRAVENOUS PRN
Status: DISCONTINUED | OUTPATIENT
Start: 2021-06-07 | End: 2021-06-07 | Stop reason: SDUPTHER

## 2021-06-07 RX ORDER — ROCURONIUM BROMIDE 10 MG/ML
INJECTION, SOLUTION INTRAVENOUS PRN
Status: DISCONTINUED | OUTPATIENT
Start: 2021-06-07 | End: 2021-06-07 | Stop reason: SDUPTHER

## 2021-06-07 RX ORDER — ALBUMIN, HUMAN INJ 5% 5 %
SOLUTION INTRAVENOUS PRN
Status: DISCONTINUED | OUTPATIENT
Start: 2021-06-07 | End: 2021-06-07 | Stop reason: SDUPTHER

## 2021-06-07 RX ORDER — HEPARIN SODIUM 10000 [USP'U]/ML
INJECTION, SOLUTION INTRAVENOUS; SUBCUTANEOUS PRN
Status: DISCONTINUED | OUTPATIENT
Start: 2021-06-07 | End: 2021-06-07 | Stop reason: SDUPTHER

## 2021-06-07 RX ORDER — LIDOCAINE HYDROCHLORIDE 10 MG/ML
INJECTION, SOLUTION EPIDURAL; INFILTRATION; INTRACAUDAL; PERINEURAL PRN
Status: DISCONTINUED | OUTPATIENT
Start: 2021-06-07 | End: 2021-06-07 | Stop reason: HOSPADM

## 2021-06-07 RX ORDER — HYDROCODONE BITARTRATE AND ACETAMINOPHEN 5; 325 MG/1; MG/1
1 TABLET ORAL EVERY 6 HOURS PRN
Status: DISCONTINUED | OUTPATIENT
Start: 2021-06-07 | End: 2021-06-08 | Stop reason: HOSPADM

## 2021-06-07 RX ORDER — SODIUM CHLORIDE, SODIUM LACTATE, POTASSIUM CHLORIDE, CALCIUM CHLORIDE 600; 310; 30; 20 MG/100ML; MG/100ML; MG/100ML; MG/100ML
INJECTION, SOLUTION INTRAVENOUS CONTINUOUS PRN
Status: DISCONTINUED | OUTPATIENT
Start: 2021-06-07 | End: 2021-06-07 | Stop reason: SDUPTHER

## 2021-06-07 RX ORDER — FENTANYL CITRATE 50 UG/ML
50 INJECTION, SOLUTION INTRAMUSCULAR; INTRAVENOUS EVERY 5 MIN PRN
Status: DISCONTINUED | OUTPATIENT
Start: 2021-06-07 | End: 2021-06-07 | Stop reason: HOSPADM

## 2021-06-07 RX ORDER — PROMETHAZINE HYDROCHLORIDE 25 MG/ML
6.25 INJECTION, SOLUTION INTRAMUSCULAR; INTRAVENOUS
Status: DISCONTINUED | OUTPATIENT
Start: 2021-06-07 | End: 2021-06-07 | Stop reason: HOSPADM

## 2021-06-07 RX ORDER — LIDOCAINE HYDROCHLORIDE AND EPINEPHRINE 10; 10 MG/ML; UG/ML
INJECTION, SOLUTION INFILTRATION; PERINEURAL PRN
Status: DISCONTINUED | OUTPATIENT
Start: 2021-06-07 | End: 2021-06-07 | Stop reason: HOSPADM

## 2021-06-07 RX ORDER — FENTANYL CITRATE 50 UG/ML
INJECTION, SOLUTION INTRAMUSCULAR; INTRAVENOUS PRN
Status: DISCONTINUED | OUTPATIENT
Start: 2021-06-07 | End: 2021-06-07 | Stop reason: SDUPTHER

## 2021-06-07 RX ORDER — DEXAMETHASONE SODIUM PHOSPHATE 4 MG/ML
INJECTION, SOLUTION INTRA-ARTICULAR; INTRALESIONAL; INTRAMUSCULAR; INTRAVENOUS; SOFT TISSUE PRN
Status: DISCONTINUED | OUTPATIENT
Start: 2021-06-07 | End: 2021-06-07 | Stop reason: SDUPTHER

## 2021-06-07 RX ORDER — ONDANSETRON 2 MG/ML
4 INJECTION INTRAMUSCULAR; INTRAVENOUS
Status: DISCONTINUED | OUTPATIENT
Start: 2021-06-07 | End: 2021-06-07 | Stop reason: HOSPADM

## 2021-06-07 RX ORDER — EPHEDRINE SULFATE 50 MG/ML
INJECTION INTRAVENOUS PRN
Status: DISCONTINUED | OUTPATIENT
Start: 2021-06-07 | End: 2021-06-07 | Stop reason: SDUPTHER

## 2021-06-07 RX ORDER — PROPOFOL 10 MG/ML
INJECTION, EMULSION INTRAVENOUS PRN
Status: DISCONTINUED | OUTPATIENT
Start: 2021-06-07 | End: 2021-06-07 | Stop reason: SDUPTHER

## 2021-06-07 RX ORDER — NITROGLYCERIN 20 MG/100ML
INJECTION INTRAVENOUS CONTINUOUS PRN
Status: DISCONTINUED | OUTPATIENT
Start: 2021-06-07 | End: 2021-06-07 | Stop reason: SDUPTHER

## 2021-06-07 RX ORDER — ONDANSETRON 2 MG/ML
INJECTION INTRAMUSCULAR; INTRAVENOUS PRN
Status: DISCONTINUED | OUTPATIENT
Start: 2021-06-07 | End: 2021-06-07 | Stop reason: SDUPTHER

## 2021-06-07 RX ORDER — FENTANYL CITRATE 50 UG/ML
25 INJECTION, SOLUTION INTRAMUSCULAR; INTRAVENOUS EVERY 5 MIN PRN
Status: DISCONTINUED | OUTPATIENT
Start: 2021-06-07 | End: 2021-06-07 | Stop reason: HOSPADM

## 2021-06-07 RX ORDER — LIDOCAINE HYDROCHLORIDE 20 MG/ML
INJECTION, SOLUTION INFILTRATION; PERINEURAL PRN
Status: DISCONTINUED | OUTPATIENT
Start: 2021-06-07 | End: 2021-06-07 | Stop reason: SDUPTHER

## 2021-06-07 RX ADMIN — SUGAMMADEX 200 MG: 100 INJECTION, SOLUTION INTRAVENOUS at 11:21

## 2021-06-07 RX ADMIN — PHENYLEPHRINE HYDROCHLORIDE 80 MCG: 10 INJECTION, SOLUTION INTRAMUSCULAR; INTRAVENOUS; SUBCUTANEOUS at 08:35

## 2021-06-07 RX ADMIN — EPHEDRINE SULFATE 5 MG: 50 INJECTION INTRAVENOUS at 08:26

## 2021-06-07 RX ADMIN — EPHEDRINE SULFATE 5 MG: 50 INJECTION INTRAVENOUS at 08:27

## 2021-06-07 RX ADMIN — DEXTRAN 70, AND HYPROMELLOSE 2910 1 DROP: 1; 3 SOLUTION/ DROPS OPHTHALMIC at 21:06

## 2021-06-07 RX ADMIN — ROCURONIUM BROMIDE 50 MG: 10 INJECTION INTRAVENOUS at 07:39

## 2021-06-07 RX ADMIN — EPHEDRINE SULFATE 5 MG: 50 INJECTION INTRAVENOUS at 09:32

## 2021-06-07 RX ADMIN — ACETAMINOPHEN 650 MG: 325 TABLET ORAL at 16:49

## 2021-06-07 RX ADMIN — HYDROCODONE BITARTRATE AND ACETAMINOPHEN 1 TABLET: 5; 325 TABLET ORAL at 15:10

## 2021-06-07 RX ADMIN — PROPOFOL 20 MG: 10 INJECTION, EMULSION INTRAVENOUS at 11:20

## 2021-06-07 RX ADMIN — LIDOCAINE HYDROCHLORIDE 100 MG: 20 INJECTION, SOLUTION INFILTRATION; PERINEURAL at 07:39

## 2021-06-07 RX ADMIN — HEPARIN SODIUM 8000 UNITS: 10000 INJECTION, SOLUTION INTRAVENOUS; SUBCUTANEOUS at 09:28

## 2021-06-07 RX ADMIN — EPHEDRINE SULFATE 5 MG: 50 INJECTION INTRAVENOUS at 07:59

## 2021-06-07 RX ADMIN — HEPARIN SODIUM 5000 UNITS: 10000 INJECTION, SOLUTION INTRAVENOUS; SUBCUTANEOUS at 10:07

## 2021-06-07 RX ADMIN — NITROGLYCERIN 10 MCG/MIN: 20 INJECTION INTRAVENOUS at 08:18

## 2021-06-07 RX ADMIN — METOPROLOL TARTRATE 25 MG: 25 TABLET, FILM COATED ORAL at 16:12

## 2021-06-07 RX ADMIN — METOPROLOL TARTRATE 25 MG: 25 TABLET, FILM COATED ORAL at 21:07

## 2021-06-07 RX ADMIN — DEXAMETHASONE SODIUM PHOSPHATE 8 MG: 4 INJECTION, SOLUTION INTRAMUSCULAR; INTRAVENOUS at 10:18

## 2021-06-07 RX ADMIN — ATORVASTATIN CALCIUM 80 MG: 80 TABLET, FILM COATED ORAL at 21:06

## 2021-06-07 RX ADMIN — EPHEDRINE SULFATE 10 MG: 50 INJECTION INTRAVENOUS at 08:30

## 2021-06-07 RX ADMIN — NIFEDIPINE 60 MG: 30 TABLET, EXTENDED RELEASE ORAL at 16:15

## 2021-06-07 RX ADMIN — PHENYLEPHRINE HYDROCHLORIDE 80 MCG: 10 INJECTION, SOLUTION INTRAMUSCULAR; INTRAVENOUS; SUBCUTANEOUS at 08:32

## 2021-06-07 RX ADMIN — FENTANYL CITRATE 50 MCG: 50 INJECTION, SOLUTION INTRAMUSCULAR; INTRAVENOUS at 07:39

## 2021-06-07 RX ADMIN — EPHEDRINE SULFATE 10 MG: 50 INJECTION INTRAVENOUS at 08:32

## 2021-06-07 RX ADMIN — Medication 10 ML: at 21:07

## 2021-06-07 RX ADMIN — EPHEDRINE SULFATE 5 MG: 50 INJECTION INTRAVENOUS at 08:10

## 2021-06-07 RX ADMIN — Medication 10 ML: at 12:00

## 2021-06-07 RX ADMIN — Medication 10 ML: at 11:40

## 2021-06-07 RX ADMIN — GLYCOPYRROLATE 0.2 MG: 0.2 INJECTION INTRAMUSCULAR; INTRAVENOUS at 09:30

## 2021-06-07 RX ADMIN — ROCURONIUM BROMIDE 20 MG: 10 INJECTION INTRAVENOUS at 09:37

## 2021-06-07 RX ADMIN — PHENYLEPHRINE HYDROCHLORIDE 10 MCG/MIN: 10 INJECTION INTRAVENOUS at 08:30

## 2021-06-07 RX ADMIN — PHENYLEPHRINE HYDROCHLORIDE 40 MCG: 10 INJECTION, SOLUTION INTRAMUSCULAR; INTRAVENOUS; SUBCUTANEOUS at 08:56

## 2021-06-07 RX ADMIN — PANTOPRAZOLE SODIUM 20 MG: 20 TABLET, DELAYED RELEASE ORAL at 06:26

## 2021-06-07 RX ADMIN — PROPOFOL 50 MG: 10 INJECTION, EMULSION INTRAVENOUS at 07:44

## 2021-06-07 RX ADMIN — FENTANYL CITRATE 50 MCG: 50 INJECTION, SOLUTION INTRAMUSCULAR; INTRAVENOUS at 08:37

## 2021-06-07 RX ADMIN — EPHEDRINE SULFATE 5 MG: 50 INJECTION INTRAVENOUS at 09:01

## 2021-06-07 RX ADMIN — SODIUM CHLORIDE, SODIUM LACTATE, POTASSIUM CHLORIDE, AND CALCIUM CHLORIDE: .6; .31; .03; .02 INJECTION, SOLUTION INTRAVENOUS at 00:31

## 2021-06-07 RX ADMIN — CEFAZOLIN 2000 MG: 1 INJECTION, POWDER, FOR SOLUTION INTRAMUSCULAR; INTRAVENOUS at 08:19

## 2021-06-07 RX ADMIN — EPHEDRINE SULFATE 5 MG: 50 INJECTION INTRAVENOUS at 08:04

## 2021-06-07 RX ADMIN — ONDANSETRON 4 MG: 2 INJECTION INTRAMUSCULAR; INTRAVENOUS at 10:18

## 2021-06-07 RX ADMIN — SODIUM CHLORIDE, SODIUM LACTATE, POTASSIUM CHLORIDE, AND CALCIUM CHLORIDE: .6; .31; .03; .02 INJECTION, SOLUTION INTRAVENOUS at 10:19

## 2021-06-07 RX ADMIN — PROPOFOL 50 MG: 10 INJECTION, EMULSION INTRAVENOUS at 07:41

## 2021-06-07 RX ADMIN — GLYCOPYRROLATE 0.2 MG: 0.2 INJECTION INTRAMUSCULAR; INTRAVENOUS at 08:00

## 2021-06-07 RX ADMIN — SODIUM CHLORIDE, SODIUM LACTATE, POTASSIUM CHLORIDE, AND CALCIUM CHLORIDE: .6; .31; .03; .02 INJECTION, SOLUTION INTRAVENOUS at 07:33

## 2021-06-07 RX ADMIN — PROPOFOL 100 MG: 10 INJECTION, EMULSION INTRAVENOUS at 07:39

## 2021-06-07 RX ADMIN — ALBUMIN (HUMAN) 12.5 G: 12.5 SOLUTION INTRAVENOUS at 08:27

## 2021-06-07 ASSESSMENT — PULMONARY FUNCTION TESTS
PIF_VALUE: 17
PIF_VALUE: 17
PIF_VALUE: 2
PIF_VALUE: 16
PIF_VALUE: 17
PIF_VALUE: 0
PIF_VALUE: 17
PIF_VALUE: 16
PIF_VALUE: 17
PIF_VALUE: 17
PIF_VALUE: 2
PIF_VALUE: 22
PIF_VALUE: 18
PIF_VALUE: 18
PIF_VALUE: 21
PIF_VALUE: 18
PIF_VALUE: 17
PIF_VALUE: 14
PIF_VALUE: 22
PIF_VALUE: 14
PIF_VALUE: 14
PIF_VALUE: 17
PIF_VALUE: 18
PIF_VALUE: 18
PIF_VALUE: 17
PIF_VALUE: 18
PIF_VALUE: 17
PIF_VALUE: 17
PIF_VALUE: 10
PIF_VALUE: 1
PIF_VALUE: 18
PIF_VALUE: 21
PIF_VALUE: 17
PIF_VALUE: 18
PIF_VALUE: 17
PIF_VALUE: 2
PIF_VALUE: 17
PIF_VALUE: 16
PIF_VALUE: 17
PIF_VALUE: 4
PIF_VALUE: 17
PIF_VALUE: 17
PIF_VALUE: 18
PIF_VALUE: 17
PIF_VALUE: 21
PIF_VALUE: 17
PIF_VALUE: 18
PIF_VALUE: 17
PIF_VALUE: 17
PIF_VALUE: 18
PIF_VALUE: 4
PIF_VALUE: 18
PIF_VALUE: 18
PIF_VALUE: 21
PIF_VALUE: 17
PIF_VALUE: 17
PIF_VALUE: 4
PIF_VALUE: 17
PIF_VALUE: 16
PIF_VALUE: 16
PIF_VALUE: 17
PIF_VALUE: 17
PIF_VALUE: 0
PIF_VALUE: 17
PIF_VALUE: 18
PIF_VALUE: 1
PIF_VALUE: 17
PIF_VALUE: 14
PIF_VALUE: 18
PIF_VALUE: 18
PIF_VALUE: 3
PIF_VALUE: 17
PIF_VALUE: 20
PIF_VALUE: 17
PIF_VALUE: 22
PIF_VALUE: 17
PIF_VALUE: 10
PIF_VALUE: 18
PIF_VALUE: 17
PIF_VALUE: 17
PIF_VALUE: 18
PIF_VALUE: 17
PIF_VALUE: 18
PIF_VALUE: 17
PIF_VALUE: 18
PIF_VALUE: 17
PIF_VALUE: 23
PIF_VALUE: 27
PIF_VALUE: 17
PIF_VALUE: 17
PIF_VALUE: 18
PIF_VALUE: 17
PIF_VALUE: 18
PIF_VALUE: 3
PIF_VALUE: 14
PIF_VALUE: 17
PIF_VALUE: 18
PIF_VALUE: 21
PIF_VALUE: 17
PIF_VALUE: 14
PIF_VALUE: 18
PIF_VALUE: 1
PIF_VALUE: 12
PIF_VALUE: 17
PIF_VALUE: 17
PIF_VALUE: 25
PIF_VALUE: 18
PIF_VALUE: 22
PIF_VALUE: 17
PIF_VALUE: 1
PIF_VALUE: 17
PIF_VALUE: 18
PIF_VALUE: 17
PIF_VALUE: 1
PIF_VALUE: 17
PIF_VALUE: 17
PIF_VALUE: 18
PIF_VALUE: 17
PIF_VALUE: 17
PIF_VALUE: 15
PIF_VALUE: 17
PIF_VALUE: 17
PIF_VALUE: 5
PIF_VALUE: 17
PIF_VALUE: 14
PIF_VALUE: 17
PIF_VALUE: 25
PIF_VALUE: 14
PIF_VALUE: 22
PIF_VALUE: 17
PIF_VALUE: 17
PIF_VALUE: 16
PIF_VALUE: 17
PIF_VALUE: 3
PIF_VALUE: 17
PIF_VALUE: 30
PIF_VALUE: 17
PIF_VALUE: 19
PIF_VALUE: 17
PIF_VALUE: 18
PIF_VALUE: 18
PIF_VALUE: 17
PIF_VALUE: 18
PIF_VALUE: 16
PIF_VALUE: 17
PIF_VALUE: 0
PIF_VALUE: 17
PIF_VALUE: 19
PIF_VALUE: 23
PIF_VALUE: 17
PIF_VALUE: 19
PIF_VALUE: 17
PIF_VALUE: 17
PIF_VALUE: 18
PIF_VALUE: 19
PIF_VALUE: 22
PIF_VALUE: 18
PIF_VALUE: 17
PIF_VALUE: 27
PIF_VALUE: 17
PIF_VALUE: 24
PIF_VALUE: 17
PIF_VALUE: 17
PIF_VALUE: 18
PIF_VALUE: 17
PIF_VALUE: 16
PIF_VALUE: 17
PIF_VALUE: 17
PIF_VALUE: 14
PIF_VALUE: 22
PIF_VALUE: 17
PIF_VALUE: 17
PIF_VALUE: 2
PIF_VALUE: 22
PIF_VALUE: 17
PIF_VALUE: 1
PIF_VALUE: 17
PIF_VALUE: 18

## 2021-06-07 ASSESSMENT — PAIN SCALES - GENERAL
PAINLEVEL_OUTOF10: 3
PAINLEVEL_OUTOF10: 0
PAINLEVEL_OUTOF10: 3
PAINLEVEL_OUTOF10: 5

## 2021-06-07 NOTE — PROGRESS NOTES
Pt is reporting numbness on bottom right lip inside and outside. Also right eye is painful and burning. Top lip, right ear, nose and bottom of chin are intact.

## 2021-06-07 NOTE — PROGRESS NOTES
Patient remains stable overnight, Neurologically intact, alert and oriented x4, afebrile, mildly anxious for the procedure today. Consent signed, has verbalized surgeon has talked to him about the procedure and that all his questions pertaining the procedure were answered and has no further questions. Has been NPO post midnight. CHG this am done. LR at 125 mls/hr, Ancef OCTOR.

## 2021-06-07 NOTE — PLAN OF CARE
Problem: Pain:  Goal: Pain level will decrease  Outcome: Met This Shift  Note: No complaints of pain. Problem: Falls - Risk of:  Goal: Will remain free from falls  Outcome: Ongoing  Note: Pt is a fall risk. Fall risk protocol in place. See Patti Rendon Fall Score. Pt bed is in low position, bed alarm is on, side rails up, fall risk bracelet applied. , non-skid footwear in use. Patient/family educated on fall risk protocol, instructed to call for assistance when needed and belongings are in reach. assistance. Will continue with hourly rounds for po intake, pain needs, toileting and repositioning as needed. Will continue to monitor for needs. Problem: Bleeding:  Goal: Will show no signs and symptoms of excessive bleeding  Outcome: Ongoing  Note: No signs of bleeding. Problem: Skin Integrity:  Goal: Absence of new skin breakdown  Note: Ajay score > 18, no skin breakdown, low risk, patient ambulatory.

## 2021-06-07 NOTE — ANESTHESIA PROCEDURE NOTES
Arterial Line:    An arterial line was placed using ultrasound guidance, in the OR for the following indication(s): continuous blood pressure monitoring and blood sampling needed. A 20 gauge (size), 1 and 1/4 inch (length), Arrow (type) catheter was placed, Seldinger technique used, into the left radial artery, secured by . Anesthesia type: General    Events:  EBL < 5mL. Additional notes:   Attempt almas x 3 - unable to thread cath x 3 - surgeon able to place on right under us   6/7/2021 7:45 AM6/7/2021 8:15 AM  Anesthesiologist: Kvng Mcdonough DO  Performed: Anesthesiologist   Preanesthetic Checklist  Completed: patient identified, IV checked, site marked, risks and benefits discussed, surgical consent, monitors and equipment checked, pre-op evaluation, timeout performed, anesthesia consent given, oxygen available and patient being monitored

## 2021-06-07 NOTE — OP NOTE
Operative Note      Patient: Kaveh Pichardo  YOB: 1945  MRN: [id number]    Date of Procedure: 6/7/2021     Indication: The patient presented with right hemispheric stroke and evidence of near occlusion-greater than 90% stenosis of the right internal carotid artery and a intraluminal thrombus but did not resolve with heparin. It was indicated to perform endarterectomy in order to relieve the stenosis and to remove the intraluminal thrombus. Risk benefits and alternatives of endarterectomy were explained in detail to the patient preoperatively. These risks include but were not limited to the possibility of stroke, death, infection, bleeding, need for further surgery, restenosis, embolus, infection, intracerebral hemorrhage, heart attack etc.  The benefit was a substantial risk reduction of further stroke ipsilateral to the stenotic area to be revascularized and the alternative was nonoperative management. The patient voiced understanding of these risks, benefits, and alternatives and requests we proceed. The indication for the surgeon placing of the right radial arterial line was that the anesthesia team was having trouble on the left side and asked me to place line. Pre-Op Diagnosis: RIGHT INTERNAL CAROTID ARTERY INCLUSION    Post-Op Diagnosis: Same       Procedure(s):  RIGHT SIDED CAROTID ENDARTERECTOMY  Placement of right radial arterial line  Surgeon(s):  Mata Reyes MD    Assistant:   Surgical Assistant: Jelani Boyd    Anesthesia: General    Estimated Blood Loss (mL): less than 50     Complications: None    Specimens:   ID Type Source Tests Collected by Time Destination   A :  **GROSS ONLY** Tissue Tissue SURGICAL PATHOLOGY Mata Reyes MD 6/7/2021 1029        Implants:  * No implants in log *      Drains:   Urethral Catheter Latex 16 fr (Active)       Findings: Floating clot in the right internal carotid artery.  Satisfactory revascularization attained with Hemoshield graft sewn in place. Detailed Description of Procedure: The patient was brought to the operating room where general endotracheal anesthesia was induced. An appropriate timeout was then accomplished with the operating staff, anesthesia team and myself. We identified the previously marked right side of the neck, and operative intervention to be performed, the patient's allergies, fire risks, and antibiotic administration. Once everyone was satisfied that we had accomplished a timeout for patient safety we proceeded with the operative intervention. The right forearm was prepped and draped in usual sterile manner. Using the sono site ultrasound machine I identified the radial artery in the forearm. I then placed an Angiocath under ultrasonic guidance into the artery and this was hooked up for continuous arterial pressure monitoring. The patient was placed supine onto the operative table and a bump was placed behind the right shoulder. The head was turned to the left. The right side of the neck was then shaved prepped and draped in the usual sterile manner. An incision was then marked out in the horizontal area below the jawline and was injected with lidocaine with 1% epinephrine. Incision was then opened sharply with a #10 blade and Bovie cautery. The platysma was opened and undermined superiorly and inferiorly along the anterior cervical fascia. This fascia was opened along the anterior border of the sternocleidomastoid muscle and the common carotid, external carotid artery, and internal carotid arteries were skeletonized. Was necessary to sacrifice the facial vein which was ligated and divided. I also had to sacrifice of the ansa cervicalis in order to mobilize the hypoglossal nerve superiorly. Prior to dissection of the carotid bifurcation I injected point forward-0.5 cc of 1% lidocaine plain. Vesseloops were placed around the respective vessels.     The patient was administered heparin to an ACT with antibiotic irrigation. The skin reapproximated with Monocryl and Dermabond. The patient was then awakened from general trach anesthesia extubated taken recovery area moving all 4 extremities in good condition.     Electronically signed by Tennille Padilla MD on 6/7/2021 at 11:14 AM

## 2021-06-07 NOTE — PROGRESS NOTES
ICU Progress Note  PGY-1    Admit Date: 5/30/2021  ICU Day: Hospital Day: 9  Vent Settings:    / / /   Diet: Diet NPO Exceptions are: Sips of Water with Meds  Code Status: Full Code       Interval history:  NAEO. BP well controlled. Pt to go down to OR now for procedure. Denies chest pain, dyspnea, abd pain. Medications:   Scheduled Meds:   sodium chloride flush  10 mL Intravenous 2 times per day    metoprolol tartrate  25 mg Oral BID    NIFEdipine  60 mg Oral Daily    chlorthalidone  25 mg Oral Daily    pantoprazole  20 mg Oral QAM AC    atorvastatin  80 mg Oral Nightly    sodium chloride flush  5-40 mL Intravenous 2 times per day    citalopram  40 mg Oral Daily       Continuous Infusions:   lactated ringers 125 mL/hr at 06/07/21 0031    sodium chloride      sodium chloride      sodium chloride      niCARdipine Stopped (06/02/21 0100)       PRN Meds:  heparin irrigation, CeFAZolin irrigation, fentanNYL, HYDROmorphone, fentanNYL, fentanNYL, ondansetron, promethazine, sodium chloride flush, sodium chloride, hydrALAZINE, perflutren lipid microspheres, sodium chloride flush, sodium chloride, promethazine **OR** ondansetron, polyethylene glycol, acetaminophen **OR** acetaminophen, heparin (porcine), heparin (porcine), butalbital-acetaminophen-caffeine, melatonin    Vital/I&O/Physical examination:   VS:  BP (!) 148/80   Pulse (!) 47   Temp 97.7 °F (36.5 °C) (Oral)   Resp 16   Ht 6' 2.02\" (1.88 m)   Wt 259 lb 7.7 oz (117.7 kg)   SpO2 98%   BMI 33.30 kg/m²     I/O:    Intake/Output Summary (Last 24 hours) at 6/7/2021 0823  Last data filed at 6/7/2021 3021  Gross per 24 hour   Intake 1638 ml   Output 350 ml   Net 1288 ml       PE:  Physical Exam  Constitutional:       Appearance: He is obese. Eyes:      Extraocular Movements: Extraocular movements intact. Cardiovascular:      Rate and Rhythm: Normal rate and regular rhythm. Pulses: Normal pulses.    Pulmonary:      Effort: Pulmonary effort is normal. No respiratory distress. Breath sounds: No wheezing. Abdominal:      General: There is no distension. Palpations: Abdomen is soft. Tenderness: There is no abdominal tenderness. Musculoskeletal:      Right lower leg: No edema. Left lower leg: No edema. Skin:     General: Skin is dry. Neurological:      Mental Status: He is alert and oriented to person, place, and time. Sensory: No sensory deficit. Motor: No weakness. Labs & Imaging:   LABS:  Renal:   Recent Labs     06/05/21  0520 06/06/21  0531 06/07/21  0532   * 137 134*   K 4.2 3.7  3.7 4.1   CL 99 100 99   CO2 28 28 26   BUN 21* 18 20   CREATININE 1.3 1.2 1.2   GLUCOSE 112* 110* 107*   ANIONGAP 7 9 9     CBC:   Recent Labs     06/05/21 0520 06/06/21 0531 06/07/21  0532   WBC 7.2 5.6 6.1   HGB 14.7 14.4 14.6   HCT 42.7 41.8 43.2    207 209   MCV 94.9 95.0 95.4                            Hepatic:   No results for input(s): AST, ALT, ALB, BILITOT, ALKPHOS in the last 72 hours. Troponin:   No results for input(s): TROPONINI in the last 72 hours. BNP: No results for input(s): BNP in the last 72 hours. Lipids:   No results for input(s): CHOL, HDL in the last 72 hours. Invalid input(s): LDLCALCU, TRIGLYCERIDE  INR: No results for input(s): INR in the last 72 hours. Lactate: No results for input(s): LACTATE in the last 72 hours. ABGs:No results for input(s): PHART, ZJW3INF, PO2ART, QZB1NIF, BEART, THGBART, T1QPYAOU, URQ4FQZ in the last 72 hours. UA:No results for input(s): NITRITE, COLORU, PHUR, LABCAST, WBCUA, RBCUA, MUCUS, TRICHOMONAS, YEAST, BACTERIA, CLARITYU, SPECGRAV, LEUKOCYTESUR, UROBILINOGEN, BILIRUBINUR, BLOODU, GLUCOSEU, AMORPHOUS in the last 72 hours. Invalid input(s): KETONESU     IMAGING:  CTA HEAD NECK W CONTRAST   Final Result   1.  High-grade stenosis of the right internal carotid artery, greater than a present diameter with large soft plaque and distal free-floating component, small thrombus. The findings unchanged from 5/30/2021   2. Left internal carotid artery with less than 10% diameter stenosis   3. Normal vertebral arteries. 4. Normal intracranial circulation. No interval development of large vessel occlusion. 5. Noncontrast brain, no intracranial hemorrhage         CT HEAD WO CONTRAST   Final Result   1. High-grade stenosis of the right internal carotid artery, greater than a present diameter with large soft plaque and distal free-floating component, small thrombus. The findings unchanged from 5/30/2021   2. Left internal carotid artery with less than 10% diameter stenosis   3. Normal vertebral arteries. 4. Normal intracranial circulation. No interval development of large vessel occlusion. 5. Noncontrast brain, no intracranial hemorrhage         MRI BRAIN WO CONTRAST   Final Result      Multifocal patchy areas of recent infarction within the right cerebral hemisphere as detailed. No intracranial hemorrhage. Moderate nonspecific white matter disease, likely chronic small vessel ischemic change. CTA HEAD NECK W CONTRAST   Final Result      CTA Head:   No arterial steno-occlusive disease or aneurysm. CTA Neck:   High-grade, 80-90% stenosis of the proximal right cervical internal carotid artery secondary to extensive noncalcified plaque. No other significant arterial steno-occlusive disease or evidence of dissection. Assessment & Plan:    Rowdy Fernandez a 76 y. o. male with PMH symptomatic bradycardia, hypertension, hyperlipidemia who was transferred from [facility] after CT revealed greater than 90% stenosis in the proximal right ICA secondary to a thrombus/plaque.       Right ICA stenosis 2/2 to thrombus/plaque  CTA revealed greater than 90% stenosis in the proximal right cervical ICA secondary to 2.5x0.5 thrombus/noncalcified atherosclerotic plaque. Last known well approximately 2300 on 5/28. Not a candidate for TPA.  MRI w/ some patchy area of ischemia in R cerebellum. TTE w/o shunt, normal findings   -Heparin gtt stopped 6/6 @ midnight   - Target BP <160/90. Continue metoprolol 25mg BID and and chlorthalidone 25mg qD, nifedipine 60mg qD. Significant allergies to BP meds. - CTA 6/4 without significant reduction of R ICA stenosis, small thrombus noted  - revascularization CEA today 6/7    Acute Ischemic stroke   R ICA stenosis.  MRI w/ patchy area of ischema in R cerebellum  - HgA1c 5.9 (6/1/2021)  - better control off BP  - TTE w/o shunt  - LDL 65, cont statin 80 mg  - heparin gtt - asa on d/c       Hypertension  - keep off Cardene gtt for SBP < 160  - metoprolol 25 BID (parameters to hold for HR < 55), cont chorthalidone, nifedipine 60 mg  - hydralazine discontinued    Chronic hyperlipidemia - cont lipitor 80 mg   Chronic asymptomatic bradycardia - cont to monitor     PT/OT 20, 22    Code Status: Full Code  FEN: Diet NPO Exceptions are: Sips of Water with Meds   PPX: heparin gtt held @ midnight, SCD, asa on d/c  DISPO: ICU    This patient will be discussed with attending, Dr Jayson Stewart MD MD, PGY-1  Contact via [facility]  6/7/2021,  8:23 AM

## 2021-06-07 NOTE — INTERVAL H&P NOTE
Update History & Physical    The patient's History and Physical of May 30, 2021 was reviewed with the patient and I examined the patient. There was no change. The surgical site was confirmed by the patient and me. Plan: The risks, benefits, expected outcome, and alternative to the recommended procedure have been discussed with the patient. Patient understands and wants to proceed with the procedure.      Electronically signed by Tahir Trevizo MD on 6/7/2021 at 7:34 AM
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
## 2021-06-07 NOTE — PROGRESS NOTES
Occupational Therapy/Physical Therapy  Chart reviewed. Pt is currently in surgery for a CEA. Will await new PT and OT orders to resume therapy. ELBERT Rivera, OTR/L [address], [facility]

## 2021-06-07 NOTE — PROGRESS NOTES
NEUROSURGERY PROGRESS NOTE    6/7/2021 1:11 PM                               Quyen Calabrese KEVIN Abrahan                      LOS: 8 days   POD#0  s/p Procedure(s) (LRB):  RIGHT SIDED CAROTID ENDARTERECTOMY (Right)    Subjective: Patient sitting in bed upon entering the room. No acute events overnight. Patient has no specific complaints after surgery. Physical Exam:  Patient seen and examined    Vitals:    06/07/21 0600   BP: (!) 148/80   Pulse: (!) 47   Resp:    Temp:    SpO2:      GCS:  4 - Opens eyes on own  5 - Alert and oriented  6 - Follows simple motor commands  General: Well developed. Alert and cooperative in no acute distress. HENT: atraumatic, neck supple  Eyes: Optic discs: Not tested  Pulmonary: unlabored respiratory effort  Cardiovascular:  Warm well perfused. No peripheral edema  Gastrointestinal: abdomen soft, NT, ND    Neurological:  Mental Status: Awake, alert, oriented x 4, speech clear and appropriate  Attention: Intact  Language: No aphasia or dysarthria noted  Sensation: Intact to all extremities to light touch  Coordination: Intact    Cranial Nerves:  II: Visual acuity not tested, denies new visual changes / diplopia  III, IV, VI: PERRL, 3 mm bilaterally, EOMI, no nystagmus noted  V: Facial sensation intact bilaterally to touch  VII: Face symmetric  VIII: Hearing intact bilaterally to spoken voice  IX: Palate movement equal bilaterally  XI: Shoulder shrug equal bilaterally  XII: Tongue midline    Musculoskeletal:   Gait: Not tested   Assist devices: None   Tone: Normal  Motor strength:    Right  Left    Right  Left    Deltoid  5 5  Hip Flex  5 5   Biceps  5 5  Knee Extensors  5 5   Triceps  5 5  Knee Flexors  5 5   Wrist Ext  5 5  Ankle Dorsiflex. 5 5   Wrist Flex  5 5  Ankle Plantarflex. 5 5   Handgrip  5 5  Ext Octavio Longus  5 5   Thumb Ext  5 5         Radiological Findings:  CT Head WO Contrast  Result Date: 5/30/2021  No acute intracranial hemorrhage or mass effect.     CTA HEAD NECK W CONTRAST  Result Date: 5/30/2021  CTA Head: No arterial steno-occlusive disease or aneurysm. CTA Neck: High-grade, 80-90% stenosis of the proximal right cervical internal carotid artery secondary to extensive noncalcified plaque. No other significant arterial steno-occlusive disease or evidence of dissection. MRI BRAIN WO CONTRAST  Result Date: 6/1/2021  Multifocal patchy areas of recent infarction within the right cerebral hemisphere as detailed. No intracranial hemorrhage. Moderate nonspecific white matter disease, likely chronic small vessel ischemic change. CT HEAD WO CONTRAST  Result Date: 6/4/2021  Noncontrast brain, no intracranial hemorrhage     CTA HEAD NECK W CONTRAST  Result Date: 6/4/2021  1. High-grade stenosis of the right internal carotid artery, greater than a present diameter with large soft plaque and distal free-floating component, small thrombus. The findings unchanged from 5/30/2021  2. Left internal carotid artery with less than 10% diameter stenosis  3. Normal vertebral arteries. 4. Normal intracranial circulation. No interval development of large vessel occlusion. Labs:  Recent Labs     06/07/21  0532   WBC 6.1   HGB 14.6   HCT 43.2          Recent Labs     06/07/21  0532   *   K 4.1   CL 99   CO2 26   BUN 20   CREATININE 1.2   GLUCOSE 107*   CALCIUM 9.3   PHOS 3.0   MG 2.10       No results for input(s): PROTIME, INR, APTT in the last 72 hours.     Patient Active Problem List    Diagnosis Date Noted    Carotid artery occlusion without infarction, unspecified laterality 05/30/2021    Pre-syncope 05/20/2020    Bradycardia 05/20/2020    Angioedema 09/14/2015    ED (erectile dysfunction) 06/01/2015    Chest pain     Unstable angina (HCC)     Low back pain 07/07/2014    HLD (hyperlipidemia) 07/07/2014    HTN (hypertension) 07/07/2014       Assessment:  Patient is a 68 y.o. male s/p Right Side Carotid Endarterectomy 2/2 critical stenosis right internal carotid artery with intraluminal thrombus. Plan:  Neurologic exams frequency: Q4H  For change in exam MUST contact neurosurgery team along with critical care or primary team  JT Stenosis with intraluminal thrombus  - s/p Right carotid endarterectomy today  - Target normal BP (<140/90)  Mobility: Advance as tolerates  Diet: Advance as tolerates  DVT Prophylaxis: SCD's  Will follow inpatient. Please call with any questions or decline in neurological status    DISPO: Remain inpatient from neurosurgery standpoint. Dispo timing to be determined by primary team once patient is medically stable for discharge. Patient was seen and examined with Dr. Audrey Ramey who agrees with above assessment and plan.      Electronically signed by: KANDY Schmidt CNP, APRN-CNP, 6/7/2021 1:11 PM  [phone]

## 2021-06-07 NOTE — ANESTHESIA PRE PROCEDURE
Department of Anesthesiology  Preprocedure Note       Name:  Jerome Penny   Age:  68 y.o.  :  1945                                          MRN:  [id number]         Date:  2021      Surgeon: Tayla Julian):  Jose Kovacs MD    Procedure: Procedure(s):  RIGHT SIDED CAROTID ENDARTERECTOMY    Medications prior to admission:   Prior to Admission medications    Medication Sig Start Date End Date Taking? Authorizing Provider   CO ENZYME Q-10 PO Take by mouth   Yes Historical Provider, MD   Garlic-Calcium (BL GARLIC PO) Take by mouth   Yes Historical Provider, MD   FIBER PO Take by mouth   Yes Historical Provider, MD   Multiple Vitamins-Minerals (THERAPEUTIC MULTIVITAMIN-MINERALS) tablet Take 1 tablet by mouth daily. Yes Historical Provider, MD   atorvastatin (LIPITOR) 20 MG tablet TAKE 1 TABLET BY MOUTH EVERY DAY 3/24/21   Azul Rivera MD   hydroCHLOROthiazide (HYDRODIURIL) 25 MG tablet TAKE 1 TABLET BY MOUTH DAILY 2/3/21   Azul Rivera MD   citalopram (CELEXA) 40 MG tablet TAKE 1 TABLET BY MOUTH DAILY 20   KANDY Avila - CNP   aspirin 325 MG EC tablet Take 1 tablet by mouth daily for 14 days 20  KETAN Trevizo   mometasone (ELOCON) 0.1 % cream Apply topically daily.  19   Azul Rivera MD   fluocinonide (LIDEX) 0.05 % ointment Apply sparingly to affected area(s) up to bid prn 16   Fior Mccartney MD   omeprazole (PRILOSEC) 20 MG capsule Take 40 mg by mouth daily    Historical Provider, MD       Current medications:    Current Facility-Administered Medications   Medication Dose Route Frequency Provider Last Rate Last Admin    ceFAZolin (ANCEF) 1,000 mg in dextrose 5 % 50 mL IVPB (mini-bag)  1,000 mg Intravenous Once Tony Alejandree, DO        sodium chloride 0.9 % 1,000 mL with heparin (porcine) 10,000 Units    PRN Jose Kovacs MD   Given at 21 0758    ceFAZolin (ANCEF) 1,000 mg in sodium chloride 0.9 % 1,000 mL    PRN Jose Kovacs MD   Given at 06/07/21 0759    lactated ringers infusion   Intravenous Continuous Pb Jerry,  mL/hr at 06/07/21 0031 New Bag at 06/07/21 0031    sodium chloride flush 0.9 % injection 10 mL  10 mL Intravenous 2 times per day Pb Jerry, DO        sodium chloride flush 0.9 % injection 10 mL  10 mL Intravenous PRN Pb Jerry, DO        0.9 % sodium chloride infusion  25 mL Intravenous PRN Pb Jerry, DO        0.9 % sodium chloride infusion   Intravenous Continuous Garland Ortiz MD        metoprolol tartrate (LOPRESSOR) tablet 25 mg  25 mg Oral BID Vernal Flack, DO   25 mg at 06/06/21 2033    NIFEdipine (ADALAT CC) extended release tablet 60 mg  60 mg Oral Daily Vernal Flack, DO   60 mg at 06/06/21 4030    hydrALAZINE (APRESOLINE) injection 20 mg  20 mg Intravenous Q6H PRN Belén Hernandez MD        chlorthalidone (HYGROTON) tablet 25 mg  25 mg Oral Daily Vernal Flack, DO   25 mg at 06/06/21 3405    pantoprazole (PROTONIX) tablet 20 mg  20 mg Oral QAM SARATH Portillo MD   20 mg at 06/07/21 0626    atorvastatin (LIPITOR) tablet 80 mg  80 mg Oral Nightly Chichi Manrique MD   80 mg at 06/06/21 2033    perflutren lipid microspheres (DEFINITY) injection 1.65 mg  1.5 mL Intravenous ONCE PRN Chichi Manrique MD        sodium chloride flush 0.9 % injection 5-40 mL  5-40 mL Intravenous 2 times per day Fadi Kellogg MD   10 mL at 06/06/21 0825    sodium chloride flush 0.9 % injection 5-40 mL  5-40 mL Intravenous PRN Fadi Kellogg MD        0.9 % sodium chloride infusion  25 mL Intravenous PRN Fadi Kellogg MD        promethazine (PHENERGAN) tablet 12.5 mg  12.5 mg Oral Q6H PRN Fadi Kellogg MD        Or    ondansetron [facility]) injection 4 mg  4 mg Intravenous Q6H PRN Fadi Kellogg MD        polyethylene glycol [facility]) packet 17 g  17 g Oral Daily PRN Fadi Kellogg MD        acetaminophen (TYLENOL) tablet 650 mg  650 mg Oral Q6H PRN Fadi Kellogg MD   650 mg at 05/30/21 2206    Or    acetaminophen (TYLENOL) suppository 650 mg  650 mg Rectal Q6H PRN Dany Henderson MD        heparin (porcine) injection 9,430 Units  80 Units/kg Intravenous PRN Dany Henderson MD        heparin (porcine) injection 4,720 Units  40 Units/kg Intravenous PRN Dany Henderson MD        butalbital-acetaminophen-caffeine [facility]) per tablet 1 tablet  1 tablet Oral Q4H PRN Dany Henderson MD   1 tablet at 06/03/21 1208    niCARdipine (CARDENE) 25 mg in sodium chloride 0.9 % 250 mL infusion  3-15 mg/hr Intravenous Continuous Sita Ruiz MD   Stopped at 06/02/21 0100    citalopram (CELEXA) tablet 40 mg  40 mg Oral Daily Sita Ruiz MD   40 mg at 06/06/21 9772    melatonin disintegrating tablet 10 mg  10 mg Oral Nightly PRN Dany Henderson MD   10 mg at 06/04/21 2343     Facility-Administered Medications Ordered in Other Encounters   Medication Dose Route Frequency Provider Last Rate Last Admin    ePHEDrine injection   Intravenous PRN Gabo Claryce Redhead, APRN - CRNA   5 mg at 06/07/21 0810    glycopyrrolate (ROBINUL) injection   Intravenous PRN Gabo Claryce Redhead, APRN - CRNA   0.2 mg at 06/07/21 0800    nitroGLYCERIN 50 mg in dextrose 5% 250 mL infusion   Intravenous Continuous PRN Gabo Claryce Redhead, APRN - CRNA 3 mL/hr at 06/07/21 0818 10 mcg/min at 06/07/21 0818       Allergies: Allergies   Allergen Reactions    Sulfa Antibiotics Anaphylaxis     hives    Amlodipine Hives     Severe hives  Patient tolerates nicardipine    Lisinopril Swelling     Face swelled/arms and legs swelled       Problem List:    Patient Active Problem List   Diagnosis Code    Low back pain M54.5    HLD (hyperlipidemia) E78.5    HTN (hypertension) I10    Unstable angina (HCC) I20.0    Chest pain R07.9    ED (erectile dysfunction) N52.9    Angioedema T78. 3XXA    Pre-syncope R55    Bradycardia R00.1    Carotid artery occlusion without infarction, unspecified laterality I65.29       Past 06/07/2021    RBC 4.52 06/07/2021    HGB 14.6 06/07/2021    HCT 43.2 06/07/2021    MCV 95.4 06/07/2021    RDW 13.7 06/07/2021     06/07/2021       CMP:   Lab Results   Component Value Date     06/07/2021    K 4.1 06/07/2021    K 3.7 06/06/2021    CL 99 06/07/2021    CO2 26 06/07/2021    BUN 20 06/07/2021    CREATININE 1.2 06/07/2021    GFRAA >60 06/07/2021    AGRATIO 1.3 05/29/2021    LABGLOM 59 06/07/2021    GLUCOSE 107 06/07/2021    PROT 7.0 05/29/2021    CALCIUM 9.3 06/07/2021    BILITOT <0.2 05/29/2021    ALKPHOS 72 05/29/2021    AST 21 05/29/2021    ALT 19 05/29/2021       POC Tests:   Recent Labs     06/04/21  1206   POCGLU 110*       Coags: No results found for: PROTIME, INR, APTT    HCG (If Applicable): No results found for: PREGTESTUR, PREGSERUM, HCG, HCGQUANT     ABGs: No results found for: PHART, PO2ART, LGE0XIR, PON6EEC, BEART, M9ASGDYP     Type & Screen (If Applicable):  No results found for: LABABO, LABRH    Drug/Infectious Status (If Applicable):  No results found for: HIV, HEPCAB    COVID-19 Screening (If Applicable):   Lab Results   Component Value Date    COVID19 Not Detected 05/30/2021           Anesthesia Evaluation  Patient summary reviewed and Nursing notes reviewed  Airway: Mallampati: III  TM distance: >3 FB   Neck ROM: full  Mouth opening: > = 3 FB Dental:          Pulmonary:Negative Pulmonary ROS breath sounds clear to auscultation                             Cardiovascular:Negative CV ROS    (+) hypertension: mild, angina: no interval change,         Rhythm: regular  Rate: normal                    Neuro/Psych:   Negative Neuro/Psych ROS              GI/Hepatic/Renal: Neg GI/Hepatic/Renal ROS            Endo/Other: Negative Endo/Other ROS                    Abdominal:       Abdomen: soft. Vascular: negative vascular ROS. Anesthesia Plan      general     ASA 3       Induction: intravenous.   arterial line  MIPS: Postoperative opioids intended and Prophylactic antiemetics administered. Anesthetic plan and risks discussed with patient. Use of blood products discussed with patient whom consented to blood products. Plan discussed with attending and CRNA.     Attending anesthesiologist reviewed and agrees with Pre Eval content              Alexandro Goodpasture, DO   6/7/2021

## 2021-06-08 VITALS
HEART RATE: 57 BPM | WEIGHT: 260.58 LBS | BODY MASS INDEX: 33.44 KG/M2 | HEIGHT: 74 IN | OXYGEN SATURATION: 97 % | RESPIRATION RATE: 16 BRPM | SYSTOLIC BLOOD PRESSURE: 154 MMHG | TEMPERATURE: 98.1 F | DIASTOLIC BLOOD PRESSURE: 76 MMHG

## 2021-06-08 LAB
ACTIVATED CLOTTING TIME: 165 SEC (ref 99–130)
ACTIVATED CLOTTING TIME: 197 SEC (ref 99–130)
ALBUMIN SERPL-MCNC: 4 G/DL (ref 3.4–5)
ANION GAP SERPL CALCULATED.3IONS-SCNC: 9 MMOL/L (ref 3–16)
ANTI-XA UNFRAC HEPARIN: <0.04 IU/ML (ref 0.3–0.7)
BASOPHILS ABSOLUTE: 0 K/UL (ref 0–0.2)
BASOPHILS RELATIVE PERCENT: 0.3 %
BUN BLDV-MCNC: 15 MG/DL (ref 7–20)
CALCIUM SERPL-MCNC: 9.3 MG/DL (ref 8.3–10.6)
CHLORIDE BLD-SCNC: 101 MMOL/L (ref 99–110)
CO2: 27 MMOL/L (ref 21–32)
CREAT SERPL-MCNC: 1.1 MG/DL (ref 0.8–1.3)
EOSINOPHILS ABSOLUTE: 0 K/UL (ref 0–0.6)
EOSINOPHILS RELATIVE PERCENT: 0.1 %
GFR AFRICAN AMERICAN: >60
GFR NON-AFRICAN AMERICAN: >60
GLUCOSE BLD-MCNC: 117 MG/DL (ref 70–99)
HCT VFR BLD CALC: 40 % (ref 40.5–52.5)
HEMOGLOBIN: 13.5 G/DL (ref 13.5–17.5)
LYMPHOCYTES ABSOLUTE: 1.7 K/UL (ref 1–5.1)
LYMPHOCYTES RELATIVE PERCENT: 14.2 %
MAGNESIUM: 1.9 MG/DL (ref 1.8–2.4)
MCH RBC QN AUTO: 32.1 PG (ref 26–34)
MCHC RBC AUTO-ENTMCNC: 33.7 G/DL (ref 31–36)
MCV RBC AUTO: 95.4 FL (ref 80–100)
MONOCYTES ABSOLUTE: 1.5 K/UL (ref 0–1.3)
MONOCYTES RELATIVE PERCENT: 12.7 %
NEUTROPHILS ABSOLUTE: 8.8 K/UL (ref 1.7–7.7)
NEUTROPHILS RELATIVE PERCENT: 72.7 %
PDW BLD-RTO: 13.5 % (ref 12.4–15.4)
PHOSPHORUS: 3.2 MG/DL (ref 2.5–4.9)
PLATELET # BLD: 191 K/UL (ref 135–450)
PMV BLD AUTO: 8.5 FL (ref 5–10.5)
POTASSIUM SERPL-SCNC: 4.1 MMOL/L (ref 3.5–5.1)
RBC # BLD: 4.19 M/UL (ref 4.2–5.9)
SODIUM BLD-SCNC: 137 MMOL/L (ref 136–145)
WBC # BLD: 12.1 K/UL (ref 4–11)

## 2021-06-08 PROCEDURE — 85025 COMPLETE CBC W/AUTO DIFF WBC: CPT

## 2021-06-08 PROCEDURE — 85520 HEPARIN ASSAY: CPT

## 2021-06-08 PROCEDURE — 6370000000 HC RX 637 (ALT 250 FOR IP): Performed by: STUDENT IN AN ORGANIZED HEALTH CARE EDUCATION/TRAINING PROGRAM

## 2021-06-08 PROCEDURE — 83735 ASSAY OF MAGNESIUM: CPT

## 2021-06-08 PROCEDURE — 99232 SBSQ HOSP IP/OBS MODERATE 35: CPT | Performed by: INTERNAL MEDICINE

## 2021-06-08 PROCEDURE — 80069 RENAL FUNCTION PANEL: CPT

## 2021-06-08 PROCEDURE — 6370000000 HC RX 637 (ALT 250 FOR IP)

## 2021-06-08 PROCEDURE — 2580000003 HC RX 258: Performed by: STUDENT IN AN ORGANIZED HEALTH CARE EDUCATION/TRAINING PROGRAM

## 2021-06-08 PROCEDURE — 97530 THERAPEUTIC ACTIVITIES: CPT

## 2021-06-08 PROCEDURE — 97535 SELF CARE MNGMENT TRAINING: CPT

## 2021-06-08 PROCEDURE — 36415 COLL VENOUS BLD VENIPUNCTURE: CPT

## 2021-06-08 RX ORDER — SODIUM CHLORIDE 0.9 % (FLUSH) 0.9 %
5-40 SYRINGE (ML) INJECTION PRN
Status: DISCONTINUED | OUTPATIENT
Start: 2021-06-08 | End: 2021-06-08 | Stop reason: HOSPADM

## 2021-06-08 RX ORDER — SODIUM CHLORIDE 0.9 % (FLUSH) 0.9 %
5-40 SYRINGE (ML) INJECTION EVERY 12 HOURS SCHEDULED
Status: DISCONTINUED | OUTPATIENT
Start: 2021-06-08 | End: 2021-06-08 | Stop reason: HOSPADM

## 2021-06-08 RX ORDER — ASPIRIN 81 MG/1
81 TABLET ORAL DAILY
Status: DISCONTINUED | OUTPATIENT
Start: 2021-06-08 | End: 2021-06-08 | Stop reason: HOSPADM

## 2021-06-08 RX ORDER — ACETAMINOPHEN 325 MG/1
650 TABLET ORAL EVERY 4 HOURS PRN
Status: DISCONTINUED | OUTPATIENT
Start: 2021-06-08 | End: 2021-06-08 | Stop reason: HOSPADM

## 2021-06-08 RX ORDER — TRAMADOL HYDROCHLORIDE 50 MG/1
100 TABLET ORAL EVERY 6 HOURS PRN
Status: DISCONTINUED | OUTPATIENT
Start: 2021-06-08 | End: 2021-06-08 | Stop reason: HOSPADM

## 2021-06-08 RX ORDER — ATORVASTATIN CALCIUM 80 MG/1
80 TABLET, FILM COATED ORAL NIGHTLY
Qty: 30 TABLET | Refills: 3 | Status: SHIPPED | OUTPATIENT
Start: 2021-06-08 | End: 2021-10-06

## 2021-06-08 RX ORDER — CHLORTHALIDONE 25 MG/1
25 TABLET ORAL DAILY
Qty: 30 TABLET | Refills: 3 | Status: SHIPPED | OUTPATIENT
Start: 2021-06-09 | End: 2021-10-06 | Stop reason: SDUPTHER

## 2021-06-08 RX ORDER — NITROGLYCERIN 20 MG/100ML
5-200 INJECTION INTRAVENOUS CONTINUOUS
Status: DISCONTINUED | OUTPATIENT
Start: 2021-06-08 | End: 2021-06-08 | Stop reason: HOSPADM

## 2021-06-08 RX ORDER — CALCIUM CARBONATE 200(500)MG
500 TABLET,CHEWABLE ORAL 3 TIMES DAILY PRN
Status: DISCONTINUED | OUTPATIENT
Start: 2021-06-08 | End: 2021-06-08 | Stop reason: HOSPADM

## 2021-06-08 RX ORDER — SODIUM CHLORIDE 9 MG/ML
25 INJECTION, SOLUTION INTRAVENOUS PRN
Status: DISCONTINUED | OUTPATIENT
Start: 2021-06-08 | End: 2021-06-08 | Stop reason: HOSPADM

## 2021-06-08 RX ORDER — TRAMADOL HYDROCHLORIDE 50 MG/1
50 TABLET ORAL EVERY 6 HOURS PRN
Status: DISCONTINUED | OUTPATIENT
Start: 2021-06-08 | End: 2021-06-08 | Stop reason: HOSPADM

## 2021-06-08 RX ORDER — NIFEDIPINE 60 MG/1
60 TABLET, FILM COATED, EXTENDED RELEASE ORAL DAILY
Qty: 30 TABLET | Refills: 3 | Status: SHIPPED | OUTPATIENT
Start: 2021-06-09 | End: 2021-10-06

## 2021-06-08 RX ORDER — CEFAZOLIN SODIUM 2 G/50ML
2000 SOLUTION INTRAVENOUS EVERY 8 HOURS
Status: DISCONTINUED | OUTPATIENT
Start: 2021-06-08 | End: 2021-06-08 | Stop reason: HOSPADM

## 2021-06-08 RX ADMIN — CITALOPRAM 40 MG: 40 TABLET, FILM COATED ORAL at 09:18

## 2021-06-08 RX ADMIN — CHLORTHALIDONE 25 MG: 25 TABLET ORAL at 09:18

## 2021-06-08 RX ADMIN — DEXTRAN 70, AND HYPROMELLOSE 2910 1 DROP: 1; 3 SOLUTION/ DROPS OPHTHALMIC at 09:20

## 2021-06-08 RX ADMIN — PANTOPRAZOLE SODIUM 20 MG: 20 TABLET, DELAYED RELEASE ORAL at 06:18

## 2021-06-08 RX ADMIN — NIFEDIPINE 60 MG: 30 TABLET, EXTENDED RELEASE ORAL at 09:18

## 2021-06-08 RX ADMIN — HYDROCODONE BITARTRATE AND ACETAMINOPHEN 1 TABLET: 5; 325 TABLET ORAL at 03:59

## 2021-06-08 RX ADMIN — METOPROLOL TARTRATE 25 MG: 25 TABLET, FILM COATED ORAL at 09:18

## 2021-06-08 RX ADMIN — Medication 10 ML: at 09:20

## 2021-06-08 RX ADMIN — ANTACID TABLETS 500 MG: 500 TABLET, CHEWABLE ORAL at 00:53

## 2021-06-08 ASSESSMENT — PAIN DESCRIPTION - DESCRIPTORS: DESCRIPTORS: ACHING

## 2021-06-08 ASSESSMENT — PAIN SCALES - GENERAL
PAINLEVEL_OUTOF10: 2
PAINLEVEL_OUTOF10: 4
PAINLEVEL_OUTOF10: 0

## 2021-06-08 ASSESSMENT — PAIN DESCRIPTION - PROGRESSION: CLINICAL_PROGRESSION: NOT CHANGED

## 2021-06-08 ASSESSMENT — PAIN DESCRIPTION - ORIENTATION: ORIENTATION: POSTERIOR

## 2021-06-08 ASSESSMENT — PAIN - FUNCTIONAL ASSESSMENT: PAIN_FUNCTIONAL_ASSESSMENT: ACTIVITIES ARE NOT PREVENTED

## 2021-06-08 ASSESSMENT — PAIN DESCRIPTION - LOCATION: LOCATION: HEAD

## 2021-06-08 ASSESSMENT — PAIN DESCRIPTION - PAIN TYPE: TYPE: ACUTE PAIN

## 2021-06-08 NOTE — DISCHARGE SUMMARY
free-floating component, small thrombus. The findings unchanged from 5/30/2021   2. Left internal carotid artery with less than 10% diameter stenosis   3. Normal vertebral arteries. 4. Normal intracranial circulation. No interval development of large vessel occlusion. 5. Noncontrast brain, no intracranial hemorrhage         MRI BRAIN WO CONTRAST   Final Result      Multifocal patchy areas of recent infarction within the right cerebral hemisphere as detailed. No intracranial hemorrhage. Moderate nonspecific white matter disease, likely chronic small vessel ischemic change. CTA HEAD NECK W CONTRAST   Final Result      CTA Head:   No arterial steno-occlusive disease or aneurysm. CTA Neck:   High-grade, 80-90% stenosis of the proximal right cervical internal carotid artery secondary to extensive noncalcified plaque. No other significant arterial steno-occlusive disease or evidence of dissection. VL DUP CAROTID RIGHT    (Results Pending)          Consults:     IP CONSULT TO CRITICAL CARE  IP CONSULT TO NEUROSURGERY      Discharge Medications:     Current Discharge Medication List             Details   chlorthalidone (HYGROTON) 25 MG tablet Take 1 tablet by mouth daily  Qty: 30 tablet, Refills: 3      NIFEdipine (ADALAT CC) 60 MG extended release tablet Take 1 tablet by mouth daily  Qty: 30 tablet, Refills: 3      metoprolol tartrate (LOPRESSOR) 25 MG tablet Take 1 tablet by mouth 2 times daily  Qty: 60 tablet, Refills: 3                Details   atorvastatin (LIPITOR) 80 MG tablet Take 1 tablet by mouth nightly  Qty: 30 tablet, Refills: 3                Details   CO ENZYME Q-10 PO Take by mouth      Garlic-Calcium (BL GARLIC PO) Take by mouth      FIBER PO Take by mouth      Multiple Vitamins-Minerals (THERAPEUTIC MULTIVITAMIN-MINERALS) tablet Take 1 tablet by mouth daily.       citalopram (CELEXA) 40 MG tablet TAKE 1 TABLET BY MOUTH DAILY  Qty: 90 tablet, Refills: 5    Associated Diagnoses: Depression, unspecified depression type      aspirin 325 MG EC tablet Take 1 tablet by mouth daily for 14 days  Qty: 14 tablet, Refills: 0      mometasone (ELOCON) 0.1 % cream Apply topically daily.   Qty: 60 g, Refills: 0    Associated Diagnoses: Eczema, unspecified type      fluocinonide (LIDEX) 0.05 % ointment Apply sparingly to affected area(s) up to bid prn  Qty: 60 g, Refills: 1      omeprazole (PRILOSEC) 20 MG capsule Take 40 mg by mouth daily           Signed:    Chilo Ledezma MD   6/8/2021
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
## 2021-06-08 NOTE — PROGRESS NOTES
Pt reporting blurry vision in right eye. Pt states that this vision change has been occurring since post procedure and that he has not reported it to an RN or MD prior to now. RN notified Dr. Ar Dudley with neuro and stat CT of head w/out contrast was completed. No changes shown on CT. RN will continue to monitor.

## 2021-06-08 NOTE — PROGRESS NOTES
Neurosurgery Progress Note    Reason for evaluation: Postop day #1 status post right carotid endarterectomy. Subjective: The patient feels very tired. He notes that he is more knocked out by the operative intervention that he thought he would be. He notes soreness in the right side of the neck. He notes that his right lower lip is not moving as well. He denies any problems with swallowing. He denies any problems speaking or slurred speech or tongue deviation. He denies problem with thinking. Not yet been up walking. Had complaints of blurry vision last night that resolved. This resulted in the CT scan that was interpreted as no acute intracranial process. The blurry vision has resolved. Objective:   BP (!) 140/65   Pulse 61   Temp 98.1 °F (36.7 °C) (Axillary)   Resp 13   Ht 6' 2.02\" (1.88 m)   Wt 260 lb 9.3 oz (118.2 kg)   SpO2 100%   BMI 33.44 kg/m²      Hct: 40    Cr: 1.1    Imaging: I personally reviewed the CT from last night and this demonstrates the expected findings considering the right high parietal/posterior frontal stroke that he had previously. No acute changes. He is alert and oriented x3. There is no speech deficit. His tongue protrudes in the midline. There is the expected periincisional numbness. There is mild right lower lip weakness-this likely represents a marginal mandibular branch weakness. Motor: No drift or tremor 5 out of 5 strength. The wound is soft clean clear and dry. The wound edges are well approximated. Assessment: Postop day #1 status post right carotid endarterectomy. From the neurosurgical standpoint he is doing well. \    Plan: From the neurosurgical standpoint he can be discharged today when he meets all discharge criteria established by the admitting service. He should remain on a aspirin tablet and he informs me that his cardiologist has him taking a full dose aspirin daily. That is fine by me.     Follow-up: Would like to see him in about 3 months with a carotid duplex evaluation.     Sonal Brasher MD  [phone]

## 2021-06-08 NOTE — PROGRESS NOTES
ICU Progress Note  PGY-1    Admit Date: 5/30/2021  ICU Day: Hospital Day: 10  Vent Settings:    / / /   Diet: ADULT DIET; Regular  Code Status: Prior       Interval history:  NAEO. Pt reports some blurry vision and numbness in his R face that started yesterday around 3 pm and has not resolved. But denies arm/eg weakness, numbness. CTH was obtained which did not show any acute abnormality. Denies chets pain, dyspnea, neck pain. Medications:   Scheduled Meds:   dextran 70-hypromellose  1 drop Both Eyes BID    metoprolol tartrate  25 mg Oral BID    NIFEdipine  60 mg Oral Daily    chlorthalidone  25 mg Oral Daily    pantoprazole  20 mg Oral QAM AC    atorvastatin  80 mg Oral Nightly    sodium chloride flush  5-40 mL Intravenous 2 times per day    citalopram  40 mg Oral Daily       Continuous Infusions:   sodium chloride      niCARdipine Stopped (06/02/21 0100)       PRN Meds:  calcium carbonate, HYDROcodone 5 mg - acetaminophen, hydrALAZINE, perflutren lipid microspheres, sodium chloride flush, promethazine **OR** ondansetron, polyethylene glycol, acetaminophen **OR** acetaminophen, heparin (porcine), heparin (porcine), butalbital-acetaminophen-caffeine, melatonin    Vital/I&O/Physical examination:   VS:  BP (!) 140/65   Pulse 61   Temp 98.1 °F (36.7 °C) (Axillary)   Resp 13   Ht 6' 2.02\" (1.88 m)   Wt 260 lb 9.3 oz (118.2 kg)   SpO2 100%   BMI 33.44 kg/m²     I/O:    Intake/Output Summary (Last 24 hours) at 6/8/2021 3985  Last data filed at 6/8/2021 0600  Gross per 24 hour   Intake 3433 ml   Output 1850 ml   Net 1583 ml       PE:  Physical Exam  Constitutional:       Appearance: He is obese. Eyes:      Extraocular Movements: Extraocular movements intact. Neck:      Comments: R neck incision clean   No hematoma   Mild swelling   Cardiovascular:      Rate and Rhythm: Normal rate and regular rhythm. Pulses: Normal pulses.    Pulmonary:      Effort: Pulmonary effort is normal. No respiratory distress. Breath sounds: No wheezing. Abdominal:      General: There is no distension. Palpations: Abdomen is soft. Tenderness: There is no abdominal tenderness. Musculoskeletal:      Right lower leg: No edema. Left lower leg: No edema. Skin:     General: Skin is dry. Neurological:      Mental Status: He is alert and oriented to person, place, and time. Sensory: No sensory deficit. Motor: No weakness. Comments: No facial droop   Reports numbness on lower R face         Labs & Imaging:   LABS:  Renal:   Recent Labs     06/06/21  0531 06/07/21  0532 06/08/21 0613    134* 137   K 3.7  3.7 4.1 4.1    99 101   CO2 28 26 27   BUN 18 20 15   CREATININE 1.2 1.2 1.1   GLUCOSE 110* 107* 117*   ANIONGAP 9 9 9     CBC:   Recent Labs     06/06/21  0531 06/07/21  0532 06/08/21 0613   WBC 5.6 6.1 12.1*   HGB 14.4 14.6 13.5   HCT 41.8 43.2 40.0*    209 191   MCV 95.0 95.4 95.4                            Hepatic:   No results for input(s): AST, ALT, ALB, BILITOT, ALKPHOS in the last 72 hours. Troponin:   No results for input(s): TROPONINI in the last 72 hours. BNP: No results for input(s): BNP in the last 72 hours. Lipids:   No results for input(s): CHOL, HDL in the last 72 hours. Invalid input(s): LDLCALCU, TRIGLYCERIDE  INR: No results for input(s): INR in the last 72 hours. Lactate: No results for input(s): LACTATE in the last 72 hours. ABGs:No results for input(s): PHART, OWF8AFV, PO2ART, FTD6TYA, BEART, THGBART, T6GDOHZO, JRM1ERN in the last 72 hours. UA:No results for input(s): NITRITE, COLORU, PHUR, LABCAST, WBCUA, RBCUA, MUCUS, TRICHOMONAS, YEAST, BACTERIA, CLARITYU, SPECGRAV, LEUKOCYTESUR, UROBILINOGEN, BILIRUBINUR, BLOODU, GLUCOSEU, AMORPHOUS in the last 72 hours. Invalid input(s): [place]     IMAGING:  CT HEAD WO CONTRAST   Final Result      1. No acute intracranial process. CTA HEAD NECK W CONTRAST   Final Result   1.  High-grade stenosis of the right internal carotid artery, greater than a present diameter with large soft plaque and distal free-floating component, small thrombus. The findings unchanged from 5/30/2021   2. Left internal carotid artery with less than 10% diameter stenosis   3. Normal vertebral arteries. 4. Normal intracranial circulation. No interval development of large vessel occlusion. 5. Noncontrast brain, no intracranial hemorrhage         CT HEAD WO CONTRAST   Final Result   1. High-grade stenosis of the right internal carotid artery, greater than a present diameter with large soft plaque and distal free-floating component, small thrombus. The findings unchanged from 5/30/2021   2. Left internal carotid artery with less than 10% diameter stenosis   3. Normal vertebral arteries. 4. Normal intracranial circulation. No interval development of large vessel occlusion. 5. Noncontrast brain, no intracranial hemorrhage         MRI BRAIN WO CONTRAST   Final Result      Multifocal patchy areas of recent infarction within the right cerebral hemisphere as detailed. No intracranial hemorrhage. Moderate nonspecific white matter disease, likely chronic small vessel ischemic change. CTA HEAD NECK W CONTRAST   Final Result      CTA Head:   No arterial steno-occlusive disease or aneurysm. CTA Neck:   High-grade, 80-90% stenosis of the proximal right cervical internal carotid artery secondary to extensive noncalcified plaque. No other significant arterial steno-occlusive disease or evidence of dissection. VL DUP CAROTID RIGHT    (Results Pending)       Assessment & Plan:    Romeo Smith is a 76 y.o. male with PMH symptomatic bradycardia, hypertension, hyperlipidemia who was transferred from [facility] after CT revealed greater than 90% stenosis in the proximal right ICA secondary to a thrombus/plaque.        Right ICA stenosis 2/2 to thrombus/plaque s/p CEA (6/7/21) POD 1  CTA revealed greater than 90% stenosis in the proximal right cervical ICA secondary to 2.5x0.5 thrombus/noncalcified atherosclerotic plaque. Last known well approximately 2300 on 5/28. Not a candidate for TPA. MRI w/ some patchy area of ischemia in R cerebellum. TTE w/o shunt, normal findings   - Target BP <160/90. Continue metoprolol 25mg BID and and chlorthalidone 25mg qD, nifedipine 60mg qD. Significant allergies to BP meds. - CTA 6/4 without significant reduction of R ICA stenosis, small thrombus noted  - s/p revascularization/CEA on 6/7  - NSGY states patient may be discharged home    Acute Ischemic stroke   R ICA stenosis. MRI w/ patchy area of ischema in R cerebellum  - HgA1c 5.9 (6/1/2021)  - better control off BP  - TTE w/o shunt  - LDL 65, cont statin 80 mg  - heparin gtt - asa on d/c       Hypertension  - keep off Cardene gtt for SBP < 160  - metoprolol 25 BID (parameters to hold for HR < 55), cont chorthalidone, nifedipine 60 mg    Chronic hyperlipidemia - cont lipitor 80 mg   Chronic asymptomatic bradycardia - cont to monitor     PT/OT 20, 22    Code Status: Prior  FEN: ADULT DIET;  Regular   PPX: SCD, asa on d/c  DISPO: transfer to  or d/c    This patient will be discussed with attending, Dr Taylor John MD PGY-1  Contact via [email or web address]  6/8/2021,  8:12 AM
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
## 2021-06-08 NOTE — CARE COORDINATION
Case Management Assessment            Discharge Note                    Date / Time of Note: 6/8/2021 10:04 AM                  Discharge Note Completed by: Mary Ellen Mcadams RN     Discharge order noted and pt's wife will transport to home. He would like to get his prescriptions filled at [facility] which have been sent there. Patient Name: Amadeo Manrique   YOB: 1945  Diagnosis: Carotid artery occlusion without infarction, unspecified laterality [I65.29]   Date / Time: 5/30/2021  4:13 AM    Current PCP: Leon Shelby MD  Clinic patient: No    Hospitalization in the last 30 days: No    Advance Directives:  Code Status: Full Code  [place] DNR form completed and on chart: Not Indicated    Financial:  Payor: [place] Pacer / Plan: Garry Armas ESSENTIAL/PLUS / Product Type: *No Product type* /      Pharmacy:    [address] Oksana Godinez  Phone: [phone] Fax: [phone]      Assistance purchasing medications?: Potential Assistance Purchasing Medications: Yes  Assistance provided by Case Management: None at this time    Does patient want to participate in local refill/ meds to beds program?: Yes    Meds To Beds General Rules:  1. Can ONLY be done Monday- Friday between 8:30am-5pm  2. Prescription(s) must be in pharmacy by 3pm to be filled same day  3. Copy of patient's insurance/ prescription drug card and patient face sheet must be sent along with the prescription(s)  4. Cost of Rx cannot be added to hospital bill. If financial assistance is needed, please contact unit  or ;  or  CANNOT provide pharmacy voucher for patients co-pays  5.  Patients can then  the prescription on their way out of the hospital at discharge, or pharmacy can deliver to the bedside if staff is available. (payment due at time of pick-up or delivery - cash, check, or card accepted)     Able to afford home medications/ co-pay costs: Yes    ADLS:  Current PT AM-PAC Score: 20 /24  Current OT AM-PAC Score: 23 /24      DISCHARGE Disposition: Home- No Services Needed    LOC at discharge: Not Applicable  PRANEETH Completed: Not Indicated    Notification completed in HENS/PAS?:  Not Applicable    IMM Completed:   Yes, Case management has presented and reviewed IMM letter #2 to the patient and/or family/ POA. Patient and/or family/POA verbalized understanding of their medicare rights and appeal process if needed. Patient and/or family/POA has signed, initialed and placed today's date (06/08/21) and time (1000) on IMM letter #2 on the the appropriate lines. Patient and/or family/POA, copy of letter offered and they are aware that this original copy of [facility] letter #2 is available prior to discharge from the paper chart on the unit. Electronic documentation has been entered into epic for IMM letter #2 and original paper copy has been added to the paper chart at the nurses station. Transportation:  Transportation PLAN for discharge: family   Mode of Transport: Private Car  Reason for medical transport: Not Applicable  Name of [address] [place] [address], [address]: Not Applicable  Time of Transport: NA      The Plan for Transition of Care is related to the following treatment goals of Carotid artery occlusion without infarction, unspecified laterality [I65.29]    The Patient and/or patient representative Tamia Nichols and his family were provided with a choice of provider and agrees with the discharge plan Not Indicated    Freedom of choice list was provided with basic dialogue that supports the patient's individualized plan of care/goals and shares the quality data associated with the providers.  Not Indicated    Care Transitions patient: No    Sveta Hall RN  [facility]  Case Management Department  Ph: [phone]  Fax: [phone]

## 2021-06-08 NOTE — PROGRESS NOTES
[phone] Cory Stalin [phone]  [address]  [address]    Phone: [phone]   · atorvastatin 80 MG tablet  · chlorthalidone 25 MG tablet  · metoprolol tartrate 25 MG tablet  · NIFEdipine 60 MG extended release tablet         Thanks!   Maqrues MoncadaD, [facility]  Wireless: [phone]  6/8/2021 11:51 AM

## 2021-06-09 ENCOUNTER — CARE COORDINATION (OUTPATIENT)
Dept: CARE COORDINATION | Age: 76
End: 2021-06-09

## 2021-06-10 ENCOUNTER — OFFICE VISIT (OUTPATIENT)
Dept: FAMILY MEDICINE CLINIC | Age: 76
End: 2021-06-10
Payer: MEDICARE

## 2021-06-10 ENCOUNTER — CARE COORDINATION (OUTPATIENT)
Dept: CASE MANAGEMENT | Age: 76
End: 2021-06-10

## 2021-06-10 VITALS
BODY MASS INDEX: 32.47 KG/M2 | DIASTOLIC BLOOD PRESSURE: 60 MMHG | SYSTOLIC BLOOD PRESSURE: 98 MMHG | OXYGEN SATURATION: 96 % | HEART RATE: 57 BPM | WEIGHT: 253 LBS

## 2021-06-10 DIAGNOSIS — I65.21 OCCLUSION OF RIGHT CAROTID ARTERY: Primary | ICD-10-CM

## 2021-06-10 PROCEDURE — 1111F DSCHRG MED/CURRENT MED MERGE: CPT | Performed by: NURSE PRACTITIONER

## 2021-06-10 PROCEDURE — 99214 OFFICE O/P EST MOD 30 MIN: CPT | Performed by: NURSE PRACTITIONER

## 2021-06-10 ASSESSMENT — ENCOUNTER SYMPTOMS
WHEEZING: 0
SHORTNESS OF BREATH: 0

## 2021-06-10 NOTE — CARE COORDINATION
Brian [facility] Transitions Initial Follow Up Call    Call within 2 business days of discharge: Yes    Patient:  Lilia Benson  Patient :  1945  MRN:  [id number]   Reason for Admission:  ICA stenosis   Discharge Date:  21  RARS: 15    CTC attempt to reach Pt regarding recent hospital discharge. CTC left voice recording with call back number requesting a call back. Follow up appointments:    Future Appointments   Date Time Provider Leonardo Irving   3/9/2022 10:45 AM SCHEDULE, [facility] MARTI BAUMAN [facility] BRETT Miranda, ERICN, RN  Care Transition Coordinator  Contact Number:  [phone]

## 2021-06-10 NOTE — PROGRESS NOTES
Post-Discharge Transitional Care Management Services or Hospital Follow Up      Edna Arteaga   YOB: 1945    Date of Office Visit:  6/10/2021  Date of Hospital Admission: 5/30/21  Date of Hospital Discharge: 6/8/21  Readmission Risk Score(high >=14%.  Medium >=10%):Readmission Risk Score: 15      Care management risk score Rising risk (score 2-5) and Complex Care (Scores >=6): 0     Non face to face  following discharge, date last encounter closed (first attempt may have been earlier): *No documented post hospital discharge outreach found in the last 14 days *No documented post hospital discharge outreach found in the last 14 days    Call initiated 2 business days of discharge: *No response recorded in the last 14 days     Patient Active Problem List   Diagnosis    Low back pain    HLD (hyperlipidemia)    HTN (hypertension)    Unstable angina ([address]    Chest pain    ED (erectile dysfunction)    Angioedema    Pre-syncope    Bradycardia    Carotid artery occlusion without infarction, unspecified laterality       Allergies   Allergen Reactions    Sulfa Antibiotics Anaphylaxis     hives    Amlodipine Hives     Severe hives  Patient tolerates nicardipine    Lisinopril Swelling     Face swelled/arms and legs swelled       Medications listed as ordered at the time of discharge from hospital   [place], [address] Medication Instructions DYAN:    Printed on:06/10/21 6179   Medication Information                      aspirin 325 MG EC tablet  Take 1 tablet by mouth daily for 14 days             atorvastatin (LIPITOR) 80 MG tablet  Take 1 tablet by mouth nightly             chlorthalidone (HYGROTON) 25 MG tablet  Take 1 tablet by mouth daily             citalopram (CELEXA) 40 MG tablet  TAKE 1 TABLET BY MOUTH DAILY             CO ENZYME Q-10 PO  Take by mouth             FIBER PO  Take by mouth             fluocinonide (LIDEX) 0.05 % ointment  Apply sparingly to affected area(s) up to bid prn seconds- has been getting up slow and transitioning slowly between sitting/standing. He does not have a BP cuff at home but is getting one. He has been feeling fatigue. Slight blurry vision in the right eye still and some numbness/tingling in his fingertips  Has follow up with neuro in a few months  Inpatient course: Discharge summary reviewed- see chart. Interval history/Current status: stable    Review of Systems   Constitutional: Positive for fatigue. Eyes: Positive for visual disturbance (a little blurry vision in right eye ). Respiratory: Negative for shortness of breath and wheezing. Cardiovascular: Negative for chest pain and palpitations. Neurological: Positive for dizziness (off and on) and numbness (in figertips again- bilateral). Negative for headaches. Vitals:    06/10/21 1258 06/10/21 1316   BP: (!) 140/64 98/60   Site:  Left Upper Arm   Pulse: 57    SpO2: 96%    Weight: 253 lb (114.8 kg)      Body mass index is 32.47 kg/m². Wt Readings from Last 3 Encounters:   06/10/21 253 lb (114.8 kg)   06/08/21 260 lb 9.3 oz (118.2 kg)   05/29/21 260 lb (117.9 kg)     BP Readings from Last 3 Encounters:   06/10/21 98/60   06/08/21 (!) 154/76   06/07/21 (!) 208/95       Physical Exam  Vitals and nursing note reviewed. Constitutional:       Appearance: Normal appearance. He is well-developed. HENT:      Head: Normocephalic and atraumatic. Right Ear: Tympanic membrane and external ear normal.      Left Ear: External ear normal. There is impacted cerumen. Nose: Nose normal.      Mouth/Throat:      Pharynx: No oropharyngeal exudate or posterior oropharyngeal erythema. Eyes:      Conjunctiva/sclera: Conjunctivae normal.   Neck:      Comments: Healing scar R sided neck  Cardiovascular:      Rate and Rhythm: Normal rate and regular rhythm. Heart sounds: Normal heart sounds. No murmur heard. Pulmonary:      Effort: Pulmonary effort is normal. No respiratory distress.       Breath sounds: Normal breath sounds. No wheezing or rales. Abdominal:      Palpations: Abdomen is soft. Musculoskeletal:         General: Normal range of motion. Cervical back: Normal range of motion. Lymphadenopathy:      Cervical: No cervical adenopathy. Skin:     General: Skin is warm and dry. Comments: Healing scar R neck   Neurological:      General: No focal deficit present. Mental Status: He is alert and oriented to person, place, and time. Deep Tendon Reflexes: Reflexes are normal and symmetric. Psychiatric:         Mood and Affect: Mood normal.         Behavior: Behavior normal.         Thought Content: Thought content normal.         Judgment: Judgment normal.             Assessment/Plan:  1.  Occlusion of right carotid artery  BP was low after recheck- told patient to call neurology and see if they want him to decrease any of the BP meds    - ND DISCHARGE MEDS RECONCILED W/ CURRENT OUTPATIENT MED LIST        Medical Decision Making: moderate complexity

## 2021-06-16 ENCOUNTER — TELEPHONE (OUTPATIENT)
Dept: FAMILY MEDICINE CLINIC | Age: 76
End: 2021-06-16

## 2021-06-16 RX ORDER — BISACODYL 10 MG
10 SUPPOSITORY, RECTAL RECTAL DAILY PRN
Qty: 10 SUPPOSITORY | Refills: 0 | Status: SHIPPED | OUTPATIENT
Start: 2021-06-16 | End: 2021-07-16

## 2021-06-16 NOTE — TELEPHONE ENCOUNTER
Patient stated he has not had a bowel movement in almost 2 weeks. Had surgery on 6/7, right sided carotid endarterectomy, has not had movement since. Would like guidance on what he should do.      Please advise [phone]

## 2021-09-20 ENCOUNTER — HOSPITAL ENCOUNTER (OUTPATIENT)
Dept: VASCULAR LAB | Age: 76
Discharge: HOME | End: 2021-09-20
Payer: MEDICARE

## 2021-09-20 DIAGNOSIS — I65.22 CAROTID ARTERY STENOSIS, UNILATERAL, LEFT: ICD-10-CM

## 2021-09-20 PROCEDURE — 93880 EXTRACRANIAL BILAT STUDY: CPT

## 2021-10-03 DIAGNOSIS — F32.A DEPRESSION, UNSPECIFIED DEPRESSION TYPE: ICD-10-CM

## 2021-10-04 RX ORDER — CITALOPRAM 40 MG/1
40 TABLET ORAL DAILY
Qty: 90 TABLET | Refills: 5 | Status: SHIPPED | OUTPATIENT
Start: 2021-10-04 | End: 2022-05-26

## 2021-10-06 RX ORDER — NIFEDIPINE 60 MG/1
60 TABLET, FILM COATED, EXTENDED RELEASE ORAL DAILY
Qty: 30 TABLET | Refills: 3 | Status: SHIPPED | OUTPATIENT
Start: 2021-10-06 | End: 2022-01-03

## 2021-10-06 RX ORDER — ATORVASTATIN CALCIUM 80 MG/1
TABLET, FILM COATED ORAL
Qty: 30 TABLET | Refills: 3 | Status: SHIPPED | OUTPATIENT
Start: 2021-10-06 | End: 2022-01-03

## 2021-10-06 RX ORDER — CHLORTHALIDONE 25 MG/1
25 TABLET ORAL DAILY
Qty: 30 TABLET | Refills: 5 | Status: SHIPPED | OUTPATIENT
Start: 2021-10-06 | End: 2022-03-07

## 2021-10-06 RX ORDER — CHLORTHALIDONE 25 MG/1
25 TABLET ORAL DAILY
Qty: 30 TABLET | Refills: 3 | OUTPATIENT
Start: 2021-10-06

## 2021-10-06 NOTE — TELEPHONE ENCOUNTER
Ysabel requesting Refill  chlorthalidone (HYGROTON) 25 MG tablet     Last Office Visit  -  6/10/21  Next Office Visit  -  3/9/22    Last Filled  -    Last UDS -    Contract -

## 2021-10-06 NOTE — TELEPHONE ENCOUNTER
Refill request for Atorvastatin/Nifedipine/Metoprolol medication.      Name of [address] [facility] visit - 6/10/21     Pending visit - 3/9/22    Last refill -6/8/21-6/9/21-6/8/21      Medication Contract signed -   Last Oarrs ran-         Additional Comments

## 2021-10-06 NOTE — TELEPHONE ENCOUNTER
Refill request for Chlorthalidone medication.      Name of [facility] visit - 6/10/21     Pending visit - 3/9/22    Last refill -6/9/21      Medication Contract signed -   Last Oarrs ran-         Additional Comments

## 2021-10-07 RX ORDER — CHLORTHALIDONE 25 MG/1
25 TABLET ORAL DAILY
Qty: 30 TABLET | Refills: 5 | OUTPATIENT
Start: 2021-10-07

## 2021-12-01 NOTE — TELEPHONE ENCOUNTER
"12/1/2021       RE: Feng Redding  [address] [place] [address]     Dear Colleague,    Thank you for referring your patient, Feng Redding, to the [facility] [place]. Please see a copy of my visit note below.    HPI:  Feng Redding is a 37 year old male being seen for fossa navicularis stricture.    On 8/26/2021, he had a cysto with serial dilation.  He noted it helped dramatically.  He felt excellent for a few weeks.  Now he's back to obstructive voiding.  He feels like there's a big balloon and hes urinating through a pinhole.  He's happily .  No erectile issues.  He has LESLIE.   He has urinary urgency.  Has incomplete emptying.    Exam:  BP (!) 158/106   Pulse 83   Ht 1.778 m (5' 10\")   Wt 90.7 kg (200 lb)   BMI 28.70 kg/m    NAD  Abdomen soft  Some meatal stenosis visible.     Review of Imaging:  The following imaging exams were independently viewed and interpreted by me and discussed with patient:    I reviewed his RUG/VCUG. It shows a short segment fossa stricture.        Review of Labs:  The following labs were reviewed by me and discussed with the patient:  UCX with negative.  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 Discussed various other approaches.  Discussed 1 week summers  Discussed risks, benefits, perioperative course. Discussed risks of re-stenosis and spraying postop.  Wants to proceed. Orders placed.      Lino Cortés MD  Reconstructive Urology  [facility]        30 minutes spent on the date of the encounter doing chart review, history and exam, documentation and further activities per the note      " ----- Message from Bibi Reeves sent at 8/27/2020  2:18 PM EDT -----  Subject: Message to Provider    QUESTIONS  Information for Provider? Patient calling requesting to know when he had   the last Pneumonia shot   Please call to advise  ---------------------------------------------------------------------------  --------------  CALL BACK INFO  What is the best way for the office to contact you? OK to leave message on   voicemail  Preferred Call Back Phone Number? [phone]  ---------------------------------------------------------------------------  --------------  SCRIPT ANSWERS  Relationship to Patient?  Self

## 2021-12-22 RX ORDER — PREDNISONE 20 MG/1
TABLET ORAL
Qty: 24 TABLET | Refills: 0 | Status: SHIPPED | OUTPATIENT
Start: 2021-12-22 | End: 2022-01-04

## 2022-01-03 RX ORDER — NIFEDIPINE 60 MG/1
60 TABLET, FILM COATED, EXTENDED RELEASE ORAL DAILY
Qty: 30 TABLET | Refills: 3 | Status: SHIPPED | OUTPATIENT
Start: 2022-01-03 | End: 2022-07-26 | Stop reason: SDUPTHER

## 2022-01-03 RX ORDER — ATORVASTATIN CALCIUM 80 MG/1
TABLET, FILM COATED ORAL
Qty: 30 TABLET | Refills: 3 | Status: SHIPPED | OUTPATIENT
Start: 2022-01-03 | End: 2022-04-11 | Stop reason: SDUPTHER

## 2022-02-10 ENCOUNTER — PATIENT MESSAGE (OUTPATIENT)
Dept: FAMILY MEDICINE CLINIC | Age: 77
End: 2022-02-10

## 2022-02-11 NOTE — TELEPHONE ENCOUNTER
From: Isabel Stoddard  To: Dr. Kristine Young: 2/10/2022 6:26 PM EST  Subject: Metoprolol    Im out of medicine.  This is for my elevated BP  182/106 on Monday

## 2022-03-07 RX ORDER — CHLORTHALIDONE 25 MG/1
25 TABLET ORAL DAILY
Qty: 30 TABLET | Refills: 5 | Status: ON HOLD | OUTPATIENT
Start: 2022-03-07 | End: 2022-04-14 | Stop reason: HOSPADM

## 2022-03-22 ENCOUNTER — OFFICE VISIT (OUTPATIENT)
Dept: FAMILY MEDICINE CLINIC | Age: 77
End: 2022-03-22
Payer: MEDICARE

## 2022-03-22 VITALS
SYSTOLIC BLOOD PRESSURE: 132 MMHG | HEART RATE: 46 BPM | DIASTOLIC BLOOD PRESSURE: 70 MMHG | WEIGHT: 259 LBS | HEIGHT: 74 IN | BODY MASS INDEX: 33.24 KG/M2 | OXYGEN SATURATION: 94 %

## 2022-03-22 DIAGNOSIS — I10 HYPERTENSION, UNSPECIFIED TYPE: ICD-10-CM

## 2022-03-22 DIAGNOSIS — Z12.5 SCREENING FOR PROSTATE CANCER: ICD-10-CM

## 2022-03-22 DIAGNOSIS — Z00.00 MEDICARE ANNUAL WELLNESS VISIT, SUBSEQUENT: Primary | ICD-10-CM

## 2022-03-22 DIAGNOSIS — E78.5 HYPERLIPIDEMIA, UNSPECIFIED HYPERLIPIDEMIA TYPE: ICD-10-CM

## 2022-03-22 LAB
A/G RATIO: 1.8 (ref 1.1–2.2)
ALBUMIN SERPL-MCNC: 4.2 G/DL (ref 3.4–5)
ALP BLD-CCNC: 66 U/L (ref 40–129)
ALT SERPL-CCNC: 23 U/L (ref 10–40)
ANION GAP SERPL CALCULATED.3IONS-SCNC: 13 MMOL/L (ref 3–16)
AST SERPL-CCNC: 17 U/L (ref 15–37)
BILIRUB SERPL-MCNC: 0.5 MG/DL (ref 0–1)
BUN BLDV-MCNC: 22 MG/DL (ref 7–20)
CALCIUM SERPL-MCNC: 9.3 MG/DL (ref 8.3–10.6)
CHLORIDE BLD-SCNC: 99 MMOL/L (ref 99–110)
CHOLESTEROL, FASTING: 145 MG/DL (ref 0–199)
CO2: 28 MMOL/L (ref 21–32)
CREAT SERPL-MCNC: 1.1 MG/DL (ref 0.8–1.3)
GFR AFRICAN AMERICAN: >60
GFR NON-AFRICAN AMERICAN: >60
GLUCOSE BLD-MCNC: 103 MG/DL (ref 70–99)
HDLC SERPL-MCNC: 59 MG/DL (ref 40–60)
LDL CHOLESTEROL CALCULATED: 75 MG/DL
POTASSIUM SERPL-SCNC: 4.2 MMOL/L (ref 3.5–5.1)
SODIUM BLD-SCNC: 140 MMOL/L (ref 136–145)
TOTAL PROTEIN: 6.5 G/DL (ref 6.4–8.2)
TRIGLYCERIDE, FASTING: 55 MG/DL (ref 0–150)
VLDLC SERPL CALC-MCNC: 11 MG/DL

## 2022-03-22 PROCEDURE — G0439 PPPS, SUBSEQ VISIT: HCPCS | Performed by: FAMILY MEDICINE

## 2022-03-22 PROCEDURE — 36415 COLL VENOUS BLD VENIPUNCTURE: CPT | Performed by: FAMILY MEDICINE

## 2022-03-22 ASSESSMENT — PATIENT HEALTH QUESTIONNAIRE - PHQ9
3. TROUBLE FALLING OR STAYING ASLEEP: 3
4. FEELING TIRED OR HAVING LITTLE ENERGY: 3
SUM OF ALL RESPONSES TO PHQ QUESTIONS 1-9: 18
SUM OF ALL RESPONSES TO PHQ QUESTIONS 1-9: 18
2. FEELING DOWN, DEPRESSED OR HOPELESS: 3
1. LITTLE INTEREST OR PLEASURE IN DOING THINGS: 3
6. FEELING BAD ABOUT YOURSELF - OR THAT YOU ARE A FAILURE OR HAVE LET YOURSELF OR YOUR FAMILY DOWN: 0
SUM OF ALL RESPONSES TO PHQ QUESTIONS 1-9: 18
5. POOR APPETITE OR OVEREATING: 3
SUM OF ALL RESPONSES TO PHQ9 QUESTIONS 1 & 2: 6
9. THOUGHTS THAT YOU WOULD BE BETTER OFF DEAD, OR OF HURTING YOURSELF: 0
SUM OF ALL RESPONSES TO PHQ QUESTIONS 1-9: 18
8. MOVING OR SPEAKING SO SLOWLY THAT OTHER PEOPLE COULD HAVE NOTICED. OR THE OPPOSITE, BEING SO FIGETY OR RESTLESS THAT YOU HAVE BEEN MOVING AROUND A LOT MORE THAN USUAL: 0
7. TROUBLE CONCENTRATING ON THINGS, SUCH AS READING THE NEWSPAPER OR WATCHING TELEVISION: 3
10. IF YOU CHECKED OFF ANY PROBLEMS, HOW DIFFICULT HAVE THESE PROBLEMS MADE IT FOR YOU TO DO YOUR WORK, TAKE CARE OF THINGS AT HOME, OR GET ALONG WITH OTHER PEOPLE: 0

## 2022-03-22 ASSESSMENT — LIFESTYLE VARIABLES
HOW OFTEN DO YOU HAVE A DRINK CONTAINING ALCOHOL: MONTHLY OR LESS
HOW MANY STANDARD DRINKS CONTAINING ALCOHOL DO YOU HAVE ON A TYPICAL DAY: 1 OR 2

## 2022-03-22 NOTE — PROGRESS NOTES
Medicare Annual Wellness Visit    Romeo Smith is here for Medicare AWV and Hypertension    Assessment & Plan   Medicare annual wellness visit, subsequent      Recommendations for Preventive Services Due: see orders and patient instructions/AVS.  Recommended screening schedule for the next 5-10 years is provided to the patient in written form: see Patient Instructions/AVS.     No follow-ups on file. Subjective   The following acute and/or chronic problems were also addressed today:      Patient's complete Health Risk Assessment and screening values have been reviewed and are found in Flowsheets. The following problems were reviewed today and where indicated follow up appointments were made and/or referrals ordered.     Positive Risk Factor Screenings with Interventions:    Fall Risk:  Do you feel unsteady or are you worried about falling? : (!) yes  2 or more falls in past year?: no  Fall with injury in past year?: no     Fall Risk Interventions:    · Home safety tips provided     Depression:  PHQ-2 Score: 6  PHQ-9 Total Score: 18    Severity:1-4 = minimal depression, 5-9 = mild depression, 10-14 = moderate depression, 15-19 = moderately severe depression, 20-27 = severe depression    Depression Interventions:  · Patient advised to follow-up in this office for further evaluation and treatment within 1 week          General Health and ACP:  General  In general, how would you say your health is?: Good  In the past 7 days, have you experienced any of the following: New or Increased Pain, New or Increased Fatigue, Loneliness, Social Isolation, Stress or Anger?: (!) Yes  Select all that apply: (!) Anger,Stress,New or Increased Pain  Do you get the social and emotional support that you need?: Yes  Do you have a Living Will?: Yes    Advance Directives     Power of  Living Will ACP-Advance Directive ACP-Power of     Not on File Not on File Not on File Not on [facility] Interventions:  · grieving loss of significant other    Health Habits/Nutrition:     Physical Activity: Inactive    Days of Exercise per Week: 0 days    Minutes of Exercise per Session: 0 min     Have you lost any weight without trying in the past 3 months?: (!) Yes  Body mass index: (!) 33.25  Have you seen the dentist within the past year?: (!) No    Health Habits/Nutrition Interventions:  · Dental exam overdue:  patient encouraged to make appointment with his/her dentist     Safety:  Do you have working smoke detectors?: Yes  Do you have any tripping hazards - loose or unsecured carpets or rugs?: No  Do you have any tripping hazards - clutter in doorways, halls, or stairs?: No  Do you have either shower bars, grab bars, non-slip mats or non-slip surfaces in your shower or bathtub?: (!) No  Do all of your stairways have a railing or banister?: Yes  Do you always fasten your seatbelt when you are in a car?: Yes    Safety Interventions:  · Home safety tips provided           Objective   Vitals:    03/22/22 1250   BP: 132/70   Pulse: (!) 46   SpO2: 94%   Weight: 259 lb (117.5 kg)   Height: 6' 2\" (1.88 m)      Body mass index is 33.25 kg/m².         General Appearance: alert and oriented to person, place and time, well developed and well- nourished, in no acute distress  Skin: warm and dry, no rash or erythema  Head: normocephalic and atraumatic  Eyes: pupils equal, round, and reactive to light, extraocular eye movements intact, conjunctivae normal  ENT: tympanic membrane, external ear and ear canal normal bilaterally, nose without deformity, nasal mucosa and turbinates normal without polyps  Neck: supple and non-tender without mass, no thyromegaly or thyroid nodules, no cervical lymphadenopathy  Pulmonary/Chest: clear to auscultation bilaterally- no wheezes, rales or rhonchi, normal air movement, no respiratory distress  Cardiovascular: normal rate, regular rhythm, normal S1 and S2, no murmurs, rubs, clicks, or gallops, distal pulses intact, no carotid bruits  Abdomen: soft, non-tender, non-distended, normal bowel sounds, no masses or organomegaly  Extremities: no cyanosis, clubbing or edema  Musculoskeletal: normal range of motion, no joint swelling, deformity or tenderness  Neurologic: reflexes normal and symmetric, no cranial nerve deficit, gait, coordination and speech normal       Allergies   Allergen Reactions    Sulfa Antibiotics Anaphylaxis     hives    Amlodipine Hives     Severe hives  Patient tolerates nicardipine    Lisinopril Swelling     Face swelled/arms and legs swelled     Prior to Visit Medications    Medication Sig Taking? Authorizing Provider   chlorthalidone (HYGROTON) 25 MG tablet TAKE 1 TABLET BY MOUTH DAILY Yes Ethlyn Sensing, APRN - CNP   metoprolol tartrate (LOPRESSOR) 25 MG tablet TAKE 1 TABLET BY MOUTH TWICE DAILY Yes Josee Walker MD   NIFEdipine (ADALAT CC) 60 MG extended release tablet TAKE 1 TABLET BY MOUTH DAILY Yes Josee Walker MD   atorvastatin (LIPITOR) 80 MG tablet TAKE 1 TABLET BY MOUTH EVERY NIGHT Yes Josee Walker MD   citalopram (CELEXA) 40 MG tablet TAKE 1 TABLET BY MOUTH DAILY Yes Josee Walker MD   CO ENZYME Q-10 PO Take by mouth Yes Historical Provider, MD   Garlic-Calcium (BL GARLIC PO) Take by mouth Yes Historical Provider, MD   aspirin 325 MG EC tablet Take 1 tablet by mouth daily for 14 days Yes KETAN Sanders   mometasone (ELOCON) 0.1 % cream Apply topically daily. Yes Josee Wlaker MD   FIBER PO Take by mouth Yes Historical Provider, MD   fluocinonide (LIDEX) 0.05 % ointment Apply sparingly to affected area(s) up to bid prn Yes Monserrat Garsia MD   omeprazole (PRILOSEC) 20 MG capsule Take 40 mg by mouth daily Yes Historical Provider, MD   Multiple Vitamins-Minerals (THERAPEUTIC MULTIVITAMIN-MINERALS) tablet Take 1 tablet by mouth daily.  Yes Historical Provider, MD   magnesium citrate solution Take 296 mLs by mouth once for 1 dose  Josee Walker MD CareTeam (Including outside providers/suppliers regularly involved in providing care):   Patient Care Team:  Constantino Farfan MD as PCP - General (Family Medicine)  Constantino Farfan MD as PCP - [facility] Empaneled Provider  Marian Wesley MD (Dermatology)    Reviewed and updated this visit:  Tobacco  Allergies  Meds  Med Hx  Surg Hx  Soc Hx  Fam Hx

## 2022-03-22 NOTE — PATIENT INSTRUCTIONS
Personalized Preventive Plan for [facility] - 3/22/2022  Medicare offers a range of preventive health benefits. Some of the tests and screenings are paid in full while other may be subject to a deductible, co-insurance, and/or copay. Some of these benefits include a comprehensive review of your medical history including lifestyle, illnesses that may run in your family, and various assessments and screenings as appropriate. After reviewing your medical record and screening and assessments performed today your provider may have ordered immunizations, labs, imaging, and/or referrals for you. A list of these orders (if applicable) as well as your Preventive Care list are included within your After Visit Summary for your review. Other Preventive Recommendations:    · A preventive eye exam performed by an eye specialist is recommended every 1-2 years to screen for glaucoma; cataracts, macular degeneration, and other eye disorders. · A preventive dental visit is recommended every 6 months. · Try to get at least 150 minutes of exercise per week or 10,000 steps per day on a pedometer . · Order or download the FREE \"Exercise & Physical Activity: Your Everyday Guide\" from [employer]. Call 7-501.285.5788 or search [employer] online. · You need 2145-8125 mg of calcium and 9232-0928 IU of vitamin D per day. It is possible to meet your calcium requirement with diet alone, but a vitamin D supplement is usually necessary to meet this goal.  · When exposed to the sun, use a sunscreen that protects against both UVA and UVB radiation with an SPF of 30 or greater. Reapply every 2 to 3 hours or after sweating, drying off with a towel, or swimming. · Always wear a seat belt when traveling in a car. Always wear a helmet when riding a bicycle or motorcycle.

## 2022-03-23 LAB — PROSTATE SPECIFIC ANTIGEN: 1.34 NG/ML (ref 0–4)

## 2022-04-11 RX ORDER — ATORVASTATIN CALCIUM 80 MG/1
80 TABLET, FILM COATED ORAL DAILY
Qty: 90 TABLET | Refills: 2 | Status: SHIPPED | OUTPATIENT
Start: 2022-04-11

## 2022-04-11 NOTE — TELEPHONE ENCOUNTER
Thank you for the quick response! Will sign off.      Kimani Sam, PharmD, [place] // Department, toll free [phone], option [phone] [address] [place] [address] in place:  No   Recommendation Provided To: Provider: 1 via Note to Provider and Patient/Caregiver: 1 via Telephone   Intervention Detail: New Rx: 1, reason: Improve Adherence   Gap Closed?: Yes    Intervention Accepted By: Provider: 1 and Patient/Caregiver: 1   Time Spent (min): 15

## 2022-04-11 NOTE — TELEPHONE ENCOUNTER
Ruby Shi MD    Patient requests 90-day supply of atorvastatin to improve medication adherence and reduce trips to the pharmacy. I have pended a refill request for your convenience. Last OV on 3/22/22.      Thank you,   Tete Mayes, MarquesD, [place] // Department, toll free [phone], Option 1

## 2022-04-11 NOTE — TELEPHONE ENCOUNTER
[facility] CLINICAL PHARMACY: ADHERENCE REVIEW  Identified care gap per Schneider: fills at [facility]: Statin adherence    Last Visit: 3/22/22 [address] [facility] Records claims through 3.7.22  YTD Jarred Thompson = Filled only once; Potential Fail Date: 5/10/22):   ATORVASTATIN 80 MG Next refill due: 3/5/22    Per Reconciled Dispense Report:   last filled on 3/6/22 for 30 day supply. Per [facility]:    last picked up on 4/7/22 for 30 day supply. 1 refills remaining. Billed through [employer] Results   Component Value Date    CHOL 133 06/01/2021    TRIG 97 06/01/2021    HDL 59 03/22/2022    LDLCALC 75 03/22/2022     ALT   Date Value Ref Range Status   03/22/2022 23 10 - 40 U/L Final     AST   Date Value Ref Range Status   03/22/2022 17 15 - 37 U/L Final     The 10-year ASCVD risk score (Sekou Santos., et al., 2013) is: 27.6%    Values used to calculate the score:      Age: 68 years      Sex: Male      Is Non- : No      Diabetic: No      Tobacco smoker: No      Systolic Blood Pressure: 314 mmHg      Is BP treated: Yes      HDL Cholesterol: 59 mg/dL      Total Cholesterol: 145 mg/dL     PLAN  The following are interventions that have been identified:   - Patient eligible for 90 day supply of ATORVASTATIN 80 MG    Attempting to reach patient to review changing prescription from a 30-day supply to a 90-day supply.  Left message asking for return call      No future appointments. Nicky Spencer CP.    [address] [facility] free: [phone]

## 2022-04-12 ENCOUNTER — APPOINTMENT (OUTPATIENT)
Dept: CT IMAGING | Age: 77
DRG: 641 | End: 2022-04-12
Payer: MEDICARE

## 2022-04-12 ENCOUNTER — APPOINTMENT (OUTPATIENT)
Dept: GENERAL RADIOLOGY | Age: 77
DRG: 641 | End: 2022-04-12
Payer: MEDICARE

## 2022-04-12 ENCOUNTER — APPOINTMENT (OUTPATIENT)
Dept: VASCULAR LAB | Age: 77
DRG: 641 | End: 2022-04-12
Payer: MEDICARE

## 2022-04-12 ENCOUNTER — HOSPITAL ENCOUNTER (INPATIENT)
Age: 77
LOS: 2 days | Discharge: HOME | DRG: 641 | End: 2022-04-14
Attending: EMERGENCY MEDICINE | Admitting: INTERNAL MEDICINE
Payer: MEDICARE

## 2022-04-12 DIAGNOSIS — R06.02 SHORTNESS OF BREATH: Primary | ICD-10-CM

## 2022-04-12 DIAGNOSIS — R55 PRE-SYNCOPE: ICD-10-CM

## 2022-04-12 DIAGNOSIS — R42 DIZZINESS: ICD-10-CM

## 2022-04-12 DIAGNOSIS — R00.1 BRADYCARDIA: ICD-10-CM

## 2022-04-12 DIAGNOSIS — H53.9 CHANGE IN VISION: ICD-10-CM

## 2022-04-12 PROBLEM — R29.90 STROKE-LIKE SYMPTOMS: Status: ACTIVE | Noted: 2022-04-12

## 2022-04-12 PROBLEM — N18.30 ACUTE RENAL FAILURE SUPERIMPOSED ON STAGE 3 CHRONIC KIDNEY DISEASE (HCC): Status: ACTIVE | Noted: 2022-04-12

## 2022-04-12 PROBLEM — M79.661 RIGHT CALF PAIN: Status: ACTIVE | Noted: 2022-04-12

## 2022-04-12 PROBLEM — R53.83 FATIGUE: Status: ACTIVE | Noted: 2022-04-12

## 2022-04-12 PROBLEM — N17.9 ACUTE RENAL FAILURE (ARF) (HCC): Status: ACTIVE | Noted: 2022-04-12

## 2022-04-12 LAB
A/G RATIO: 1.9 (ref 1.1–2.2)
ALBUMIN SERPL-MCNC: 4 G/DL (ref 3.4–5)
ALP BLD-CCNC: 65 U/L (ref 40–129)
ALT SERPL-CCNC: 28 U/L (ref 10–40)
ANION GAP SERPL CALCULATED.3IONS-SCNC: 10 MMOL/L (ref 3–16)
AST SERPL-CCNC: 38 U/L (ref 15–37)
BASOPHILS ABSOLUTE: 0.1 K/UL (ref 0–0.2)
BASOPHILS RELATIVE PERCENT: 0.7 %
BILIRUB SERPL-MCNC: 0.5 MG/DL (ref 0–1)
BUN BLDV-MCNC: 23 MG/DL (ref 7–20)
CALCIUM SERPL-MCNC: 9.1 MG/DL (ref 8.3–10.6)
CHLORIDE BLD-SCNC: 102 MMOL/L (ref 99–110)
CO2: 26 MMOL/L (ref 21–32)
CREAT SERPL-MCNC: 1.4 MG/DL (ref 0.8–1.3)
D DIMER: 235 NG/ML DDU (ref 0–229)
EKG ATRIAL RATE: 48 BPM
EKG DIAGNOSIS: NORMAL
EKG P AXIS: 64 DEGREES
EKG P-R INTERVAL: 184 MS
EKG Q-T INTERVAL: 446 MS
EKG QRS DURATION: 88 MS
EKG QTC CALCULATION (BAZETT): 398 MS
EKG R AXIS: 30 DEGREES
EKG T AXIS: 78 DEGREES
EKG VENTRICULAR RATE: 48 BPM
EOSINOPHILS ABSOLUTE: 0.2 K/UL (ref 0–0.6)
EOSINOPHILS RELATIVE PERCENT: 2.3 %
GFR AFRICAN AMERICAN: 59
GFR NON-AFRICAN AMERICAN: 49
GLUCOSE BLD-MCNC: 128 MG/DL (ref 70–99)
HCT VFR BLD CALC: 38.3 % (ref 40.5–52.5)
HEMOGLOBIN: 12.9 G/DL (ref 13.5–17.5)
LYMPHOCYTES ABSOLUTE: 1.6 K/UL (ref 1–5.1)
LYMPHOCYTES RELATIVE PERCENT: 19 %
MCH RBC QN AUTO: 32.1 PG (ref 26–34)
MCHC RBC AUTO-ENTMCNC: 33.7 G/DL (ref 31–36)
MCV RBC AUTO: 95.1 FL (ref 80–100)
MONOCYTES ABSOLUTE: 0.7 K/UL (ref 0–1.3)
MONOCYTES RELATIVE PERCENT: 7.9 %
NEUTROPHILS ABSOLUTE: 5.8 K/UL (ref 1.7–7.7)
NEUTROPHILS RELATIVE PERCENT: 70.1 %
PDW BLD-RTO: 13.4 % (ref 12.4–15.4)
PLATELET # BLD: 231 K/UL (ref 135–450)
PMV BLD AUTO: 8.7 FL (ref 5–10.5)
POTASSIUM REFLEX MAGNESIUM: 4.7 MMOL/L (ref 3.5–5.1)
PRO-BNP: 445 PG/ML (ref 0–449)
RBC # BLD: 4.03 M/UL (ref 4.2–5.9)
SODIUM BLD-SCNC: 138 MMOL/L (ref 136–145)
TOTAL PROTEIN: 6.1 G/DL (ref 6.4–8.2)
TROPONIN: <0.01 NG/ML
WBC # BLD: 8.3 K/UL (ref 4–11)

## 2022-04-12 PROCEDURE — 6360000002 HC RX W HCPCS: Performed by: INTERNAL MEDICINE

## 2022-04-12 PROCEDURE — 1200000000 HC SEMI PRIVATE

## 2022-04-12 PROCEDURE — 85379 FIBRIN DEGRADATION QUANT: CPT

## 2022-04-12 PROCEDURE — 93005 ELECTROCARDIOGRAM TRACING: CPT | Performed by: PHYSICIAN ASSISTANT

## 2022-04-12 PROCEDURE — 85025 COMPLETE CBC W/AUTO DIFF WBC: CPT

## 2022-04-12 PROCEDURE — 83880 ASSAY OF NATRIURETIC PEPTIDE: CPT

## 2022-04-12 PROCEDURE — 93010 ELECTROCARDIOGRAM REPORT: CPT | Performed by: INTERNAL MEDICINE

## 2022-04-12 PROCEDURE — G0378 HOSPITAL OBSERVATION PER HR: HCPCS

## 2022-04-12 PROCEDURE — 6370000000 HC RX 637 (ALT 250 FOR IP): Performed by: INTERNAL MEDICINE

## 2022-04-12 PROCEDURE — 6360000004 HC RX CONTRAST MEDICATION: Performed by: PHYSICIAN ASSISTANT

## 2022-04-12 PROCEDURE — 71046 X-RAY EXAM CHEST 2 VIEWS: CPT

## 2022-04-12 PROCEDURE — 36415 COLL VENOUS BLD VENIPUNCTURE: CPT

## 2022-04-12 PROCEDURE — 80053 COMPREHEN METABOLIC PANEL: CPT

## 2022-04-12 PROCEDURE — 99283 EMERGENCY DEPT VISIT LOW MDM: CPT

## 2022-04-12 PROCEDURE — 2580000003 HC RX 258: Performed by: INTERNAL MEDICINE

## 2022-04-12 PROCEDURE — 80061 LIPID PANEL: CPT

## 2022-04-12 PROCEDURE — 2580000003 HC RX 258: Performed by: PHYSICIAN ASSISTANT

## 2022-04-12 PROCEDURE — 83036 HEMOGLOBIN GLYCOSYLATED A1C: CPT

## 2022-04-12 PROCEDURE — 96361 HYDRATE IV INFUSION ADD-ON: CPT

## 2022-04-12 PROCEDURE — 70450 CT HEAD/BRAIN W/O DYE: CPT

## 2022-04-12 PROCEDURE — 93971 EXTREMITY STUDY: CPT

## 2022-04-12 PROCEDURE — 71260 CT THORAX DX C+: CPT

## 2022-04-12 PROCEDURE — 84484 ASSAY OF TROPONIN QUANT: CPT

## 2022-04-12 PROCEDURE — 96374 THER/PROPH/DIAG INJ IV PUSH: CPT

## 2022-04-12 RX ORDER — HYDRALAZINE HYDROCHLORIDE 20 MG/ML
10 INJECTION INTRAMUSCULAR; INTRAVENOUS EVERY 4 HOURS PRN
Status: DISCONTINUED | OUTPATIENT
Start: 2022-04-12 | End: 2022-04-14 | Stop reason: HOSPADM

## 2022-04-12 RX ORDER — SODIUM CHLORIDE 0.9 % (FLUSH) 0.9 %
10 SYRINGE (ML) INJECTION PRN
Status: DISCONTINUED | OUTPATIENT
Start: 2022-04-12 | End: 2022-04-14 | Stop reason: HOSPADM

## 2022-04-12 RX ORDER — POLYETHYLENE GLYCOL 3350 17 G/17G
17 POWDER, FOR SOLUTION ORAL DAILY PRN
Status: DISCONTINUED | OUTPATIENT
Start: 2022-04-12 | End: 2022-04-12 | Stop reason: SDUPTHER

## 2022-04-12 RX ORDER — PANTOPRAZOLE SODIUM 40 MG/1
40 TABLET, DELAYED RELEASE ORAL
Status: DISCONTINUED | OUTPATIENT
Start: 2022-04-13 | End: 2022-04-14 | Stop reason: HOSPADM

## 2022-04-12 RX ORDER — ACETAMINOPHEN 325 MG/1
650 TABLET ORAL EVERY 4 HOURS PRN
Status: DISCONTINUED | OUTPATIENT
Start: 2022-04-12 | End: 2022-04-14 | Stop reason: HOSPADM

## 2022-04-12 RX ORDER — ONDANSETRON 4 MG/1
4 TABLET, ORALLY DISINTEGRATING ORAL EVERY 8 HOURS PRN
Status: DISCONTINUED | OUTPATIENT
Start: 2022-04-12 | End: 2022-04-14 | Stop reason: HOSPADM

## 2022-04-12 RX ORDER — 0.9 % SODIUM CHLORIDE 0.9 %
1000 INTRAVENOUS SOLUTION INTRAVENOUS ONCE
Status: COMPLETED | OUTPATIENT
Start: 2022-04-12 | End: 2022-04-12

## 2022-04-12 RX ORDER — ASPIRIN 300 MG/1
300 SUPPOSITORY RECTAL DAILY
Status: DISCONTINUED | OUTPATIENT
Start: 2022-04-13 | End: 2022-04-14 | Stop reason: HOSPADM

## 2022-04-12 RX ORDER — NIFEDIPINE 30 MG/1
60 TABLET, EXTENDED RELEASE ORAL DAILY
Status: DISCONTINUED | OUTPATIENT
Start: 2022-04-12 | End: 2022-04-14 | Stop reason: HOSPADM

## 2022-04-12 RX ORDER — SODIUM CHLORIDE 9 MG/ML
INJECTION, SOLUTION INTRAVENOUS PRN
Status: DISCONTINUED | OUTPATIENT
Start: 2022-04-12 | End: 2022-04-14 | Stop reason: HOSPADM

## 2022-04-12 RX ORDER — POLYETHYLENE GLYCOL 3350 17 G/17G
17 POWDER, FOR SOLUTION ORAL DAILY PRN
Status: DISCONTINUED | OUTPATIENT
Start: 2022-04-12 | End: 2022-04-14 | Stop reason: HOSPADM

## 2022-04-12 RX ORDER — ONDANSETRON 2 MG/ML
4 INJECTION INTRAMUSCULAR; INTRAVENOUS EVERY 4 HOURS PRN
Status: DISCONTINUED | OUTPATIENT
Start: 2022-04-12 | End: 2022-04-12 | Stop reason: SDUPTHER

## 2022-04-12 RX ORDER — KETOROLAC TROMETHAMINE 30 MG/ML
15 INJECTION, SOLUTION INTRAMUSCULAR; INTRAVENOUS ONCE
Status: DISCONTINUED | OUTPATIENT
Start: 2022-04-12 | End: 2022-04-13

## 2022-04-12 RX ORDER — CITALOPRAM 20 MG/1
40 TABLET ORAL DAILY
Status: DISCONTINUED | OUTPATIENT
Start: 2022-04-12 | End: 2022-04-14 | Stop reason: HOSPADM

## 2022-04-12 RX ORDER — ONDANSETRON 2 MG/ML
4 INJECTION INTRAMUSCULAR; INTRAVENOUS EVERY 6 HOURS PRN
Status: DISCONTINUED | OUTPATIENT
Start: 2022-04-12 | End: 2022-04-14 | Stop reason: HOSPADM

## 2022-04-12 RX ORDER — DIPHENHYDRAMINE HYDROCHLORIDE 50 MG/ML
25 INJECTION INTRAMUSCULAR; INTRAVENOUS ONCE
Status: COMPLETED | OUTPATIENT
Start: 2022-04-12 | End: 2022-04-12

## 2022-04-12 RX ORDER — METOCLOPRAMIDE HYDROCHLORIDE 5 MG/ML
10 INJECTION INTRAMUSCULAR; INTRAVENOUS ONCE
Status: DISCONTINUED | OUTPATIENT
Start: 2022-04-12 | End: 2022-04-14 | Stop reason: HOSPADM

## 2022-04-12 RX ORDER — SODIUM CHLORIDE 0.9 % (FLUSH) 0.9 %
10 SYRINGE (ML) INJECTION EVERY 12 HOURS SCHEDULED
Status: DISCONTINUED | OUTPATIENT
Start: 2022-04-12 | End: 2022-04-14 | Stop reason: HOSPADM

## 2022-04-12 RX ORDER — ACETAMINOPHEN 650 MG/1
650 SUPPOSITORY RECTAL EVERY 4 HOURS PRN
Status: DISCONTINUED | OUTPATIENT
Start: 2022-04-12 | End: 2022-04-14 | Stop reason: HOSPADM

## 2022-04-12 RX ORDER — ATORVASTATIN CALCIUM 80 MG/1
80 TABLET, FILM COATED ORAL DAILY
Status: DISCONTINUED | OUTPATIENT
Start: 2022-04-12 | End: 2022-04-14 | Stop reason: HOSPADM

## 2022-04-12 RX ORDER — ASPIRIN 81 MG/1
81 TABLET ORAL DAILY
Status: DISCONTINUED | OUTPATIENT
Start: 2022-04-13 | End: 2022-04-14 | Stop reason: HOSPADM

## 2022-04-12 RX ADMIN — IOPAMIDOL 75 ML: 755 INJECTION, SOLUTION INTRAVENOUS at 16:49

## 2022-04-12 RX ADMIN — DIPHENHYDRAMINE HYDROCHLORIDE 25 MG: 50 INJECTION, SOLUTION INTRAMUSCULAR; INTRAVENOUS at 15:37

## 2022-04-12 RX ADMIN — ATORVASTATIN CALCIUM 80 MG: 80 TABLET, FILM COATED ORAL at 21:45

## 2022-04-12 RX ADMIN — SODIUM CHLORIDE, PRESERVATIVE FREE 10 ML: 5 INJECTION INTRAVENOUS at 22:17

## 2022-04-12 RX ADMIN — ACETAMINOPHEN 650 MG: 325 TABLET ORAL at 22:17

## 2022-04-12 RX ADMIN — SODIUM CHLORIDE 1000 ML: 9 INJECTION, SOLUTION INTRAVENOUS at 15:23

## 2022-04-12 ASSESSMENT — ENCOUNTER SYMPTOMS
VOMITING: 0
DIARRHEA: 0
ABDOMINAL PAIN: 0
NAUSEA: 0
COUGH: 0
BACK PAIN: 0
SHORTNESS OF BREATH: 1
CHEST TIGHTNESS: 0

## 2022-04-12 ASSESSMENT — PAIN SCALES - GENERAL: PAINLEVEL_OUTOF10: 7

## 2022-04-12 NOTE — ED PROVIDER NOTES
**ADVANCED PRACTICE PROVIDER, I HAVE EVALUATED THIS [facility]  ED  EMERGENCY DEPARTMENT ENCOUNTER      Pt Name: Jojo Yun  XPETERF:[id number]  Dena 1945  Date of evaluation: 4/12/2022  Provider: Suzzette Bumpers, PA      Chief Complaint:    Chief Complaint   Patient presents with    Shortness of Breath     pt reporting vision in his left eye \"comes and goes\" reports in the past he's had an \"aura\" where he saw \"specks\" with migraines. today he states his vision has been coming and going \"all day\" pt also reporting a new onset of SOB. when asked if he's experienced dizziness pt reports he's \"three degrees of kilter\"     Vision Change    Dizziness         Nursing Notes, Past Medical Hx, Past Surgical Hx, Social Hx, Allergies, and Family Hx were all reviewed and agreed with or any disagreements were addressed in the HPI.    HPI: (Location, Duration, Timing, Severity, Quality, Assoc Sx, Context, Modifying factors)    Chief Complaint of shortness of breath, lightheadedness, and change in vision. This is a  68 y.o. male who presents via private vehicle with past medical history ofcarotid artery occlusion without infarction, hypertension, and high cholesterol. The patient presents to the emergency department stating \"I think I had a stroke again. \" He states approximately 2 hours ago he had sudden change in vision in his left eye. He describes the symptoms as intermittent and states he has had previous migraines with aura however he denies a headache today. He states he \"ruled out a migraine\" based on his symptoms. He also states that he is \"ruled out for heart attack as he is not having any chest pain. \"  He is also complaining of shortness of breath that began today. He denies a cough. He complains of lightheadedness worse with ambulation. He denies any dizziness or room spinning sensation. He denies any chest pain.   He does have unilateral leg swelling and states he has severe eczema on both legs which he is following with dermatology for. He does have pain in right calf. PastMedical/Surgical History:      Diagnosis Date    Bradycardia     Carotid artery occlusion without infarction, unspecified laterality 5/30/2021    ED (erectile dysfunction)     Hypercholesteremia     Hypertension     Low back pain 7/7/2014    Strabismus          Procedure Laterality Date    CAROTID ENDARTERECTOMY Right 6/7/2021    RIGHT SIDED CAROTID ENDARTERECTOMY performed by Jose Elias Roblero MD at [address] [facility]    CYST REMOVAL      ESOPHAGOGASTRIC FUNDOPLICATION      EXTERNAL EAR SURGERY      cancer spots    EYE SURGERY Left     for strabsismus    KNEE ARTHROSCOPY Right 1/7/2020    EXAM UNDER ANESTHESIA VIDEO ARTHROSCOPY RIGHT KNEE, PARTIAL MEDIAL MENISCECTOMY, PATELLA FEMORAL CHONDROPLASTY, SYNOVECTOMY performed by Torres Groves MD at [address] [facility] ENDOSCOPY  2011    UPPER GASTROINTESTINAL ENDOSCOPY  9/26/2014    gastritis barretts biopsy taken       Medications:  Previous Medications    ASPIRIN 325 MG EC TABLET    Take 1 tablet by mouth daily for 14 days    ATORVASTATIN (LIPITOR) 80 MG TABLET    Take 1 tablet by mouth daily    CHLORTHALIDONE (HYGROTON) 25 MG TABLET    TAKE 1 TABLET BY MOUTH DAILY    CITALOPRAM (CELEXA) 40 MG TABLET    TAKE 1 TABLET BY MOUTH DAILY    CO ENZYME Q-10 PO    Take by mouth    FIBER PO    Take by mouth    FLUOCINONIDE (LIDEX) 0.05 % OINTMENT    Apply sparingly to affected area(s) up to bid prn    GARLIC-CALCIUM (BL GARLIC PO)    Take by mouth    MAGNESIUM CITRATE SOLUTION    Take 296 mLs by mouth once for 1 dose    METOPROLOL TARTRATE (LOPRESSOR) 25 MG TABLET    TAKE 1 TABLET BY MOUTH TWICE DAILY    MOMETASONE (ELOCON) 0.1 % CREAM    Apply topically daily. MULTIPLE VITAMINS-MINERALS (THERAPEUTIC MULTIVITAMIN-MINERALS) TABLET    Take 1 tablet by mouth daily.     NIFEDIPINE (ADALAT CC) 60 MG EXTENDED RELEASE TABLET    TAKE 1 TABLET BY MOUTH DAILY    OMEPRAZOLE (PRILOSEC) 20 MG CAPSULE    Take 40 mg by mouth daily         Review of Systems:  (2-9 systems needed)  Review of Systems   Constitutional: Negative for chills and fever. HENT: Negative for congestion. Eyes: Positive for visual disturbance. Respiratory: Positive for shortness of breath. Negative for cough and chest tightness. Cardiovascular: Negative for chest pain and palpitations. Gastrointestinal: Negative for abdominal pain, diarrhea, nausea and vomiting. Genitourinary: Negative for dysuria, frequency, hematuria and urgency. Musculoskeletal: Negative for back pain. Skin: Negative for rash. Neurological: Positive for light-headedness. Negative for dizziness, weakness and headaches. Physical Exam:  Physical Exam  Vitals and nursing note reviewed. Constitutional:       Appearance: Normal appearance. He is not diaphoretic. HENT:      Head: Normocephalic and atraumatic. Nose: Nose normal.   Eyes:      General:         Right eye: No discharge. Left eye: No discharge. Cardiovascular:      Rate and Rhythm: Normal rate and regular rhythm. Pulses: Normal pulses. Heart sounds: Normal heart sounds. No murmur heard. No friction rub. No gallop. Pulmonary:      Effort: Pulmonary effort is normal. No respiratory distress. Breath sounds: Normal breath sounds. No stridor. No wheezing, rhonchi or rales. Abdominal:      Palpations: Abdomen is soft. There is no mass. Tenderness: There is no abdominal tenderness. There is no guarding or rebound. Musculoskeletal:         General: Normal range of motion. Cervical back: Normal range of motion and neck supple. Right lower leg: No tenderness. Left lower leg: Tenderness present. Edema present. Skin:     General: Skin is warm and dry. Coloration: Skin is not pale.    Neurological:      Mental Status: He is alert and oriented to person, place, and time. GCS: GCS eye subscore is 4. GCS verbal subscore is 5. GCS motor subscore is 6. Cranial Nerves: Cranial nerves are intact. Sensory: Sensation is intact. Motor: Motor function is intact. Coordination: Coordination is intact. Gait: Gait is intact. Psychiatric:         Mood and Affect: Mood normal.         Behavior: Behavior normal.         MEDICAL DECISION MAKING    Vitals:    Vitals:    04/12/22 1617 04/12/22 1632 04/12/22 1641 04/12/22 1738   BP: (!) 116/58 (!) 115/58     Pulse: (!) 49 50 (!) 47 50   Resp: 15 16 16 11   Temp:       TempSrc:       SpO2: 95% 95% 94% 95%   Weight:       Height:           LABS:  Labs Reviewed   CBC WITH AUTO DIFFERENTIAL - Abnormal; Notable for the following components:       Result Value    RBC 4.03 (*)     Hemoglobin 12.9 (*)     Hematocrit 38.3 (*)     All other components within normal limits   COMPREHENSIVE METABOLIC PANEL W/ REFLEX TO MG FOR LOW K - Abnormal; Notable for the following components:    Glucose 128 (*)     BUN 23 (*)     CREATININE 1.4 (*)     GFR Non- 49 (*)     GFR  59 (*)     Total Protein 6.1 (*)     AST 38 (*)     All other components within normal limits   D-DIMER, QUANTITATIVE - Abnormal; Notable for the following components:    D-Dimer, Quant 235 (*)     All other components within normal limits   TROPONIN        Remainder of labs reviewed and were negative at this time or not returned at the time of this note. RADIOLOGY:   Non-plain film images such as CT, Ultrasound and MRI are read by the radiologist. KETAN Gallagher have directly visualized the radiologic plain film image(s) with the below findings:      Interpretation per the Radiologist below, if available at the time of this note:    CT CHEST PULMONARY EMBOLISM W CONTRAST   Final Result   1. No evidence of pulmonary embolism or acute pulmonary abnormality.       2.  Moderate hiatal hernia      3. Cholelithiasis         VL Extremity Venous Right   Final Result      CT Head WO Contrast   Final Result      No acute intracranial abnormality noted. XR CHEST (2 VW)   Final Result   No acute cardiopulmonary disease              XR CHEST (2 VW)    Result Date: 4/12/2022  EXAMINATION: TWO XRAY VIEWS OF THE CHEST 4/12/2022 2:44 pm COMPARISON: None. HISTORY: ORDERING SYSTEM PROVIDED HISTORY: sob TECHNOLOGIST PROVIDED HISTORY: Reason for exam:->sob Reason for Exam: sob FINDINGS: No acute airspace infiltrate. No pneumothorax or pleural effusion. Normal cardiomediastinal silhouette. No acute cardiopulmonary disease     CT Head WO Contrast    Result Date: 4/12/2022  EXAM: CT Head Without Intravenous Contrast EXAM DATE/TIME: 4/12/2022 3:04 pm CLINICAL HISTORY: ORDERING SYSTEM PROVIDED  light headed  TECHNOLOGIST PROVIDED HISTORY: Reason for exam:->light headed  Has a \"code stroke\" or \"stroke alert\" been called? ->No  Decision Support Exception - unselect if not a suspected or confirmed emergency medical condition->Emergency Medical Condition (MA) Reason for Exam: Similar stroke symptoms as before, dizziness, fatigue, flashing lights  Relevant Medical/Surgical History: hx of stroke 5/21, surgery carotid TECHNIQUE: Axial computed tomography images of the head/brain without intravenous contrast.  This CT exam was performed using one or more of the following dose reduction techniques:  automated exposure control, adjustment of the mA and/or kV according to patient size, and/or use of iterative reconstruction technique. COMPARISON: 06/07/2021 FINDINGS: Brain:  No acute findings. No hemorrhage. Small punctate calcification or possibly a small dural calcification noted within arch a sent to a sulcus of the right posterolateral parietal lobe and unchanged. Ventricles:  No acute findings. No ventriculomegaly. Bones/joints:  No acute findings. No acute fracture. Soft tissues:  No acute findings. Sinuses:  Unremarkable as visualized. No acute sinusitis. Mastoid air cells:  Unremarkable as visualized. No mastoid effusion. No acute intracranial abnormality noted. VL Extremity Venous Right    Result Date: 4/12/2022  Vascular Lower Extremities DVT Study Procedure -- PRELIMINARY SONOGRAPHER REPORT --   Demographics   Patient Name       Alexandra Conte   Date of Study      04/12/2022         Gender              Male   Patient Number     [id number]         Date of Birth       1945   Visit Number       [id number]          Age                 68 year(s)   Accession Number   [id number]         Room Number         21   Corporate ID       [id number]            [address]                                                            [place]   Ordering Physician Jeimy Schmitt,           Interpreting        Priyank Klein [place]      Physician           Readers  Procedure Type of Study:   Veins:Lower Extremities DVT Study, VL EXTREMITY VENOUS DUPLEX RIGHT. Tech Comments Right No evidence of deep vein or superficial vein thrombosis involving the right lower extremity. Left There is no evidence of deep venous thrombosis involving the left common femoral vein. There is no previous exam for comparison. MEDICAL DECISION MAKING / ED COURSE:      Patient was evaluated in the emergency department today for multiple complaints. Blood pressure was 108/59 at triage, otherwise vitals stable. Patient is well-appearing on exam. I was initially called to the room for a stroke assessment. The patient's symptoms have resolved at this time. His NIH is 0. He does have unilateral right lower leg swelling with tenderness. Work-up will include right lower extremity vascular ultrasound, CT of the head, lab work and EKG and chest x-ray. Work-up results include:  CBC without evidence of leukocytosis or acute anemia  CMP with no acute electrolyte abnormality.   Kidney function slightly worsened with a BUN of 23, creatinine 1.4 and GFR 49. Baseline creatinine is 1.1  Troponin is less than 0.01  D-dimer is slightly elevated at 235    Imaging results:  CT chest PE study shows no evidence of pulmonary embolism or acute pulmonary abnormality. He is noted to have moderate hiatal hernia and cholelithiasis. CT head shows no acute intracranial abnormality. Vascular ultrasound of right lower extremity without evidence of DVT. EKG was interpreted by attending physician found to have sinus bradycardia. Patient was given:  Medications   ketorolac (TORADOL) injection 15 mg (15 mg IntraVENous Not Given 4/12/22 1537)   diphenhydrAMINE (BENADRYL) injection 25 mg (25 mg IntraVENous Not Given 4/12/22 1537)   metoclopramide (REGLAN) injection 10 mg (10 mg IntraVENous Not Given 4/12/22 1537)   0.9 % sodium chloride bolus (0 mLs IntraVENous Stopped 4/12/22 1652)   iopamidol (ISOVUE-370) 76 % injection 75 mL (75 mLs IntraVENous Given 4/12/22 1649)       I was notified by nursing staff and patient refused Benadryl, Toradol and Reglan as he was not having any pain. He is given 1 L of IV fluids. His work-up is overall very reassuring however given his plethora of symptoms without explanation I am recommending admission for further evaluation and treatment of symptomatic presyncopal workup. The patient initially was reluctant to admission, but ultimately agrees to be admitted. All questions are answered. The patient remains stable and hospitalist is consulted. The patient tolerated their visit well. I evaluated the patient. The physician was available for consultation as needed. The patient and / or the family were informed of the results of any tests, a time was given to answer questions, a plan was proposed and they agreed with plan. CLINICAL IMPRESSION:  1. Shortness of breath    2.  Change in vision      Results for orders placed or performed during the hospital encounter of 04/12/22 CBC with Auto Differential   Result Value Ref Range    WBC 8.3 4.0 - 11.0 K/uL    RBC 4.03 (L) 4.20 - 5.90 M/uL    Hemoglobin 12.9 (L) 13.5 - 17.5 g/dL    Hematocrit 38.3 (L) 40.5 - 52.5 %    MCV 95.1 80.0 - 100.0 fL    MCH 32.1 26.0 - 34.0 pg    MCHC 33.7 31.0 - 36.0 g/dL    RDW 13.4 12.4 - 15.4 %    Platelets 873 248 - 421 K/uL    MPV 8.7 5.0 - 10.5 fL    Neutrophils % 70.1 %    Lymphocytes % 19.0 %    Monocytes % 7.9 %    Eosinophils % 2.3 %    Basophils % 0.7 %    Neutrophils Absolute 5.8 1.7 - 7.7 K/uL    Lymphocytes Absolute 1.6 1.0 - 5.1 K/uL    Monocytes Absolute 0.7 0.0 - 1.3 K/uL    Eosinophils Absolute 0.2 0.0 - 0.6 K/uL    Basophils Absolute 0.1 0.0 - 0.2 K/uL   Comprehensive Metabolic Panel w/ Reflex to MG   Result Value Ref Range    Sodium 138 136 - 145 mmol/L    Potassium reflex Magnesium 4.7 3.5 - 5.1 mmol/L    Chloride 102 99 - 110 mmol/L    CO2 26 21 - 32 mmol/L    Anion Gap 10 3 - 16    Glucose 128 (H) 70 - 99 mg/dL    BUN 23 (H) 7 - 20 mg/dL    CREATININE 1.4 (H) 0.8 - 1.3 mg/dL    GFR Non- 49 (A) >60    GFR  59 (A) >60    Calcium 9.1 8.3 - 10.6 mg/dL    Total Protein 6.1 (L) 6.4 - 8.2 g/dL    Albumin 4.0 3.4 - 5.0 g/dL    Albumin/Globulin Ratio 1.9 1.1 - 2.2    Total Bilirubin 0.5 0.0 - 1.0 mg/dL    Alkaline Phosphatase 65 40 - 129 U/L    ALT 28 10 - 40 U/L    AST 38 (H) 15 - 37 U/L   Troponin   Result Value Ref Range    Troponin <0.01 <0.01 ng/mL   D-Dimer, Quantitative   Result Value Ref Range    D-Dimer, Quant 235 (H) 0 - 229 ng/mL DDU   Brain Natriuretic Peptide   Result Value Ref Range    Pro- 0 - 449 pg/mL   EKG 12 Lead   Result Value Ref Range    Ventricular Rate 48 BPM    Atrial Rate 48 BPM    P-R Interval 184 ms    QRS Duration 88 ms    Q-T Interval 446 ms    QTc Calculation (Bazett) 398 ms    P Axis 64 degrees    R Axis 30 degrees    T Axis 78 degrees    Diagnosis       Marked sinus bradycardiaNonspecific T wave abnormalityAbnormal ECGWhen compared with ECG of 29-MAY-2021 20:50,No significant change was foundConfirmed by Domi Loza MD, Claritza Fisher ([id number]) on 4/12/2022 8:01:13 PM       I spoke with Dr. Rhys Diaz hospitalist. We discussed the history, physical exam, diagnostics, as well as their emergency department course. Based upon that discussion, we've decided to admit Patsy Orozco for further observation and evaluation of Jaden KEVIN Gauthier's   1. Shortness of breath    2. Change in vision    . DISPOSITION Decision To Admit 04/12/2022 05:38:01 PM      PATIENT REFERRED TO:  No follow-up provider specified.     DISCHARGE MEDICATIONS:  New Prescriptions    No medications on file       DISCONTINUED MEDICATIONS:  Discontinued Medications    No medications on file              (Please note the MDM and HPI sections of this note were completed with a voice recognition program.  Efforts were made to edit the dictations but occasionally words are mis-transcribed.)    Electronically signed, Chepe Romero,           Chepe Romero  04/12/22 2007

## 2022-04-12 NOTE — ED PROVIDER NOTES
I independently performed a history and physical on [facility]. All diagnostic, treatment, and disposition decisions were made by myself in conjunction with the advanced practice provider. EKG: Sinus bradycardia, rate 48, normal axis, QTC within normal limits, no acute ST or T wave changes from prior on May 29, 2021    Patient with multiple complaints including lightheadedness, dyspnea, floaters in his vision. He has bradycardia with moments of mild hypotension. He is on a beta-blocker. Overall symptoms sound presyncopal.  No focal neuro deficits. Plan for hospitalist admission. For further details of [facility] emergency department encounter, please see Vj ACEVES's documentation.              Cony Bender MD  04/12/22 1924

## 2022-04-13 ENCOUNTER — APPOINTMENT (OUTPATIENT)
Dept: VASCULAR LAB | Age: 77
DRG: 641 | End: 2022-04-13
Payer: MEDICARE

## 2022-04-13 ENCOUNTER — APPOINTMENT (OUTPATIENT)
Dept: MRI IMAGING | Age: 77
DRG: 641 | End: 2022-04-13
Payer: MEDICARE

## 2022-04-13 PROBLEM — G45.9 TIA (TRANSIENT ISCHEMIC ATTACK): Status: ACTIVE | Noted: 2022-04-12

## 2022-04-13 LAB
ANION GAP SERPL CALCULATED.3IONS-SCNC: 8 MMOL/L (ref 3–16)
BASOPHILS ABSOLUTE: 0 K/UL (ref 0–0.2)
BASOPHILS RELATIVE PERCENT: 0.6 %
BUN BLDV-MCNC: 19 MG/DL (ref 7–20)
CALCIUM SERPL-MCNC: 8.9 MG/DL (ref 8.3–10.6)
CHLORIDE BLD-SCNC: 105 MMOL/L (ref 99–110)
CHOLESTEROL, TOTAL: 132 MG/DL (ref 0–199)
CO2: 29 MMOL/L (ref 21–32)
CREAT SERPL-MCNC: 1.1 MG/DL (ref 0.8–1.3)
EOSINOPHILS ABSOLUTE: 0.2 K/UL (ref 0–0.6)
EOSINOPHILS RELATIVE PERCENT: 4.3 %
ESTIMATED AVERAGE GLUCOSE: 122.6 MG/DL
GFR AFRICAN AMERICAN: >60
GFR NON-AFRICAN AMERICAN: >60
GLUCOSE BLD-MCNC: 113 MG/DL (ref 70–99)
HBA1C MFR BLD: 5.9 %
HCT VFR BLD CALC: 39 % (ref 40.5–52.5)
HDLC SERPL-MCNC: 50 MG/DL (ref 40–60)
HEMOGLOBIN: 13.1 G/DL (ref 13.5–17.5)
LDL CHOLESTEROL CALCULATED: 66 MG/DL
LYMPHOCYTES ABSOLUTE: 1.5 K/UL (ref 1–5.1)
LYMPHOCYTES RELATIVE PERCENT: 31.6 %
MCH RBC QN AUTO: 32.3 PG (ref 26–34)
MCHC RBC AUTO-ENTMCNC: 33.7 G/DL (ref 31–36)
MCV RBC AUTO: 95.8 FL (ref 80–100)
MONOCYTES ABSOLUTE: 0.5 K/UL (ref 0–1.3)
MONOCYTES RELATIVE PERCENT: 11.7 %
NEUTROPHILS ABSOLUTE: 2.4 K/UL (ref 1.7–7.7)
NEUTROPHILS RELATIVE PERCENT: 51.8 %
PDW BLD-RTO: 13.6 % (ref 12.4–15.4)
PLATELET # BLD: 188 K/UL (ref 135–450)
PMV BLD AUTO: 8.2 FL (ref 5–10.5)
POTASSIUM REFLEX MAGNESIUM: 4.2 MMOL/L (ref 3.5–5.1)
RBC # BLD: 4.08 M/UL (ref 4.2–5.9)
SODIUM BLD-SCNC: 142 MMOL/L (ref 136–145)
TRIGL SERPL-MCNC: 82 MG/DL (ref 0–150)
VLDLC SERPL CALC-MCNC: 16 MG/DL
WBC # BLD: 4.7 K/UL (ref 4–11)

## 2022-04-13 PROCEDURE — 97165 OT EVAL LOW COMPLEX 30 MIN: CPT

## 2022-04-13 PROCEDURE — 99223 1ST HOSP IP/OBS HIGH 75: CPT | Performed by: INTERNAL MEDICINE

## 2022-04-13 PROCEDURE — 99222 1ST HOSP IP/OBS MODERATE 55: CPT | Performed by: PSYCHIATRY & NEUROLOGY

## 2022-04-13 PROCEDURE — 97535 SELF CARE MNGMENT TRAINING: CPT

## 2022-04-13 PROCEDURE — 1200000000 HC SEMI PRIVATE

## 2022-04-13 PROCEDURE — 6360000002 HC RX W HCPCS: Performed by: INTERNAL MEDICINE

## 2022-04-13 PROCEDURE — 97116 GAIT TRAINING THERAPY: CPT

## 2022-04-13 PROCEDURE — G0378 HOSPITAL OBSERVATION PER HR: HCPCS

## 2022-04-13 PROCEDURE — 6370000000 HC RX 637 (ALT 250 FOR IP): Performed by: NURSE PRACTITIONER

## 2022-04-13 PROCEDURE — 36415 COLL VENOUS BLD VENIPUNCTURE: CPT

## 2022-04-13 PROCEDURE — 93880 EXTRACRANIAL BILAT STUDY: CPT

## 2022-04-13 PROCEDURE — 97530 THERAPEUTIC ACTIVITIES: CPT

## 2022-04-13 PROCEDURE — 85025 COMPLETE CBC W/AUTO DIFF WBC: CPT

## 2022-04-13 PROCEDURE — 97161 PT EVAL LOW COMPLEX 20 MIN: CPT

## 2022-04-13 PROCEDURE — 96375 TX/PRO/DX INJ NEW DRUG ADDON: CPT

## 2022-04-13 PROCEDURE — 80048 BASIC METABOLIC PNL TOTAL CA: CPT

## 2022-04-13 PROCEDURE — 96372 THER/PROPH/DIAG INJ SC/IM: CPT

## 2022-04-13 PROCEDURE — 2580000003 HC RX 258: Performed by: INTERNAL MEDICINE

## 2022-04-13 PROCEDURE — 6370000000 HC RX 637 (ALT 250 FOR IP): Performed by: INTERNAL MEDICINE

## 2022-04-13 PROCEDURE — 70551 MRI BRAIN STEM W/O DYE: CPT

## 2022-04-13 RX ORDER — DIPHENHYDRAMINE HCL 25 MG
50 TABLET ORAL NIGHTLY PRN
Status: DISCONTINUED | OUTPATIENT
Start: 2022-04-14 | End: 2022-04-13

## 2022-04-13 RX ORDER — DIPHENHYDRAMINE HCL 25 MG
50 TABLET ORAL NIGHTLY PRN
Status: DISCONTINUED | OUTPATIENT
Start: 2022-04-13 | End: 2022-04-14 | Stop reason: HOSPADM

## 2022-04-13 RX ADMIN — NIFEDIPINE 60 MG: 30 TABLET, FILM COATED, EXTENDED RELEASE ORAL at 08:26

## 2022-04-13 RX ADMIN — ACETAMINOPHEN 650 MG: 325 TABLET ORAL at 11:52

## 2022-04-13 RX ADMIN — CITALOPRAM HYDROBROMIDE 40 MG: 20 TABLET ORAL at 08:26

## 2022-04-13 RX ADMIN — ATORVASTATIN CALCIUM 80 MG: 80 TABLET, FILM COATED ORAL at 21:31

## 2022-04-13 RX ADMIN — ACETAMINOPHEN 650 MG: 325 TABLET ORAL at 21:31

## 2022-04-13 RX ADMIN — DIPHENHYDRAMINE HCL 50 MG: 25 TABLET ORAL at 21:46

## 2022-04-13 RX ADMIN — ASPIRIN 81 MG: 81 TABLET, COATED ORAL at 08:26

## 2022-04-13 RX ADMIN — ENOXAPARIN SODIUM 30 MG: 100 INJECTION SUBCUTANEOUS at 21:31

## 2022-04-13 RX ADMIN — HYDRALAZINE HYDROCHLORIDE 10 MG: 20 INJECTION INTRAMUSCULAR; INTRAVENOUS at 11:55

## 2022-04-13 RX ADMIN — SODIUM CHLORIDE, PRESERVATIVE FREE 10 ML: 5 INJECTION INTRAVENOUS at 08:26

## 2022-04-13 RX ADMIN — PANTOPRAZOLE SODIUM 40 MG: 40 TABLET, DELAYED RELEASE ORAL at 08:26

## 2022-04-13 RX ADMIN — SODIUM CHLORIDE, PRESERVATIVE FREE 10 ML: 5 INJECTION INTRAVENOUS at 21:31

## 2022-04-13 RX ADMIN — Medication 10 ML: at 11:55

## 2022-04-13 ASSESSMENT — PAIN DESCRIPTION - LOCATION
LOCATION: HEAD
LOCATION: HEAD
LOCATION_2: LEG

## 2022-04-13 ASSESSMENT — PAIN DESCRIPTION - ORIENTATION: ORIENTATION_2: LOWER;RIGHT

## 2022-04-13 ASSESSMENT — PAIN SCALES - GENERAL
PAINLEVEL_OUTOF10: 1
PAINLEVEL_OUTOF10: 4
PAINLEVEL_OUTOF10: 0
PAINLEVEL_OUTOF10: 5
PAINLEVEL_OUTOF10: 0
PAINLEVEL_OUTOF10: 0

## 2022-04-13 ASSESSMENT — PAIN DESCRIPTION - DESCRIPTORS
DESCRIPTORS: HEADACHE
DESCRIPTORS_2: CRAMPING;DISCOMFORT
DESCRIPTORS: HEADACHE

## 2022-04-13 ASSESSMENT — PAIN - FUNCTIONAL ASSESSMENT: PAIN_FUNCTIONAL_ASSESSMENT: ACTIVITIES ARE NOT PREVENTED

## 2022-04-13 ASSESSMENT — PAIN DESCRIPTION - ONSET
ONSET: ON-GOING
ONSET_2: ON-GOING

## 2022-04-13 ASSESSMENT — PAIN DESCRIPTION - FREQUENCY
FREQUENCY: INTERMITTENT
FREQUENCY: INTERMITTENT

## 2022-04-13 ASSESSMENT — PAIN DESCRIPTION - INTENSITY: RATING_2: 2

## 2022-04-13 ASSESSMENT — PAIN DESCRIPTION - PAIN TYPE
TYPE: ACUTE PAIN
TYPE_2: ACUTE PAIN
TYPE: ACUTE PAIN

## 2022-04-13 ASSESSMENT — PAIN DESCRIPTION - DURATION: DURATION_2: CONTINUOUS

## 2022-04-13 NOTE — CONSULTS
[address] [address]                                  CONSULTATION    PATIENT NAME: Oli Shore                      :        1945  MED REC NO:   [id number]                          ROOM:       4440  ACCOUNT NO:   [de-identified]                           ADMIT DATE: 2022  PROVIDER:     Rukhsana Franklin MD    CONSULT DATE:  2022    CARDIAC ELECTROPHYSIOLOGY CONSULTATION    REASON FOR CONSULTATION:  Bradycardia. HISTORY OF PRESENT ILLNESS:  The patient is a 68-year-old man with  history of hypertension, hyperlipidemia and sinus bradycardia who  presents for evaluation of dizziness and ocular symptoms. He has a  known history of carotid disease and presents with left eye symptoms  consisting of bright light flashes in the left eye. At the time of  admission, he is found to have sinus bradycardia. The sinus bradycardia  has been known about in the past.  He does have history of intermittent  dizziness, but these episodes are exclusively proceeded by change in  posture from supine or sitting to standing position. He has documented  orthostatic hypotension in the past.  He underwent ambulatory ECG  monitoring on 2020, which demonstrates sinus rhythm at an average  rate of 58 beats per minute. His maximal sinus rate was 99 with a  minimum of 45 beats per minute. ECG in admission from 2022  demonstrates sinus rhythm at 48 beats per minute. He is taking  metoprolol tartrate 25 mg p.o. b.i.d. He denies episodes of chest pain,  near-syncope or syncope. He believes his exercise tolerance is  well-preserved and he maintains a fairly active lifestyle. PAST MEDICAL HISTORY:  1. Hypertension. 2.  Hyperlipidemia. 3.  Sinus bradycardia. 4.  Carotid artery disease.     MEDICATIONS:  At the time of admission include, aspirin 325 mg p.o.  daily, Lipitor 80 mg p.o. daily, chlorthalidone 25 mg p.o. daily, Celexa  40 mg p.o. daily, metoprolol tartrate 25 mg p.o. b.i.d., nifedipine SR  60 mg p.o. daily and Prilosec 20 mg p.o. daily. ALLERGIES:  Include SULFA ANTIBIOTICS, AMLODIPINE and LISINOPRIL. SOCIAL HISTORY:  The patient does not smoke cigarettes. He does consume  alcohol about eight alcoholic drinks per week. FAMILY HISTORY:  Remarkable for hypertension and hyperlipidemia in the  mother. There is history of cancer in the father and two siblings. REVIEW OF SYSTEMS:  Demonstrates no recent change in appetite or change  in weight. The patient has not had recent fever or chills. He  describes the above-mentioned episodes of orthostatic dizziness, which  are usually self limiting lasting only several seconds. He has not had  syncope. All other components of the 14-system review are negative. PHYSICAL EXAMINATION:  VITAL SIGNS:  Blood pressure is 180/80, heart rate is 51 beats per  minute and regular. GENERAL:  The patient is awake, alert, oriented and in no discomfort. HEENT:  Exam demonstrates normocephalic and atraumatic head. There is  no scleral icterus. Pupils are round and reactive. NECK:  Supple without thyromegaly. LUNGS:  Clear to auscultation. CARDIOVASCULAR:  Exam reveals a regular bradycardic rhythm. The apical  impulse is discrete. S1 and S2 are normal.  There is no audible murmur  or gallop. The jugular venous pressure is not elevated. ABDOMEN:  Obese, soft and nontender. EXTREMITIES:  Demonstrate no pitting, pretibial or dependent edema. There is no cyanosis. There is no evidence for chronic venous stasis. SKIN:  Otherwise warm and dry without skin rash. DIAGNOSTIC DATA:  A 12-lead ECG from 04/12/2022 at 02:42 p.m.  demonstrates sinus bradycardia at 48 beats per minute. There are  nonspecific T-wave abnormalities. IMPRESSIONS:  1. Sinus bradycardia. 2.  Arterial hypotension. 3.  Ocular symptoms in the left eye.     The patient presents for evaluation of new ocular symptoms in left eye  consisting of a bright flashing sensation. He also had dizziness and  fatigue at the time of admission. On admission, his blood pressure was  low, in the 100 mmHg range. This did drop with change in posture to  standing. He is now back to be hypertensive and the symptoms have  abated. The etiology of the arterial hypotension is not clear, but his  symptoms do appear to be related to it. He has persistent sinus  bradycardia and likely has some element of underlying organic sick sinus  syndrome. It is unlikely that his symptoms are related to the sinus  bradycardia, however. I would be inclined to discontinue the metoprolol  for now to evaluate his sinus node function. He will require treatment  of his hypertension with other antihypertensive agents. It would be of  interest to determine his exercise related heart rate. In 2020, on  ambulatory ECG monitoring he was able to achieve a heart rate of 99  beats per minute with activity. If he has asymptomatic resting sinus  bradycardia and generates an appropriate heart rate response with  exercise, no particular intervention will be necessary. RECOMMENDATIONS:  1. Hydration with normal saline. 2.  Hold metoprolol. 3.  He will likely require treatment of arterial hypertension. 4.  Ambulatory ECG monitoring post discharge. Thanks for the opportunity to assist in the care of the patient. Please  contact me, if you have any questions regarding his evaluation.         Grace Jensen MD    D: 04/13/2022 13:31:32       T: 04/13/2022 13:34:00     [email or web address]  Job#: [id number]     Doc#: [id number]    CC:

## 2022-04-13 NOTE — PLAN OF CARE
Problem: HEMODYNAMIC STATUS  Goal: Patient has stable vital signs and fluid balance  Outcome: Ongoing   Patients vital signs monitored every four hours

## 2022-04-13 NOTE — PROGRESS NOTES
Orthostatic BP's:    Laying: BP= 191/95               HR = 59    Sitting: XI=697/89               HR=56    Standing: PR=391/94                   HR=60

## 2022-04-13 NOTE — H&P
Hospital Medicine  History and Physical    PCP: Carrol Jara MD  Patient Name: Cooper Nguyen    Date of Service: Pt seen/examined on 4/12/22 and admitted to Inpatient with expected LOS greater than two midnights due to medical therapy    CHIEF COMPLAINT:  Pt c/o shortness of breath, bright lights flashing \"mainly in the left eye,\" dizziness  HISTORY OF PRESENT ILLNESS: Pt is an 68y.o. year-old male with a history of hypertension and hyperlipidemia who presents to the ER for evaluation following a sudden change in vision in his left eye which began approximately 2 hours before his arrival in the ER and shortness of breath and dizziness which began today. He reports a history of migraines, but does not think this is the migraine. He describes his vision changes as right flashes in his left eye. This has happened episodically since onset. He also reports that he has extreme fatigue when the flashing is occurring. In the emergency room a CT of the head had no acute findings and a CTA of the head and neck performed 06/04/2021 with, \"High-grade stenosis of the right internal carotid artery, greater than a present diameter with large soft plaque and distal free-floating component, small thrombus. The findings unchanged from 5/30/2021. \" This study was not repeated. He was also found to have bradycardia. He reports a long h/o bradycardia. He is being admitted for further evaluation and treatment. Pt denies fall or head trauma, and associated signs and symptoms do not include neck pain or stiffness, diplopia, difficulty swallowing, numbness in the arms or legs, or focal neurological symptoms not mentioned above.         Past Medical History:        Diagnosis Date    Bradycardia     Carotid artery occlusion without infarction, unspecified laterality 5/30/2021    ED (erectile dysfunction)     Hypercholesteremia     Hypertension     Low back pain 7/7/2014    Strabismus        Past Surgical History:        Procedure Laterality Date    CAROTID ENDARTERECTOMY Right 6/7/2021    RIGHT SIDED CAROTID ENDARTERECTOMY performed by Ángela Larose MD at [address] [facility]  2011    CYST REMOVAL      ESOPHAGOGASTRIC FUNDOPLICATION      EXTERNAL EAR SURGERY      cancer spots    EYE SURGERY Left     for strabsismus    KNEE ARTHROSCOPY Right 1/7/2020    EXAM UNDER ANESTHESIA VIDEO ARTHROSCOPY RIGHT KNEE, PARTIAL MEDIAL MENISCECTOMY, PATELLA FEMORAL CHONDROPLASTY, SYNOVECTOMY performed by Emili Deng MD at [address] [facility] ENDOSCOPY  2011    UPPER GASTROINTESTINAL ENDOSCOPY  9/26/2014    gastritis barretts biopsy taken       Allergies:  Sulfa antibiotics, Amlodipine, and Lisinopril    Medications Prior to Admission:    Prior to Admission medications    Medication Sig Start Date End Date Taking? Authorizing Provider   atorvastatin (LIPITOR) 80 MG tablet Take 1 tablet by mouth daily 4/11/22   Peggy Saucedo MD   chlorthalidone (HYGROTON) 25 MG tablet TAKE 1 TABLET BY MOUTH DAILY 3/7/22   KANDY Smart - CNP   metoprolol tartrate (LOPRESSOR) 25 MG tablet TAKE 1 TABLET BY MOUTH TWICE DAILY 2/11/22   Peggy Saucedo MD   NIFEdipine (ADALAT CC) 60 MG extended release tablet TAKE 1 TABLET BY MOUTH DAILY 1/3/22   Peggy Saucedo MD   citalopram (CELEXA) 40 MG tablet TAKE 1 TABLET BY MOUTH DAILY 10/4/21   Peggy Saucedo MD   magnesium citrate solution Take 296 mLs by mouth once for 1 dose 6/16/21 6/16/21  Peggy Saucedo MD   CO ENZYME Q-10 PO Take by mouth    Historical Provider, MD   Garlic-Calcium (BL GARLIC PO) Take by mouth    Historical Provider, MD   aspirin 325 MG EC tablet Take 1 tablet by mouth daily for 14 days 1/7/20 3/22/22  KETAN Aguilar   mometasone (ELOCON) 0.1 % cream Apply topically daily.  11/19/19   Peggy Saucedo MD   FIBER PO Take by mouth    Historical Provider, MD   fluocinonide (LIDEX) 0.05 % ointment Apply sparingly to affected area(s) up to bid prn 2/1/16   Rachel Dougherty MD   omeprazole (PRILOSEC) 20 MG capsule Take 40 mg by mouth daily    Historical Provider, MD   Multiple Vitamins-Minerals (THERAPEUTIC MULTIVITAMIN-MINERALS) tablet Take 1 tablet by mouth daily. Historical Provider, MD       Family History:       Problem Relation Age of Onset    Stroke Mother 47    High Blood Pressure Mother     High Cholesterol Mother     Cancer Father         lung    Cancer Sister         breast    Cancer Brother         multiple myeloma      Social History:   TOBACCO:   reports that he has never smoked. He has never used smokeless tobacco.  ETOH:   reports current alcohol use of about 2.5 standard drinks of alcohol per week. OCCUPATION:      REVIEW OF SYSTEMS:  A full review of systems was performed and is negative except for that which appears in the HPI    Physical Exam:    Vitals: BP (!) 181/92   Pulse 51   Temp 97.6 °F (36.4 °C) (Oral)   Resp 15   Ht 6' 2\" (1.88 m)   Wt 263 lb 4.8 oz (119.4 kg)   SpO2 96%   BMI 33.81 kg/m²   General appearance: WD/WN 68y.o. year-old male who is alert, appears stated age and is cooperative  HEENT: Head: Normocephalic, no lesions, without obvious abnormality. Eye: Normal external eye, conjunctiva, lids cornea, PEERL  Ears: Normal external ears. Non-tender. Nose: Normal external nose, mucus membranes and septum. Pharynx: Dental Hygiene adequate. Normal buccal mucosa. Normal pharynx. Neck: no adenopathy, no carotid bruit, no JVD, supple, symmetrical, trachea midline and thyroid not enlarged, symmetric, no tenderness/mass/nodules  Lungs: clear to auscultation bilaterally and no use of accessory muscles  Heart:  bradycardic and regular rhythm, S1, S2 normal, no murmur, click, rub or gallop and normal apical impulse  Abdomen: soft, non-tender; bowel sounds normal; no masses, no organomegaly  Extremities: extremities atraumatic, no cyanosis or edema and Homans sign is negative, no sign of DVT.     Capillary Refill: Acceptable < 3 seconds   Peripheral Pulses: +3 easily felt, not easily obliterated with pressures   Skin: Skin color, texture, turgor normal. No rashes or lesions on exposed skin  Neurologic: Neurovascularly intact without any focal sensory/motor deficits. Cranial nerves: II-XII intact, grossly non-focal. Gait was not tested. Mental Status: Alert and oriented, thought content appropriate, normal insight      CBC:   Recent Labs     04/12/22  1446   WBC 8.3   HGB 12.9*        BMP:    Recent Labs     04/12/22  1446      K 4.7      CO2 26   BUN 23*   CREATININE 1.4*   GLUCOSE 128*     Troponin:   Recent Labs     04/12/22  1446   TROPONINI <0.01     PT/INR:  No results found for: PTINR  U/A:    Lab Results   Component Value Date    LEUKOCYTESUR Negative 05/20/2020    SPECGRAV 1.020 05/20/2020    UROBILINOGEN 0.2 05/20/2020    BILIRUBINUR Negative 05/20/2020    BLOODU Negative 05/20/2020    GLUCOSEU Negative 05/20/2020    PROTEINU Negative 05/20/2020         RAD:   I have independently reviewed and interpreted the imaging studies below and based my recommendations to the patient on those findings. XR CHEST (2 VW)    Result Date: 4/12/2022  EXAMINATION: TWO XRAY VIEWS OF THE CHEST 4/12/2022 2:44 pm COMPARISON: None. HISTORY: ORDERING SYSTEM PROVIDED HISTORY: sob TECHNOLOGIST PROVIDED HISTORY: Reason for exam:->sob Reason for Exam: sob FINDINGS: No acute airspace infiltrate. No pneumothorax or pleural effusion. Normal cardiomediastinal silhouette. No acute cardiopulmonary disease     CT Head WO Contrast    Result Date: 4/12/2022  EXAM: CT Head Without Intravenous Contrast EXAM DATE/TIME: 4/12/2022 3:04 pm CLINICAL HISTORY: ORDERING SYSTEM PROVIDED  light headed  TECHNOLOGIST PROVIDED HISTORY: Reason for exam:->light headed  Has a \"code stroke\" or \"stroke alert\" been called? ->No  Decision Support Exception - unselect if not a suspected or confirmed emergency medical condition->Emergency Medical Condition (MA) Reason for Exam: Similar stroke symptoms as before, dizziness, fatigue, flashing lights  Relevant Medical/Surgical History: hx of stroke 5/21, surgery carotid TECHNIQUE: Axial computed tomography images of the head/brain without intravenous contrast.  This CT exam was performed using one or more of the following dose reduction techniques:  automated exposure control, adjustment of the mA and/or kV according to patient size, and/or use of iterative reconstruction technique. COMPARISON: 06/07/2021 FINDINGS: Brain:  No acute findings. No hemorrhage. Small punctate calcification or possibly a small dural calcification noted within arch a sent to a sulcus of the right posterolateral parietal lobe and unchanged. Ventricles:  No acute findings. No ventriculomegaly. Bones/joints:  No acute findings. No acute fracture. Soft tissues:  No acute findings. Sinuses:  Unremarkable as visualized. No acute sinusitis. Mastoid air cells:  Unremarkable as visualized. No mastoid effusion. No acute intracranial abnormality noted. CT CHEST PULMONARY EMBOLISM W CONTRAST    Result Date: 4/12/2022  EXAMINATION: CTA OF THE CHEST 4/12/2022 4:41 pm TECHNIQUE: CTA of the chest was performed after the administration of intravenous contrast.  Multiplanar reformatted images are provided for review. MIP images are provided for review. Dose modulation, iterative reconstruction, and/or weight based adjustment of the mA/kV was utilized to reduce the radiation dose to as low as reasonably achievable. COMPARISON: None.  HISTORY: ORDERING SYSTEM PROVIDED HISTORY: elevated dimer with sob TECHNOLOGIST PROVIDED HISTORY: Reason for exam:->elevated dimer with sob Decision Support Exception - unselect if not a suspected or confirmed emergency medical condition->Emergency Medical Condition (MA) Reason for Exam: pt states he got dizzy and had sob today with no chest pain; states his BP was low;  feeling better now FINDINGS: Pulmonary Arteries: Pulmonary arteries are adequately opacified for evaluation. No evidence of intraluminal filling defect to suggest pulmonary embolism. Main pulmonary artery is normal in caliber. Mediastinum: No evidence of mediastinal lymphadenopathy. The heart and pericardium demonstrate no acute abnormality. There is no acute abnormality of the thoracic aorta. Lungs/pleura: Mild bibasilar atelectasis. No focal consolidation or pulmonary edema. No evidence of pleural effusion or pneumothorax. Upper Abdomen: A few layering stones are seen within the gallbladder. Moderate hiatal hernia. Soft Tissues/Bones: No acute bone or soft tissue abnormality. Remote fracture of the left 8th rib     1. No evidence of pulmonary embolism or acute pulmonary abnormality. 2.  Moderate hiatal hernia 3. Cholelithiasis     VL Extremity Venous Right    Result Date: 4/12/2022  Lower Extremities DVT Study  Demographics   Patient Name       Augustus Valdez   Date of Study      04/12/2022         Gender              Male   Patient Number     [id number]         Date of Birth       1945   Visit Number       [id number]          Age                 68 year(s)   Accession Number   [id number]         Room Number         21   Corporate ID       [id number]            [address] [place], [place]   Ordering Physician Ayaka Adams,           Interpreting        [place], [place]      Physician           Readers                                                            Gracy Sosa MD  Procedure Type of Study:   Veins:Lower Extremities DVT Study, VL EXTREMITY VENOUS DUPLEX RIGHT. Vascular Sonographer Report  Indications for Study:Leg pain and Swelling. Additional Indications:Patient scanned bedside in ED.  Patient presents with right lower extremity calf pain and localized swelling x 1 day. Venous Duplex Scan: B-mode imaging of the deep and superficial veins, with compression maneuvers, including color and Doppler spectral waveform analysis. Impressions Right Impression No evidence of deep vein or superficial vein thrombosis involving the right lower extremity. Left Impression There is no evidence of deep venous thrombosis involving the left common femoral vein. There is no previous exam for comparison. Conclusions   Summary   No evidence of deep vein or superficial thrombosis involving the right lower  extremity and the contralateral proximal common femoral vein. Signature   ------------------------------------------------------------------  Electronically signed by Brenda Gibbs MD (Interpreting  physician) on 04/12/2022 at 04:48 PM  ------------------------------------------------------------------  Patient Status:ER. Study [facility] - Vascular Lab. Technical Quality:Adequate visualization.   - Results were reported to:DAYNE Blair in ED @ [place] [address].. Risk Factors History +---------+----+--------+ ! Diagnosis! Date! Comments! +---------+----+--------+ ! CAD      !    !        ! +---------+----+--------+ Velocities are measured in cm/s ; Diameters are measured in mm Right Lower Extremities DVT Study Measurements Right 2D Measurements +------------------------+----------+---------------+----------+ ! Location                ! Visualized! Compressibility! Thrombosis! +------------------------+----------+---------------+----------+ ! Sapheno Femoral Junction! Yes       ! Yes            ! None      ! +------------------------+----------+---------------+----------+ ! GSV Thigh               ! Yes       ! Yes            ! None      ! +------------------------+----------+---------------+----------+ ! Common Femoral          !Yes       ! Yes            ! None      ! +------------------------+----------+---------------+----------+ ! Femoral                 !Yes       ! Yes            ! None ! +------------------------+----------+---------------+----------+ ! Prox Femoral            !Yes       ! Yes            ! None      ! +------------------------+----------+---------------+----------+ ! Mid Femoral             !Yes       ! Yes            ! None      ! +------------------------+----------+---------------+----------+ ! Dist Femoral            !Yes       ! Yes            ! None      ! +------------------------+----------+---------------+----------+ ! Deep Femoral            !Yes       ! Yes            ! None      ! +------------------------+----------+---------------+----------+ ! Popliteal               !Yes       ! Yes            ! None      ! +------------------------+----------+---------------+----------+ ! GSV Below Knee          ! Yes       ! Yes            ! None      ! +------------------------+----------+---------------+----------+ ! Gastroc                 ! Yes       ! Yes            ! None      ! +------------------------+----------+---------------+----------+ ! Soleal                  !Yes       ! Yes            ! None      ! +------------------------+----------+---------------+----------+ ! PTV                     ! Yes       ! Yes            ! None      ! +------------------------+----------+---------------+----------+ ! Peroneal                !Yes       ! Yes            ! None      ! +------------------------+----------+---------------+----------+ ! GSV Calf                ! Yes       ! Yes            ! None      ! +------------------------+----------+---------------+----------+ ! SSV                     ! Yes       ! Yes            ! None      ! +------------------------+----------+---------------+----------+ Right Doppler Measurements +------------------------+------+------+------------+ ! Location                ! Signal!Reflux! Reflux (sec)! +------------------------+------+------+------------+ ! Sapheno Femoral Junction! Phasic! No    !            ! +------------------------+------+------+------------+ ! Common Femoral !Phasic! No    !            ! +------------------------+------+------+------------+ ! Femoral                 !Phasic! No    !            ! +------------------------+------+------+------------+ ! Deep Femoral            !Phasic! No    !            ! +------------------------+------+------+------------+ ! Popliteal               !Phasic! No    !            ! +------------------------+------+------+------------+ Left Lower Extremities DVT Study Measurements Left 2D Measurements +--------------+----------+---------------+----------+ ! Location      ! Visualized! Compressibility! Thrombosis! +--------------+----------+---------------+----------+ ! Common Femoral!Yes       ! Yes            ! None      ! +--------------+----------+---------------+----------+ Left Doppler Measurements +--------------+------+------+------------+ ! Location      ! Signal!Reflux! Reflux (sec)! +--------------+------+------+------------+ ! Common Femoral!Phasic! No    !            ! +--------------+------+------+------------+      EKG:   Read by ER in the absence of a Cardiologist shows sinus bradycardia, rate 48, normal axis, QTC within normal limits, no acute ST or T wave changes from prior on May 29, 2021      Assessment:   Principal Problem:    Stroke-like symptoms  Active Problems:    HLD (hyperlipidemia)    Essential hypertension    Symptomatic bradycardia    Fatigue    Acute renal failure superimposed on stage 3 chronic kidney disease (HCC)    Right calf pain  Resolved Problems:    * No resolved hospital problems.  *      Plan:       Stroke-like symptoms vs atypical migraine symptoms vs presyncope including unilateral vision changes (bright flashes in left eye) - The following medications, tests and consults have been ordered;  · The  Stroke Team was not consulted  · CT of the head is negative for acute findings  · CTA of the Head and Neck performed 06/04/2021 with, \"High-grade stenosis of the right internal carotid artery, greater than a present diameter with large soft plaque and distal free-floating component, small thrombus. The findings unchanged from 5/30/2021. \" This study was not repeated. · MRI of the brain has been ordered  · Echocardiogram with bubble study ordered  · Stroke risk factors panel: A1C, lipid panel, EKG  · Monitor for arrhythmias on Telemetry  · Serial neurological checks will be preformed  · Aspirin is being given  · Continue home statin therapy and check LDL for effectivemness  · Lipid profile ordered  · PT/OT has been consulted  · No obvious dysphagia, so swallow screen by nursing before diet and oral medications started  · Neurology has been consulted    Fatigue (severe) - Episodic and occurs when the light is flashing in his left eye. Atypical migraine? Will await Neurology assessment    Shortness of breath - Without Hypoxia. Unclear etiology. CXR and CTPE without causative abnormalities. Monitor for now    Right calf pain - CTPE without PE. Venous dopplers, \"No evidence of deep vein or superficial vein thrombosis involving the right lower extremity. \"    Symptomatic bradycardia - Bradycardia and hypotension with lightheadedness. He reports a long h/o bradycardia. Will hold Metoprolol and monitor on Telemetry. Possibly pre-syncope. Consult Cardiology     Acute renal failure superimposed on stage 3 chronic kidney disease ([facility] [address] - etiology unclear. Prerenal suspected. Monitor for improvement on IV fluids    Essential (primary) hypertension - Hold Chlorthalidone due to renal failure, add prn Hydralazine and monitor blood pressure    Hyperlipidemia - No current evidence of Rhabdomyolysis or other adverse effects. Continue statin therapy while in the hospital    Non-morbid obesity due to excess calories (Body mass index is 33.81 kg/m². ) - Complicating assessment and treatment.  Placing patient at risk for multiple co-morbidities as well as early death and contributing to the patient's presentation        DVT Prophylaxis: Lovenox  Diet: ADULT DIET; Regular  Code Status: Full Code  (Advanced care planning has been discussed with patient and/or responsible family member and is reflected in the code status.  Further orders associated with this have been entered if appropriate)    Disposition: Anticipate that patient will remain in the hospital for 2 to 3 days depending on further evaluation and clinical course     Please note that over 50 minutes was spent in evaluating the patient, review of records and results, discussion with staff/family, etc.    Melissa Padilla MD

## 2022-04-13 NOTE — PROGRESS NOTES
Physical Therapy    Facility/Department: [facility] B3 - MED SURG  Initial Assessment, treatment, DC summary    NAME: Tobin Souza  : 1945  MRN: [id number]    Date of Service: 2022    Discharge Recommendations:  Home with assist PRN   PT Equipment Recommendations  Equipment Needed: No    Assessment   Assessment: Pt referred for PT evaluation with a diagnosis of SOB and c/o visual changes that have since resolved. Pt is currently functioning at baseline, and is independent for bed mobility, transfers, gait without AD and stair negotiation. PT does not need further PT in acute setting. Recommend home with PRN assist at DC from acute setting  Prognosis: Good  Decision Making: Low Complexity  PT Education: Goals;PT Role;Disease Specific Education  Patient Education: pt verbalized understanding of symptoms of stroke and what to do if experienced  Barriers to Learning: no  REQUIRES PT FOLLOW UP: No  Activity Tolerance  Activity Tolerance: Patient Tolerated treatment well       Patient Diagnosis(es): The primary encounter diagnosis was Shortness of breath. A diagnosis of Change in vision was also pertinent to this visit. has a past medical history of Acute renal failure (ARF) (HCC), Bradycardia, Carotid artery occlusion without infarction, unspecified laterality, ED (erectile dysfunction), Hypercholesteremia, Hypertension, Low back pain, and Strabismus. has a past surgical history that includes cyst removal; Esophagogastric fundoplication; Tonsillectomy; Strabismus surgery; External ear surgery; Colonoscopy (); Upper gastrointestinal endoscopy (); Upper gastrointestinal endoscopy (2014); eye surgery (Left); Knee arthroscopy (Right, 2020); and Carotid endarterectomy (Right, 2021).     Restrictions  Restrictions/Precautions  Restrictions/Precautions: Up as Tolerated  Position Activity Restriction  Other position/activity restrictions: NPO, up as tolerated, up with assist, up in chair  Vision/Hearing  Vision:  (visual tracking and peripheral vision WNL)  Vision Exceptions: Wears glasses at all times  Hearing: Within functional limits     Subjective  General  Chart Reviewed: Yes  Patient assessed for rehabilitation services?: Yes  Response To Previous Treatment: Not applicable  Family / Caregiver Present: No  Referring Practitioner: Errol Aragon MD  Referral Date : 22  Diagnosis: SOB  Follows Commands: Within Functional Limits  General Comment  Comments: Pt resting in bed upon entry, RN cleared pt for therapy  Subjective  Subjective: Pt agreeable to PT  Pain Screening  Patient Currently in Pain: Denies  Vital Signs  Pulse: 51  BP: (!) 211/103  BP Location: Right upper arm  Patient Position: High fowlers  Orthostatic B/P and Pulse?: Yes  Blood Pressure Lyin/103  Pulse Lyin PER MINUTE  Blood Pressure Sittin/103  Pulse Sittin PER MINUTE  Blood Pressure Standin/65  Pulse Standin PER MINUTE  Patient Currently in Pain: Denies  Orthostatic B/P and Pulse?: Yes  Oxygen Therapy  SpO2: 98 %  Pulse Oximeter Device Mode: Intermittent  Pulse Oximeter Device Location: Right;Finger  O2 Device: None (Room air)  Pre Treatment Pain Screening  Intervention List: Patient able to continue with treatment    Orientation  Orientation  Overall Orientation Status: Within Normal Limits  Social/Functional History  Social/Functional History  Lives With: Alone  Type of Home: House  Home Layout: Two level,Performs ADL's on one level,Able to Live on Main level with bedroom/bathroom  Home Access: Stairs to enter without rails  Entrance Stairs - Number of Steps: 2  Bathroom Shower/Tub: Tub/Shower unit  Bathroom Toilet: Standard  ADL Assistance: Independent  Homemaking Assistance: Independent  Ambulation Assistance: Independent  Transfer Assistance: Independent  Active : Yes  Occupation: Full time employment  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 Pt does not have 24/7 supervision/assistance available. Pt's wife passed away in November 2021. Objective          PROM RLE (degrees)  RLE PROM: WFL  AROM RLE (degrees)  RLE AROM: WFL  PROM LLE (degrees)  LLE PROM: WFL  AROM LLE (degrees)  LLE AROM : WFL  Strength RLE  Strength RLE: WNL  Comment: grossly 4+ to 5/5 throughout  Strength LLE  Strength LLE: WFL  Comment: slight decrease compared to R to 4 to 4+/5 throughout  Tone RLE  RLE Tone: Normotonic  Tone LLE  LLE Tone: Normotonic  Motor Control  Gross Motor?: WNL  Coordination  Rapid Alternating Movements: Normal  Finger to Nose: Normal  Heel to Shin: Normal  Sensation  Overall Sensation Status: Impaired  Light Touch: Partial deficits in the RLE  Additional Comments: pt reports chornic numbness R upper leg  Bed mobility  Supine to Sit: Independent  Sit to Supine: Independent  Scooting: Independent  Transfers  Sit to Stand: Independent  Stand to sit:  Independent  Ambulation  Ambulation?: Yes  Ambulation 1  Surface: level tile  Device: No Device  Assistance: Independent  Quality of Gait: increased AMADOU with mild postural sway and toeing out B, steady with no LOB  Distance: 200 ft  Stairs/Curb  Stairs?: Yes  Stairs  # Steps : 4  Stairs Height: 6\"  Device: No Device  Assistance: Independent     Balance  Posture: Good  Sitting - Static: Good  Sitting - Dynamic: Good  Standing - Static: Good  Standing - Dynamic: Good        Plan   Plan  Times per week: one time only  Safety Devices  Type of devices: Call light within reach,Gait belt,Left in bed,Nurse notified      AM-PAC Score  AM-PAC Inpatient Mobility Raw Score : 24 (04/13/22 0959)  AM-PAC Inpatient T-Scale Score : 61.14 (04/13/22 0959)  Mobility Inpatient CMS 0-100% Score: 0 (04/13/22 0959)  Mobility Inpatient CMS G-Code Modifier : [facility] (04/13/22 8544)          Goals  Short term goals  Time Frame for Short term goals: 4/13/22  Short term goal 1: Pt will perform bed mobility and transfers independently; goal met  Short term goal 2: Pt will ambulate 200 ft and negotiate 4 stairs without AD independently; goal met  Patient Goals   Patient goals : \"to go home today\"       Therapy Time   Individual Concurrent Group Co-treatment   Time In 0918         Time Out 0951         Minutes 33         Timed Code Treatment Minutes: 23 Minutes    If pt is discharged prior to next therapy session, this note will serve as discharge summary.     Afua Aguero, PT

## 2022-04-13 NOTE — PROGRESS NOTES
Hospitalist Progress Note    CC: TIA (transient ischemic attack)    Hospital course:  68y.o. year-old male with a history of hypertension and hyperlipidemia who presents to the ER for evaluation following a sudden change in vision in his left eye which began approximately 2 hours before his arrival in the ER and shortness of breath and dizziness which began today. He reports a history of migraines, but does not think this is the migraine. He describes his vision changes as right flashes in his left eye. This has happened episodically since onset. He also reports that he has extreme fatigue when the flashing is occurring. In the emergency room a CT of the head had no acute findings and a CTA of the head and neck performed 06/04/2021 with, \"High-grade stenosis of the right internal carotid artery, greater than a present diameter with large soft plaque and distal free-floating component, small thrombus. The findings unchanged from 5/30/2021. \" This study was not repeated. He was also found to have bradycardia. He reports a long h/o bradycardia. Admit date: 4/12/2022  Days in hospital:  1    24 Hour Events: pt sx improved    Subjective: pt sx improved - he was hypotensive yesterday, but has since rebounded and BP is poorly controlled - usually has dizziness on standing, now much worse - he has chronic bradycardia - pt hungry as he was made NPO by cardiology for unknown reason  - cardiology had wanted to place heart monitor at some point per patient - has known carotid disease    ROS:   A comprehensive review of systems was negative except for: denies SOB, dizziness or weakness .     Objective:    BP (!) 166/94   Pulse 60   Temp 97.7 °F (36.5 °C) (Oral)   Resp 19   Ht 6' 2\" (1.88 m)   Wt 263 lb 4.8 oz (119.4 kg)   SpO2 97%   BMI 33.81 kg/m²     Gen: NAD  HEENT: NC/AT, moist mucous membranes, no oropharyngeal erythema or exudate  Neck: supple, trachea midline, no anterior cervical or SC LAD  Heart:  Normal s1/s2, RRR, no murmurs, gallops, or rubs. no leg edema  Lungs:  Mostly clear bilaterally, no wheeze, no rales, no rhonchi, no crackles, no use of accessory muscles  Abd: bowel sounds present, soft, nontender, nondistended, no masses  Extrem:  No clubbing, cyanosis,  no edema  Skin: no rashes or lesions  Psych: A & O x3  Neuro: grossly intact, moves all four extremities. Assessment:    Principal Problem:    TIA (transient ischemic attack)  Active Problems:    Dyslipidemia    HTN (hypertension), benign    Symptomatic bradycardia    Fatigue    Acute renal failure superimposed on stage 3 chronic kidney disease (HCC)    Right calf pain    Hypertensive urgency    Dizziness  Resolved Problems:    * No resolved hospital problems. *      Plan:  1. TIA - improved - seemed more like prolonged dizziness in setting of dehydration - will await MRI and neuro input  2. HTN - poorly controlled - will order prn hydralazine  3. Bradycardia - chronic and has not changed - cardiology consulted - pt   4. Acute kidney injury on CKDIII - back to baseline - I did discuss CKD in this patient as he was not aware that he had any issue - avoid NSAIDs    Prognosis:  Fair    Code status:  Full code    DVT prophylaxis: Lovenox  GI prophylaxis: Proton Pump Inhibitor  Antibiotic prophylaxis indicated:   no  Diet:  ADULT DIET;  Regular    Disposition:  Home    Medications:  Scheduled Meds:   enoxaparin  30 mg SubCUTAneous BID    metoclopramide  10 mg IntraVENous Once    pantoprazole  40 mg Oral QAM AC    NIFEdipine  60 mg Oral Daily    citalopram  40 mg Oral Daily    atorvastatin  80 mg Oral Daily    sodium chloride flush  10 mL IntraVENous 2 times per day    aspirin  81 mg Oral Daily    Or    aspirin  300 mg Rectal Daily       PRN Meds:  sodium chloride flush, sodium chloride, polyethylene glycol, acetaminophen **OR** acetaminophen, ondansetron **OR** ondansetron, hydrALAZINE    IV:   sodium chloride           Intake/Output Summary (Last 24 hours) at 4/13/2022 1515  Last data filed at 4/13/2022 1333  Gross per 24 hour   Intake 50 ml   Output --   Net 50 ml       Results:  CBC:   Recent Labs     04/12/22  1446 04/13/22  0524   WBC 8.3 4.7   HGB 12.9* 13.1*   HCT 38.3* 39.0*   MCV 95.1 95.8    188     BMP:   Recent Labs     04/12/22  1446 04/13/22  0524    142   K 4.7 4.2    105   CO2 26 29   BUN 23* 19   CREATININE 1.4* 1.1     Mag: No results for input(s): MAG in the last 72 hours. Phos:   Lab Results   Component Value Date    PHOS 3.2 06/08/2021     No results found for: GLU    LIVER PROFILE:   Recent Labs     04/12/22  1446   AST 38*   ALT 28   BILITOT 0.5   ALKPHOS 65     PT/INR: No results for input(s): PROTIME, INR in the last 72 hours. APTT: No results for input(s): APTT in the last 72 hours. UA:No results for input(s): NITRITE, COLORU, PHUR, LABCAST, WBCUA, RBCUA, MUCUS, TRICHOMONAS, YEAST, BACTERIA, CLARITYU, SPECGRAV, LEUKOCYTESUR, UROBILINOGEN, BILIRUBINUR, BLOODU, GLUCOSEU, AMORPHOUS in the last 72 hours.     Invalid input(s): Sherlyn Sender input(s): ABG  Lab Results   Component Value Date    CALCIUM 8.9 04/13/2022    PHOS 3.2 06/08/2021                 Electronically signed by Lionel Del Valle MD on 4/13/2022 at 3:15 PM

## 2022-04-13 NOTE — PROGRESS NOTES
4 Eyes Skin Assessment     The patient is being assess for  {Blank single:65723::\"Admission\",\"Transfer to New Unit\",\"Post-Op Surgical\",\"Cath Lab Post-Op\",\"Shift Handoff\"}    I agree that 2 RN's have performed a thorough Head to Toe Skin Assessment on the patient. ALL assessment sites listed below have been assessed. Areas assessed by both nurses: ***  [x]   Head, Face, and Ears   [x]   Shoulders, Back, and Chest  [x]   Arms, Elbows, and Hands   [x]   Coccyx, Sacrum, and Ischum  [x]   Legs, Feet, and Heels        Does the Patient have Skin Breakdown?   No         Ajay Prevention initiated:  No   Wound Care Orders initiated:  No      [facility] nurse consulted for Pressure Injury (Stage 3,4, Unstageable, DTI, NWPT, and Complex wounds):  No      Nurse 1 eSignature: Electronically signed by Tahir Traore RN on 4/12/22 at 10:35 PM EDT    **SHARE this note so that the co-signing nurse is able to place an eSignature**    Nurse 2 eSignature: {Esignature:[id number]}

## 2022-04-13 NOTE — PROGRESS NOTES
Occupational Therapy   Occupational Therapy Initial Assessment/Discharge Summary  Date: 2022   Patient Name: Freida Martinez  MRN: [id number]     : 1945    Date of Service: 2022    Discharge Recommendations:  Home independently  OT Equipment Recommendations  Equipment Needed: No    Assessment   Assessment: Pt tolerated OT evaluation well. At baseline pt lives alone, works full time, independent with I/ADLs, ambulates without AD. Pt currently independent with I/ADLs, functional transfers and ambulation without AD. Pt reports vision changes have resolved and SOB/dizziness have improved since admission. Vitals remained stable and SpO2 remains >90% on RA. Pt appears to be at baseline level of occupational performance. No further OT services warranted at this time. Pt safe to return home at discharge. OT order to be discontinued. Prognosis: Good  Decision Making: Low Complexity    OT Education: OT Role;Plan of Care  Patient Education: home safety concerns, OT discharge recommendations. Barriers to Learning: none  No Skilled OT: Independent with functional mobility; Independent with ADL's;At baseline function; Safe to return home; No OT goals identified  REQUIRES OT FOLLOW UP: No    Activity Tolerance  Activity Tolerance: Patient Tolerated treatment well  Activity Tolerance: Vitals supine: 178/88, 54bpm. Vitals seated: 173/87, 53bpm. Vitals standin/91, 58bpm. SpO2 remains >90% on RA  Safety Devices  Safety Devices in place: Yes  Type of devices: Nurse notified;Call light within reach; Left in bed;Gait belt           Patient Diagnosis(es): The primary encounter diagnosis was Shortness of breath. A diagnosis of Change in vision was also pertinent to this visit. has a past medical history of Acute renal failure (ARF) (HCC), Bradycardia, Carotid artery occlusion without infarction, unspecified laterality, ED (erectile dysfunction), Hypercholesteremia, Hypertension, Low back pain, and Strabismus.    has a past surgical history that includes cyst removal; Esophagogastric fundoplication; Tonsillectomy; Strabismus surgery; External ear surgery; Colonoscopy (2011); Upper gastrointestinal endoscopy (2011); Upper gastrointestinal endoscopy (9/26/2014); eye surgery (Left); Knee arthroscopy (Right, 1/7/2020); and Carotid endarterectomy (Right, 6/7/2021). Restrictions  Restrictions/Precautions  Restrictions/Precautions: Up as Tolerated  Position Activity Restriction  Other position/activity restrictions: NPO, up as tolerated, up with assist    Subjective   General  Chart Reviewed: Yes  Additional Pertinent Hx: HTN, bradycardia, back pain, CKD, strabismus s/p eye surgery for correction, carotid endarterectomy 6/2021, knee surgery  Family / Caregiver Present: No    Diagnosis: Pt presents with c/o SOB, dizziness, vision changes L eye, episodic severe fatigue. Pt admitted for work up of CVA/TIA vs atypical migraine vs presyncope. Pt also with symptomatic bradycardia and acute renal failure. Subjective  Subjective: Pt supine in bed upon OT arrival. Pt reports that vision changes have resolved, improved SOB and dizziness. General Comment  Comments: RN agreeabe to OT session. Patient Currently in Pain: Yes  Pain Assessment  Pain Assessment: 0-10  Pain Level: 1  Patient's Stated Pain Goal: No pain  Pain Type: Acute pain  Pain Location: Head  Pain Descriptors: Headache  Pain Frequency: Intermittent  Pain Onset: On-going  Functional Pain Assessment: Activities are not prevented  Non-Pharmaceutical Pain Intervention(s): Distraction;Repositioned; Environmental changes  Multiple Pain Sites: Yes  Pre Treatment Pain Screening  Intervention List: Patient able to continue with treatment;Nurse/Physician notified  Pain 2  Pain Rating 2: 2  Pain Type 2: Acute Pain  Pain Location 2: Leg  Pain Orientation 2: Lower;Right  Pain Descriptors 2: Cramping;Discomfort  Pain Duration 2: Continuous  Pain Onset 2: On-going  Non-Pharmaceutical Pain Intervention(s) 2: Distraction;Repositioned  Vital Signs  Temp: 97.8 °F (36.6 °C)  Temp Source: Oral  Pulse: 56  Heart Rate Source: Monitor  Resp: 17  BP: (!) 178/88  BP Location: Left upper arm  Patient Position: High fowlers  Level of Consciousness: Alert (0)  MEWS Score: 1  Patient Currently in Pain: Yes  Oxygen Therapy  SpO2: 95 %  Pulse Oximeter Device Mode: Intermittent  Pulse Oximeter Device Location: Finger  O2 Device: None (Room air)     Social/Functional History  Social/Functional History  Lives With: Alone  Type of Home: House  Home Layout: Two level,Performs ADL's on one level,Able to Live on Main level with bedroom/bathroom  Home Access: Stairs to enter without rails  Entrance Stairs - Number of Steps: 2  Bathroom Shower/Tub: Tub/Shower unit  Bathroom Toilet: Standard  ADL Assistance: Independent  Homemaking Assistance: Independent  Ambulation Assistance: Independent  Transfer Assistance: Independent  Active : Yes  Occupation: Full time employment  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 Pt does not have 24/7 supervision/assistance available. Pt's wife passed away in November 2021. Objective   Vision: Impaired  Vision Exceptions: Wears glasses at all times  Hearing: Within functional limits      Orientation  Overall Orientation Status: Within Normal Limits        Balance  Sitting Balance: Independent  Standing Balance: Independent    Standing Balance  Time: 5-6 minutes  Activity: bathroom mobility, grooming in stance at sink, ambulation 250 feet without AD  Comment: independent without AD. Pt able to maintain standing balance independently and denies new symptoms with cervical AROM during ambulation    Functional Mobility  Functional - Mobility Device: No device  Activity: To/from bathroom; Other  Assist Level: Independent  Functional Mobility Comments: 250 feet without AD.     Toilet Transfers  Toilet - Technique: Ambulating  Equipment Used: Standard toilet  Toilet Transfer: Independent    ADL  Grooming: Independent  LE Dressing: Modified independent   Toileting: Independent     Tone RUE  RUE Tone: Normotonic  Tone LUE  LUE Tone: Normotonic  Coordination  Movements Are Fluid And Coordinated: Yes        Bed mobility  Supine to Sit: Independent  Sit to Supine: Independent     Transfers  Stand Step Transfers: Independent  Sit to stand: Independent  Stand to sit: Independent    Vision - Basic Assessment  Prior Vision: Wears glasses all the time  Visual History: Surgical intervention; Other (add comment) (strabismus)  Patient Visual Report: No visual complaint reported. Visual Field Cut: No  Oculo Motor Control: WNL  Vision Comments: pt reports that he was experiencing some vision changes L eye when he arrived, but that it has resolved and no longer has any vision changes to report     Cognition  Overall Cognitive Status: WNL           Sensation  Overall Sensation Status: Impaired  Light Touch: Partial deficits in the RLE  Additional Comments: pt reports chornic numbness R upper leg          LUE AROM (degrees)  LUE AROM : WNL  RUE AROM (degrees)  RUE AROM : WNL     LUE Strength  Gross LUE Strength: WNL  L Hand General: 5/5  LUE Strength Comment: grossly 5/5  RUE Strength  Gross RUE Strength: WNL  R Hand General: 5/5  RUE Strength Comment: grossly 5/5                   Plan   Plan  Times per week: No further OT services warranted at this time. OT orders to be discontinued. AM-PAC Score        -[facility] Inpatient Daily Activity Raw Score: 24 (04/13/22 0834)  AM-PAC Inpatient ADL T-Scale Score : 57.54 (04/13/22 0834)  ADL Inpatient CMS 0-100% Score: 0 (04/13/22 0834)  ADL Inpatient CMS G-Code Modifier : [facility] (04/13/22 0727)    Goals  Short term goals  Time Frame for Short term goals: No OT goals identified. Pt independent and at baseline level of I/ADLs and functional transfers.   Patient Goals   Patient goals : to return home ASAP       Therapy Time   Individual Concurrent Group Co-treatment   Time In 0747         Time Out 0821         Minutes 34         Timed Code Treatment Minutes: 19 Minutes (15 minute evaluation)       Franky Alex, OT

## 2022-04-13 NOTE — PLAN OF CARE
Problem: Safety:  Goal: Ability to chew and swallow food without choking will improve  Description: Ability to chew and swallow food without choking will improve  Outcome: Met This Shift     Problem: ACTIVITY INTOLERANCE/IMPAIRED MOBILITY  Goal: Mobility/activity is maintained at optimum level for patient  Outcome: Ongoing

## 2022-04-13 NOTE — CARE COORDINATION
Advance Care Planning     Advance Care Planning Activator (Inpatient)  Conversation Note      Date of ACP Conversation: 4/13/2022     Conversation Conducted with: Pt with decision making capacity    ACP Activator: April Jerome RN    Health Care Decision Maker:     Current Designated Health Care Decision Maker:     Primary Decision Maker: khai abraham - Child - [phone]      Care Preferences    Ventilation: \"If you were in your present state of health and suddenly became very ill and were unable to breathe on your own, what would your preference be about the use of a ventilator (breathing machine) if it were available to you? \"      Would the patient desire the use of ventilator (breathing machine)?: Yes    Resuscitation  \"CPR works best to restart the heart when there is a sudden event, like a heart attack, in someone who is otherwise healthy. Unfortunately, CPR does not typically restart the heart for people who have serious health conditions or who are very sick. \"    \"In the event your heart stopped as a result of an underlying serious health condition, would you want attempts to be made to restart your heart (answer \"yes\" for attempt to resuscitate) or would you prefer a natural death (answer \"no\" for do not attempt to resuscitate)? YES       [x] Yes   [] No   Educated Patient / Lawrnce Mater regarding differences between Advance Directives and portable DNR orders.     Length of ACP Conversation in minutes:      Conversation Outcomes:  [x] ACP discussion completed  [] Existing advance directive reviewed with patient; no changes to patient's previously recorded wishes  [] New Advance Directive completed  [] Portable Do Not Rescitate prepared for Provider review and signature  [] POLST/POST/MOLST/MOST prepared for Provider review and signature      Follow-up plan:    [] Schedule follow-up conversation to continue planning  [x] Referred individual to Provider for additional questions/concerns   [] Advised patient/agent/surrogate to review completed ACP document and update if needed with changes in condition, patient preferences or care setting    [] This note routed to one or more involved healthcare providers

## 2022-04-13 NOTE — CARE COORDINATION
CASE MANAGEMENT INITIAL ASSESSMENT      Reviewed chart and completed assessment with patient: pt  Family present:  none  Explained Case Management role/services. Primary contact information:    Health Care Decision Maker :   Primary Decision Maker: khai abraham - [place] - [phone]        Can this person be reached and be able to respond quickly, such as within a few minutes or hours? yes    Admit date/status: 4/12/22 IP  Diagnosis: Stroke like symptoms  Is this a Readmission?:  no     Insurance: [employer] required for SNF: yes     3 night stay required: no    Living arrangements, Adls, care needs, prior to admission: Lives alone in 1 story home with 2 [address] drives    Durable Medical Equipment at home: none    Services in the home and/or outpatient, prior to admission: none    Current PCP: Timbo Banuelos MD    Transportation needs:  Drove himself here, car in [address] lot, wishes to drive self home if able    Dialysis Facility (if applicable) N/A    PT/OT recs: Home assist PRN no PT/OT needs    Hospital Exemption Notification (HEN):  Not initiated    Barriers to discharge: none    Plan/comments: Pt with stroke like symptoms pending consult to Cards, Neuro, MRI. Pt does not know of any dc needs at this time, please inform DCP if needs arise.      ECOC on chart for MD signature

## 2022-04-13 NOTE — PROGRESS NOTES
Patient admitted to room 352 from ER. Patient oriented to room, call light, bed rails, phone, lights and bathroom. Patient instructed about the schedule of the day including: vital sign frequency, lab draws, possible tests, frequency of MD and staff rounds, including RN/MD rounding together at bedside, daily weights, and I &O's. Patient instructed about prescribed diet, how to use television. bed alarm in place, patient aware of placement and reason. Telemetry box  in place, patient aware of placement and reason. Bed locked, in lowest position, side rails up 2/4, call light within reach. Will continue to monitor.

## 2022-04-14 ENCOUNTER — TELEPHONE (OUTPATIENT)
Dept: CARDIOLOGY | Age: 77
End: 2022-04-14

## 2022-04-14 VITALS
WEIGHT: 263.3 LBS | HEIGHT: 74 IN | RESPIRATION RATE: 19 BRPM | OXYGEN SATURATION: 97 % | BODY MASS INDEX: 33.79 KG/M2 | TEMPERATURE: 98.1 F | DIASTOLIC BLOOD PRESSURE: 78 MMHG | SYSTOLIC BLOOD PRESSURE: 145 MMHG | HEART RATE: 67 BPM

## 2022-04-14 LAB
ANION GAP SERPL CALCULATED.3IONS-SCNC: 7 MMOL/L (ref 3–16)
BUN BLDV-MCNC: 13 MG/DL (ref 7–20)
CALCIUM SERPL-MCNC: 9 MG/DL (ref 8.3–10.6)
CHLORIDE BLD-SCNC: 105 MMOL/L (ref 99–110)
CO2: 29 MMOL/L (ref 21–32)
CREAT SERPL-MCNC: 0.8 MG/DL (ref 0.8–1.3)
GFR AFRICAN AMERICAN: >60
GFR NON-AFRICAN AMERICAN: >60
GLUCOSE BLD-MCNC: 116 MG/DL (ref 70–99)
LV EF: 58 %
LVEF MODALITY: NORMAL
POTASSIUM REFLEX MAGNESIUM: 3.9 MMOL/L (ref 3.5–5.1)
SODIUM BLD-SCNC: 141 MMOL/L (ref 136–145)

## 2022-04-14 PROCEDURE — G0378 HOSPITAL OBSERVATION PER HR: HCPCS

## 2022-04-14 PROCEDURE — 93306 TTE W/DOPPLER COMPLETE: CPT

## 2022-04-14 PROCEDURE — 99232 SBSQ HOSP IP/OBS MODERATE 35: CPT

## 2022-04-14 PROCEDURE — 36415 COLL VENOUS BLD VENIPUNCTURE: CPT

## 2022-04-14 PROCEDURE — 6370000000 HC RX 637 (ALT 250 FOR IP): Performed by: INTERNAL MEDICINE

## 2022-04-14 PROCEDURE — 2580000003 HC RX 258: Performed by: INTERNAL MEDICINE

## 2022-04-14 PROCEDURE — 80048 BASIC METABOLIC PNL TOTAL CA: CPT

## 2022-04-14 RX ORDER — HYDRALAZINE HYDROCHLORIDE 50 MG/1
50 TABLET, FILM COATED ORAL EVERY 8 HOURS SCHEDULED
Status: DISCONTINUED | OUTPATIENT
Start: 2022-04-14 | End: 2022-04-14 | Stop reason: HOSPADM

## 2022-04-14 RX ORDER — HYDRALAZINE HYDROCHLORIDE 50 MG/1
50 TABLET, FILM COATED ORAL EVERY 8 HOURS SCHEDULED
Qty: 90 TABLET | Refills: 3 | Status: SHIPPED | OUTPATIENT
Start: 2022-04-14 | End: 2022-10-04 | Stop reason: SDUPTHER

## 2022-04-14 RX ORDER — HYDRALAZINE HYDROCHLORIDE 25 MG/1
25 TABLET, FILM COATED ORAL EVERY 8 HOURS SCHEDULED
Status: DISCONTINUED | OUTPATIENT
Start: 2022-04-14 | End: 2022-04-14

## 2022-04-14 RX ADMIN — PANTOPRAZOLE SODIUM 40 MG: 40 TABLET, DELAYED RELEASE ORAL at 08:05

## 2022-04-14 RX ADMIN — HYDRALAZINE HYDROCHLORIDE 50 MG: 50 TABLET, FILM COATED ORAL at 15:41

## 2022-04-14 RX ADMIN — CITALOPRAM HYDROBROMIDE 40 MG: 20 TABLET ORAL at 08:05

## 2022-04-14 RX ADMIN — ASPIRIN 81 MG: 81 TABLET, COATED ORAL at 08:05

## 2022-04-14 RX ADMIN — HYDRALAZINE HYDROCHLORIDE 25 MG: 25 TABLET, FILM COATED ORAL at 08:34

## 2022-04-14 RX ADMIN — ACETAMINOPHEN 650 MG: 325 TABLET ORAL at 08:34

## 2022-04-14 RX ADMIN — SODIUM CHLORIDE, PRESERVATIVE FREE 10 ML: 5 INJECTION INTRAVENOUS at 08:35

## 2022-04-14 RX ADMIN — NIFEDIPINE 60 MG: 30 TABLET, FILM COATED, EXTENDED RELEASE ORAL at 08:05

## 2022-04-14 ASSESSMENT — PAIN SCALES - GENERAL
PAINLEVEL_OUTOF10: 0
PAINLEVEL_OUTOF10: 7
PAINLEVEL_OUTOF10: 3

## 2022-04-14 NOTE — PROGRESS NOTES
[facility]     Electrophysiology                                     Progress Note    Admission date:  2022    Reason for follow up visit: bradycardia     HPI/CC: Rei Muñiz was admitted on 2022 with dizziness and ocular symptoms. At the time of admission, he was found to have sinus bradycardia with a HR of 48. He has a history of dizziness usually associated with position changes. Head CT and brain MRI showed no acute findings. Rhythm has been SR in the 60's. Subjective: He has no complaints today. Denies chest pain, palpitations, shortness of breath, and dizziness. Vitals:  Blood pressure (!) 170/84, pulse 68, temperature 97.5 °F (36.4 °C), temperature source Oral, resp. rate 18, height 6' 2\" (1.88 m), weight 263 lb 4.8 oz (119.4 kg), SpO2 95 %.   Temp  Av.6 °F (36.4 °C)  Min: 97.2 °F (36.2 °C)  Max: 97.9 °F (36.6 °C)  Pulse  Av.8  Min: 56  Max: 81  BP  Min: 124/72  Max: 195/89  SpO2  Av.7 %  Min: 94 %  Max: 98 %    24 hour I/O    Intake/Output Summary (Last 24 hours) at 2022 1113  Last data filed at 2022 3718  Gross per 24 hour   Intake 480 ml   Output --   Net 480 ml     Current Facility-Administered Medications   Medication Dose Route Frequency Provider Last Rate Last Admin    hydrALAZINE (APRESOLINE) tablet 50 mg  50 mg Oral 3 times per day Chano Farfan MD        enoxaparin (LOVENOX) injection 30 mg  30 mg SubCUTAneous BID Chano Farfan MD   30 mg at 22 213    diphenhydrAMINE (BENADRYL) tablet 50 mg  50 mg Oral Nightly PRN KANDY Shaw CNP   50 mg at 22    metoclopramide (REGLAN) injection 10 mg  10 mg IntraVENous Once Breezy Tyson MD        pantoprazole (PROTONIX) tablet 40 mg  40 mg Oral QAM AC Breezy Tyson MD   40 mg at 22 08    NIFEdipine (PROCARDIA XL) extended release tablet 60 mg  60 mg Oral Daily Breezy Tyson MD   60 mg at 22 0805    citalopram (CELEXA) tablet 40 mg 40 mg Oral Daily Diaz Lauren MD   40 mg at 04/14/22 0805    atorvastatin (LIPITOR) tablet 80 mg  80 mg Oral Daily Diaz Lauren MD   80 mg at 04/13/22 2131    sodium chloride flush 0.9 % injection 10 mL  10 mL IntraVENous 2 times per day Diaz Lauren MD   10 mL at 04/14/22 0835    sodium chloride flush 0.9 % injection 10 mL  10 mL IntraVENous PRN Diaz Lauren MD   10 mL at 04/13/22 1155    0.9 % sodium chloride infusion   IntraVENous PRN Diaz Lauren MD        polyethylene glycol [facility]) packet 17 g  17 g Oral Daily PRN Diaz Lauren MD        acetaminophen (TYLENOL) tablet 650 mg  650 mg Oral Q4H PRN Diaz Lauren MD   650 mg at 04/14/22 2747    Or    acetaminophen (TYLENOL) suppository 650 mg  650 mg Rectal Q4H PRN Diaz Lauren MD        ondansetron (ZOFRAN-ODT) disintegrating tablet 4 mg  4 mg Oral Q8H PRN Diaz Lauren MD        Or    ondansetron [facility]) injection 4 mg  4 mg IntraVENous Q6H PRN Diaz Lauren MD        aspirin EC tablet 81 mg  81 mg Oral Daily Diaz Lauren MD   81 mg at 04/14/22 7412    Or    aspirin suppository 300 mg  300 mg Rectal Daily Diaz Lauren MD        hydrALAZINE (APRESOLINE) injection 10 mg  10 mg IntraVENous Q4H PRN Diaz Lauren MD   10 mg at 04/13/22 1155       Objective:     Telemetry monitor: SR    Physical Exam:  Constitutional and general appearance: alert, cooperative, no distress and appears stated age  HEENT: PERRL, no cervical lymphadenopathy. No masses palpable.  Normal oral mucosa  Respiratory:  · Normal excursion and expansion without use of accessory muscles  · Resp auscultation: Normal breath sounds without wheezing, rhonchi, and rales  Cardiovascular:  · The apical impulse is not displaced  · Heart tones are crisp and normal. regular S1 and S2.  · Jugular venous pulsation Normal  · The carotid upstroke is normal in amplitude and contour without delay or bruit  · Peripheral pulses are symmetrical and full Abdomen:  · No masses or tenderness  · Bowel sounds present  Extremities:  ·  No cyanosis or clubbing  ·  No lower extremity edema  ·  Skin: warm and dry  Neurological:  · Alert and oriented  · Moves all extremities well  · No abnormalities of mood, affect, memory, mentation, or behavior are noted    Data  EKG 4/12/2022:   SB, HR 48    Carotid duplex 4/13/2022:  Conclusions        Summary        The bilateral internal carotid arteries reveal a <50% diameter reducing    stenosis.    The right external carotid artery reveals a >50% diameter reducing    stenosis.    The bilateral vertebral arteries have normal antegrade flow.         Echo 6/01/2021:   Conclusions   Summary   Normal left ventricle size. There is mild concentric left ventricular   hypertrophy. Overall left ventricular systolic function appears normal with   an ejection fraction of 55-60%. No regional wall motion abnormalities are   noted. Diastolic filling parameters suggests normal diastolic function. A bubble study was performed and fails to show evidence of right to left   shunting. All labs and testing reviewed.   Lab Review     Renal Profile:   Lab Results   Component Value Date    CREATININE 0.8 04/14/2022    BUN 13 04/14/2022     04/14/2022    K 3.9 04/14/2022     04/14/2022    CO2 29 04/14/2022     CBC:    Lab Results   Component Value Date    WBC 4.7 04/13/2022    RBC 4.08 04/13/2022    HGB 13.1 04/13/2022    HCT 39.0 04/13/2022    MCV 95.8 04/13/2022    RDW 13.6 04/13/2022     04/13/2022     BNP:  No results found for: BNP  Fasting Lipid Panel:    Lab Results   Component Value Date    CHOL 132 04/12/2022    HDL 50 04/12/2022    TRIG 82 04/12/2022     Cardiac Enzymes:  CK/MbTroponin  Lab Results   Component Value Date    TROPONINI <0.01 04/12/2022     PT/ INR No results found for: INR, PROTIME  PTT No results found for: PTT   Lab Results   Component Value Date    MG 1.90 06/08/2021    No results found for: TSH    Assessment:  Sinus bradycardia: ongoing     -ambulatory cardiac monitor in 5/2020 demonstrating SR, average HR of 58. Max HR was 99, minimum HR 45    -metoprolol held 4/13/2022  HTN: suboptimal given medication adjustments   Carotid disease: follows with vascular surgery outpatient  Orthostatic hypotension   HLD: on statin   TIA   -neurology following    -Head CT and brain MRI showed no acute findings   CKD      Plan:   1. Discontinue metoprolol due to sinus bradycardia   2. BP management per hospitalist service   3. 2 week cardiac monitor at discharge  4. EP office to arrange follow up   5.  Ok to discharge home from an [address], [address] [address]  [phone]

## 2022-04-14 NOTE — TELEPHONE ENCOUNTER
Monitor company [employer]  Length of monitor 14 days  Monitor ordered by Dr. Yuridia Fair number [id number]  Nurse to apply at the time of discharge.

## 2022-04-14 NOTE — DISCHARGE SUMMARY
Hospitalist Discharge Summary    Patient ID:  Rhianna Whitfield  [id number]  40 y.o.  1945    Admit date: 4/12/2022    Discharge date: 4/14/2022    Disposition: home    Admission Diagnoses:   Patient Active Problem List   Diagnosis    Low back pain    Dyslipidemia    HTN (hypertension), benign    Unstable angina (HCC)    Chest pain    ED (erectile dysfunction)    Angioedema    Pre-syncope    Bradycardia    Carotid artery occlusion without infarction, unspecified laterality    Symptomatic bradycardia    TIA (transient ischemic attack)    Fatigue    Acute renal failure superimposed on stage 3 chronic kidney disease (HCC)    Right calf pain    Hypertensive urgency    Dizziness       Discharge Diagnoses: Principal Problem:    TIA (transient ischemic attack)  Active Problems:    Dyslipidemia    HTN (hypertension), benign    Symptomatic bradycardia    Fatigue    Acute renal failure superimposed on stage 3 chronic kidney disease (HCC)    Right calf pain    Hypertensive urgency    Dizziness  Resolved Problems:    * No resolved hospital problems. *      Code Status:  Full Code    Condition:  Stable    Discharge Diet: Diet:  ADULT DIET; Regular    PCP to do list:  Follow blood pressures closely, will have 30 day EKG monitor placed as well, patient will remain off of chlorthalidone for now. It may be restarted every other day if needed to help with blood pressure and fluid management.     Hospital Course: 68y.o. year-old male with a history of hypertension and hyperlipidemia who presents to the ER for evaluation following a sudden change in vision in his left eye which began approximately 2 hours before his arrival in the ER and shortness of breath and dizziness which began today.  He reports a history of migraines, but does not think this is the migraine.  He describes his vision changes as right flashes in his left eye.  This has happened episodically since onset. He also reports that he has extreme fatigue when the flashing is occurring.  In the emergency room a CT of the head had no acute findings and a CTA of the head and neck performed 06/04/2021 with, \"High-grade stenosis of the right internal carotid artery, greater than a present diameter with large soft plaque and distal free-floating component, small thrombus. The findings unchanged from 5/30/2021. \" This study was not repeated. He was also found to have bradycardia. He reports a long h/o bradycardia. Symptoms are more likely due to near syncopal episode due to dehydration and possible orthostasis. Due to ongoing bradycardia, decision made to stop beta blocker and start hydralazine TID. BP is poorly controlled at baseline and will need ongoing close follow up. Patient has allergies to ACE and amlodipine which limit choices somewhat. He will remain off of the chlorthalidone for now. He will have an ambulatory event monitor going home with later option of implantable monitor after follow up with Dr. Chandni Mix. Carotid dopplers are unchanged and ECHO shows EF of 55-60% and grade I diastolic dysfunction. Discharge Medications:   Current Discharge Medication List      START taking these medications    Details   hydrALAZINE (APRESOLINE) 50 MG tablet Take 1 tablet by mouth every 8 hours  Qty: 90 tablet, Refills: 3           Current Discharge Medication List        Current Discharge Medication List      CONTINUE these medications which have NOT CHANGED    Details   atorvastatin (LIPITOR) 80 MG tablet Take 1 tablet by mouth daily  Qty: 90 tablet, Refills: 2    Comments: Patient requests 90-day supply. Please deactivate any remaining 30-day supplies on file. NIFEdipine (ADALAT CC) 60 MG extended release tablet TAKE 1 TABLET BY MOUTH DAILY  Qty: 30 tablet, Refills: 3    Comments: ZERO refills remain on this prescription.  Your patient is requesting advance approval of refills for this medication to [facility] citalopram (CELEXA) 40 MG tablet TAKE 1 TABLET BY MOUTH DAILY  Qty: 90 tablet, Refills: 5    Associated Diagnoses: Depression, unspecified depression type      CO ENZYME Q-10 PO Take by mouth      Garlic-Calcium (BL GARLIC PO) Take by mouth      aspirin 325 MG EC tablet Take 1 tablet by mouth daily for 14 days  Qty: 14 tablet, Refills: 0      mometasone (ELOCON) 0.1 % cream Apply topically daily. Qty: 60 g, Refills: 0    Associated Diagnoses: Eczema, unspecified type      FIBER PO Take by mouth      fluocinonide (LIDEX) 0.05 % ointment Apply sparingly to affected area(s) up to bid prn  Qty: 60 g, Refills: 1      omeprazole (PRILOSEC) 20 MG capsule Take 40 mg by mouth daily      Multiple Vitamins-Minerals (THERAPEUTIC MULTIVITAMIN-MINERALS) tablet Take 1 tablet by mouth daily. Current Discharge Medication List      STOP taking these medications       chlorthalidone (HYGROTON) 25 MG tablet Comments:   Reason for Stopping:         metoprolol tartrate (LOPRESSOR) 25 MG tablet Comments:   Reason for Stopping:         magnesium citrate solution Comments:   Reason for Stopping:                   Procedures: ECHO:  Summary    Normal left ventricular systolic function with ejection fraction of 55-60%.    No regional wall motion abnormalites are seen.    Grade I diastolic dysfunction with normal filling pressure.    Compared to previous study from 6-1-2021 no changes noted in left    ventricular function.    A bubble study was performed and fails to show evidence of shunting.      Assessment on Discharge: Stable, improved     Discharge ROS:  A complete review of systems was asked and negative except for denies pain or dizziness or chest pain or vision changes    Discharge Exam:  BP (!) 195/89   Pulse 81   Temp 97.4 °F (36.3 °C) (Oral)   Resp 18   Ht 6' 2\" (1.88 m)   Wt 263 lb 4.8 oz (119.4 kg)   SpO2 95%   BMI 33.81 kg/m²     Gen: NAD  HEENT: NC/AT, moist mucous membranes, no oropharyngeal erythema or exudate  Neck: supple, trachea midline, no anterior cervical or SC LAD  Heart:  Normal s1/s2, RRR, no murmurs, gallops, or rubs. no leg edema  Lungs:  clear bilaterally, no wheeze,no rales or rhonchi, no use of accessory muscles  Abd: bowel sounds present, soft, nontender, nondistended, no masses  Extrem:  No clubbing, cyanosis,  no edema  Skin: no lesion or masses  Psych:  A & O x3  Neuro: grossly intact, moves all four extremities    Pertinent Studies During Hospital Stay:  Radiology:  XR CHEST (2 VW)    Result Date: 4/12/2022  EXAMINATION: TWO XRAY VIEWS OF THE CHEST 4/12/2022 2:44 pm COMPARISON: None. HISTORY: ORDERING SYSTEM PROVIDED HISTORY: sob TECHNOLOGIST PROVIDED HISTORY: Reason for exam:->sob Reason for Exam: sob FINDINGS: No acute airspace infiltrate. No pneumothorax or pleural effusion. Normal cardiomediastinal silhouette. No acute cardiopulmonary disease     CT Head WO Contrast    Result Date: 4/12/2022  EXAM: CT Head Without Intravenous Contrast EXAM DATE/TIME: 4/12/2022 3:04 pm CLINICAL HISTORY: ORDERING SYSTEM PROVIDED  light headed  TECHNOLOGIST PROVIDED HISTORY: Reason for exam:->light headed  Has a \"code stroke\" or \"stroke alert\" been called? ->No  Decision Support Exception - unselect if not a suspected or confirmed emergency medical condition->Emergency Medical Condition (MA) Reason for Exam: Similar stroke symptoms as before, dizziness, fatigue, flashing lights  Relevant Medical/Surgical History: hx of stroke 5/21, surgery carotid TECHNIQUE: Axial computed tomography images of the head/brain without intravenous contrast.  This CT exam was performed using one or more of the following dose reduction techniques:  automated exposure control, adjustment of the mA and/or kV according to patient size, and/or use of iterative reconstruction technique. COMPARISON: 06/07/2021 FINDINGS: Brain:  No acute findings. No hemorrhage.   Small punctate calcification or possibly a small dural calcification noted within arch a sent to a sulcus of the right posterolateral parietal lobe and unchanged. Ventricles:  No acute findings. No ventriculomegaly. Bones/joints:  No acute findings. No acute fracture. Soft tissues:  No acute findings. Sinuses:  Unremarkable as visualized. No acute sinusitis. Mastoid air cells:  Unremarkable as visualized. No mastoid effusion. No acute intracranial abnormality noted. CT CHEST PULMONARY EMBOLISM W CONTRAST    Result Date: 4/12/2022  EXAMINATION: CTA OF THE CHEST 4/12/2022 4:41 pm TECHNIQUE: CTA of the chest was performed after the administration of intravenous contrast.  Multiplanar reformatted images are provided for review. MIP images are provided for review. Dose modulation, iterative reconstruction, and/or weight based adjustment of the mA/kV was utilized to reduce the radiation dose to as low as reasonably achievable. COMPARISON: None. HISTORY: ORDERING SYSTEM PROVIDED HISTORY: elevated dimer with sob TECHNOLOGIST PROVIDED HISTORY: Reason for exam:->elevated dimer with sob Decision Support Exception - unselect if not a suspected or confirmed emergency medical condition->Emergency Medical Condition (MA) Reason for Exam: pt states he got dizzy and had sob today with no chest pain; states his BP was low;  feeling better now FINDINGS: Pulmonary Arteries: Pulmonary arteries are adequately opacified for evaluation. No evidence of intraluminal filling defect to suggest pulmonary embolism. Main pulmonary artery is normal in caliber. Mediastinum: No evidence of mediastinal lymphadenopathy. The heart and pericardium demonstrate no acute abnormality. There is no acute abnormality of the thoracic aorta. Lungs/pleura: Mild bibasilar atelectasis. No focal consolidation or pulmonary edema. No evidence of pleural effusion or pneumothorax. Upper Abdomen: A few layering stones are seen within the gallbladder. Moderate hiatal hernia.  Soft Tissues/Bones: No acute bone or soft tissue abnormality. Remote fracture of the left 8th rib     1. No evidence of pulmonary embolism or acute pulmonary abnormality. 2.  Moderate hiatal hernia 3. Cholelithiasis     VL DUP CAROTID BILATERAL    Result Date: 4/13/2022  Carotid Duplex Study  Demographics   Patient Name       Felicia Black   Date of Study      04/13/2022         Gender              Male   Patient Number     [id number]         Date of Birth       1945   Visit Number       [id number]          Age                 68 year(s)   Accession Number   [id number]         Room Number         6999   Corporate ID       [id number]            Sonographer         Hayde Pappas T   Ordering Physician Antonio Morales MD        Physician           Readers                                                            Sonya Faria MD  Procedure Type of Study:   Cerebral:Carotid, VL CAROTID DUPLEX BILATERAL. Vascular Sonographer Report  Indications for Study:TIA (Transient Ischemic Attack). Additional Indications:Concern for TIA/CVA. Carotid Artery Duplex Scan: Transverse and longitudinal grayscale and color imaging of the extracranial carotid system including Doppler spectral waveform analysis. Impressions Right Impression The right internal carotid artery reveals a <50% diameter reducing stenosis. The internal carotid artery was tortuous in nature. The right external carotid artery reveals a >50% diameter reducing stenosis. The right vertebral artery demonstrates normal antegrade flow. The right subclavian artery is visualized and demonstrates multiphasic flow. Left Impression The left internal carotid artery reveals a <50% diameter reducing stenosis. The internal carotid artery was tortuous in nature.  The left vertebral artery demonstrates normal antegrade flow. The left subclavian artery is visualized and demonstrates multiphasic flow. Previous exam on 09/20/2021, right ICA <50% stenosis, right ECA >50%, left ICA <50% stenosis. Conclusions   Summary   The bilateral internal carotid arteries reveal a <50% diameter reducing  stenosis.  The right external carotid artery reveals a >50% diameter reducing  stenosis.  The bilateral vertebral arteries have normal antegrade flow.   Signature   ------------------------------------------------------------------  Electronically signed by Arjun Langley MD (Interpreting  physician) on 04/13/2022 at 07:15 PM  ------------------------------------------------------------------  Patient Status:Routine. Study Location:[facility] - Vascular Lab. Technical Quality:Adequate visualization. Risk Factors History +---------+----+--------+ !Diagnosis!Date!Comments! +---------+----+--------+ !CAD      !    !        ! +---------+----+--------+ Plaque   - A plaque was found in the Left Prox ICA. The plaque characteristics are: medium echogenicity, moderate severity and homogeneous texture. Velocities are measured in cm/s ; Diameters are measured in mm Carotid Right Measurements +---------------+----+----+-----+----+ !Location       !PSV !EDV !Angle!RI  ! +---------------+----+----+-----+----+ !Prox CCA       !67.3!16.3!60   !0.76! +---------------+----+----+-----+----+ !Mid CCA        !59.1!14.3!60   !0.76! +---------------+----+----+-----+----+ !Dist CCA       !59.8!14.3!60   !0.76! +---------------+----+----+-----+----+ !Prox ICA       !85.8!13.8!60   !0.84! +---------------+----+----+-----+----+ !Mid ICA        !94.2!19.2!60   !0.8 ! +---------------+----+----+-----+----+ !Dist ICA       !69.8!17  !44   !0.76! +---------------+----+----+-----+----+ !Prox ECA       !203 !0   !60   !1   ! +---------------+----+----+-----+----+ !Vertebral      !61.2!12.9!60   !0.79! +---------------+----+----+-----+----+ !Prox  Subclavian! 153 !0   !42   !1   ! +---------------+----+----+-----+----+   - There is antegrade vertebral flow noted on the right side. - Additional Measurements:ICAPSV/CCAPSV 1.59. ICAEDV/CCAEDV 1.18. Carotid Left Measurements +---------------+----+----+-----+----+ ! Location       ! PSV ! EDV ! Angle! RI  ! +---------------+----+----+-----+----+ ! Prox CCA       !77. 4!13. 2! 60   !0.83! +---------------+----+----+-----+----+ ! Mid CCA        !93. 0!86. 6!60   !0.83! +---------------+----+----+-----+----+ ! Dist CCA       !78  !16. 8!60   !0.78! +---------------+----+----+-----+----+ ! Prox ICA       !79.8!21. 6!60   !0.73! +---------------+----+----+-----+----+ ! Mid ICA        !75. 2!20. 7!46   !0.72! +---------------+----+----+-----+----+ ! Dist ICA       !82. 6!31. 8!60   !0.69! +---------------+----+----+-----+----+ ! Prox ECA       !87. 6!6.6 ! 60   !0.92! +---------------+----+----+-----+----+ ! Vertebral      !61.5!12. 5!60   !0.8 ! +---------------+----+----+-----+----+ ! Prox Subclavian! 143 !0   !60   !1   ! +---------------+----+----+-----+----+   - There is antegrade vertebral flow noted on the left side. - Additional Measurements:ICAPSV/CCAPSV 0.88. ICAEDV/CCAEDV 1.95.     VL Extremity Venous Right    Result Date: 4/12/2022  Lower Extremities DVT Study  Demographics   Patient Name       Vanessa Jimenez   Date of Study      04/12/2022         Gender              Male   Patient Number     [id number]         Date of Birth       1945   Visit Number       [id number]          Age                 68 year(s)   Accession Number   [id number]         Room Number         21   Corporate ID       [id number]            [address]  [place], [place]   Ordering Physician Jose Cordova,           Interpreting        Aminta Briggs, [address]      Physician           David Dominguez Nery Alcala MD  Procedure Type of Study:   Veins:Lower Extremities DVT Study, VL EXTREMITY VENOUS DUPLEX RIGHT. Vascular Sonographer Report  Indications for Study:Leg pain and Swelling. Additional Indications:Patient scanned bedside in ED. Patient presents with right lower extremity calf pain and localized swelling x 1 day. Venous Duplex Scan: B-mode imaging of the deep and superficial veins, with compression maneuvers, including color and Doppler spectral waveform analysis. Impressions Right Impression No evidence of deep vein or superficial vein thrombosis involving the right lower extremity. Left Impression There is no evidence of deep venous thrombosis involving the left common femoral vein. There is no previous exam for comparison. Conclusions   Summary   No evidence of deep vein or superficial thrombosis involving the right lower  extremity and the contralateral proximal common femoral vein. Signature   ------------------------------------------------------------------  Electronically signed by Supa Pulido MD (Interpreting  physician) on 04/12/2022 at 04:48 PM  ------------------------------------------------------------------  Patient Status:ER. Study [facility] - Vascular Lab. Technical Quality:Adequate visualization.   - Results were reported to:DAYNE Blair in ED @ [phone]. Risk Factors History +---------+----+--------+ ! Diagnosis! Date! Comments! +---------+----+--------+ ! CAD      !    !        ! +---------+----+--------+ Velocities are measured in cm/s ; Diameters are measured in mm Right Lower Extremities DVT Study Measurements Right 2D Measurements +------------------------+----------+---------------+----------+ ! Location                ! Visualized! Compressibility! Thrombosis! +------------------------+----------+---------------+----------+ ! Sapheno Femoral Junction! Yes       ! Yes            ! None      ! +------------------------+----------+---------------+----------+ ! GSV Thigh               ! Yes       ! Yes            ! None      ! +------------------------+----------+---------------+----------+ ! Common Femoral          !Yes       ! Yes            ! None      ! +------------------------+----------+---------------+----------+ ! Femoral                 !Yes       ! Yes            ! None      ! +------------------------+----------+---------------+----------+ ! Prox Femoral            !Yes       ! Yes            ! None      ! +------------------------+----------+---------------+----------+ ! Mid Femoral             !Yes       ! Yes            ! None      ! +------------------------+----------+---------------+----------+ ! Dist Femoral            !Yes       ! Yes            ! None      ! +------------------------+----------+---------------+----------+ ! Deep Femoral            !Yes       ! Yes            ! None      ! +------------------------+----------+---------------+----------+ ! Popliteal               !Yes       ! Yes            ! None      ! +------------------------+----------+---------------+----------+ ! GSV Below Knee          ! Yes       ! Yes            ! None      ! +------------------------+----------+---------------+----------+ ! Gastroc                 ! Yes       ! Yes            ! None      ! +------------------------+----------+---------------+----------+ ! Soleal                  !Yes       ! Yes            ! None      ! +------------------------+----------+---------------+----------+ ! PTV                     ! Yes       ! Yes            ! None      ! +------------------------+----------+---------------+----------+ ! Peroneal                !Yes       ! Yes            ! None      ! +------------------------+----------+---------------+----------+ ! GSV Calf                ! Yes       ! Yes            ! None      ! +------------------------+----------+---------------+----------+ ! SSV                     ! Yes       ! Yes            ! None      ! +------------------------+----------+---------------+----------+ Right Doppler Measurements +------------------------+------+------+------------+ ! Location                ! Signal!Reflux! Reflux (sec)! +------------------------+------+------+------------+ ! Sapheno Femoral Junction! Phasic! No    !            ! +------------------------+------+------+------------+ ! Common Femoral          !Phasic! No    !            ! +------------------------+------+------+------------+ ! Femoral                 !Phasic! No    !            ! +------------------------+------+------+------------+ ! Deep Femoral            !Phasic! No    !            ! +------------------------+------+------+------------+ ! Popliteal               !Phasic! No    !            ! +------------------------+------+------+------------+ Left Lower Extremities DVT Study Measurements Left 2D Measurements +--------------+----------+---------------+----------+ ! Location      ! Visualized! Compressibility! Thrombosis! +--------------+----------+---------------+----------+ ! Common Femoral!Yes       ! Yes            ! None      ! +--------------+----------+---------------+----------+ Left Doppler Measurements +--------------+------+------+------------+ ! Location      ! Signal!Reflux! Reflux (sec)! +--------------+------+------+------------+ ! Common Femoral!Phasic! No    !            ! +--------------+------+------+------------+    MRI brain without contrast    Result Date: 4/13/2022  EXAMINATION: MRI OF THE BRAIN WITHOUT CONTRAST  4/13/2022 5:36 pm TECHNIQUE: Multiplanar multisequence MRI of the brain was performed without the administration of intravenous contrast. COMPARISON: None. HISTORY: ORDERING SYSTEM PROVIDED HISTORY: Stroke like symptoms TECHNOLOGIST PROVIDED HISTORY: Reason for exam:->Stroke like symptoms What is the sedation requirement?->None FINDINGS: INTRACRANIAL STRUCTURES/VENTRICLES: There is no acute infarct. No mass effect or midline shift.  No evidence of an acute intracranial hemorrhage.  There is volume loss with mild chronic white matter microvascular ischemic change. The sellar/suprasellar regions appear unremarkable.  The normal signal voids within the major intracranial vessels appear maintained. ORBITS: The visualized portion of the orbits demonstrate no acute abnormality. SINUSES: The visualized paranasal sinuses and mastoid air cells demonstrate no acute abnormality. BONES/SOFT TISSUES: The bone marrow signal intensity appears normal. The soft tissues demonstrate no acute abnormality.     No acute infarct.           Last Labs on Discharge:     Recent Results (from the past 24 hour(s))   Basic Metabolic Panel w/ Reflex to MG    Collection Time: 04/14/22  5:55 AM   Result Value Ref Range    Sodium 141 136 - 145 mmol/L    Potassium reflex Magnesium 3.9 3.5 - 5.1 mmol/L    Chloride 105 99 - 110 mmol/L    CO2 29 21 - 32 mmol/L    Anion Gap 7 3 - 16    Glucose 116 (H) 70 - 99 mg/dL    BUN 13 7 - 20 mg/dL    CREATININE 0.8 0.8 - 1.3 mg/dL    GFR Non-African American >60 >60    GFR African American >60 >60    Calcium 9.0 8.3 - 10.6 mg/dL         Follow up: with Quinn Bradley MD    Note that 30 minutes was spent in preparing discharge papers, discussing discharge with patient, medication review, etc.    Thank you Quinn Bradley MD for the opportunity to be involved in this patient's care. If you have any questions or concerns please feel free to contact me at [phone].      Electronically signed by Maxine Canela MD on 4/14/2022 at 9:10 AM

## 2022-04-14 NOTE — PROGRESS NOTES
Monitor company [employer]  Length of monitor 14 days  Monitor ordered by Dr. Lois Lal number [id number]  Nurse to apply at the time of discharge.

## 2022-04-15 ENCOUNTER — CARE COORDINATION (OUTPATIENT)
Dept: CARE COORDINATION | Age: 77
End: 2022-04-15

## 2022-04-15 ENCOUNTER — CARE COORDINATION (OUTPATIENT)
Dept: CASE MANAGEMENT | Age: 77
End: 2022-04-15

## 2022-04-15 DIAGNOSIS — G45.9 TIA (TRANSIENT ISCHEMIC ATTACK): Primary | ICD-10-CM

## 2022-04-15 PROCEDURE — 1111F DSCHRG MED/CURRENT MED MERGE: CPT | Performed by: FAMILY MEDICINE

## 2022-04-15 NOTE — PROGRESS NOTES
Physician Progress Note      PATIENT:               Dayanna   CSN #:                  [id number]  :                       1945  ADMIT DATE:       2022 2:25 PM  [address] DATE:        2022 4:10 PM  RESPONDING  PROVIDER #:        Cecilia Silva MD          QUERY TEXT:    H&P-\"Stroke-like symptoms vs atypical migraine symptoms vs presyncope   including unilateral vision changes (bright flashes in left eye\". Discharge   Summary notes- \"TIA\". Neurology Consult notes-  \"New onset dizziness with   ataxia likely secondary to hypertensive urgency. Hypertension, not controlled,   Hyperlipidemia\". ?     If possible, please document in progress notes and discharge summary if   you are evaluating and /or treating any of the following: The medical record reflects the following:  Risk Factors: uncontrolled HTN, Hyperlipidemia, carotid stenosis Sinus   bradycardia. Carotid artery disease  Clinical Indicators: b/p 211/103 HR- 55  EP- He has a known history of carotid   disease and presents with left eye symptoms  consisting of bright light flashes in the left eye. At the time of admission,   he is found to have sinus bradycardia. He has persistent sinus bradycardia   and likely has some element of underlying organic sick sinus syndrome. Discharge summary- decision made to stop beta blocker and start hydralazine   TID. BP is poorly controlled at baseline and will need ongoing close follow   up. \"  Treatment: Neurology and Cardiology consult,  Continue aspirin and statin. Blood pressure control closely at home  Continue current blood pressure medications, DVT and GI prophylaxis,   Telemetry, PT and OT, Follow-up carotid Doppler with his vascular   surgeon. Blood sugar monitor, Follow A1c and lipid panel outpatient, Hold   metoprolol. He will likely require treatment of arterial hypertension. Ambulatory ECG monitoring post discharge.     Thank-You, Christin Pritchard RN, BSN, CCDS  Options provided:  -- TIA symptoms likely  due to Cerebral Artery Occlusion/Stenosis (without   Infarction)  -- Other - I will add my own diagnosis  -- Disagree - Not applicable / Not valid  -- Disagree - Clinically unable to determine / Unknown  -- Refer to Clinical Documentation Reviewer    PROVIDER RESPONSE TEXT:    Near syncope based on dehydration in setting of chronic sinus bradycardia and   sick sinus syndrome.     Query created by: Alla Seay on 4/14/2022 1:52 PM      Electronically signed by:  Urmila Ramey MD 4/15/2022 7:40 AM

## 2022-04-15 NOTE — CARE COORDINATION
Brian [facility] Transitions Initial Follow Up Call    Call within 2 business days of discharge: Yes    Patient: Amalia Aragon Patient : 1945   MRN: [id number]  Reason for Admission: SOB   Discharge Date: 22 RARS: Readmission Risk Score: 10.2 ( )      Last Discharge [facility]       Complaint Diagnosis Description Type Department Provider    22 Shortness of Breath; Vision Change; Dizziness Shortness of breath . .. ED to Hosp-Admission (Discharged) (ADMITTED) Jaxson Harmon MD; Megha . .. Spoke with: [address] [place] [address] Pkwy: [facility]     Transitions of Care Initial Call    Was this an external facility discharge? No Discharge Facility: n/a    Challenges to be reviewed by the provider   Additional needs identified to be addressed with provider: No  none             Method of communication with provider : none      Advance Care Planning:   Does patient have an Advance Directive: not on file. Was this a readmission? No  Patient stated reason for admission:   Patients top risk factors for readmission: medical condition-.    Care Transition Nurse (CTN) contacted the patient by telephone to perform post hospital discharge assessment. Verified name and  with patient as identifiers. Provided introduction to self, and explanation of the CTN role. CTN reviewed discharge instructions, medical action plan and red flags with patient who verbalized understanding. Patient given an opportunity to ask questions and does not have any further questions or concerns at this time. Were discharge instructions available to patient? Yes. Reviewed appropriate site of care based on symptoms and resources available to patient including: PCP  Specialist  When to call 911. The patient agrees to contact the PCP office for questions related to their healthcare. Medication reconciliation was performed with patient, who verbalizes understanding of administration of home medications.  Advised obtaining a 90-day supply of all daily and as-needed medications. Reviewed and educated patient on any new and changed medications related to discharge diagnosis. CTN provided contact information. Plan for follow-up call in 5-7 days based on severity of symptoms and risk factors. Plan for next call: Apt/symptoms     Spoke with patient who reported he is doing well. Patient denied any cp, headaches, or weakness. CTN reviewed all medications with patient who reported he is taking all at this time. Patient reported he has vital patch in place and aware of when to change. CTN offered to schedule follow up apt with PCP and patient agreeable to apt on 4/21/22. Denies any acute needs at present time. Agreeable to f/u calls. Educated on the use of urgent care or physicians 24 hr access line if assistance is needed after hours.            Care Transitions 24 Hour Call    Care Transitions Interventions         Follow Up  Future Appointments   Date Time Provider Leonardo Irving   4/21/2022  1:40 PM Rachel Roa APRN - CNP NEL[facility] - DYKIRSTY   6/3/2022  2:00 PM KANDY Gomez - CALEB REYESN, RN, [facility]  Care Transition Nurse  [phone] mobile

## 2022-04-15 NOTE — CARE COORDINATION
ACM covering for primary ACM today. No outreach from this ACM as pt is currently receiving outreach from the [address] [place] [address] Drive team. ACM to send update to primary ACM.

## 2022-04-18 ENCOUNTER — CARE COORDINATION (OUTPATIENT)
Dept: CARE COORDINATION | Age: 77
End: 2022-04-18

## 2022-04-18 NOTE — CARE COORDINATION
No outreach made to patient at this time. Patient currently in CTN episode. Will follow up at a later date.

## 2022-04-19 ENCOUNTER — NURSE ONLY (OUTPATIENT)
Dept: FAMILY MEDICINE CLINIC | Age: 77
End: 2022-04-19
Payer: MEDICARE

## 2022-04-19 VITALS — HEART RATE: 85 BPM | DIASTOLIC BLOOD PRESSURE: 64 MMHG | SYSTOLIC BLOOD PRESSURE: 120 MMHG

## 2022-04-19 DIAGNOSIS — R07.9 CHEST PAIN, UNSPECIFIED TYPE: Primary | ICD-10-CM

## 2022-04-19 PROCEDURE — 93000 ELECTROCARDIOGRAM COMPLETE: CPT | Performed by: FAMILY MEDICINE

## 2022-04-19 NOTE — PROGRESS NOTES
Pt came in for BP check and stated that he is having chest pain and heart rate increased. Pt had been having difficult due to loosing his wife 6 mo ago. Per  do an EKG. Janes George reviewed and stated that it was ok no concerns. Pt informed and will contact office with any additional concerns.

## 2022-04-21 ENCOUNTER — OFFICE VISIT (OUTPATIENT)
Dept: FAMILY MEDICINE CLINIC | Age: 77
End: 2022-04-21
Payer: MEDICARE

## 2022-04-21 ENCOUNTER — CARE COORDINATION (OUTPATIENT)
Dept: CASE MANAGEMENT | Age: 77
End: 2022-04-21

## 2022-04-21 VITALS
BODY MASS INDEX: 33.5 KG/M2 | OXYGEN SATURATION: 97 % | HEART RATE: 78 BPM | SYSTOLIC BLOOD PRESSURE: 120 MMHG | WEIGHT: 261 LBS | HEIGHT: 74 IN | DIASTOLIC BLOOD PRESSURE: 80 MMHG

## 2022-04-21 DIAGNOSIS — G45.9 TIA (TRANSIENT ISCHEMIC ATTACK): Primary | ICD-10-CM

## 2022-04-21 DIAGNOSIS — L30.9 DERMATITIS: ICD-10-CM

## 2022-04-21 DIAGNOSIS — Z09 HOSPITAL DISCHARGE FOLLOW-UP: ICD-10-CM

## 2022-04-21 PROCEDURE — 1111F DSCHRG MED/CURRENT MED MERGE: CPT | Performed by: REGISTERED NURSE

## 2022-04-21 PROCEDURE — 99495 TRANSJ CARE MGMT MOD F2F 14D: CPT | Performed by: REGISTERED NURSE

## 2022-04-21 RX ORDER — AZITHROMYCIN MONOHYDRATE 10 MG/ML
SOLUTION/ DROPS OPHTHALMIC
COMMUNITY
Start: 2021-04-09 | End: 2022-04-21

## 2022-04-21 NOTE — PATIENT INSTRUCTIONS
Please make an appointment with one of our [address], Dr. Otilio Blount or Russell Grijalva MA. They will work with you to develop a plan to improve your overall well-being by addressing any behavioral or mental health factors that are influencing your health. You can schedule your appointment just like you schedule your other appointments here, at the  or over the phone. Each appointment will be 30 minutes long, and you will typically be seen every 2-4 weeks. Your insurance should cover the visit, but you may owe a specialist copay. If you would prefer to be seen by a therapist more frequently, or for a longer visit, please ask your Primary Care Provider for a recommendation.   [phone] Option 3

## 2022-04-21 NOTE — PROGRESS NOTES
Post-Discharge Transitional Care  Follow Up      Sonja Aggarwal   YOB: 1945    Date of Office Visit:  4/21/2022  Date of Hospital Admission: 4/12/22  Date of Hospital Discharge: 4/14/22  Risk of hospital readmission (high >=14%. Medium >=10%) :Readmission Risk Score: 10.2 ( )      Care management risk score Rising risk (score 2-5) and Complex Care (Scores >=6): 1     Non face to face  following discharge, date last encounter closed (first attempt may have been earlier): 4/15/2022 10:01 AM    Call initiated 2 business days of discharge: Yes    ASSESSMENT/PLAN:   TIA (transient ischemic attack)  -     VT DISCHARGE MEDS RECONCILED W/ CURRENT OUTPATIENT MED LIST  Dermatitis  Follow-up with dermatology as planned on 4/26/22 for dermatitis on bilateral lower extremities. -     hydrocortisone 2.5 % cream; Apply topically 2 times daily, Topical, 2 TIMES DAILY Starting Thu 4/21/2022, Disp-453.6 g, R-0, Normal  Hospital discharge follow-up  -     VT DISCHARGE MEDS RECONCILED W/ CURRENT OUTPATIENT MED LIST  -Follow-up with cardiology as planned. Referred to behavioral health consultants for help with grief related to loss of his wife. Encouraged stress management. He is going for a massage tomorrow- recommend he continue self-care. Medical Decision Making: moderate complexity  Return if symptoms worsen or fail to improve. On this date 4/21/2022 I have spent 26 minutes reviewing previous notes, test results and face to face with the patient discussing the diagnosis and importance of compliance with the treatment plan as well as documenting on the day of the visit. Subjective:   HPI:  Follow up of Hospital problems/diagnosis(es): He is here for follow up after having some dizziness and experiencing symptoms he says were similar to when he had a stroke a couple of years ago. He was diagnosed with a TIA and will have follow-up with cardiology in June 2022. Denies any issues or concerns.   No new symptoms. Denies chest pain, shortness of breath, headache or blurred vision. He says he does have some neck soreness which he believes is due to stress. Stress- he has a lot of stress related to work and the loss of his wife approximately 6 months ago. He is going to get a massage tomorrow. Inpatient course: Discharge summary reviewed- see chart. Interval history/Current status: stable    Patient Active Problem List   Diagnosis    Low back pain    Dyslipidemia    HTN (hypertension), benign    Unstable angina ([address]    Chest pain    ED (erectile dysfunction)    Angioedema    Pre-syncope    Bradycardia    Carotid artery occlusion without infarction, unspecified laterality    Symptomatic bradycardia    TIA (transient ischemic attack)    Fatigue    Acute renal failure superimposed on stage 3 chronic kidney disease ([facility] [address]    Right calf pain    Hypertensive urgency    Dizziness       Medications listed as ordered at the time of discharge from hospital     Medication List          Accurate as of April 21, 2022  2:21 PM. If you have any questions, ask your nurse or doctor.             CONTINUE taking these medications    aspirin 325 MG EC tablet  Take 1 tablet by mouth daily for 14 days     atorvastatin 80 MG tablet  Commonly known as: LIPITOR  Take 1 tablet by mouth daily     citalopram 40 MG tablet  Commonly known as: CELEXA  TAKE 1 TABLET BY MOUTH DAILY     CO ENZYME Q-10 PO     FIBER PO     GARLIC PO     hydrALAZINE 50 MG tablet  Commonly known as: APRESOLINE  Take 1 tablet by mouth every 8 hours     hydrocortisone 2.5 % cream  Apply topically 2 times daily     NIFEdipine 60 MG extended release tablet  Commonly known as: ADALAT CC  TAKE 1 TABLET BY MOUTH DAILY     omeprazole 20 MG delayed release capsule  Commonly known as: PRILOSEC     therapeutic multivitamin-minerals tablet           Where to Get Your Medications      These medications were sent to [address] [place] - [address]  [address] [facility]    Phone: [phone]   · hydrocortisone 2.5 % cream           Medications marked \"taking\" at this time  Outpatient Medications Marked as Taking for the 4/21/22 encounter (Office Visit) with KANDY Medley - CNP   Medication Sig Dispense Refill    GARLIC PO GARLIC CAPS      hydrocortisone 2.5 % cream Apply topically 2 times daily 453.6 g 0    hydrALAZINE (APRESOLINE) 50 MG tablet Take 1 tablet by mouth every 8 hours 90 tablet 3    atorvastatin (LIPITOR) 80 MG tablet Take 1 tablet by mouth daily 90 tablet 2    NIFEdipine (ADALAT CC) 60 MG extended release tablet TAKE 1 TABLET BY MOUTH DAILY 30 tablet 3    citalopram (CELEXA) 40 MG tablet TAKE 1 TABLET BY MOUTH DAILY 90 tablet 5    CO ENZYME Q-10 PO Take by mouth      aspirin 325 MG EC tablet Take 1 tablet by mouth daily for 14 days 14 tablet 0    FIBER PO Take by mouth      omeprazole (PRILOSEC) 20 MG capsule Take 40 mg by mouth daily      Multiple Vitamins-Minerals (THERAPEUTIC MULTIVITAMIN-MINERALS) tablet Take 1 tablet by mouth daily. Medications patient taking as of now reconciled against medications ordered at time of hospital discharge: Yes    A comprehensive review of systems was negative except for what was noted in the HPI. Objective:    /80   Pulse 78   Ht 6' 2\" (1.88 m)   Wt 261 lb (118.4 kg)   SpO2 97%   BMI 33.51 kg/m²   General Appearance: alert and oriented to person, place and time, well developed and well- nourished, in no acute distress  Skin: warm and dry, diffuse, erythematous scaly, rash on bilateral lower extremities with some mild edema.   Head: normocephalic and atraumatic  Eyes: pupils equal, round, and reactive to light, extraocular eye movements intact, conjunctivae normal  ENT: tympanic membrane, external ear and ear canal normal bilaterally, nose without deformity, nasal mucosa and turbinates normal without polyps  Neck: supple and non-tender without mass, no thyromegaly or thyroid nodules, no cervical lymphadenopathy  Pulmonary/Chest: clear to auscultation bilaterally- no wheezes, rales or rhonchi, normal air movement, no respiratory distress  Cardiovascular: normal rate, regular rhythm, normal S1 and S2, no murmurs, rubs, clicks, or gallops, distal pulses intact  Abdomen: soft, non-tender, non-distended, normal bowel sounds, no masses or organomegaly  Extremities: no cyanosis, clubbing or edema  Musculoskeletal: normal range of motion, no joint swelling, deformity or tenderness  Neurologic: reflexes normal and symmetric, no cranial nerve deficit, gait, coordination and speech normal      An electronic signature was used to authenticate this note.   --Wally Zamudio, APRN - CNP

## 2022-04-21 NOTE — CARE COORDINATION
Brian [facility] Transitions Follow Up Call    2022    Patient: Laury Andres  Patient : 1945   MRN: <[id number]>  Reason for Admission: TIA  Discharge Date: 22 RARS: Readmission Risk Score: 10.2 ( )         Spoke with: NA    Attempted to reach patient via phone for transition call. Upon chart review, patient was seen at PCP office today. Will schedule outreach for . Chas Arnold, ANGELAN [address] [facility]  Care Transitions  [phone]    Care Transitions Subsequent and Final Call    Subsequent and Final Calls  Care Transitions Interventions  Other Interventions:            Follow Up  Future Appointments   Date Time Provider Leonardo Irving   2022 10:30 AM Jose Alejandro Murillo, PhD [facility]   6/3/2022  2:00 PM Blanca Sales APRN - CNP [facility]       Chas Arnold, ANGELAN

## 2022-04-22 ENCOUNTER — CARE COORDINATION (OUTPATIENT)
Dept: CASE MANAGEMENT | Age: 77
End: 2022-04-22

## 2022-04-22 NOTE — CARE COORDINATION
[facility] Transitions Follow Up Call    2022    Patient: Krystyna Minaya  Patient : 1945   MRN: [id number]  Reason for Admission: TIA, HTN, bradycardia, MIRI on CKD3 -> home no services, avoid NSAIDs  Discharge Date: 22 RARS: Readmission Risk Score: 10.2 ( )    Needs to be reviewed by the provider   Additional needs identified to be addressed with provider: No         Method of communication with provider : none    Care Transition Nurse (CTN) contacted the patient by telephone to follow up after recent hospital admission. Addressed changes since last contact: completed [facility] visit - note reviewed  Discussed follow-up appointments. If no appointment was previously scheduled, appointment scheduling offered: no - completed TCM visit on 2022. Non-[facility] follow up appointment(s): NA    Discussed appropriate site of care based on symptoms and resources available to patient including: PCP  Specialist. The patient agrees to contact the PCP office for questions related to their healthcare. Patients top risk factors for readmission: medical condition-see above  Interventions to address risk factors: Education of patient/family/caregiver/guardian to support self-management-     Reports feeling well today. The hydrocortisone PCP ordered yesterday is on back order. He will wait for it. Denies needs/concerns at this time. CTN provided contact information for future needs. Plan for follow-up call in 5-7 days based on severity of symptoms and risk factors. Plan for next call: follow up appointment-derm  for dermatitis BLE, did he get hydrocortisone creme or derm order other med?       Care Transitions Subsequent and Final Call    Schedule Follow Up Appointment with PCP: Completed  Subsequent and Final Calls  Do you have any ongoing symptoms?: No  Have your medications changed?: Yes  Do you have any questions related to your medications?: No  Do you currently have any active services?: No  Do you have any needs or concerns that I can assist you with?: No  Identified Barriers: None  Care Transitions Interventions  No Identified Needs  Other Interventions:          Follow Up  Future Appointments   Date Time Provider Leonardo Irving   5/6/2022 10:30 AM Natalia Weaver,  [facility]   6/3/2022  2:00 PM Guillermo Aguilar APRN - CALEB F[facility]       Alec Dominguez RN

## 2022-04-25 ENCOUNTER — CARE COORDINATION (OUTPATIENT)
Dept: CARE COORDINATION | Age: 77
End: 2022-04-25

## 2022-04-28 ENCOUNTER — CARE COORDINATION (OUTPATIENT)
Dept: CASE MANAGEMENT | Age: 77
End: 2022-04-28

## 2022-05-02 ENCOUNTER — CARE COORDINATION (OUTPATIENT)
Dept: CARE COORDINATION | Age: 77
End: 2022-05-02

## 2022-05-02 NOTE — CARE COORDINATION
ACM did not attempt outreach at this time due to patient being in a CTN episode. ACM will continue to monitor.

## 2022-05-05 ENCOUNTER — CARE COORDINATION (OUTPATIENT)
Dept: CASE MANAGEMENT | Age: 77
End: 2022-05-05

## 2022-05-05 NOTE — CARE COORDINATION
Brian [facility] Transitions Follow Up Call    2022    Patient: Rashid Melendrez  Patient : 1945   MRN: [id number]  Reason for Admission: sob  Discharge Date: 22 RARS: Readmission Risk Score: 10.2 ( )         Spoke with: Chika Be with patient who reported he is doing ok and reported he did have another episode of his \"heart pounding\" and reports he feels it is r/t anxiety. CTN discussed possible stressors and ways to manage stress. Patient also confirmed he has an apt with Dr. Nixon Barker ( Psych) on . Patient also reports headaches and reported his BP has been on average 130/80's. Patient encouraged to notify provider right away should any symptoms worsen. Care Transitions Subsequent and Final Call    Subsequent and Final Calls  Do you have any ongoing symptoms?: Yes  Patient-reported symptoms: Other  Interventions for patient-reported symptoms: Other  Have your medications changed?: No  Do you have any questions related to your medications?: No  Do you currently have any active services?: No  Do you have any needs or concerns that I can assist you with?: No  Identified Barriers: None  Care Transitions Interventions  Other Interventions:            Follow Up  Future Appointments   Date Time Provider Leonardo Irving   2022 10:30 AM Nicole Nam, PhD [facility]   6/3/2022  2:00 PM Gurpreet Roldan APRN - CALEB REYESN, RN, [facility]  Care Transition Nurse  [phone] mobile

## 2022-05-06 ENCOUNTER — OFFICE VISIT (OUTPATIENT)
Dept: PSYCHOLOGY | Age: 77
End: 2022-05-06
Payer: MEDICARE

## 2022-05-06 DIAGNOSIS — F43.21 ADJUSTMENT DISORDER WITH DEPRESSED MOOD: Primary | ICD-10-CM

## 2022-05-06 DIAGNOSIS — Z63.4 BEREAVEMENT: ICD-10-CM

## 2022-05-06 PROCEDURE — 90791 PSYCH DIAGNOSTIC EVALUATION: CPT | Performed by: PSYCHOLOGIST

## 2022-05-06 PROCEDURE — 93272 ECG/REVIEW INTERPRET ONLY: CPT | Performed by: INTERNAL MEDICINE

## 2022-05-06 SDOH — SOCIAL STABILITY - SOCIAL INSECURITY: DISSAPEARANCE AND DEATH OF FAMILY MEMBER: Z63.4

## 2022-05-06 ASSESSMENT — PATIENT HEALTH QUESTIONNAIRE - PHQ9
9. THOUGHTS THAT YOU WOULD BE BETTER OFF DEAD, OR OF HURTING YOURSELF: 0
8. MOVING OR SPEAKING SO SLOWLY THAT OTHER PEOPLE COULD HAVE NOTICED. OR THE OPPOSITE, BEING SO FIGETY OR RESTLESS THAT YOU HAVE BEEN MOVING AROUND A LOT MORE THAN USUAL: 2
SUM OF ALL RESPONSES TO PHQ QUESTIONS 1-9: 16
7. TROUBLE CONCENTRATING ON THINGS, SUCH AS READING THE NEWSPAPER OR WATCHING TELEVISION: 1
2. FEELING DOWN, DEPRESSED OR HOPELESS: 2
1. LITTLE INTEREST OR PLEASURE IN DOING THINGS: 3
10. IF YOU CHECKED OFF ANY PROBLEMS, HOW DIFFICULT HAVE THESE PROBLEMS MADE IT FOR YOU TO DO YOUR WORK, TAKE CARE OF THINGS AT HOME, OR GET ALONG WITH OTHER PEOPLE: 1
6. FEELING BAD ABOUT YOURSELF - OR THAT YOU ARE A FAILURE OR HAVE LET YOURSELF OR YOUR FAMILY DOWN: 1
SUM OF ALL RESPONSES TO PHQ9 QUESTIONS 1 & 2: 5
SUM OF ALL RESPONSES TO PHQ QUESTIONS 1-9: 16
3. TROUBLE FALLING OR STAYING ASLEEP: 2
SUM OF ALL RESPONSES TO PHQ QUESTIONS 1-9: 16
SUM OF ALL RESPONSES TO PHQ QUESTIONS 1-9: 16
5. POOR APPETITE OR OVEREATING: 3
4. FEELING TIRED OR HAVING LITTLE ENERGY: 2

## 2022-05-06 ASSESSMENT — ANXIETY QUESTIONNAIRES
2. NOT BEING ABLE TO STOP OR CONTROL WORRYING: 2-OVER HALF THE DAYS
GAD7 TOTAL SCORE: 10
1. FEELING NERVOUS, ANXIOUS, OR ON EDGE: 2
4. TROUBLE RELAXING: 1-SEVERAL DAYS
7. FEELING AFRAID AS IF SOMETHING AWFUL MIGHT HAPPEN: 1-SEVERAL DAYS
6. BECOMING EASILY ANNOYED OR IRRITABLE: 2-OVER HALF THE DAYS
3. WORRYING TOO MUCH ABOUT DIFFERENT THINGS: 1-SEVERAL DAYS
5. BEING SO RESTLESS THAT IT IS HARD TO SIT STILL: 1-SEVERAL DAYS

## 2022-05-06 NOTE — PATIENT INSTRUCTIONS
Bereavement, Grief & Mourning        Bereavement is the state of having lost a significant other to death. Grief is the personal response to the loss. Mourning is the public expression of that loss. What is Normal Grief? Grief reactions vary depending on who we are, who we lost, our relationship with that person, the circumstances around their passing, and how much their loss affects our day-to-day functioning. Different people may express grief differently and you may even have different grief responses between one loss and another. Reactions to grief and loss include not just emotional symptoms, but also behavioral and physical symptoms. These reactions can often change over time. All are normal for a short period of time. Emotional Behavioral Physical   Shock, denial,                   numbness Crying unexpectedly Exhaustion/Fatigue      Sadness, anxiety, guilt,       fear Sleep changes (increase or decrease) Decreased energy        Anger (at others or        God), Not eating/Weight changes Memory problems        Irritability, frustration Withdrawing from others Stomach and intestinal upset    Restlessness, difficulty concentrating, trouble making decisions Pain and headaches     Symptoms that are not normal and may signal the need to talk to a professional include:  Use of drugs, alcohol, violence, and thoughts of killing oneself. The duration of grief varies from person to person. Current research shows that the average recovery time is 18 to 24 months. Also, grief reactions can be stronger around anniversary or other significant dates, such as the anniversary of the persons death, birthdays, and holidays. The Stages of Grief  Grief therapists often describe stages of grief outlined by the research of Dr. Crystal Florence. These stages do not always go in order. You may move back and forth among some of the stages and may even skip some of them.   These stages are meant as a guide to help you understand your reactions and those of others who are grieving.  - Denial:  Denial (not acknowledging the loss) can help contain the shock of loss. Denial can act as a safety mechanism to block out grief until we are ready to handle it. - Sadness and depression:  Deep, intense grief and mourning appear during this stage. When the full understanding of our loss comes, it can seem overwhelming. During this stage, you may cry often and unexpectedly. You may not want to be around people or to do things that you normally enjoy. During this stage, it is best to remain as active as possible and to seek supportive people who will allow you to say what you need to or to cry when you need to. It is important to allow yourself to work through your full range and experience of emotions. - Anger:  Rage and anger can be intense toward the person who , toward friends and relatives, and even toward God. It is important to have an outlet to release anger through activities such as exercise, hobbies, or through therapy. Guilt, shame, and blame are feelings that need to be addressed, especially if it is toward you. - Acceptance: This stage includes coming to terms with the loss. It does not mean that you have found the answers to your questions or that you stop thinking about the person who is gone. It does signify a reinvestment in life and a willingness to readjust to your new circumstances while carrying the memory of your loved one with you. How to Help Yourself  1. Give yourself time to grieve. It is normal and important to express your grief and to work through the concerns that arise for you at this time. Stuffing your feelings may not be helpful and may delay or prolong your grief. 2. Find supportive people to reach out to during your grief. This is the time when the support of others may be the most helpful.   Dont be afraid to tell them how they can best help, even if it means just listening. It is often very helpful to talk about your loss with people who will allow you to express your emotions. 3. Take care of your health. Often after a loss, we stop doing the things we need to for health care, such as exercising, eating correctly, keeping Dr. appointments, or taking prescribed medications. If you are on a health care regimen, it is important to continue to adhere to your treatment. 4. Postpone major life changes. Give yourself time to adjust to your loss before making plans to change jobs, move or sell your home, remarry, etc.  Grief can sometimes cloud your judgment and ability to make decisions. 5. Consider keeping a journal.  It is often helpful to write or tell the story of your loss and what it means to you as a way to work through your feelings. 6. Participate in activities. Staying active through exercise, enjoyable activities, outings with supportive others, or even starting new hobbies can help us get through tough times while providing opportunities for constructive development and use of energy. 7. Find a way to memorialize your loved one. Planting a tree or garden in the name of your loved one, dedicating a work to their memory, contributing to a seferino in their name, and other such activities can be helpful. 8. Consider joining grief-support groups or contacting a grief counselor for additional support and help. Remember that depressive symptoms (feeling sad) are a fundamental part of normal bereavement. Staying active and finding support from others can help you to work through the grief process. Grief Tips  Help for Those Who Mourn    The following are many ideas to help people who are mourning a loved ones death. Different kinds of losses dictate different responses, so not all of these ideas will suit everyone. Likewise, no two people grieve alike  what works for one may not work for another.   Treat this list for what it is; a gathering of assorted suggestions that various people have tried with success. Perhaps what helped them will help you. The emphasis here is on specific, practical ideas. 1. Talk regularly with a friend. Talking with another about what you think and feel is one of the best things you can do for yourself. It helps relieve some of the pressure you may feel, it can give you a sense of perspective, and it keeps you in touch with others. Look for someone whos a good listener and who has a caring soul. Then speak whats on your mind and in your heart. If this feels one-sided let that be okay for this period of your life. Chances are the other person will find meaning in what theyre doing, and time will come when youll have the chance to be a good listener for someone else. Youll be a better listener then, if youre a good talker now. 2. Walk. Go for walks outside every day if you can. Dont overdo it, but walk briskly enough that it feels invigorating. Sometimes try walking slowly enough so you can look carefully at what you see. Observe what nature has to offer you, what it can teach you. Enjoy as much as you are able to of the sights and sounds that come your way. If you like, walk with another person. 3. Carry or wear a linking object. Carry something in your pocket or purse that reminds you of the one who   a keepsake they gave you perhaps, or small object they once carried or used or a memento you select for just this purpose. You might wear a piece of their jewelry in the same way. Whenever you want, reach for gaze upon this object and remember what it signifies. 4. Visit the grave. Not all people prefer to do this. But if it feels right to you, then do so. Dont let others convince you this is a morbid thing to do. Spend whatever time feels right there. Stand or sit in the quietness and do what comes naturally: be silent or talk, breathe deeply or cry, recollect or pray. You may wish to add your distinctive touch to the gravesite  straighten it a bit, or add little signs of your love. 5. Create a memory book. Compile photographs, which document your loved ones life. Arrange them into some sort of order so they tell a story. Add other elements if you want: diplomas, newspaper clippings, awards, accomplishments, and reminders of significant events. Put all this in a special binder and keep it for other people to look at if they wish. Go through it on your own if you desire. Reminisce as you do so. 6. Recall your dreams. Your dreams often have important things to say about your feelings and about your relationship with the one who . Your dreams may be scary or sad, especially early on. They may seem weird or crazy to you. You may find that your loved one appears in your dreams. Accept your dreams for what they are and see what you can learn from them. No one knows that better than you. 7. Tell people what helps you and what doesnt. People around you may not understand what you need. So tell them. If hearing your loved ones name spoken aloud by others feels good, say so. If you need more time alone, or assistance with chores youre unable to complete, or an occasional hug, be honest.  People cant read your mind, so youll have to speak it. 8. Write things down. Most people who are grieving become more forgetful than usual.  So help yourself remember what you want by keeping track of it on paper or with whatever system works best for you. This may include writing down things you want to preserve about the person who has . 9. Ask for a copy of the [facility]'s. If the  liturgy or memorial service holds special meaning for you because of what was spoken or read, ask for the words. Whoever participated in that ritual will feel gratified that what they prepared was appreciated. Turn to these words whenever you want.   Some people find these thoughts provide even more help weeks and months after the service. 10. Remember the serenity prayer. This prayer is attributed to theologian Leopoldo Perch, but its actually an ancient  [facility]. It has brought comfort and support to many that have suffered various kinds of afflictions. 11. Create a memory area at home. In a space that feels appropriate, arrange a small table that honors the person: a framed photograph or two, perhaps a prized possession or award or something they created or something they loved. This might be placed on a small table, a mantel or a desk. Some people like to use a grouping of candles, representing not just the person who  but others who have  as well. In that case a variety of candles can be arranged each representing a unique life. 12. Drink water. Grieving people can easily become dehydrated. Crying can naturally lead to that. And with your normal routines turned upside down, you may simply not drink as much or as regularly as you did before this death. Make this a way you care for yourself. 13. Use your hands. Sometimes theres value in doing repetitive things with your hands, something you dont have to think about very much because it becomes second nature. Knitting and crocheting are like that. So are carving, woodworking, polishing, solving jigsaw puzzles, painting, braiding, shoveling, washing and countless other activities. 15. Give yourself respites from your grief. Just because youre grieving doesnt mean you must always be feeling sad or     forlorn. Theres value in sometimes consciously deciding that youll think about something else for awhile, or that youll do something youve always enjoyed doing. Sometimes this happens naturally and its only later you realize that your grief has taken a back seat. Let it, this is not an indication you love that person any less or that youre forgetting them.   Its a sign that youre human and you need relief from the unrelenting pressure. It can also be a healthy sign youre healing. 15. See a grief counselor. If youre concerned about how youre feeling and how well youre adapting make an appointment   with a counselor who specializes in grief. Often youll learn what you need both about grief and about yourself as a griever in only a few sessions. Ask questions of the counselor before you sign on. What specific training does he or she have? What accreditation? A person who is a family therapist or a psychologist doesnt necessarily understand the unique issues of someone in grief. 12. Begin your day with your loved one. If your grief is young, youll probably wake up thinking of that person anyway. So why not decide that youll include her or him from the start? Focus this time in a positive way. Bring to your mind fulfilling memories. Recall lessons that this person taught you, gifts he or she gave you. Think about how you can spend your day in ways that would be keeping in with your loved ones best self and with your best self. Then carry that best self with you through your day. 16. Invite some one to be your telephone barbie. If your grief and sadness hit you especially hard at times and you have no   one nearby to turn to, ask someone you trust to be your telephone barbie. Ask their permission for you to call them whenever you feel youre at loose ends, day or night. Then put their number beside your phone and call them if you need them. Dont abuse the privilege, of course. And covenant that some day it will be payback time  someday youll make yourself available to help someone else in the same way youve been helped. That will help you accept the care youre receiving. 18. Avoid certain people if you must.  No one likes to be unfriendly or cold.   But if there are people in your life who make it  very difficult for you to do your grieving then do what you can to stay out of their way. Some people may lecture you or belittle you. 23. Donate their possessions meaningfully. Whether you give your loved ones personal possessions to someone you know   or to a stranger, find ways to pass these things along so that others might benefit from them. Family members of friends might like to receive keepsakes. They or others might deserve tools, utensils, books or sporting equipment. [employer] organizations can put clothes to good use. Some wish to do this quickly following the death, while others wish to wait awhile. 21. Donate in the others name. Honor the [employer] and spirit by giving a gift or gifts to a cause the other would   appreciate. A favorite seferino? A local ? A building project? Extend that persons influence even further. 21. Create or commission a memory quilt. Sew or invite others to sew with you, or hire someone to sew for you. However you get it completed, put together a wall hanging or a bedroom quilt that remembers the important life events of the one who . Take your time doing this. Make it what it is, a labor of love. 22. Take a yoga class. People of almost any age can do yoga. More than conditioning your body, it helps you relax and focus your mind. It can be woven into a practice of mediation. Its a gentle art for that time in your life when you deserve gentleness all around you. 23. Connect on the Internet. If youre [employer], search the Internet. Rosa Mcclure find many resources for people in grief,   as well as the opportunity to chat with fellow grievers. You can link up with others without leaving your home. Rosa Mcclure also find more to expand your horizons as a person who is beginning to grow. 24. Speak to a cleargyperson.   If youre searching for answers to the larger questions about life and death, Rastafari and spirituality, consider talking with a representative of your lisa, or even anothers lisa. Consider becoming a spiritual friend with another and making your time of grieving a time of personal exploring. Read of how others have responded to a loved ones death. You may feel that your own grief is all you can handle. But if youd like to look at the ways others have done it, try:  Beyond Grief:  A Guide for Recovering from the Death of a Loved One by Quyen 40 by Paula Ken and Reid Gerardo  The Grief Recovery Handbook: The Action Program for Moving Beyond Death, Divorce, & Other Losses by Argenis Stoddard   A Grief Observed by Eduardo Rojas  by Maricela Harada When Good-Bye Is Forever by [place] [employer]  Men and Grief by Aga Wilson or Alejandra Rue [place] for a Son. There are many others. Check with a . 22. Learn about your loved one from others. Listen to the stories others have to tell about the one, who , stories youre familiar with and those youve never heard before. Spend time with their friends, schoolmates or colleagues. Invite them into your home. Solicit the writings of others. Preserve whatever you find out. Celebrate your time together. 26. Take a day off. When the mood is just right, take a one-day vacation. Do whatever you want, or dont do whatever you want. Travel somewhere or stay inside by yourself. Be very active or dont do anything at all. Just make it your day, whatever that means for you. 32. Invite someone to give you feedback. Select someone you trust, preferably someone familiar with the working of grief, to give you his or her reaction when you ask for it. If you want to check out how clearly youre thinking, how accurately youre remembering, how effectively youre coping, go to that person. Pose your questions, then listen to their responses. What you choose to do with that information will be up to you.     28. Vent your anger rather than hold it in. You may feel awkward being angry when youre grieving, but anger is a common reaction. The expression holds true: anger is best out floating rather than in bloating. Even if you feel a bit ashamed as you do it, find ways to get it out of your system. [place], even if its in an empty house. Cry. Hit something soft. Throw eggs at something hard. Vacuum up a storm. Resist the temptation to be proper. 29. Give thanks every day. Whatever has happened to you, you still have things to be thankful for. Perhaps its your memories, your remaining family, your support, your work; you own health  all sorts of things. Draw your attention to those parts of life that are worth appreciating, then appreciate them. 30. Monitor signs of dependency. While its normal to become more dependent upon others for awhile immediately after a death, it will not be helpful to continue in that role long-term. Watch for signs that youre prolonging your need for assistance. Congratulate yourself when you do things for yourself.

## 2022-05-06 NOTE — PROGRESS NOTES
Behavioral Health Consultation  Rubi Campbell, Ph.D.  Psychologist  5/10/2022  10:34 AM EDT      Time spent with Patient: 30 minutes  This is patient's first [facility] appointment. Reason for Consult:    Chief Complaint   Patient presents with    Stress     Referring Provider: Ashwin Ritter, KANDY - CNP  [address] [address]    Pt provided informed consent for the behavioral health program. Discussed with patient model of service to include the limits of confidentiality (i.e. abuse reporting, suicide intervention, etc.) and short-term intervention focused approach. Pt indicated understanding. Feedback given to PCP. S:  Patient presents with concerns about \"stress, grief, and guilt. \" Patient's wife  in November after living with pancreatic cancer for about 1.5 years. Patient had a TIA about 1 year ago. At that time, his wife entered palliative care. He feels guilty that he withdrew from her more at the end of her life. Sx are aggravated by work-related stress, loss of his wife. Patient finds some relief from edible THC. Goals for treatment incude processing stress, developing better coping strategies. Patient lives alone. He has a dog and a cat. 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 Daily routine is fairly consistent. Daily caffeine use includes 2 cups of coffee and up to 5 Diet Cokes. Smokes cigars once a month or less, drinks alcohol about once a month, uses THC edibles a few times a week. Patient spends a few hours a day playing card games on his phone. There is no regular exercise. Patient describes vairable sleep pattern. \"I either sleep not enough or too much. \" Patient does not have hobbies, he describes himself as a \"work-a-holic. \" Patient describes good social support. Family history is positive for mental illness and possible personality factors. Patient has been in therapy after a divorce.      O:  MSE:    Appearance: good hygiene Attitude: cooperative, friendly and tearful  Consciousness: alert  Orientation: oriented to person, place, time, general circumstance  Memory: recent and remote memory intact  Attention/Concentration: intact during session  Psychomotor Activity:normal  Eye Contact: normal  Speech: normal rate and volume, well-articulated  Mood: down  Affect: dysphoric  Perception: within normal limits  Thought Content: all-or-none thinking and intrusive thoughts  Thought Process: logical, coherent and goal-directed  Insight: good  Judgment: intact  Ability to understand instructions: Yes  Ability to respond meaningfully: Yes  Morbid Ideation: no   Suicide Assessment: no suicidal ideation, plan, or intent  Homicidal Ideation: no    History:    Medications:   Current Outpatient Medications   Medication Sig Dispense Refill    GARLIC PO GARLIC CAPS      hydrocortisone 2.5 % cream Apply topically 2 times daily 453.6 g 0    hydrALAZINE (APRESOLINE) 50 MG tablet Take 1 tablet by mouth every 8 hours 90 tablet 3    atorvastatin (LIPITOR) 80 MG tablet Take 1 tablet by mouth daily 90 tablet 2    NIFEdipine (ADALAT CC) 60 MG extended release tablet TAKE 1 TABLET BY MOUTH DAILY 30 tablet 3    citalopram (CELEXA) 40 MG tablet TAKE 1 TABLET BY MOUTH DAILY 90 tablet 5    CO ENZYME Q-10 PO Take by mouth      aspirin 325 MG EC tablet Take 1 tablet by mouth daily for 14 days 14 tablet 0    FIBER PO Take by mouth      omeprazole (PRILOSEC) 20 MG capsule Take 40 mg by mouth daily      Multiple Vitamins-Minerals (THERAPEUTIC MULTIVITAMIN-MINERALS) tablet Take 1 tablet by mouth daily. No current facility-administered medications for this visit. Social History:   Social History     Socioeconomic History    Marital status:       Spouse name: Not on file    Number of children: Not on file    Years of education: Not on file    Highest education level: Not on file   Occupational History    Not on file   Tobacco Use    Smoking status: Never Smoker    Smokeless tobacco: Never Used   Vaping Use    Vaping Use: Never used   Substance and Sexual Activity    Alcohol use: Yes     Alcohol/week: 2.5 standard drinks     Types: 3 Standard drinks or equivalent per week     Comment: 2 drinks about 4 nights a week    Drug use: No    Sexual activity: Not on file   Other Topics Concern    Not on file   Social History Narrative    Not on file     Social Determinants of Health     Financial Resource Strain:     Difficulty of Paying Living Expenses: Not on file   Food Insecurity:     Worried About Running Out of Food in the Last Year: Not on file    Niko of Food in the Last Year: Not on file   Transportation Needs:     Lack of Transportation (Medical): Not on file    Lack of Transportation (Non-Medical): Not on file   Physical Activity: Inactive    Days of Exercise per Week: 0 days    Minutes of Exercise per Session: 0 min   Stress:     Feeling of Stress : Not on file   Social Connections:     Frequency of Communication with Friends and Family: Not on file    Frequency of Social Gatherings with Friends and Family: Not on file    Attends Jain Services: Not on file    Active Member of Clubs or Organizations: Not on file    Attends Club or Organization Meetings: Not on file    Marital Status: Not on file   Intimate Partner Violence:     Fear of Current or Ex-Partner: Not on file    Emotionally Abused: Not on file    Physically Abused: Not on file    Sexually Abused: Not on file   Housing Stability:     Unable to Pay for Housing in the Last Year: Not on file    Number of [place] in the Last Year: Not on file    Unstable Housing in the Last Year: Not on file     TOBACCO:   reports that he has never smoked. He has never used smokeless tobacco.  ETOH:   reports current alcohol use of about 2.5 standard drinks of alcohol per week.   Family History:   Family History   Problem Relation Age of Onset    Stroke Mother 47    High Blood Pressure Mother     High Cholesterol Mother     Cancer Father         lung    Cancer Sister         breast    Cancer Brother         multiple myeloma     A:  Administered PHQ-9 and PRIYA-7 (see below). Patient endorses moderately severe symptoms of depression and moderate symptoms of anxiety. Denies SI. Insight and motivation are good. PHQ Scores 5/6/2022 3/22/2022 3/3/2021 1/25/2021 1/3/2020 3/11/2019 10/17/2018   PHQ2 Score 5 6 0 1 0 6 2   PHQ9 Score 16 18 0 1 0 17 2     Interpretation of Total Score Depression Severity: 1-4 = Minimal depression, 5-9 = Mild depression, 10-14 = Moderate depression, 15-19 = Moderately severe depression, 20-27 = Severe depression    PRIYA 7 SCORE 5/6/2022   PRIYA-7 Total Score 10     Interpretation of PRIYA-7 score: 5-9 = mild anxiety, 10-14 = moderate anxiety, 15+ = severe anxiety. Recommend referral to behavioral health for scores 10 or greater. Diagnosis:    1. Adjustment disorder with depressed mood    2. Bereavement          Diagnosis Date    Acute renal failure (ARF) ([facility] [address] 4/12/2022    Bradycardia     Carotid artery occlusion without infarction, unspecified laterality 5/30/2021    ED (erectile dysfunction)     Hypercholesteremia     Hypertension     Low back pain 7/7/2014    Strabismus      Plan:  Pt interventions:   Provided handout on  grief, Established rapport, Conducted functional assessment, [place]-setting to identify pt's primary goals for [facility] visit / overall health, Supportive techniques and Emphasized self-care as important for managing overall health.     Pt Behavioral Change Plan:   See Pt Instructions

## 2022-05-11 ENCOUNTER — CARE COORDINATION (OUTPATIENT)
Dept: CARE COORDINATION | Age: 77
End: 2022-05-11

## 2022-05-12 ENCOUNTER — TELEPHONE (OUTPATIENT)
Dept: CARDIOLOGY CLINIC | Age: 77
End: 2022-05-12

## 2022-05-12 ENCOUNTER — CARE COORDINATION (OUTPATIENT)
Dept: CASE MANAGEMENT | Age: 77
End: 2022-05-12

## 2022-05-12 NOTE — CARE COORDINATION
Brian [facility] Transitions Follow Up Call    2022    Patient: Nadyne Fabry  Patient : 1945   MRN: [id number]  Reason for Admission: TIA   Discharge Date: 22 RARS: Readmission Risk Score: 10.2 ( )         Attempted to reach patient via phone for care transition. VM left stating purpose of call along with my contact information requesting a return call. Care Transitions Subsequent and Final Call    Subsequent and Final Calls  Care Transitions Interventions  Other Interventions:            Follow Up  Future Appointments   Date Time Provider Leonardo Irving   2022  3:30 PM Gissell Yo, PhD [facility] PSY[facility]   6/3/2022  2:00 PM Balaji Manning APRN - CAL[facility]       Jacky REYESN, RN, [facility]  Care Transition Nurse  [phone] mobile respirations non-labored/clear to auscultation bilaterally

## 2022-05-16 ENCOUNTER — CARE COORDINATION (OUTPATIENT)
Dept: CASE MANAGEMENT | Age: 77
End: 2022-05-16

## 2022-05-16 NOTE — CARE COORDINATION
Brian 45 Transitions Follow Up Call    2022    Patient: Marquita Belcher  Patient : 1945   MRN: [id number]  Reason for Admission: TIA   Discharge Date: 22 RARS: Readmission Risk Score: 10.2 ( )         Final attempt made to reach patient for post hospital follow up call. Left a voice message for patient with my contact information and informed of final outreach attempt. Care Transitions Subsequent and Final Call    Subsequent and Final Calls  Care Transitions Interventions  Other Interventions:            Follow Up  Future Appointments   Date Time Provider Leonardo Irving   2022  3:30 PM Analy Venegas, PhD [facility] PSY[facility]   6/3/2022  2:00 PM Jacquie Oates APRN - CALEB [facility]       Macrina REYESN, RN, [facility]  Care Transition Nurse  [phone] mobile

## 2022-05-20 DIAGNOSIS — R42 DIZZINESS: ICD-10-CM

## 2022-05-20 DIAGNOSIS — R55 PRE-SYNCOPE: ICD-10-CM

## 2022-05-20 DIAGNOSIS — R00.1 BRADYCARDIA: ICD-10-CM

## 2022-05-26 DIAGNOSIS — F32.A DEPRESSION, UNSPECIFIED DEPRESSION TYPE: ICD-10-CM

## 2022-05-26 RX ORDER — CITALOPRAM 40 MG/1
40 TABLET ORAL DAILY
Qty: 90 TABLET | Refills: 5 | Status: SHIPPED | OUTPATIENT
Start: 2022-05-26

## 2022-05-26 NOTE — TELEPHONE ENCOUNTER
Last Office Visit  -  4/21/22  Next Office Visit  -  n/a    Last Filled  -    Last UDS -    Contract -

## 2022-05-31 RX ORDER — NIFEDIPINE 60 MG/1
60 TABLET, FILM COATED, EXTENDED RELEASE ORAL DAILY
Qty: 30 TABLET | Refills: 3 | OUTPATIENT
Start: 2022-05-31

## 2022-06-01 ENCOUNTER — CARE COORDINATION (OUTPATIENT)
Dept: CARE COORDINATION | Age: 77
End: 2022-06-01

## 2022-06-01 NOTE — CARE COORDINATION
ACM outreach to patient to introduce him to CC and the role of the ACM. Patient noted he is doing well and follows up with cardiology on Friday. Patient stated at this time he is not interested in [address] [place] [address]. No further follow up at this time.

## 2022-06-02 NOTE — PROGRESS NOTES
[facility]   Electrophysiology Outpatient Note              Date:  Gavi 3, 2022  Patient name: Deforest Gitelman  YOB: 1945    Primary Care physician: Jesus Cancino MD    HISTORY OF PRESENT ILLNESS: The patient is a 68 y.o.  male with a history of sinus bradycardia, HTN, carotid disease, orthostatic hypotension, HLD, TIA, and CKD. In 4/2022 he was admitted for dizziness and ocular symptoms. Head CT and brain MRI showed no acute findings. He was noted to have sinus bradycardia and metoprolol was discontinued. He wore a cardiac event monitor for 6 days that showed predominately SR with PACs and brief PAT. Average HR was 76 bpm, minimum 52 bpm, maximum 130 bpm.   Today he is being seen for bradycardia and dizziness. He reports he has a lot of current stressors in his life related to family issues. Today he had an episode of dizziness when he was standing at his front door. This happens about once a week when he is changing positions or standing. Overall he feels he has improved since his hospital stay in April. He no longer reports any occular symptoms. He checks his blood pressure at home every once in awhile, but reports the cuff is too tight so he has to deduct 20 from the top number. He doesn't check his HR at home, but at his most PCP appointment it was in the 76s. He does not drink much water and drinks mostly diet coke. He reports taking a THC gummy once a week, but the dizziness does not correlate with this. Sometimes he does feel his heart racing, but this subsides with a couple deep breaths. Past Medical History:   has a past medical history of Acute renal failure (ARF) (HCC), Bradycardia, Carotid artery occlusion without infarction, unspecified laterality, ED (erectile dysfunction), Hypercholesteremia, Hypertension, Low back pain, and Strabismus.     Past Surgical History:   has a past surgical history that includes cyst removal; Esophagogastric fundoplication; Tonsillectomy; Strabismus surgery; External ear surgery; Colonoscopy (2011); Upper gastrointestinal endoscopy (2011); Upper gastrointestinal endoscopy (9/26/2014); eye surgery (Left); Knee arthroscopy (Right, 1/7/2020); and Carotid endarterectomy (Right, 6/7/2021). Home Medications:    Prior to Admission medications    Medication Sig Start Date End Date Taking? Authorizing Provider   citalopram (CELEXA) 40 MG tablet TAKE 1 TABLET BY MOUTH DAILY 5/26/22  Yes Kami Hay MD   GARLIC PO GARLIC CAPS 0/13/81  Yes Historical Provider, MD   hydrocortisone 2.5 % cream Apply topically 2 times daily 4/21/22  Yes KANDY Peguero CNP   hydrALAZINE (APRESOLINE) 50 MG tablet Take 1 tablet by mouth every 8 hours 4/14/22  Yes Ye So MD   atorvastatin (LIPITOR) 80 MG tablet Take 1 tablet by mouth daily 4/11/22  Yes Kami Hay MD   NIFEdipine (ADALAT CC) 60 MG extended release tablet TAKE 1 TABLET BY MOUTH DAILY 1/3/22  Yes Kami Hay MD   CO ENZYME Q-10 PO Take by mouth   Yes Historical Provider, MD   FIBER PO Take by mouth   Yes Historical Provider, MD   omeprazole (PRILOSEC) 20 MG capsule Take 40 mg by mouth daily   Yes Historical Provider, MD   Multiple Vitamins-Minerals (THERAPEUTIC MULTIVITAMIN-MINERALS) tablet Take 1 tablet by mouth daily. Yes Historical Provider, MD   aspirin 325 MG EC tablet Take 1 tablet by mouth daily for 14 days 1/7/20 4/21/22  KETAN Reza       Allergies:  Sulfa antibiotics, Amlodipine, and Lisinopril    Social History:   reports that he has never smoked. He has never used smokeless tobacco. He reports current alcohol use of about 2.5 standard drinks of alcohol per week. He reports current drug use. Drug: Marijuana Dauna Other). Family History: family history includes Cancer in his brother, father, and sister; High Blood Pressure in his mother; High Cholesterol in his mother; Stroke (age of onset: 47) in his mother.     All 14 point review of systems are completed and pertinent positives are mentioned in the history of present illness. Other systems are reviewed and are negative. PHYSICAL EXAM:    Vital signs:    /60   Pulse 73   Ht 6' 2\" (1.88 m)   Wt 258 lb (117 kg)   SpO2 97%   BMI 33.13 kg/m²      Constitutional and general appearance: alert, cooperative, no distress, appears stated age  HEENT: PERRL, no cervical lymphadenopathy. No masses palpable. Normal oral mucosa  Respiratory:  · Normal excursion and expansion without use of accessory muscles  · Resp auscultation: Normal breath sounds without wheezing, rhonchi, and rales  Cardiovascular:  · The apical impulse is not displaced  · Heart tones are crisp and normal. RegularS1 and S2.  · Jugular venous pulsation normal  · The carotid upstroke is normal in amplitude and contour without delay or bruit  · Peripheral pulses are symmetrical and full   Abdomen:  · No masses or tenderness  · Bowel sounds present  Extremities:  ·  No cyanosis or clubbing  ·  Trace lower extremity edema  ·  Skin: warm and dry  Neurological:  · Alert and oriented  · Moves all extremities well  · No abnormalities of mood, affect, memory, mentation, or behavior are noted    DATA:    Monitor results 4/2022  Predominately SR with PACs and brief PAT. Average HR was 76 bpm, minimum 52 bpm, maximum 130 bpm.    Echo 4/12/2022:   Summary   Normal left ventricular systolic function with ejection fraction of 55-60%. No regional wall motion abnormalites are seen. Grade I diastolic dysfunction with normal filling pressure. Compared to previous study from 6-1-2021 no changes noted in left   ventricular function. A bubble study was performed and fails to show evidence of shunting.     Carotid duplex 4/13/2022:  Conclusions        Summary        The bilateral internal carotid arteries reveal a <50% diameter reducing    stenosis.    The right external carotid artery reveals a >50% diameter reducing    stenosis.    The bilateral outpatient  Orthostatic hypotension   HLD: on statin   TIA   CKD       Plan:   1. Continue aspirin, atorvastatin, hydralazine, and nifedipine. 2. We discussed orthostatic hypotension and while his vitals in the office don't demonstrate that, his symptoms at home seem to correlate. We discussed him purchasing an appropriate fitting blood pressure cuff and monitoring his BP at home and keeping a log. Due to his significant history of HTN, I am hesitant to make any changes in his medication regimen until we have more data. 3. Increase water intake  4. Wear compression stockings and elevate your legs while resting   5.  Follow up in 3 months so we can closely monitor BP and symptoms    MDM moderate      Murcia Branch, APRN-CNP  [facility]  [phone]

## 2022-06-03 ENCOUNTER — OFFICE VISIT (OUTPATIENT)
Dept: CARDIOLOGY CLINIC | Age: 77
End: 2022-06-03
Payer: MEDICARE

## 2022-06-03 DIAGNOSIS — R00.1 BRADYCARDIA: Primary | ICD-10-CM

## 2022-06-03 DIAGNOSIS — R55 SYNCOPE, UNSPECIFIED SYNCOPE TYPE: ICD-10-CM

## 2022-06-03 DIAGNOSIS — R42 DIZZINESS: ICD-10-CM

## 2022-06-03 DIAGNOSIS — R00.1 BRADYCARDIA: ICD-10-CM

## 2022-06-03 PROCEDURE — 99214 OFFICE O/P EST MOD 30 MIN: CPT

## 2022-06-03 PROCEDURE — 1123F ACP DISCUSS/DSCN MKR DOCD: CPT

## 2022-06-03 NOTE — PATIENT INSTRUCTIONS
Buy compression stockings (any drug store).  Elevate your legs while your resting    Get an accurate fitting blood pressure cuff, keep a BP log    Increase water intake (instead of diet coke)     Follow up in 3 months

## 2022-06-03 NOTE — LETTER
[address] Cardiology - [address] [facility] [address] [address]  Phone: [phone]  Fax: [phone]    KANDY Silverio CNP    June 6, 2022     Francisco J Enriquez, [address] [address]    Patient: Tess Barros   MR Number: [id number]   YOB: 1945   Date of Visit: 6/3/2022       Dear Francisco J Enriquez: Thank you for referring Alice Marin to me for evaluation/treatment. Below are the relevant portions of my assessment and plan of care. If you have questions, please do not hesitate to call me. I look forward to following Janus Severe along with you.     Sincerely,      KANDY Silverio CNP

## 2022-06-04 VITALS
HEIGHT: 74 IN | BODY MASS INDEX: 33.11 KG/M2 | OXYGEN SATURATION: 97 % | SYSTOLIC BLOOD PRESSURE: 100 MMHG | DIASTOLIC BLOOD PRESSURE: 68 MMHG | HEART RATE: 73 BPM | WEIGHT: 258 LBS

## 2022-07-14 RX ORDER — NIFEDIPINE 60 MG/1
60 TABLET, FILM COATED, EXTENDED RELEASE ORAL DAILY
Qty: 30 TABLET | Refills: 3 | OUTPATIENT
Start: 2022-07-14

## 2022-07-14 NOTE — TELEPHONE ENCOUNTER
Pt returned call. Informed pt NPNR was on phone with a physician and that she will be starting afternoon clinic. Pt v/u. He stated she can try calling him after clinic around 4pm at [phone].

## 2022-07-14 NOTE — TELEPHONE ENCOUNTER
Pt called stating at his last ov, NPNR informed him to start taking his Nifedipine 30mg but a script was not sent in. The pt stated his pcp d/c the medication prior to that ov and at the ov the pt stated this was mentioned. Please advise. I informed pt if there is any issue our office will call, if not please send medication refill to [facility].

## 2022-07-14 NOTE — TELEPHONE ENCOUNTER
If he had stopped this medication prior to the office visit, I don't think this is necessary since BP was normal. What have his blood pressures been at home?

## 2022-07-14 NOTE — TELEPHONE ENCOUNTER
Pt last OV 6/3/22 - Note Plan states \"Continue aspirin, atorvastatin, hydralazine, and nifedipine. \"     Pt states his PCP DC Nifedipine prior to 6/3/22 OV, NPNR advised to restart this medication, but Rx was not sent. Medication pending if NPNR wants to restart medication. Please verify 30mg vs 60mg dose, thank you.

## 2022-07-14 NOTE — TELEPHONE ENCOUNTER
I spoke with Lauren Loyola on the phone. He has been without Nifedipine for 3 days. Prior to that he had been taking Nifedipine every other day due to being low on pills. BP today was 139/82. He has not been checking his BP frequently or routinely. He reached out to his PCP who had stated the Nifedipine had been discontinued. He has felt progressively better since our office visit at the beginning of June. He is having less frequent dizziness. No falls. We will plan on him checking his BP daily for the next few days, and he will call back early next week with BP readings. Will discuss re-adding Nifedipine or adjusting hydralazine if necessary at that time.

## 2022-07-14 NOTE — TELEPHONE ENCOUNTER
Attempted to call pt to get more information. LVM for pt to return call. Need to know how long pt has been off medications and need to know recent BP's.

## 2022-07-14 NOTE — TELEPHONE ENCOUNTER
Pt returned call. Pt stated that he has not stopped taking the Nifiedipine. Pt stated that he takes 60mg every other day. Pt stated that on 06/11 bp was 137/83 and on 06/25 bp was 113/68.

## 2022-07-26 RX ORDER — NIFEDIPINE 60 MG/1
60 TABLET, FILM COATED, EXTENDED RELEASE ORAL DAILY
Qty: 30 TABLET | Refills: 3 | Status: SHIPPED | OUTPATIENT
Start: 2022-07-26

## 2022-09-09 ENCOUNTER — OFFICE VISIT (OUTPATIENT)
Dept: CARDIOLOGY CLINIC | Age: 77
End: 2022-09-09
Payer: MEDICARE

## 2022-09-09 VITALS
OXYGEN SATURATION: 97 % | WEIGHT: 263.5 LBS | BODY MASS INDEX: 33.82 KG/M2 | SYSTOLIC BLOOD PRESSURE: 130 MMHG | DIASTOLIC BLOOD PRESSURE: 86 MMHG | HEIGHT: 74 IN | HEART RATE: 78 BPM

## 2022-09-09 DIAGNOSIS — R42 DIZZINESS: ICD-10-CM

## 2022-09-09 DIAGNOSIS — R00.1 BRADYCARDIA: Primary | ICD-10-CM

## 2022-09-09 DIAGNOSIS — R55 PRE-SYNCOPE: ICD-10-CM

## 2022-09-09 PROCEDURE — 1123F ACP DISCUSS/DSCN MKR DOCD: CPT

## 2022-09-09 PROCEDURE — 99214 OFFICE O/P EST MOD 30 MIN: CPT

## 2022-09-09 NOTE — PROGRESS NOTES
[address]   Electrophysiology Outpatient Note              Date:  September 9, 2022  Patient name: Timberon Conception  YOB: 1945    Primary Care physician: Lena Gilliland MD    HISTORY OF PRESENT ILLNESS: The patient is a 68 y.o.  male with a history of sinus bradycardia, HTN, carotid disease, orthostatic hypotension, HLD, TIA, and CKD. In 4/2022 he was admitted for dizziness and ocular symptoms. Head CT and brain MRI showed no acute findings. He was noted to have sinus bradycardia and metoprolol was discontinued. He wore a cardiac event monitor for 6 days that showed predominately SR with PACs and brief PAT. Average HR was 76 bpm, minimum 52 bpm, maximum 130 bpm. In 6/2022 at OV he reported dizziness. Today he is being seen for bradycardia and dizziness. He is back on his Nifedipine. BP is normally 125-135/70. He does not check his heart rate. He denies any episodes of dizziness. Dizziness has resolved since he's restarted his nifedipine. He has unchanged stable shortness of breath. He denies palpitations and chest pain. He tries to workout at least once a week. He walks his dog for a mile and a half. He is still working and travels for work selling sporting good. He stays very busy. He has started drinking 4 diet cokes a day. Past Medical History:   has a past medical history of Acute renal failure (ARF) (HCC), Bradycardia, Carotid artery occlusion without infarction, unspecified laterality, ED (erectile dysfunction), Hypercholesteremia, Hypertension, Low back pain, and Strabismus. Past Surgical History:   has a past surgical history that includes cyst removal; Esophagogastric fundoplication; Tonsillectomy; Strabismus surgery; External ear surgery; Colonoscopy (2011); Upper gastrointestinal endoscopy (2011); Upper gastrointestinal endoscopy (9/26/2014); eye surgery (Left); Knee arthroscopy (Right, 1/7/2020); and Carotid endarterectomy (Right, 6/7/2021).      Home Medications:    Prior to Admission medications    Medication Sig Start Date End Date Taking? Authorizing Provider   NIFEdipine (ADALAT CC) 60 MG extended release tablet Take 1 tablet by mouth in the morning. 7/26/22  Yes KANDY Amos CNP   citalopram (CELEXA) 40 MG tablet TAKE 1 TABLET BY MOUTH DAILY 5/26/22  Yes Wilian Marques MD   Iliana Johnston [address] 5/90/82  Yes Historical Provider, MD   hydrocortisone 2.5 % cream Apply topically 2 times daily 4/21/22  Yes KANDY Corrales CNP   hydrALAZINE (APRESOLINE) 50 MG tablet Take 1 tablet by mouth every 8 hours 4/14/22  Yes Arnie Plaza MD   atorvastatin (LIPITOR) 80 MG tablet Take 1 tablet by mouth daily 4/11/22  Yes Wilian Marques MD   CO ENZYME Q-10 PO Take by mouth   Yes Historical Provider, MD   aspirin 325 MG EC tablet Take 1 tablet by mouth daily for 14 days 1/7/20 9/9/22 Yes KETAN Brown   FIBER PO Take by mouth   Yes Historical Provider, MD   omeprazole (PRILOSEC) 20 MG capsule Take 40 mg by mouth daily   Yes Historical Provider, MD   Multiple Vitamins-Minerals (THERAPEUTIC MULTIVITAMIN-MINERALS) tablet Take 1 tablet by mouth daily. Yes Historical Provider, MD       Allergies:  Sulfa antibiotics, Amlodipine, and Lisinopril    Social History:   reports that he has never smoked. He has never used smokeless tobacco. He reports current alcohol use of about 2.5 standard drinks per week. He reports current drug use. Drug: Marijuana [employer]ails). Family History: family history includes Cancer in his brother, father, and sister; High Blood Pressure in his mother; High Cholesterol in his mother; Stroke (age of onset: 47) in his mother. All 14 point review of systems are completed and pertinent positives are mentioned in the history of present illness. Other systems are reviewed and are negative.      PHYSICAL EXAM:    Vital signs:    /86   Pulse 78   Ht 6' 2\" (1.88 m)   Wt 263 lb 8 oz (119.5 kg)   SpO2 97%   BMI 33.83 kg/m²      Constitutional and general appearance: alert, cooperative, no distress, appears stated age  [de-identified]: PERRL, no cervical lymphadenopathy. No masses palpable. Normal oral mucosa  Respiratory:  Normal excursion and expansion without use of accessory muscles  Resp auscultation: Normal breath sounds without wheezing, rhonchi, and rales  Cardiovascular: The apical impulse is not displaced  Heart tones are crisp and normal. RegularS1 and S2.  Jugular venous pulsation normal  The carotid upstroke is normal in amplitude and contour without delay or bruit  Peripheral pulses are symmetrical and full   Abdomen:  No masses or tenderness  Bowel sounds present  Extremities:   No cyanosis or clubbing   Nonpitting lower extremity edema, R > L   Skin: warm and dry  Neurological:  Alert and oriented  Moves all extremities well  No abnormalities of mood, affect, memory, mentation, or behavior are noted    DATA:    Monitor results 4/2022  Predominately SR with PACs and brief PAT. Average HR was 76 bpm, minimum 52 bpm, maximum 130 bpm.    Echo 4/12/2022:   Summary   Normal left ventricular systolic function with ejection fraction of 55-60%. No regional wall motion abnormalites are seen. Grade I diastolic dysfunction with normal filling pressure. Compared to previous study from 6-1-2021 no changes noted in left   ventricular function. A bubble study was performed and fails to show evidence of shunting. Carotid duplex 4/13/2022:  Conclusions        Summary        The bilateral internal carotid arteries reveal a <50% diameter reducing    stenosis. The right external carotid artery reveals a >50% diameter reducing    stenosis. The bilateral vertebral arteries have normal antegrade flow. Echo 6/01/2021:   Conclusions   Summary   Normal left ventricle size. There is mild concentric left ventricular   hypertrophy. Overall left ventricular systolic function appears normal with   an ejection fraction of 55-60%. No regional wall motion abnormalities are   noted. Diastolic filling parameters suggests normal diastolic function. A bubble study was performed and fails to show evidence of right to left   shunting. All labs and testing reviewed. CARDIOLOGY LABS:   CBC: No results for input(s): WBC, HGB, HCT, PLT in the last 72 hours. BMP: No results for input(s): NA, K, CO2, BUN, CREATININE, LABGLOM, GLUCOSE in the last 72 hours. PT/INR: No results for input(s): PROTIME, INR in the last 72 hours. APTT:No results for input(s): APTT in the last 72 hours. FASTING LIPID PANEL:  Lab Results   Component Value Date/Time    HDL 50 04/12/2022 02:46 PM    [address] 66 04/12/2022 02:46 PM    TRIG 82 04/12/2022 02:46 PM     LIVER PROFILE:No results for input(s): AST, ALT, ALB in the last 72 hours. IMPRESSION:    Patient Active Problem List   Diagnosis    Low back pain    Dyslipidemia    HTN (hypertension), benign    Unstable angina (HCC)    Chest pain    ED (erectile dysfunction)    Angioedema    Pre-syncope    Bradycardia    Carotid artery occlusion without infarction, unspecified laterality    Symptomatic bradycardia    TIA (transient ischemic attack)    Fatigue    Acute renal failure superimposed on stage 3 chronic kidney disease (HCC)    Right calf pain    Hypertensive urgency    Dizziness     Assessment:   Sinus bradycardia: stable              -ambulatory cardiac monitor in 5/2020 demonstrating SR, average HR of 58. Max HR was 99, minimum HR 45    -cardiac monitor 4/2022 predominately SR w/ PACs and brief PAT. Average HR 76 bpm, min 52, max 130 bpm              -metoprolol held 4/13/2022  HTN: controlled  Carotid disease: follows with vascular surgery outpatient  Orthostatic hypotension   HLD: on statin   TIA   CKD       Plan:   1. Continue aspirin, atorvastatin, hydralazine, and nifedipine   2. Decrease diet coke intake. Increase water intake to 64 oz a day.   3. Wear compression stockings when you spend a lot of time on your feet  4.  Follow up in 6 months or sooner if needed    MDM moderate      KANDY Simon-CNP  [facility] [place], operated [facility]  [phone]

## 2022-09-09 NOTE — PATIENT INSTRUCTIONS
No changes today, continue your current medications    Increase water intake, 64 oz a day    Wear compression stockings when you spend a lot of time on your feet    Follow up in 6 months or sooner if needed

## 2022-09-30 ENCOUNTER — HOSPITAL ENCOUNTER (OUTPATIENT)
Dept: VASCULAR LAB | Age: 77
Discharge: HOME | End: 2022-09-30
Payer: MEDICARE

## 2022-09-30 DIAGNOSIS — I65.22 STENOSIS OF LEFT CAROTID ARTERY: ICD-10-CM

## 2022-09-30 PROCEDURE — 93880 EXTRACRANIAL BILAT STUDY: CPT

## 2022-09-30 NOTE — CARE COORDINATION
Patient presented to vascular lab for carotid doppler. Upon review of patient's chart there was a completed carotid doppler performed on 4/13/22. Patient states he has no new symptoms or concerns and was unsure of need for further testing. Writer contacted Dr. Richie Estrada' office and spoke to Evette Edgar RN who verified that the order that was required was already completed in April, however, their office was unaware and did not receive a copy of the final report. Per Nelda Callejas, patient not need to repeat. Charge was deleted for today as study was not performed.  Patient verbalized understanding

## 2022-10-04 RX ORDER — HYDRALAZINE HYDROCHLORIDE 50 MG/1
50 TABLET, FILM COATED ORAL EVERY 8 HOURS SCHEDULED
Qty: 90 TABLET | Refills: 3 | Status: SHIPPED | OUTPATIENT
Start: 2022-10-04

## 2022-10-04 NOTE — TELEPHONE ENCOUNTER
Last Office Visit  -  4-  Next Office Visit  -      Last Filled  -    Last UDS -    Contract -    Refill hydrALAZINE (APRESOLINE) 50 MG tablet      [facility] [id number] [place] [address]

## 2022-10-25 ENCOUNTER — OFFICE VISIT (OUTPATIENT)
Dept: FAMILY MEDICINE CLINIC | Age: 77
End: 2022-10-25
Payer: MEDICARE

## 2022-10-25 VITALS
BODY MASS INDEX: 33.24 KG/M2 | DIASTOLIC BLOOD PRESSURE: 64 MMHG | WEIGHT: 259 LBS | OXYGEN SATURATION: 97 % | HEART RATE: 66 BPM | SYSTOLIC BLOOD PRESSURE: 110 MMHG | HEIGHT: 74 IN

## 2022-10-25 DIAGNOSIS — L08.9 RIGHT KNEE SKIN INFECTION: Primary | ICD-10-CM

## 2022-10-25 PROCEDURE — 1123F ACP DISCUSS/DSCN MKR DOCD: CPT | Performed by: NURSE PRACTITIONER

## 2022-10-25 PROCEDURE — 99213 OFFICE O/P EST LOW 20 MIN: CPT | Performed by: NURSE PRACTITIONER

## 2022-10-25 RX ORDER — CEPHALEXIN 500 MG/1
500 CAPSULE ORAL 2 TIMES DAILY
Qty: 20 CAPSULE | Refills: 0 | Status: SHIPPED | OUTPATIENT
Start: 2022-10-25 | End: 2022-11-04

## 2022-10-25 ASSESSMENT — PATIENT HEALTH QUESTIONNAIRE - PHQ9
SUM OF ALL RESPONSES TO PHQ9 QUESTIONS 1 & 2: 4
1. LITTLE INTEREST OR PLEASURE IN DOING THINGS: 2
7. TROUBLE CONCENTRATING ON THINGS, SUCH AS READING THE NEWSPAPER OR WATCHING TELEVISION: 0
4. FEELING TIRED OR HAVING LITTLE ENERGY: 3
2. FEELING DOWN, DEPRESSED OR HOPELESS: 2
9. THOUGHTS THAT YOU WOULD BE BETTER OFF DEAD, OR OF HURTING YOURSELF: 0
6. FEELING BAD ABOUT YOURSELF - OR THAT YOU ARE A FAILURE OR HAVE LET YOURSELF OR YOUR FAMILY DOWN: 1
SUM OF ALL RESPONSES TO PHQ QUESTIONS 1-9: 12
SUM OF ALL RESPONSES TO PHQ QUESTIONS 1-9: 12
10. IF YOU CHECKED OFF ANY PROBLEMS, HOW DIFFICULT HAVE THESE PROBLEMS MADE IT FOR YOU TO DO YOUR WORK, TAKE CARE OF THINGS AT HOME, OR GET ALONG WITH OTHER PEOPLE: 0
SUM OF ALL RESPONSES TO PHQ QUESTIONS 1-9: 12
5. POOR APPETITE OR OVEREATING: 3
8. MOVING OR SPEAKING SO SLOWLY THAT OTHER PEOPLE COULD HAVE NOTICED. OR THE OPPOSITE, BEING SO FIGETY OR RESTLESS THAT YOU HAVE BEEN MOVING AROUND A LOT MORE THAN USUAL: 0
3. TROUBLE FALLING OR STAYING ASLEEP: 1
SUM OF ALL RESPONSES TO PHQ QUESTIONS 1-9: 12

## 2022-10-25 ASSESSMENT — ENCOUNTER SYMPTOMS: RESPIRATORY NEGATIVE: 1

## 2022-10-25 NOTE — PROGRESS NOTES
Patient: Jeannine Cadet is a 68 y.o. male who presents today with the following Chief Complaint(s):  Chief Complaint   Patient presents with    Knee Injury     Genesis Rowan on Monday 10/10 Rt knee painful       Assessment:  Encounter Diagnosis   Name Primary? Right knee skin infection Yes       Plan:  1. Right knee skin infection  Will treat with keflex twice a day for 10 days and follow up if no improvement. - cephALEXin (KEFLEX) 500 MG capsule; Take 1 capsule by mouth 2 times daily for 10 days  Dispense: 20 capsule; Refill: 0    HPI  Patient presents today with concerns of right knee infection. He fell on Monday the 10th and states he has had some swelling every since he had an open wound that now has a scab but it does feel warm to touch and has some redness and swelling. He states he fell on her right elbow as well but states it is healing well. Current Outpatient Medications   Medication Sig Dispense Refill    cephALEXin (KEFLEX) 500 MG capsule Take 1 capsule by mouth 2 times daily for 10 days 20 capsule 0    hydrALAZINE (APRESOLINE) 50 MG tablet Take 1 tablet by mouth every 8 hours 90 tablet 3    NIFEdipine (ADALAT CC) 60 MG extended release tablet Take 1 tablet by mouth in the morning. 30 tablet 3    citalopram (CELEXA) 40 MG tablet TAKE 1 TABLET BY MOUTH DAILY 90 tablet 5    hydrocortisone 2.5 % cream Apply topically 2 times daily 453.6 g 0    atorvastatin (LIPITOR) 80 MG tablet Take 1 tablet by mouth daily 90 tablet 2    omeprazole (PRILOSEC) 20 MG capsule Take 40 mg by mouth daily      Multiple Vitamins-Minerals (THERAPEUTIC MULTIVITAMIN-MINERALS) tablet Take 1 tablet by mouth daily. aspirin 325 MG EC tablet Take 1 tablet by mouth daily for 14 days 14 tablet 0     No current facility-administered medications for this visit. Patient's past medical history, surgical history, family history, medications,and allergies  were all reviewed and updated as appropriate today.       Review of Systems Constitutional: Negative. Respiratory: Negative. Musculoskeletal:         Knee pain   Skin: Negative. Neurological: Negative. Psychiatric/Behavioral: Negative. Physical Exam  Vitals reviewed. Musculoskeletal:      Right knee: Swelling present. Normal range of motion. Tenderness present. Skin:     General: Skin is warm. Findings: Wound present. Comments: Wound to right knee cap. Swelling, redness and warmth. Neurological:      Mental Status: He is alert. Vitals:    10/25/22 1017   BP: 110/64   Pulse: 66   SpO2: 97%       This chart was generated using the Dragon dictation system. I created this record but it may contain dictation errors due to the limitation of the software.

## 2022-11-10 ENCOUNTER — HOSPITAL ENCOUNTER (EMERGENCY)
Age: 77
Discharge: HOME | End: 2022-11-10
Payer: MEDICARE

## 2022-11-10 VITALS
OXYGEN SATURATION: 97 % | TEMPERATURE: 98 F | RESPIRATION RATE: 20 BRPM | DIASTOLIC BLOOD PRESSURE: 75 MMHG | SYSTOLIC BLOOD PRESSURE: 146 MMHG | BODY MASS INDEX: 32.85 KG/M2 | HEART RATE: 66 BPM | WEIGHT: 256 LBS | HEIGHT: 74 IN

## 2022-11-10 DIAGNOSIS — M70.41 PREPATELLAR BURSITIS OF RIGHT KNEE: Primary | ICD-10-CM

## 2022-11-10 PROCEDURE — 99283 EMERGENCY DEPT VISIT LOW MDM: CPT

## 2022-11-10 RX ORDER — CEPHALEXIN 500 MG/1
500 CAPSULE ORAL 4 TIMES DAILY
Qty: 20 CAPSULE | Refills: 0 | Status: SHIPPED | OUTPATIENT
Start: 2022-11-10 | End: 2022-11-15

## 2022-11-10 ASSESSMENT — PAIN - FUNCTIONAL ASSESSMENT: PAIN_FUNCTIONAL_ASSESSMENT: NONE - DENIES PAIN

## 2022-11-10 NOTE — ED NOTES
Pt discharged in stable condition. Pt had no further questions at this time.       Suha Jay RN  11/10/22 3246

## 2022-11-10 NOTE — ED PROVIDER NOTES
**ADVANCED PRACTICE PROVIDER, I HAVE EVALUATED THIS [facility]  ED  EMERGENCY DEPARTMENT ENCOUNTER      Pt Name: Adarsh Baltazar  [facility]:[id number]  [place] 1945  Date of evaluation: 11/10/2022  Provider: KETAN Dillon  Note Started: 6:12 PM EST 11/10/2022        Chief Complaint:    Chief Complaint   Patient presents with    Knee Pain     Pt reports he fell in October tripping over a 2 inch sidewalk. Was seen by PCP and placed on a 10 day abx. Was told after completion if it was not better to go to the ED. Pt reporting he believes he needs the knee \"lanced\"          Nursing Notes, Past Medical Hx, Past Surgical Hx, Social Hx, Allergies, and Family Hx were all reviewed and agreed with or any disagreements were addressed in the HPI.    HPI: (Location, Duration, Timing, Severity, Quality, Assoc Sx, Context, Modifying factors)    Chief Complaint of right knee pain. This is a  68 y.o. male who presents patient was seen and evaluated in the emergency department today for right knee pain and swelling. He states he fell onto his knee 2 weeks ago which resulted in a laceration across his knee. He was seen by his primary care physician a few days later and at that time was placed on 10-day course of Keflex. He states he completed the Keflex last week however the swelling has not improved. He does have a healing scab. There has been no drainage. He is here today because he would like us to drain his knee. He denies any fevers or chills. Denies any difficulty walking. No difficulty bending his knee. No other injuries. He is up-to-date on tetanus.     PastMedical/Surgical History:      Diagnosis Date    Acute renal failure (ARF) ([facility] [address] 4/12/2022    Bradycardia     Carotid artery occlusion without infarction, unspecified laterality 5/30/2021    ED (erectile dysfunction)     Hypercholesteremia     Hypertension     Low back pain 7/7/2014    Strabismus          Procedure Laterality Date    CAROTID ENDARTERECTOMY Right 6/7/2021    RIGHT SIDED CAROTID ENDARTERECTOMY performed by Dionte Silva MD at [facility]      ESOPHAGOGASTRIC FUNDOPLICATION      EXTERNAL EAR SURGERY      cancer spots    EYE SURGERY Left     for strabsismus    KNEE ARTHROSCOPY Right 1/7/2020    EXAM UNDER ANESTHESIA VIDEO ARTHROSCOPY RIGHT KNEE, PARTIAL MEDIAL MENISCECTOMY, PATELLA FEMORAL CHONDROPLASTY, SYNOVECTOMY performed by Glen Crane MD at [address] [place] [address]    UPPER GASTROINTESTINAL ENDOSCOPY  9/26/2014    gastritis barretts biopsy taken       Medications:  Previous Medications    ASPIRIN 325 MG EC TABLET    Take 1 tablet by mouth daily for 14 days    ATORVASTATIN (LIPITOR) 80 MG TABLET    Take 1 tablet by mouth daily    CITALOPRAM (CELEXA) 40 MG TABLET    TAKE 1 TABLET BY MOUTH DAILY    HYDRALAZINE (APRESOLINE) 50 MG TABLET    Take 1 tablet by mouth every 8 hours    HYDROCORTISONE 2.5 % CREAM    Apply topically 2 times daily    MULTIPLE VITAMINS-MINERALS (THERAPEUTIC MULTIVITAMIN-MINERALS) TABLET    Take 1 tablet by mouth daily. NIFEDIPINE (ADALAT CC) 60 MG EXTENDED RELEASE TABLET    Take 1 tablet by mouth in the morning. OMEPRAZOLE (PRILOSEC) 20 MG CAPSULE    Take 40 mg by mouth daily         Review of Systems:  (2-9 systems needed)  Review of Systems    \"Positives and Pertinent negatives as per HPI\"    Physical Exam:  Physical Exam  Vitals and nursing note reviewed. Constitutional:       Appearance: Normal appearance. He is not diaphoretic. HENT:      Head: Normocephalic and atraumatic. Nose: Nose normal.      Mouth/Throat:      Mouth: Mucous membranes are moist.   Eyes:      General:         Right eye: No discharge. Left eye: No discharge. Extraocular Movements: Extraocular movements intact. Pulmonary:      Effort: Pulmonary effort is normal. No respiratory distress. Musculoskeletal:         General: Normal range of motion. Cervical back: Normal range of motion and neck supple. Comments: Prepatellar swelling of right knee. There is a healing scab with minimal surrounding erythema. Tenderness to palpation. No active drainage. Patient has collected range of motion of lower extremity. Skin:     General: Skin is warm and dry. Coloration: Skin is not pale. Neurological:      Mental Status: He is alert and oriented to person, place, and time. Psychiatric:         Mood and Affect: Mood normal.         Behavior: Behavior normal.       MEDICAL DECISION MAKING    Vitals:    Vitals:    11/10/22 1731   BP: (!) 146/75   Pulse: 66   Resp: 20   Temp: 98 °F (36.7 °C)   TempSrc: Oral   SpO2: 97%   Weight: 256 lb (116.1 kg)   Height: 6' 2\" (1.88 m)       LABS:Labs Reviewed - No data to display     Remainder of labs reviewed and were negative at this time or not returned at the time of this note. RADIOLOGY:   Non-plain film images such as CT, Ultrasound and MRI are read by the radiologist. KETAN Prince have directly visualized the radiologic plain film image(s) with the below findings:      Interpretation per the Radiologist below, if available at the time of this note:    No orders to display        No results found. MEDICAL DECISION MAKING / ED COURSE:      PROCEDURES:   Procedures    None    Patient was given:  Medications - No data to display    Patient was evaluated in the emergency department today for swelling of right knee. Vital signs are stable. Physical exam is most consistent with prepatellar bursitis of right knee. I did consult with Ortho and spoke with Dr. Joanette Kayser who recommended placing him on Keflex as a precaution and having him follow-up closely on an outpatient basis. I discussed treatment plan with the patient and he is ultimately discharged home in good condition    The patient tolerated their visit well.   I evaluated the patient. The physician was available for consultation as needed. The patient and / or the family were informed of the results of any tests, a time was given to answer questions, a plan was proposed and they agreed with plan. I am the Primary Clinician of Record. CLINICAL IMPRESSION:  1. Prepatellar bursitis of right knee        I estimate there is LOW risk for COMPARTMENT SYNDROME, DEEP VENOUS THROMBOSIS, SEPTIC ARTHRITIS, TENDON OR NEUROVASCULAR INJURY, thus I consider the discharge disposition reasonable. Erving Jorgito and I have discussed the diagnosis and risks, and we agree with discharging home to follow-up with their primary doctor or the referral orthopedist. We also discussed returning to the Emergency Department immediately if new or worsening symptoms occur. We have discussed the symptoms which are most concerning (e.g., changing or worsening pain, numbness, weakness) that necessitate immediate return. Final Impression    1. Prepatellar bursitis of right knee        Blood pressure (!) 146/75, pulse 66, temperature 98 °F (36.7 °C), temperature source Oral, resp. rate 20, height 6' 2\" (1.88 m), weight 256 lb (116.1 kg), SpO2 97 %.     DISPOSITION Decision To Discharge 11/10/2022 06:13:38 PM      PATIENT REFERRED TO:  [facility]  [address]  Go to   If symptoms worsen    Dominguez Gregory MD  [address] [place] [address]  [phone]    Call in 1 day      DISCHARGE MEDICATIONS:  New Prescriptions    CEPHALEXIN (KEFLEX) 500 MG CAPSULE    Take 1 capsule by mouth 4 times daily for 5 days       DISCONTINUED MEDICATIONS:  Discontinued Medications    No medications on file              (Please note the MDM and HPI sections of this note were completed with a voice recognition program.  Efforts were made to edit the dictations but occasionally words are mis-transcribed.)    Electronically signed, KETAN Pool,

## 2022-11-11 ENCOUNTER — CARE COORDINATION (OUTPATIENT)
Dept: CARE COORDINATION | Age: 77
End: 2022-11-11

## 2022-11-11 NOTE — CARE COORDINATION
Ambulatory Care Coordination  ED Follow up Call    Reason for ED visit:  Knee Pain   Status:     improved    Did you call your PCP prior to going to the ED? No      Did you receive a discharge instructions from the Emergency Room? Yes  Review of Instructions:     Understands what to report/when to return?:  Yes   Understands discharge instructions?:  Yes   Following discharge instructions?:  Yes   If not why? N/A    Are there any new complaints of pain? No  New Pain Meds? No    Constipation prophylaxis needed? N/A    If you have a wound is the dressing clean, dry, and intact? Yes  Understands wound care regimen? Yes    Are there any other complaints/concerns that you wish to tell your provider? Patient was called and introduced to CC and role of ACM. Patient notes he was in the ED for knee pain from a fall that happened in October. Right knee was painful and swollen. Patient was on a course of abx which he completed and still had some swelling and pain. Patient went to the ED believing he needed it to be drained. ED gave another round of abx and follow up with ortho. ACM asked if patient had followed up to make an appointment yet, and patient denied. Noting that he feels it will get better on its own. ACM asked if patient would prefer to follow up with PCP and patient denied. ACM asked if patient has started abx and he has not yet. Noting he will call the pharmacy to  and start. ACM went over prescription of Keflex and the direction on when and how to take. Patient was advised if no improvement to follow up with ortho or PCP. Patient noted he would. Patient noted he will call if he wants further follow up or calls. Was not enrolled in CC at this time.        FU appts/Provider:    Future Appointments   Date Time Provider Leonardo Irving   3/28/2023  9:00 AM Fain Corner, MD [facility]           New Medications?:   Yes      Medication Reconciliation by phone - Yes  Understands Medications? Yes  Taking Medications? No  Can you swallow your pills? Yes    Any further needs in the home i.e. Equipment?   No    Link to services in community?:  No   Which services:  N/A

## 2022-11-14 ENCOUNTER — TELEPHONE (OUTPATIENT)
Dept: ORTHOPEDIC SURGERY | Age: 77
End: 2022-11-14

## 2022-11-23 RX ORDER — NIFEDIPINE 60 MG/1
60 TABLET, FILM COATED, EXTENDED RELEASE ORAL DAILY
Qty: 30 TABLET | Refills: 3 | Status: SHIPPED | OUTPATIENT
Start: 2022-11-23

## 2023-01-26 RX ORDER — HYDRALAZINE HYDROCHLORIDE 50 MG/1
50 TABLET, FILM COATED ORAL EVERY 8 HOURS SCHEDULED
Qty: 90 TABLET | Refills: 3 | Status: SHIPPED | OUTPATIENT
Start: 2023-01-26

## 2023-02-27 RX ORDER — ATORVASTATIN CALCIUM 80 MG/1
80 TABLET, FILM COATED ORAL DAILY
Qty: 90 TABLET | Refills: 2 | Status: SHIPPED | OUTPATIENT
Start: 2023-02-27

## 2023-03-24 RX ORDER — NIFEDIPINE 60 MG/1
60 TABLET, FILM COATED, EXTENDED RELEASE ORAL DAILY
Qty: 30 TABLET | Refills: 3 | Status: SHIPPED | OUTPATIENT
Start: 2023-03-24

## 2023-03-24 NOTE — TELEPHONE ENCOUNTER
Last Office Visit  -  10/25/2023  Next Office Visit  -  n/a    Last Filled  -    Last UDS -    Contract -

## 2023-03-27 NOTE — PROGRESS NOTES
taking all cardiac medications as prescribed and tolerates them well. Past Medical History:   has a past medical history of Acute renal failure (ARF) (HCC), Bradycardia, Carotid artery occlusion without infarction, unspecified laterality, ED (erectile dysfunction), Hypercholesteremia, Hypertension, Low back pain, and Strabismus. Surgical History:   has a past surgical history that includes cyst removal; Esophagogastric fundoplication; Tonsillectomy; Strabismus surgery; External ear surgery; Colonoscopy (2011); Upper gastrointestinal endoscopy (2011); Upper gastrointestinal endoscopy (9/26/2014); eye surgery (Left); Knee arthroscopy (Right, 1/7/2020); and Carotid endarterectomy (Right, 6/7/2021). Social History:   reports that he has never smoked. He has never used smokeless tobacco. He reports current alcohol use of about 2.5 standard drinks per week. He reports current drug use. Drug: Marijuana Rodolfo Frederick). Family History:  family history includes Cancer in his brother, father, and sister; High Blood Pressure in his mother; High Cholesterol in his mother; Stroke (age of onset: 47) in his mother.      Home Medications:  Outpatient Encounter Medications as of 3/30/2023   Medication Sig Dispense Refill    FIBER PO Take by mouth      Menaquinone-7 (K2 PO) Take by mouth      Cholecalciferol (VITAMIN D3 PO) Take by mouth      NIFEdipine (ADALAT CC) 60 MG extended release tablet Take 1 tablet by mouth daily 30 tablet 3    atorvastatin (LIPITOR) 80 MG tablet TAKE 1 TABLET BY MOUTH DAILY 90 tablet 2    hydrALAZINE (APRESOLINE) 50 MG tablet TAKE 1 TABLET BY MOUTH EVERY 8 HOURS 90 tablet 3    citalopram (CELEXA) 40 MG tablet TAKE 1 TABLET BY MOUTH DAILY 90 tablet 5    hydrocortisone 2.5 % cream Apply topically 2 times daily 453.6 g 0    aspirin 325 MG EC tablet Take 1 tablet by mouth daily for 14 days 14 tablet 0    omeprazole (PRILOSEC) 20 MG capsule Take 20 mg by mouth daily      Multiple Vitamins-Minerals

## 2023-03-30 ENCOUNTER — OFFICE VISIT (OUTPATIENT)
Dept: CARDIOLOGY CLINIC | Age: 78
End: 2023-03-30
Payer: MEDICARE

## 2023-03-30 VITALS
SYSTOLIC BLOOD PRESSURE: 132 MMHG | HEIGHT: 74 IN | BODY MASS INDEX: 33.01 KG/M2 | DIASTOLIC BLOOD PRESSURE: 82 MMHG | OXYGEN SATURATION: 98 % | HEART RATE: 56 BPM | WEIGHT: 257.2 LBS

## 2023-03-30 DIAGNOSIS — R00.1 BRADYCARDIA: Primary | ICD-10-CM

## 2023-03-30 DIAGNOSIS — R55 PRE-SYNCOPE: ICD-10-CM

## 2023-03-30 PROCEDURE — 3074F SYST BP LT 130 MM HG: CPT | Performed by: INTERNAL MEDICINE

## 2023-03-30 PROCEDURE — 99214 OFFICE O/P EST MOD 30 MIN: CPT | Performed by: INTERNAL MEDICINE

## 2023-03-30 PROCEDURE — 3078F DIAST BP <80 MM HG: CPT | Performed by: INTERNAL MEDICINE

## 2023-03-30 PROCEDURE — 1123F ACP DISCUSS/DSCN MKR DOCD: CPT | Performed by: INTERNAL MEDICINE

## 2023-03-30 PROCEDURE — 93000 ELECTROCARDIOGRAM COMPLETE: CPT | Performed by: INTERNAL MEDICINE

## 2023-04-06 NOTE — PROGRESS NOTES
Results for [facility] (MRN [id number]) as of 6/5/2021 06:57   Ref. Range 6/5/2021 05:20   Anti-XA Unfrac Heparin Latest Ref Range: 0.30 - 0.70 IU/mL 0.63   Within therapeutic range, Heparin drip remains at 11 units/kg/hr. No signs of bleeding. Patient's first and last name, , procedure, and correct site confirmed prior to the start of procedure.

## 2023-05-05 ENCOUNTER — TELEPHONE (OUTPATIENT)
Dept: FAMILY MEDICINE CLINIC | Age: 78
End: 2023-05-05

## 2023-05-05 ENCOUNTER — TELEMEDICINE (OUTPATIENT)
Dept: FAMILY MEDICINE CLINIC | Age: 78
End: 2023-05-05

## 2023-05-05 DIAGNOSIS — R07.9 CHEST PAIN, UNSPECIFIED TYPE: ICD-10-CM

## 2023-05-05 DIAGNOSIS — R52 GENERALIZED BODY ACHES: Primary | ICD-10-CM

## 2023-05-05 DIAGNOSIS — R51.9 NONINTRACTABLE HEADACHE, UNSPECIFIED CHRONICITY PATTERN, UNSPECIFIED HEADACHE TYPE: ICD-10-CM

## 2023-05-05 PROBLEM — F33.9 MAJOR DEPRESSIVE DISORDER, RECURRENT, UNSPECIFIED (HCC): Status: ACTIVE | Noted: 2023-05-05

## 2023-05-05 PROBLEM — F33.1 MAJOR DEPRESSIVE DISORDER, RECURRENT, MODERATE (HCC): Status: ACTIVE | Noted: 2023-05-05

## 2023-05-05 PROBLEM — F33.0 MAJOR DEPRESSIVE DISORDER, RECURRENT, MILD (HCC): Status: ACTIVE | Noted: 2023-05-05

## 2023-05-05 ASSESSMENT — ENCOUNTER SYMPTOMS
COUGH: 0
SHORTNESS OF BREATH: 0
WHEEZING: 0
EYE PAIN: 1
RHINORRHEA: 0

## 2023-05-05 NOTE — PROGRESS NOTES
care.  The patient (and/or legal guardian) has also been advised to contact this office for worsening conditions or problems, and seek emergency medical treatment and/or call 911 if deemed necessary. Services were provided through a video synchronous discussion virtually to substitute for in-person clinic visit. Patient and provider were located at their individual homes. --KANDY Mccoy - CNP on 5/5/2023 at 4:50 PM    This chart was generated using the [employer] dictation system. I created this record but it may contain dictation errors due to the limitation of the software. An electronic signature was used to authenticate this note. Detail Level: Simple Detail Level: Generalized Detail Level: Zone

## 2023-05-05 NOTE — TELEPHONE ENCOUNTER
Contacted pt and states that he was about to leave to come to the office for a covid/flu test but stated that he started to feel bad. Per provider it was recommended to go to the er to be evaluated as pt was feeling worse.  Pt acknowledged and understood

## 2023-05-19 RX ORDER — HYDRALAZINE HYDROCHLORIDE 50 MG/1
50 TABLET, FILM COATED ORAL EVERY 8 HOURS SCHEDULED
Qty: 90 TABLET | Refills: 3 | Status: SHIPPED | OUTPATIENT
Start: 2023-05-19

## 2023-07-18 RX ORDER — NIFEDIPINE 60 MG/1
60 TABLET, FILM COATED, EXTENDED RELEASE ORAL DAILY
Qty: 30 TABLET | Refills: 3 | Status: SHIPPED | OUTPATIENT
Start: 2023-07-18

## 2023-07-18 NOTE — ED NOTES
05/29/2021  [address] @ 3085  RE: AMS per Dr. Fifi Solorzano called back @ [phone]       Juju Louise  05/30/21 0200 5

## 2023-07-28 SDOH — HEALTH STABILITY: PHYSICAL HEALTH: ON AVERAGE, HOW MANY MINUTES DO YOU ENGAGE IN EXERCISE AT THIS LEVEL?: 30 MIN

## 2023-07-28 SDOH — HEALTH STABILITY: PHYSICAL HEALTH: ON AVERAGE, HOW MANY DAYS PER WEEK DO YOU ENGAGE IN MODERATE TO STRENUOUS EXERCISE (LIKE A BRISK WALK)?: 4 DAYS

## 2023-07-28 ASSESSMENT — SOCIAL DETERMINANTS OF HEALTH (SDOH)
WITHIN THE LAST YEAR, HAVE YOU BEEN AFRAID OF YOUR PARTNER OR EX-PARTNER?: PATIENT DECLINED
WITHIN THE LAST YEAR, HAVE YOU BEEN KICKED, HIT, SLAPPED, OR OTHERWISE PHYSICALLY HURT BY YOUR PARTNER OR EX-PARTNER?: PATIENT DECLINED
WITHIN THE LAST YEAR, HAVE YOU BEEN HUMILIATED OR EMOTIONALLY ABUSED IN OTHER WAYS BY YOUR PARTNER OR EX-PARTNER?: PATIENT DECLINED
WITHIN THE LAST YEAR, HAVE TO BEEN RAPED OR FORCED TO HAVE ANY KIND OF SEXUAL ACTIVITY BY YOUR PARTNER OR EX-PARTNER?: PATIENT DECLINED

## 2023-07-31 ENCOUNTER — OFFICE VISIT (OUTPATIENT)
Dept: ORTHOPEDIC SURGERY | Age: 78
End: 2023-07-31
Payer: MEDICARE

## 2023-07-31 VITALS — HEIGHT: 74 IN | WEIGHT: 257 LBS | BODY MASS INDEX: 32.98 KG/M2

## 2023-07-31 DIAGNOSIS — M17.11 PRIMARY OSTEOARTHRITIS OF RIGHT KNEE: ICD-10-CM

## 2023-07-31 DIAGNOSIS — Z98.890 H/O ARTHROSCOPIC KNEE SURGERY: Primary | ICD-10-CM

## 2023-07-31 PROCEDURE — 1123F ACP DISCUSS/DSCN MKR DOCD: CPT | Performed by: PHYSICIAN ASSISTANT

## 2023-07-31 PROCEDURE — 20611 DRAIN/INJ JOINT/BURSA W/US: CPT | Performed by: PHYSICIAN ASSISTANT

## 2023-07-31 PROCEDURE — 99203 OFFICE O/P NEW LOW 30 MIN: CPT | Performed by: PHYSICIAN ASSISTANT

## 2023-07-31 RX ORDER — LIDOCAINE HYDROCHLORIDE 10 MG/ML
20 INJECTION, SOLUTION INFILTRATION; PERINEURAL ONCE
Status: COMPLETED | OUTPATIENT
Start: 2023-07-31 | End: 2023-07-31

## 2023-07-31 RX ORDER — TRIAMCINOLONE ACETONIDE 40 MG/ML
40 INJECTION, SUSPENSION INTRA-ARTICULAR; INTRAMUSCULAR ONCE
Status: COMPLETED | OUTPATIENT
Start: 2023-07-31 | End: 2023-07-31

## 2023-07-31 RX ORDER — BUPIVACAINE HYDROCHLORIDE 2.5 MG/ML
30 INJECTION, SOLUTION INFILTRATION; PERINEURAL ONCE
Status: COMPLETED | OUTPATIENT
Start: 2023-07-31 | End: 2023-07-31

## 2023-07-31 RX ADMIN — BUPIVACAINE HYDROCHLORIDE 75 MG: 2.5 INJECTION, SOLUTION INFILTRATION; PERINEURAL at 15:33

## 2023-07-31 RX ADMIN — LIDOCAINE HYDROCHLORIDE 20 ML: 10 INJECTION, SOLUTION INFILTRATION; PERINEURAL at 15:33

## 2023-07-31 RX ADMIN — TRIAMCINOLONE ACETONIDE 40 MG: 40 INJECTION, SUSPENSION INTRA-ARTICULAR; INTRAMUSCULAR at 15:34

## 2023-07-31 NOTE — PROGRESS NOTES
meds) to surgical options. In terms of treatment, I recommended continuing with rest, icing, avoidance of painful activities, NSAIDs or pain meds as tolerated, and physical therapy. We discussed surgical options as well, should conservative measures fail. Electronically signed by Glen Fisher PA-C on 7/31/2023 at 3:51 PM  This dictation was generated by voice recognition computer software. Although all attempts are made to edit the dictation for accuracy, there may be errors in the transcription that are not intended.

## 2023-08-26 ENCOUNTER — TELEPHONE (OUTPATIENT)
Dept: ORTHOPEDIC SURGERY | Age: 78
End: 2023-08-26

## 2023-08-26 NOTE — TELEPHONE ENCOUNTER
Notified [facility] office regarding the medical records to be mailed. All records on file.

## 2023-10-05 ENCOUNTER — OFFICE VISIT (OUTPATIENT)
Dept: FAMILY MEDICINE CLINIC | Age: 78
End: 2023-10-05
Payer: MEDICARE

## 2023-10-05 VITALS
HEART RATE: 63 BPM | HEIGHT: 74 IN | DIASTOLIC BLOOD PRESSURE: 60 MMHG | OXYGEN SATURATION: 96 % | BODY MASS INDEX: 32.85 KG/M2 | WEIGHT: 256 LBS | SYSTOLIC BLOOD PRESSURE: 116 MMHG

## 2023-10-05 DIAGNOSIS — Z01.818 PRE-OP EXAM: Primary | ICD-10-CM

## 2023-10-05 DIAGNOSIS — R68.89 COLD INTOLERANCE: ICD-10-CM

## 2023-10-05 DIAGNOSIS — Z01.818 PRE-OP EXAM: ICD-10-CM

## 2023-10-05 PROCEDURE — 3078F DIAST BP <80 MM HG: CPT | Performed by: STUDENT IN AN ORGANIZED HEALTH CARE EDUCATION/TRAINING PROGRAM

## 2023-10-05 PROCEDURE — 1036F TOBACCO NON-USER: CPT | Performed by: STUDENT IN AN ORGANIZED HEALTH CARE EDUCATION/TRAINING PROGRAM

## 2023-10-05 PROCEDURE — 93000 ELECTROCARDIOGRAM COMPLETE: CPT | Performed by: STUDENT IN AN ORGANIZED HEALTH CARE EDUCATION/TRAINING PROGRAM

## 2023-10-05 PROCEDURE — 99214 OFFICE O/P EST MOD 30 MIN: CPT | Performed by: STUDENT IN AN ORGANIZED HEALTH CARE EDUCATION/TRAINING PROGRAM

## 2023-10-05 PROCEDURE — 3074F SYST BP LT 130 MM HG: CPT | Performed by: STUDENT IN AN ORGANIZED HEALTH CARE EDUCATION/TRAINING PROGRAM

## 2023-10-05 PROCEDURE — G8427 DOCREV CUR MEDS BY ELIG CLIN: HCPCS | Performed by: STUDENT IN AN ORGANIZED HEALTH CARE EDUCATION/TRAINING PROGRAM

## 2023-10-05 PROCEDURE — 1123F ACP DISCUSS/DSCN MKR DOCD: CPT | Performed by: STUDENT IN AN ORGANIZED HEALTH CARE EDUCATION/TRAINING PROGRAM

## 2023-10-05 PROCEDURE — G8484 FLU IMMUNIZE NO ADMIN: HCPCS | Performed by: STUDENT IN AN ORGANIZED HEALTH CARE EDUCATION/TRAINING PROGRAM

## 2023-10-05 PROCEDURE — G8417 CALC BMI ABV UP PARAM F/U: HCPCS | Performed by: STUDENT IN AN ORGANIZED HEALTH CARE EDUCATION/TRAINING PROGRAM

## 2023-10-05 NOTE — PROGRESS NOTES
[facility]  Preoperative visit   10/5/2023    Patient is here for preop evaluation at the request of Dr. Kenyon Dorado for Meniscal repair and arthrosopy and baker's cyst surgery . Is scheduled for surgery on 10/17/2023    Functional Assessment:  Exercise tolerance: >4 METS using the DASI calculator   Denies any current chest pain or discomfort, sob or FROST, orthopnea, PND or leg swelling. Medications: reviewed, Over the counter (NSAIDs) use: No NSAIDs, on ASA>     Cardiac Risk:  High-risk Surgery: No / Hx of ischemic heart disease: No / Hx of CHF: No / Hx of CVA: YES / Pre-op insulin: No / Pre-op creatinine >2.0: No (last cr 0.8)    JR Screen:  Snore loudly? No / Feel tired/fatigued during the day? No / Stop breathing while sleeping? No / High blood pressure? No / Morning HA? No     History of surgery/ anesthesia:  - No hx of complications, anesthesia reaction, bleeding, bruising, clots  - No family Hx of reactions to anesthesia/ bleeding/clots  - No loose teeth/ dentures  - Support systems after surgery: Daughter  - Complicating medical diseases are currently well controlled.      Problem List  Patient Active Problem List   Diagnosis    Low back pain    Dyslipidemia    HTN (hypertension), benign    Unstable angina (HCC)    Chest pain    ED (erectile dysfunction)    Angioedema    Pre-syncope    Bradycardia    Carotid artery occlusion without infarction, unspecified laterality    Symptomatic bradycardia    TIA (transient ischemic attack)    Fatigue    Acute renal failure superimposed on stage 3 chronic kidney disease (HCC)    Right calf pain    Hypertensive urgency    Dizziness    Major depressive disorder, recurrent, mild    Major depressive disorder, recurrent, moderate    Major depressive disorder, recurrent, unspecified       Medical and Surgical History  Past Medical History:   Diagnosis Date    Acute renal failure (ARF) ([address]) 04/12/2022    Anxiety 2010    Bradycardia     Carotid artery occlusion

## 2023-10-06 ENCOUNTER — TELEPHONE (OUTPATIENT)
Dept: FAMILY MEDICINE CLINIC | Age: 78
End: 2023-10-06

## 2023-10-06 LAB
ANION GAP SERPL CALCULATED.3IONS-SCNC: 11 MMOL/L (ref 3–16)
BUN SERPL-MCNC: 17 MG/DL (ref 7–20)
CALCIUM SERPL-MCNC: 9.5 MG/DL (ref 8.3–10.6)
CHLORIDE SERPL-SCNC: 101 MMOL/L (ref 99–110)
CO2 SERPL-SCNC: 28 MMOL/L (ref 21–32)
CREAT SERPL-MCNC: 1 MG/DL (ref 0.8–1.3)
DEPRECATED RDW RBC AUTO: 13.8 % (ref 12.4–15.4)
GFR SERPLBLD CREATININE-BSD FMLA CKD-EPI: >60 ML/MIN/{1.73_M2}
GLUCOSE SERPL-MCNC: 98 MG/DL (ref 70–99)
HCT VFR BLD AUTO: 42.6 % (ref 40.5–52.5)
HGB BLD-MCNC: 14.8 G/DL (ref 13.5–17.5)
MCH RBC QN AUTO: 33.3 PG (ref 26–34)
MCHC RBC AUTO-ENTMCNC: 34.7 G/DL (ref 31–36)
MCV RBC AUTO: 96 FL (ref 80–100)
PLATELET # BLD AUTO: 218 K/UL (ref 135–450)
PMV BLD AUTO: 8.9 FL (ref 5–10.5)
POTASSIUM SERPL-SCNC: 4.3 MMOL/L (ref 3.5–5.1)
RBC # BLD AUTO: 4.44 M/UL (ref 4.2–5.9)
SODIUM SERPL-SCNC: 140 MMOL/L (ref 136–145)
TSH SERPL DL<=0.005 MIU/L-ACNC: 1.99 UIU/ML (ref 0.27–4.2)
WBC # BLD AUTO: 6.3 K/UL (ref 4–11)

## 2023-10-06 NOTE — TELEPHONE ENCOUNTER
Patient dropped off forms to be completed for his surgery on 10/14. Date of pre-op 10/5/23 with CS    Forms are scanned in chart and in physicians folder.

## 2023-10-11 ENCOUNTER — TELEPHONE (OUTPATIENT)
Dept: FAMILY MEDICINE CLINIC | Age: 78
End: 2023-10-11

## 2023-10-11 NOTE — TELEPHONE ENCOUNTER
Lorie Husain from the [facility] is requesting EKG readings be faxed to her.     [phone] phone  [phone] fax

## 2023-10-16 RX ORDER — HYDRALAZINE HYDROCHLORIDE 50 MG/1
50 TABLET, FILM COATED ORAL EVERY 8 HOURS SCHEDULED
Qty: 90 TABLET | Refills: 3 | Status: SHIPPED | OUTPATIENT
Start: 2023-10-16

## 2023-10-16 RX ORDER — NIFEDIPINE 60 MG/1
60 TABLET, FILM COATED, EXTENDED RELEASE ORAL DAILY
Qty: 30 TABLET | Refills: 3 | Status: SHIPPED | OUTPATIENT
Start: 2023-10-16

## 2023-10-16 NOTE — TELEPHONE ENCOUNTER
Last Office Visit  -  10/5/23  Next Office Visit  -  N/A    Last Filled  -    Last UDS -    Contract -

## 2023-11-15 RX ORDER — ATORVASTATIN CALCIUM 80 MG/1
80 TABLET, FILM COATED ORAL DAILY
Qty: 90 TABLET | Refills: 2 | Status: SHIPPED | OUTPATIENT
Start: 2023-11-15

## 2023-12-12 ENCOUNTER — OFFICE VISIT (OUTPATIENT)
Dept: FAMILY MEDICINE CLINIC | Age: 78
End: 2023-12-12
Payer: MEDICARE

## 2023-12-12 VITALS
HEART RATE: 59 BPM | WEIGHT: 256.6 LBS | DIASTOLIC BLOOD PRESSURE: 64 MMHG | BODY MASS INDEX: 32.93 KG/M2 | HEIGHT: 74 IN | OXYGEN SATURATION: 98 % | SYSTOLIC BLOOD PRESSURE: 126 MMHG

## 2023-12-12 DIAGNOSIS — Z23 NEED FOR VACCINATION: ICD-10-CM

## 2023-12-12 DIAGNOSIS — C44.91 SKIN CANCER, BASAL CELL: ICD-10-CM

## 2023-12-12 DIAGNOSIS — Z01.818 PRE-OP EXAM: ICD-10-CM

## 2023-12-12 DIAGNOSIS — Z01.818 PRE-OP EXAM: Primary | ICD-10-CM

## 2023-12-12 PROBLEM — N17.9 ACUTE RENAL FAILURE SUPERIMPOSED ON STAGE 3 CHRONIC KIDNEY DISEASE (HCC): Status: RESOLVED | Noted: 2022-04-12 | Resolved: 2023-12-12

## 2023-12-12 PROBLEM — N18.30 ACUTE RENAL FAILURE SUPERIMPOSED ON STAGE 3 CHRONIC KIDNEY DISEASE (HCC): Status: RESOLVED | Noted: 2022-04-12 | Resolved: 2023-12-12

## 2023-12-12 LAB
ANION GAP SERPL CALCULATED.3IONS-SCNC: 9 MMOL/L (ref 3–16)
BUN SERPL-MCNC: 18 MG/DL (ref 7–20)
CALCIUM SERPL-MCNC: 9.4 MG/DL (ref 8.3–10.6)
CHLORIDE SERPL-SCNC: 102 MMOL/L (ref 99–110)
CO2 SERPL-SCNC: 29 MMOL/L (ref 21–32)
CREAT SERPL-MCNC: 0.9 MG/DL (ref 0.8–1.3)
GFR SERPLBLD CREATININE-BSD FMLA CKD-EPI: >60 ML/MIN/{1.73_M2}
GLUCOSE SERPL-MCNC: 111 MG/DL (ref 70–99)
POTASSIUM SERPL-SCNC: 4.5 MMOL/L (ref 3.5–5.1)
SODIUM SERPL-SCNC: 140 MMOL/L (ref 136–145)

## 2023-12-12 PROCEDURE — 3078F DIAST BP <80 MM HG: CPT | Performed by: NURSE PRACTITIONER

## 2023-12-12 PROCEDURE — G0008 ADMIN INFLUENZA VIRUS VAC: HCPCS | Performed by: NURSE PRACTITIONER

## 2023-12-12 PROCEDURE — 1036F TOBACCO NON-USER: CPT | Performed by: NURSE PRACTITIONER

## 2023-12-12 PROCEDURE — 99213 OFFICE O/P EST LOW 20 MIN: CPT | Performed by: NURSE PRACTITIONER

## 2023-12-12 PROCEDURE — G8417 CALC BMI ABV UP PARAM F/U: HCPCS | Performed by: NURSE PRACTITIONER

## 2023-12-12 PROCEDURE — G8484 FLU IMMUNIZE NO ADMIN: HCPCS | Performed by: NURSE PRACTITIONER

## 2023-12-12 PROCEDURE — 1123F ACP DISCUSS/DSCN MKR DOCD: CPT | Performed by: NURSE PRACTITIONER

## 2023-12-12 PROCEDURE — 3074F SYST BP LT 130 MM HG: CPT | Performed by: NURSE PRACTITIONER

## 2023-12-12 PROCEDURE — 90694 VACC AIIV4 NO PRSRV 0.5ML IM: CPT | Performed by: NURSE PRACTITIONER

## 2023-12-12 PROCEDURE — G8427 DOCREV CUR MEDS BY ELIG CLIN: HCPCS | Performed by: NURSE PRACTITIONER

## 2024-01-24 ENCOUNTER — APPOINTMENT (OUTPATIENT)
Dept: CT IMAGING | Age: 79
End: 2024-01-24
Payer: MEDICARE

## 2024-01-24 ENCOUNTER — HOSPITAL ENCOUNTER (OUTPATIENT)
Age: 79
Setting detail: OBSERVATION
Discharge: HOME | End: 2024-01-25
Attending: STUDENT IN AN ORGANIZED HEALTH CARE EDUCATION/TRAINING PROGRAM | Admitting: INTERNAL MEDICINE
Payer: MEDICARE

## 2024-01-24 ENCOUNTER — APPOINTMENT (OUTPATIENT)
Dept: GENERAL RADIOLOGY | Age: 79
End: 2024-01-24
Payer: MEDICARE

## 2024-01-24 DIAGNOSIS — H53.9 VISION CHANGES: ICD-10-CM

## 2024-01-24 DIAGNOSIS — R07.9 CHEST PAIN, UNSPECIFIED TYPE: Primary | ICD-10-CM

## 2024-01-24 LAB
ALBUMIN SERPL-MCNC: 4.1 G/DL (ref 3.4–5)
ALBUMIN/GLOB SERPL: 1.3 {RATIO} (ref 1.1–2.2)
ALP SERPL-CCNC: 90 U/L (ref 40–129)
ALT SERPL-CCNC: 26 U/L (ref 10–40)
ANION GAP SERPL CALCULATED.3IONS-SCNC: 12 MMOL/L (ref 3–16)
AST SERPL-CCNC: 33 U/L (ref 15–37)
BASOPHILS # BLD: 0 K/UL (ref 0–0.2)
BASOPHILS NFR BLD: 0.5 %
BILIRUB SERPL-MCNC: 0.5 MG/DL (ref 0–1)
BILIRUB UR QL STRIP.AUTO: NEGATIVE
BUN SERPL-MCNC: 15 MG/DL (ref 7–20)
CALCIUM SERPL-MCNC: 9.6 MG/DL (ref 8.3–10.6)
CHLORIDE SERPL-SCNC: 99 MMOL/L (ref 99–110)
CLARITY UR: CLEAR
CO2 SERPL-SCNC: 31 MMOL/L (ref 21–32)
COLOR UR: YELLOW
CREAT SERPL-MCNC: 1.1 MG/DL (ref 0.8–1.3)
D DIMER: 1.16 UG/ML FEU (ref 0–0.6)
DEPRECATED RDW RBC AUTO: 14.1 % (ref 12.4–15.4)
EKG ATRIAL RATE: 68 BPM
EKG DIAGNOSIS: NORMAL
EKG P AXIS: 23 DEGREES
EKG P-R INTERVAL: 168 MS
EKG Q-T INTERVAL: 408 MS
EKG QRS DURATION: 90 MS
EKG QTC CALCULATION (BAZETT): 433 MS
EKG R AXIS: 18 DEGREES
EKG T AXIS: 16 DEGREES
EKG VENTRICULAR RATE: 68 BPM
EOSINOPHIL # BLD: 0.1 K/UL (ref 0–0.6)
EOSINOPHIL NFR BLD: 0.7 %
EPI CELLS #/AREA URNS HPF: NORMAL /HPF (ref 0–5)
GFR SERPLBLD CREATININE-BSD FMLA CKD-EPI: >60 ML/MIN/{1.73_M2}
GLUCOSE SERPL-MCNC: 109 MG/DL (ref 70–99)
GLUCOSE UR STRIP.AUTO-MCNC: NEGATIVE MG/DL
HCT VFR BLD AUTO: 44.9 % (ref 40.5–52.5)
HGB BLD-MCNC: 15 G/DL (ref 13.5–17.5)
HGB UR QL STRIP.AUTO: ABNORMAL
KETONES UR STRIP.AUTO-MCNC: NEGATIVE MG/DL
LEUKOCYTE ESTERASE UR QL STRIP.AUTO: NEGATIVE
LYMPHOCYTES # BLD: 0.9 K/UL (ref 1–5.1)
LYMPHOCYTES NFR BLD: 12.2 %
MAGNESIUM SERPL-MCNC: 1.8 MG/DL (ref 1.8–2.4)
MCH RBC QN AUTO: 31.3 PG (ref 26–34)
MCHC RBC AUTO-ENTMCNC: 33.3 G/DL (ref 31–36)
MCV RBC AUTO: 94.1 FL (ref 80–100)
MONOCYTES # BLD: 0.7 K/UL (ref 0–1.3)
MONOCYTES NFR BLD: 10 %
NEUTROPHILS # BLD: 5.6 K/UL (ref 1.7–7.7)
NEUTROPHILS NFR BLD: 76.6 %
NITRITE UR QL STRIP.AUTO: NEGATIVE
PH UR STRIP.AUTO: 7.5 [PH] (ref 5–8)
PLATELET # BLD AUTO: 211 K/UL (ref 135–450)
PMV BLD AUTO: 8 FL (ref 5–10.5)
POTASSIUM SERPL-SCNC: 2.9 MMOL/L (ref 3.5–5.1)
PROT SERPL-MCNC: 7.2 G/DL (ref 6.4–8.2)
PROT UR STRIP.AUTO-MCNC: NEGATIVE MG/DL
RBC # BLD AUTO: 4.78 M/UL (ref 4.2–5.9)
RBC #/AREA URNS HPF: NORMAL /HPF (ref 0–4)
SODIUM SERPL-SCNC: 142 MMOL/L (ref 136–145)
SP GR UR STRIP.AUTO: 1.01 (ref 1–1.03)
TROPONIN, HIGH SENSITIVITY: 21 NG/L (ref 0–22)
TROPONIN, HIGH SENSITIVITY: 24 NG/L (ref 0–22)
UA DIPSTICK W REFLEX MICRO PNL UR: YES
URN SPEC COLLECT METH UR: ABNORMAL
UROBILINOGEN UR STRIP-ACNC: 0.2 E.U./DL
WBC # BLD AUTO: 7.3 K/UL (ref 4–11)
WBC #/AREA URNS HPF: NORMAL /HPF (ref 0–5)

## 2024-01-24 PROCEDURE — 80053 COMPREHEN METABOLIC PANEL: CPT

## 2024-01-24 PROCEDURE — 6370000000 HC RX 637 (ALT 250 FOR IP): Performed by: PHYSICIAN ASSISTANT

## 2024-01-24 PROCEDURE — 96372 THER/PROPH/DIAG INJ SC/IM: CPT

## 2024-01-24 PROCEDURE — 93010 ELECTROCARDIOGRAM REPORT: CPT | Performed by: INTERNAL MEDICINE

## 2024-01-24 PROCEDURE — 81001 URINALYSIS AUTO W/SCOPE: CPT

## 2024-01-24 PROCEDURE — 71046 X-RAY EXAM CHEST 2 VIEWS: CPT

## 2024-01-24 PROCEDURE — 2580000003 HC RX 258: Performed by: INTERNAL MEDICINE

## 2024-01-24 PROCEDURE — 70450 CT HEAD/BRAIN W/O DYE: CPT

## 2024-01-24 PROCEDURE — 83735 ASSAY OF MAGNESIUM: CPT

## 2024-01-24 PROCEDURE — 84484 ASSAY OF TROPONIN QUANT: CPT

## 2024-01-24 PROCEDURE — 99285 EMERGENCY DEPT VISIT HI MDM: CPT

## 2024-01-24 PROCEDURE — 36415 COLL VENOUS BLD VENIPUNCTURE: CPT

## 2024-01-24 PROCEDURE — G0378 HOSPITAL OBSERVATION PER HR: HCPCS

## 2024-01-24 PROCEDURE — 85379 FIBRIN DEGRADATION QUANT: CPT

## 2024-01-24 PROCEDURE — 93005 ELECTROCARDIOGRAM TRACING: CPT | Performed by: STUDENT IN AN ORGANIZED HEALTH CARE EDUCATION/TRAINING PROGRAM

## 2024-01-24 PROCEDURE — 6360000002 HC RX W HCPCS: Performed by: INTERNAL MEDICINE

## 2024-01-24 PROCEDURE — 6370000000 HC RX 637 (ALT 250 FOR IP): Performed by: INTERNAL MEDICINE

## 2024-01-24 PROCEDURE — 85025 COMPLETE CBC W/AUTO DIFF WBC: CPT

## 2024-01-24 RX ORDER — SODIUM CHLORIDE 9 MG/ML
INJECTION, SOLUTION INTRAVENOUS PRN
Status: DISCONTINUED | OUTPATIENT
Start: 2024-01-24 | End: 2024-01-25 | Stop reason: HOSPADM

## 2024-01-24 RX ORDER — ENOXAPARIN SODIUM 100 MG/ML
30 INJECTION SUBCUTANEOUS 2 TIMES DAILY
Status: DISCONTINUED | OUTPATIENT
Start: 2024-01-24 | End: 2024-01-25 | Stop reason: HOSPADM

## 2024-01-24 RX ORDER — CITALOPRAM 20 MG/1
40 TABLET ORAL DAILY
Status: DISCONTINUED | OUTPATIENT
Start: 2024-01-24 | End: 2024-01-25 | Stop reason: HOSPADM

## 2024-01-24 RX ORDER — ATORVASTATIN CALCIUM 80 MG/1
80 TABLET, FILM COATED ORAL DAILY
Status: DISCONTINUED | OUTPATIENT
Start: 2024-01-24 | End: 2024-01-25 | Stop reason: HOSPADM

## 2024-01-24 RX ORDER — HYDRALAZINE HYDROCHLORIDE 20 MG/ML
10 INJECTION INTRAMUSCULAR; INTRAVENOUS EVERY 6 HOURS PRN
Status: DISCONTINUED | OUTPATIENT
Start: 2024-01-24 | End: 2024-01-25 | Stop reason: HOSPADM

## 2024-01-24 RX ORDER — NICOTINE 21 MG/24HR
1 PATCH, TRANSDERMAL 24 HOURS TRANSDERMAL DAILY
Status: DISCONTINUED | OUTPATIENT
Start: 2024-01-24 | End: 2024-01-25

## 2024-01-24 RX ORDER — HYDRALAZINE HYDROCHLORIDE 50 MG/1
50 TABLET, FILM COATED ORAL EVERY 8 HOURS SCHEDULED
Status: DISCONTINUED | OUTPATIENT
Start: 2024-01-24 | End: 2024-01-25 | Stop reason: HOSPADM

## 2024-01-24 RX ORDER — POLYETHYLENE GLYCOL 3350 17 G/17G
17 POWDER, FOR SOLUTION ORAL DAILY PRN
Status: DISCONTINUED | OUTPATIENT
Start: 2024-01-24 | End: 2024-01-25 | Stop reason: HOSPADM

## 2024-01-24 RX ORDER — SODIUM CHLORIDE 0.9 % (FLUSH) 0.9 %
5-40 SYRINGE (ML) INJECTION EVERY 12 HOURS SCHEDULED
Status: DISCONTINUED | OUTPATIENT
Start: 2024-01-24 | End: 2024-01-25 | Stop reason: HOSPADM

## 2024-01-24 RX ORDER — ONDANSETRON 4 MG/1
4 TABLET, ORALLY DISINTEGRATING ORAL EVERY 8 HOURS PRN
Status: DISCONTINUED | OUTPATIENT
Start: 2024-01-24 | End: 2024-01-25 | Stop reason: HOSPADM

## 2024-01-24 RX ORDER — PANTOPRAZOLE SODIUM 40 MG/1
40 TABLET, DELAYED RELEASE ORAL
Status: DISCONTINUED | OUTPATIENT
Start: 2024-01-25 | End: 2024-01-24

## 2024-01-24 RX ORDER — ASPIRIN 325 MG
325 TABLET, DELAYED RELEASE (ENTERIC COATED) ORAL DAILY
Status: DISCONTINUED | OUTPATIENT
Start: 2024-01-25 | End: 2024-01-25 | Stop reason: HOSPADM

## 2024-01-24 RX ORDER — PANTOPRAZOLE SODIUM 40 MG/1
40 TABLET, DELAYED RELEASE ORAL NIGHTLY
Status: DISCONTINUED | OUTPATIENT
Start: 2024-01-25 | End: 2024-01-25 | Stop reason: HOSPADM

## 2024-01-24 RX ORDER — ATORVASTATIN CALCIUM 80 MG/1
80 TABLET, FILM COATED ORAL NIGHTLY
Status: DISCONTINUED | OUTPATIENT
Start: 2024-01-24 | End: 2024-01-24

## 2024-01-24 RX ORDER — NIFEDIPINE 30 MG/1
60 TABLET, EXTENDED RELEASE ORAL DAILY
Status: DISCONTINUED | OUTPATIENT
Start: 2024-01-24 | End: 2024-01-25 | Stop reason: HOSPADM

## 2024-01-24 RX ORDER — M-VIT,TX,IRON,MINS/CALC/FOLIC 27MG-0.4MG
1 TABLET ORAL DAILY
Status: DISCONTINUED | OUTPATIENT
Start: 2024-01-24 | End: 2024-01-25 | Stop reason: HOSPADM

## 2024-01-24 RX ORDER — SODIUM CHLORIDE 0.9 % (FLUSH) 0.9 %
5-40 SYRINGE (ML) INJECTION PRN
Status: DISCONTINUED | OUTPATIENT
Start: 2024-01-24 | End: 2024-01-25 | Stop reason: HOSPADM

## 2024-01-24 RX ORDER — ONDANSETRON 2 MG/ML
4 INJECTION INTRAMUSCULAR; INTRAVENOUS EVERY 6 HOURS PRN
Status: DISCONTINUED | OUTPATIENT
Start: 2024-01-24 | End: 2024-01-25 | Stop reason: HOSPADM

## 2024-01-24 RX ADMIN — ENOXAPARIN SODIUM 30 MG: 100 INJECTION SUBCUTANEOUS at 23:40

## 2024-01-24 RX ADMIN — HYDRALAZINE HYDROCHLORIDE 50 MG: 50 TABLET, FILM COATED ORAL at 23:39

## 2024-01-24 RX ADMIN — ATORVASTATIN CALCIUM 80 MG: 80 TABLET, FILM COATED ORAL at 23:40

## 2024-01-24 RX ADMIN — POTASSIUM BICARBONATE 40 MEQ: 782 TABLET, EFFERVESCENT ORAL at 15:51

## 2024-01-24 RX ADMIN — ACETAMINOPHEN, ASPIRIN, CAFFEINE 2 TABLET: 250; 65; 250 TABLET, FILM COATED ORAL at 18:33

## 2024-01-24 RX ADMIN — Medication 10 ML: at 23:40

## 2024-01-24 ASSESSMENT — PAIN SCALES - GENERAL
PAINLEVEL_OUTOF10: 0
PAINLEVEL_OUTOF10: 3
PAINLEVEL_OUTOF10: 0
PAINLEVEL_OUTOF10: 2

## 2024-01-24 ASSESSMENT — PAIN DESCRIPTION - LOCATION
LOCATION: HEAD
LOCATION: HEAD

## 2024-01-24 ASSESSMENT — PAIN DESCRIPTION - DESCRIPTORS: DESCRIPTORS: ACHING

## 2024-01-24 ASSESSMENT — PAIN - FUNCTIONAL ASSESSMENT: PAIN_FUNCTIONAL_ASSESSMENT: 0-10

## 2024-01-24 NOTE — ED PROVIDER NOTES
[facility]  ED  Emergency Department Encounter    Patient Name: Jaden Gauthier  MRN: [id number]  YOB: 1945  Date of Evaluation: 1/24/2024  Provider: Quinn Bradley MD  Note Started: 3:29 PM EST 1/24/24    CHIEF COMPLAINT  Headache (Pt reports hx of a stroke in 2021 and has had TIA's since the CVA. He reports he was working in the [employer] yesterday around 1030 he was talking to a woman when his left eye went blurry. States he couldn't understand the woman at that time. Incident lasted two minutes and subsided. Pt flew home last night and came to the ED today to r/o a TIA. His only only lingering symptoms are headaches. ) and Chest Pain (At the end of triage pt mentioned he is having some intermittent chest pain at this time. EKG ordered )    SHARED SERVICE VISIT  I have seen and evaluated this patient with my supervising physician, Dr. Esquivel.     HISTORY OF PRESENT ILLNESS  Jaden Gauthier is a 78 y.o. male who presents to the ED for evaluation of blurry vision and lightheadedness.  Patient drove himself in today for evaluation.  Reports that episodes of occurred around 10:30 PM yesterday.  States that he was getting out of the vehicle when he began to felt lightheaded and then states that he went into \"full TIA mode\".  Reports some blurry vision out of the left eye as well as some difficulty comprehending what a friend was saying to him.  Reports that it lasted for about 2 minutes and resolved.  States that he has had some mild headache.  He denies persistent visual changes and states that he is back to baseline.  He denies any difficulty speaking swallowing.  Denies any numbness, tingling, weakness of extremities.  No neck or back pain.  No chest pain or shortness of breath.  No abdominal pain.  No nausea or vomiting he denies fevers chills.  Has not had much of an appetite.  Denies nausea, vomiting or diarrhea.  Patient reports that he did develop some chest pressure and  Hypertension, Low back pain (07/07/2014), Major depressive disorder, recurrent, mild (05/05/2023), Osteoarthritis (2010), Strabismus, TIA (transient ischemic attack) (04/12/2022), and TIA (transient ischemic attack).    Social Determinants:   None identified.    Consults:   Case discussed with the hospitalist, Dr. Rangel, regarding admission request for TIA workup and further evaluation of chest pain with elevated troponins.  Admission orders have been placed.    Reassessment:      Given dose of Excedrin for his headache with improvement of symptoms.  Vitals have remained stable although patient was initially moderately hypertensive at 176/92.    MDM/Disposition Considerations:   Briefly Jaden Gauthier presents for headache with TIA-like symptoms yesterday with chest pain today.  Patient had head CT obtained showing no acute intercranial abnormalities.  Also obtained a cardiac workup with mildly elevated troponin at 24 with a repeat of 21.  EKG was normal sinus rhythm with nonspecific ST and T wave changes.  Chest x-ray stable.  CBC at this time is without leukocytosis or anemia.  CMP was consistent with hypokalemia at 2.9.  He received 40 mill equivalents of Effer-K p.o.  Normal magnesium.  Patient did just travel to [place].  Although his chest discomfort is not pleuritic will add on D-dimer.  Given his chest discomfort, EKG changes and elevated troponins we are at this time recommending admission and in addition to further workup with TIA-like symptoms.  Case discussed with hospitalist and orders placed.    Critical Care Time:   30 Minutes of critical care time spent not including separately billable procedures.    DDx:  I estimate there is LOW risk for SUBARACHNOID HEMORRHAGE, MENINGITIS, INTRACRANIAL HEMORRHAGE, SUBDURAL HEMATOMA, OR STROKE, thus I consider the discharge disposition reasonable. Jaden BRONW Abrahan and I have also discussed returning to the Emergency Department immediately if new or worsening

## 2024-01-24 NOTE — H&P
V2.0  History and Physical      Name:  Jaden Gauthier /Age/Sex: 1945  (78 y.o. male)   MRN & CSN:  [id number] & [id number] Encounter Date/Time: 2024 6:43 PM EST   Location:   PCP: Quinn Bradley MD       Hospital Day: 1    Assessment and Plan:   Jaden Gauthier is a 78 y.o. male with a Significant PMH as below who presented to ED with c/o transient loss of vision left eye along with receptive aphasia lasting for minutes yesterday    Transient loss of left eye vision along with receptive aphasia concerning for TIA versus evolving stroke  GERD  Hypertension  Hyperlipidemia  Depression  Hypokalemia  Elevated troponin    Plan:     []Admit to Inpatient with expected LOS greater than two midnights due to medical therapy.  [x]Placed in Observation status.        NCCT head did not show any evidence of acute intracranial process including hemorrhage.  CTA of Head and neck negative for hemodynamic significant stenosis  EKG in ER did not show any Afib/aflutter.  Stroke Neurology consulted by ED and do not think patient is a candidate for Thrombolytics  Pt. Loaded with aspirin in ED will continue  Atorvastatin 40 mg HS  MRI of Brain without contrast ordered  Echo with Bubble study  DVT prophylaxis with SCD + S/q Lovenox  For further risk stratification, will check Fasting Lipid panel in AM, A1C, TSH.  Frequent Neuro status checks will be performed.  Will consult neurology.  Hold Home antihypertensive and allow permissive Hypertension  Hydralazine 10 mg Q6hr PRN for SBP >170 mm Hg  Replace potassium  Labs in AM            Comment: Please note this report has been produced using speech recognition software and may contain errors related to that system including errors in grammar, punctuation, and spelling, as well as words and phrases that may be inappropriate. If there are any questions or concerns please feel free to contact the dictating provider for clarification.     Disposition:   Current Living situation:

## 2024-01-25 ENCOUNTER — APPOINTMENT (OUTPATIENT)
Dept: VASCULAR LAB | Age: 79
End: 2024-01-25
Payer: MEDICARE

## 2024-01-25 ENCOUNTER — APPOINTMENT (OUTPATIENT)
Dept: MRI IMAGING | Age: 79
End: 2024-01-25
Payer: MEDICARE

## 2024-01-25 VITALS
HEIGHT: 74 IN | BODY MASS INDEX: 31.7 KG/M2 | WEIGHT: 247 LBS | HEART RATE: 59 BPM | RESPIRATION RATE: 16 BRPM | OXYGEN SATURATION: 96 % | TEMPERATURE: 98.2 F | SYSTOLIC BLOOD PRESSURE: 165 MMHG | DIASTOLIC BLOOD PRESSURE: 76 MMHG

## 2024-01-25 LAB
ALBUMIN SERPL-MCNC: 3.5 G/DL (ref 3.4–5)
ALBUMIN/GLOB SERPL: 1.3 {RATIO} (ref 1.1–2.2)
ALP SERPL-CCNC: 72 U/L (ref 40–129)
ALT SERPL-CCNC: 22 U/L (ref 10–40)
ANION GAP SERPL CALCULATED.3IONS-SCNC: 8 MMOL/L (ref 3–16)
AST SERPL-CCNC: 29 U/L (ref 15–37)
BILIRUB SERPL-MCNC: 0.4 MG/DL (ref 0–1)
BUN SERPL-MCNC: 11 MG/DL (ref 7–20)
CALCIUM SERPL-MCNC: 8.6 MG/DL (ref 8.3–10.6)
CHLORIDE SERPL-SCNC: 102 MMOL/L (ref 99–110)
CHOLEST SERPL-MCNC: 108 MG/DL (ref 0–199)
CO2 SERPL-SCNC: 34 MMOL/L (ref 21–32)
CREAT SERPL-MCNC: 1 MG/DL (ref 0.8–1.3)
DEPRECATED RDW RBC AUTO: 13.6 % (ref 12.4–15.4)
GFR SERPLBLD CREATININE-BSD FMLA CKD-EPI: >60 ML/MIN/{1.73_M2}
GLUCOSE SERPL-MCNC: 111 MG/DL (ref 70–99)
HCT VFR BLD AUTO: 38.4 % (ref 40.5–52.5)
HDLC SERPL-MCNC: 45 MG/DL (ref 40–60)
HGB BLD-MCNC: 13.1 G/DL (ref 13.5–17.5)
LDLC SERPL CALC-MCNC: 46 MG/DL
MAGNESIUM SERPL-MCNC: 2 MG/DL (ref 1.8–2.4)
MCH RBC QN AUTO: 32.2 PG (ref 26–34)
MCHC RBC AUTO-ENTMCNC: 34.1 G/DL (ref 31–36)
MCV RBC AUTO: 94.3 FL (ref 80–100)
PLATELET # BLD AUTO: 165 K/UL (ref 135–450)
PMV BLD AUTO: 8.2 FL (ref 5–10.5)
POTASSIUM SERPL-SCNC: 3 MMOL/L (ref 3.5–5.1)
PROT SERPL-MCNC: 6.2 G/DL (ref 6.4–8.2)
RBC # BLD AUTO: 4.08 M/UL (ref 4.2–5.9)
SODIUM SERPL-SCNC: 144 MMOL/L (ref 136–145)
TRIGL SERPL-MCNC: 86 MG/DL (ref 0–150)
VLDLC SERPL CALC-MCNC: 17 MG/DL
WBC # BLD AUTO: 5.1 K/UL (ref 4–11)

## 2024-01-25 PROCEDURE — 97161 PT EVAL LOW COMPLEX 20 MIN: CPT

## 2024-01-25 PROCEDURE — 6360000002 HC RX W HCPCS: Performed by: INTERNAL MEDICINE

## 2024-01-25 PROCEDURE — G0378 HOSPITAL OBSERVATION PER HR: HCPCS

## 2024-01-25 PROCEDURE — APPSS60 APP SPLIT SHARED TIME 46-60 MINUTES: Performed by: NURSE PRACTITIONER

## 2024-01-25 PROCEDURE — 6370000000 HC RX 637 (ALT 250 FOR IP): Performed by: INTERNAL MEDICINE

## 2024-01-25 PROCEDURE — 2580000003 HC RX 258: Performed by: INTERNAL MEDICINE

## 2024-01-25 PROCEDURE — 97165 OT EVAL LOW COMPLEX 30 MIN: CPT

## 2024-01-25 PROCEDURE — 80053 COMPREHEN METABOLIC PANEL: CPT

## 2024-01-25 PROCEDURE — 83735 ASSAY OF MAGNESIUM: CPT

## 2024-01-25 PROCEDURE — 85027 COMPLETE CBC AUTOMATED: CPT

## 2024-01-25 PROCEDURE — 96372 THER/PROPH/DIAG INJ SC/IM: CPT

## 2024-01-25 PROCEDURE — 70551 MRI BRAIN STEM W/O DYE: CPT

## 2024-01-25 PROCEDURE — 6370000000 HC RX 637 (ALT 250 FOR IP): Performed by: NURSE PRACTITIONER

## 2024-01-25 PROCEDURE — 97116 GAIT TRAINING THERAPY: CPT

## 2024-01-25 PROCEDURE — 36415 COLL VENOUS BLD VENIPUNCTURE: CPT

## 2024-01-25 PROCEDURE — 96374 THER/PROPH/DIAG INJ IV PUSH: CPT

## 2024-01-25 PROCEDURE — 83036 HEMOGLOBIN GLYCOSYLATED A1C: CPT

## 2024-01-25 PROCEDURE — 93880 EXTRACRANIAL BILAT STUDY: CPT

## 2024-01-25 PROCEDURE — 80061 LIPID PANEL: CPT

## 2024-01-25 PROCEDURE — 97535 SELF CARE MNGMENT TRAINING: CPT

## 2024-01-25 RX ORDER — CLOPIDOGREL BISULFATE 75 MG/1
75 TABLET ORAL DAILY
Qty: 30 TABLET | Refills: 3 | Status: SHIPPED | OUTPATIENT
Start: 2024-01-25

## 2024-01-25 RX ORDER — ASPIRIN 81 MG/1
81 TABLET ORAL DAILY
Qty: 21 TABLET | Refills: 0 | Status: SHIPPED | OUTPATIENT
Start: 2024-01-25 | End: 2024-02-15

## 2024-01-25 RX ORDER — CLOPIDOGREL BISULFATE 75 MG/1
75 TABLET ORAL DAILY
Status: DISCONTINUED | OUTPATIENT
Start: 2024-01-25 | End: 2024-01-25 | Stop reason: HOSPADM

## 2024-01-25 RX ORDER — POTASSIUM CHLORIDE 20 MEQ/1
40 TABLET, EXTENDED RELEASE ORAL ONCE
Status: COMPLETED | OUTPATIENT
Start: 2024-01-25 | End: 2024-01-25

## 2024-01-25 RX ADMIN — NIFEDIPINE 60 MG: 30 TABLET, FILM COATED, EXTENDED RELEASE ORAL at 07:44

## 2024-01-25 RX ADMIN — Medication 1 TABLET: at 07:43

## 2024-01-25 RX ADMIN — CITALOPRAM HYDROBROMIDE 40 MG: 20 TABLET ORAL at 07:43

## 2024-01-25 RX ADMIN — POTASSIUM CHLORIDE 40 MEQ: 1500 TABLET, EXTENDED RELEASE ORAL at 07:43

## 2024-01-25 RX ADMIN — PANTOPRAZOLE SODIUM 40 MG: 40 TABLET, DELAYED RELEASE ORAL at 01:57

## 2024-01-25 RX ADMIN — ASPIRIN 325 MG: 325 TABLET, COATED ORAL at 07:44

## 2024-01-25 RX ADMIN — HYDRALAZINE HYDROCHLORIDE 50 MG: 50 TABLET, FILM COATED ORAL at 13:46

## 2024-01-25 RX ADMIN — Medication 10 ML: at 07:45

## 2024-01-25 RX ADMIN — HYDRALAZINE HYDROCHLORIDE 50 MG: 50 TABLET, FILM COATED ORAL at 07:43

## 2024-01-25 RX ADMIN — ENOXAPARIN SODIUM 30 MG: 100 INJECTION SUBCUTANEOUS at 07:44

## 2024-01-25 RX ADMIN — HYDRALAZINE HYDROCHLORIDE 10 MG: 20 INJECTION, SOLUTION INTRAMUSCULAR; INTRAVENOUS at 11:22

## 2024-01-25 ASSESSMENT — PAIN - FUNCTIONAL ASSESSMENT: PAIN_FUNCTIONAL_ASSESSMENT: ACTIVITIES ARE NOT PREVENTED

## 2024-01-25 ASSESSMENT — PAIN DESCRIPTION - PAIN TYPE: TYPE: ACUTE PAIN

## 2024-01-25 ASSESSMENT — PAIN DESCRIPTION - LOCATION: LOCATION: HEAD

## 2024-01-25 ASSESSMENT — PAIN DESCRIPTION - DESCRIPTORS: DESCRIPTORS: THROBBING

## 2024-01-25 ASSESSMENT — PAIN SCALES - GENERAL
PAINLEVEL_OUTOF10: 3
PAINLEVEL_OUTOF10: 0

## 2024-01-25 ASSESSMENT — PAIN DESCRIPTION - FREQUENCY: FREQUENCY: CONTINUOUS

## 2024-01-25 ASSESSMENT — PAIN DESCRIPTION - ONSET: ONSET: GRADUAL

## 2024-01-25 ASSESSMENT — PAIN DESCRIPTION - ORIENTATION: ORIENTATION: MID

## 2024-01-25 NOTE — CARE COORDINATION
Case Management Assessment  Initial Evaluation    Date/Time of Evaluation: 1/25/2024 3:10 PM  Assessment Completed by: Lauren Escobedo RN    If patient is discharged prior to next notation, then this note serves as note for discharge by case management.    Patient Name: Jaden Gauthier                   YOB: 1945  Diagnosis: TIA (transient ischemic attack) [G45.9]  Vision changes [H53.9]  Chest pain, unspecified type [R07.9]                   Date / Time: 1/24/2024  2:41 PM    Patient Admission Status: Observation   Readmission Risk (Low < 19, Mod (19-27), High > 27): No data recorded  Current PCP: Quinn Bradley MD  PCP verified by CM? Yes    Chart Reviewed: Yes      History Provided by: Patient  Patient Orientation: Alert and Oriented    Patient Cognition: Alert    Hospitalization in the last 30 days (Readmission):  No    If yes, Readmission Assessment in  Navigator will be completed.    Advance Directives:      Code Status: DNR-CCA   Patient's Primary Decision Maker is: Patient Declined (Legal Next of Kin Remains as Decision Maker)    Primary Decision Maker: leigh khai - Child - [phone]    Discharge Planning:    Patient lives with: Alone Type of Home: House  Primary Care Giver: Self  Patient Support Systems include: Children   Current Financial resources: Medicare  Current community resources: None  Current services prior to admission: Transportation            Current DME:              Type of Home Care services:  None    ADLS  Prior functional level: Independent in ADLs/IADLs  Current functional level: Independent in ADLs/IADLs    PT AM-PAC: 24 /24  OT AM-PAC: 24 /24    Family can provide assistance at DC: No  Would you like Case Management to discuss the discharge plan with any other family members/significant others, and if so, who? No  Plans to Return to Present Housing: Yes  Other Identified Issues/Barriers to RETURNING to current housing: lives alone  Potential Assistance needed at

## 2024-01-25 NOTE — PROGRESS NOTES
Comprehensive Nutrition Assessment    RECOMMENDATIONS:  PO Diet: Cardiac, CC3 diet, recommend liberating CC restriction as BG WNL. Last A1C WNL when last taken (4/2022- recommend reviewing again)  ONS: Ensure BID  Nutrition Education: No recommendation at this time     NUTRITION ASSESSMENT:   Nutritional summary & status: Positive screen for poor po and wt loss. Admitted with acute loss of vision in one eye and aphagia, dx of TIA. Noted CC3 diet however BG WNL, last A1C from 4/22 however WNL as well. RD will liberalize diet and continue to monitor intake.     MALNUTRITION ASSESSMENT  Context of Malnutrition: Acute Illness   Malnutrition Status: At risk for malnutrition (Comment)    NUTRITION DIAGNOSIS   Predicted inadequate energy intake related to acute injury/trauma as evidenced by intake 0-25%    Nutrition Monitoring and Evaluation:   Food/Nutrient Intake Outcomes:  Food and Nutrient Intake, Supplement Intake  Physical Signs/Symptoms Outcomes:  Biochemical Data, Skin     OBJECTIVE DATA: Significant to nutrition assessment  Nutrition Related Findings: K+ 3.0- repleting, , MVI  Wounds: Surgical Incision  Nutrition Goals: PO intake 75% or greater, prior to discharge     CURRENT NUTRITION THERAPIES  ADULT DIET; Regular; 3 carb choices (45 gm/meal); Low Fat/Low Chol/High Fiber/THEE  ADULT ORAL NUTRITION SUPPLEMENT; Breakfast; Standard High Calorie/High Protein Oral Supplement  PO Intake: Unable to assess   PO Supplement Intake:Unable to assess    COMPARATIVE STANDARDS  Energy (kcal):  2728-4827 (15-18)     Protein (g):  103-129 (1.3-1.5)         ANTHROPOMETRICS  Current Height: 188 cm (6' 2\")  Current Weight - Scale: 112 kg (247 lb)    Admission weight: 112 kg (247 lb)    The patient will be monitored per nutrition standards of care. Consult dietitian if additional nutrition interventions are needed prior to RD reassessment.     Joan Lawrence RD  Luis:  [phone]  Office:  [phone]     
4 Eyes Skin Assessment     NAME:  Jaden Gauthier  YOB: 1945  MEDICAL RECORD NUMBER:  [id number]    The patient is being assessed for  Admission    I agree that at least one RN has performed a thorough Head to Toe Skin Assessment on the patient. ALL assessment sites listed below have been assessed.      Areas assessed by both nurses:    Head, Face, Ears, Shoulders, Back, Chest, Arms, Elbows, Hands, Sacrum. Buttock, Coccyx, Ischium, Legs. Feet and Heels, and Under Medical Devices         Does the Patient have a Wound? No noted wound(s)        Bruising on left arm    Ajay Prevention initiated by RN: No  Wound Care Orders initiated by RN: No    Pressure Injury (Stage 3,4, Unstageable, DTI, NWPT, and Complex wounds) if present, place Wound referral order by RN under : No    New Ostomies, if present place, Ostomy referral order under : No     Nurse 1 eSignature: Electronically signed by Crystal Pryor RN on 1/25/24 at 1:33 AM EST    **SHARE this note so that the co-signing nurse can place an eSignature**    Nurse 2 eSignature: {Esignature:[id number]}   
Occupational Therapy  Facility/Department: [place] [id number] - MED SURG/ORTHO  Occupational Therapy Initial Assessment, Treatment, and Discharge    Name: Jaden Gauthier  : 1945  MRN: [id number]  Date of Service: 2024    Discharge Recommendations:  Home independently  OT Equipment Recommendations  Equipment Needed: No    Patient Diagnosis(es): The primary encounter diagnosis was Chest pain, unspecified type. A diagnosis of Vision changes was also pertinent to this visit.  Past Medical History:  has a past medical history of Acute renal failure (ARF) (HCC), Acute renal failure superimposed on stage 3 chronic kidney disease (HCC), Anxiety, Bradycardia, Carotid artery occlusion without infarction, unspecified laterality, Cerebral artery occlusion with cerebral infarction (HCC), ED (erectile dysfunction), GERD (gastroesophageal reflux disease), Hypercholesteremia, Hypertension, Low back pain, Major depressive disorder, recurrent, mild, Osteoarthritis, Strabismus, TIA (transient ischemic attack), and TIA (transient ischemic attack).  Past Surgical History:  has a past surgical history that includes cyst removal; Esophagogastric fundoplication; Tonsillectomy; Strabismus surgery; External ear surgery; Colonoscopy (); Upper gastrointestinal endoscopy (); Upper gastrointestinal endoscopy (2014); eye surgery (Left); Knee arthroscopy (Right, 2020); Carotid endarterectomy (Right, 2021); and Knee Arthroplasty ().         Assessment   After evaluation and treatment, pt is presenting with the ability to perform all ADL and functional mobility at the SBA level or higher. Pt with no further acute OT or equipment needs, thus d/c pt. Rec d/c home with family assist prn.    Prognosis: Good  Decision Making: Low Complexity  REQUIRES OT FOLLOW-UP: No (No further acute OT needs, pt at baseline status)  Activity Tolerance  Activity Tolerance: Patient Tolerated treatment well        Plan   Occupational Therapy 
Patient and family given discharge instructions. All questions and concerns were addressed. IV removed without complications. Prescriptions were picked up from pharmacy. Patient wheeled to car with all patient belongings.    
Perfect Serve Placed     Who: Tran Kuhn NP  Date: 1/25/2024  Time: 0703  Message: K 3.0. No PRN orders for replacement. Can you add them please? Thank you :)     Electronically signed by Stormy Gregg RN on 1/25/2024 at 7:04 AM     
TODAY'S DATE:  1/25/2024      Current NIHSS 0      Discussed personal risk factors for Stroke/TIA with patient/family, and ways to reduce the risk for a recurrent stroke.     Patient's personal risk factors which were identified are:   []   Alcohol Abuse: check with your physician before any alcohol consumption.   []   Atrial fibrillation: may cause blood clots  []   Drug Abuse: Seek help, talk with your doctor  []   Clotting Disorder  []   Diabetes  []   Family history of stroke or heart disease  [x]   High Blood Pressure/Hypertension: work with your physician  [x]   High cholesterol: monitor cholesterol levels with your physician  []   Overweight/Obesity: work with your physician for your ideal body weight  []   Physical Inactivity: get regular exercise as directed by your physician  [x]   Personal history of previous TIA or stroke  [x]   Poor Diet: decrease salt (sodium) in your diet, follow diet directed by physician  []   Smoking: stop smoking      Reviewed the Following Education with Patient and/or Family:   - Signs and Symptoms of Stroke  - Personal risk factors   - How to activate EMS (911)   - Importance of Follow Up Appointments at Discharge   - Importance of Compliance with Medications Prescribed at Discharge  - Available community resources and stroke advocacy groups if needed    Patient and/or family member: verbalized understanding.     Stroke Education booklet given to patient/family (or verified, if given already), which reviews above information. yes         Electronically signed by Stormy Gregg RN on 1/25/2024 at 10:35 AM       
TODAY'S DATE:  1/25/2024      Current NIHSS 0      Discussed personal risk factors for Stroke/TIA with patient/family, and ways to reduce the risk for a recurrent stroke.     Patient's personal risk factors which were identified are:   []   Alcohol Abuse: check with your physician before any alcohol consumption.   []   Atrial fibrillation: may cause blood clots  [x]   Drug Abuse: Seek help, talk with your doctor  []   Clotting Disorder  []   Diabetes  []   Family history of stroke or heart disease  [x]   High Blood Pressure/Hypertension: work with your physician  [x]   High cholesterol: monitor cholesterol levels with your physician  []   Overweight/Obesity: work with your physician for your ideal body weight  []   Physical Inactivity: get regular exercise as directed by your physician  [x]   Personal history of previous TIA or stroke  []   Poor Diet: decrease salt (sodium) in your diet, follow diet directed by physician  []   Smoking: stop smoking      Reviewed the Following Education with Patient and/or Family:   - Signs and Symptoms of Stroke  - Personal risk factors   - How to activate EMS (911)   - Importance of Follow Up Appointments at Discharge   - Importance of Compliance with Medications Prescribed at Discharge  - Available community resources and stroke advocacy groups if needed    Patient and/or family member: verbalized understanding.     Stroke Education booklet given to patient/family (or verified, if given already), which reviews above information.         Electronically signed by Crystal Pryor RN on 1/25/2024 at 1:30 AM       
Hobbies: Taking dog for walks, riding bike for exercise    Vision/Hearing  Vision  Vision: Impaired  Vision Exceptions: Wears glasses for reading  Hearing  Hearing: Within functional limits      Cognition   Orientation  Overall Orientation Status: Within Normal Limits     Objective   Vitals  Pulse: 66  Heart Rate Source: Monitor  BP: (!) 155/72  BP Location: Right upper arm  BP Method: Automatic  Patient Position: Semi fowlers  MAP (Calculated): 100  Respirations: 16  SpO2: 95 %  O2 Device: None (Room air)  Temp: 98.2 °F (36.8 °C)    Gross Assessment  AROM: Within functional limits  PROM: Within functional limits  Strength: Within functional limits  Coordination: Within functional limits  Tone: Normal  Sensation: Intact (no c/o N/T in BLE)     Bed Mobility Training  Supine to Sit: Independent  Sit to Supine: Independent  Scooting: Independent    Balance  Sitting: Intact  Standing: Intact  Transfer Training  Sit to Stand: Independent  Stand to Sit: Independent    Gait Training  Overall Level of Assistance: Supervision;Independent (supervision progressing to (I) with repetition)  Distance (ft): 400 Feet  Assistive Device: None  Interventions: Verbal cues  Swing Pattern: Left symmetrical;Right symmetrical  Gait Abnormalities:  (pt amb with step-through gait pattern and equal stride length from R to L, pt reports baseline R knee pain from OA and received cortison injection in knee yesterday (no instances of antalgic gait or postural sway observed))    Stair Training  Rail Use: Right (R ascending/R descending)  Stairs - Level of Assistance: Supervision  Number of Stairs Trained: 7 (three 6\" steps + four 2\" steps)  Curbs/Ramps: Supervision    AM-PAC - Mobility    AM-PAC Basic Mobility - Inpatient   How much help is needed turning from your back to your side while in a flat bed without using bedrails?: None  How much help is needed moving from lying on your back to sitting on the side of a flat bed without using bedrails?:

## 2024-01-25 NOTE — DISCHARGE SUMMARY
Hospital Medicine Discharge Summary    Patient: Jaden Gauthier   : 1945     Admit Date: 2024   Discharge Date:   24    Disposition:  [x]Home   []HHC  []SNF  []ECF  []Acute Rehab  []LTAC  []Hospice  Code status:  [x]Full  []DNR/CCA  []Limited (DNR/CCA with Do Not Intubate)  []DNRCC  Condition at Discharge: Stable  Primary Care Provider: Quinn Bradley MD    Admitting Provider: Gary Rangel MD  Discharge Provider: KANDY Fay     Discharge Diagnoses:      Active Hospital Problems    Diagnosis     TIA (transient ischemic attack) [G45.9]        Presenting Admission History:     Jaden Gauthier is a 78 y.o. male with significant past medical history of hypertension, hyperlipidemia, GERD, depression who presented to the ED at the insistence of his PCP because he had sudden loss of left eye vision yesterday while in [facility] [place] which lasted for 30 seconds to a minute along with receptive aphasia lasting for a minute or 2 but denies any word finding difficulty later or ataxia or facial droop or chest pain or shortness of breath.     He did endorse a massive headache following this episode 10 /10 all over the head     Denies URI symptoms or seizure activity or dysuria or hematuria     Assessment/Plan:       Current Principal Problem:  TIA (transient ischemic attack)     Transient left eye vision loss, receptive aphasia, and headache. Unclear etiology  - CTH with no acute findings  - MRI brain with no acute intracranial abnormality. No acute infarct. Mild to moderate global parenchymal volume loss with chronic microvascular ischemic changes.  - Carotid duplex without significant stenosis  - Continue home statin. Will have on DAPT (81mg asa/75mg plavix) x 21 days and then plavix only after that  - FU with PCP  - FU with ophthalmologist      Essential HTN, not well controlled  - Continue home nifedipine, hydralazine  - Telemetry monitoring     Hx of HLD, controlled with statin.  - Continue  global parenchymal volume loss with chronic microvascular ischemic changes.     CT HEAD WO CONTRAST    Result Date: 1/24/2024  EXAMINATION: CT OF THE HEAD WITHOUT CONTRAST  1/24/2024 3:44 pm TECHNIQUE: CT of the head was performed without the administration of intravenous contrast. Automated exposure control, iterative reconstruction, and/or weight based adjustment of the mA/kV was utilized to reduce the radiation dose to as low as reasonably achievable. COMPARISON: 04/12/2022 HISTORY: ORDERING SYSTEM PROVIDED HISTORY: ha/vision changes TECHNOLOGIST PROVIDED HISTORY: Reason for exam:->ha/vision changes Has a \"code stroke\" or \"stroke alert\" been called?->No Decision Support Exception - unselect if not a suspected or confirmed emergency medical condition->Emergency Medical Condition (MA) Reason for Exam: ha/vision changes,pt states he had tia yesterday FINDINGS: BRAIN/VENTRICLES: There is no acute intracranial hemorrhage, mass effect or midline shift.  No abnormal extra-axial fluid collection.  The gray-white differentiation is maintained without evidence of an acute infarct.  There are mild periventricular white matter chronic microvascular ischemic changes. There is a chronic right parietal small calcification.  There is no evidence of hydrocephalus. ORBITS: The visualized portion of the orbits demonstrate no acute abnormality.  Status post left lens replacement. SINUSES: The visualized paranasal sinuses and mastoid air cells demonstrate no acute abnormality.  There is right nasal septal deviation. SOFT TISSUES/SKULL:  No acute abnormality of the visualized skull or soft tissues.     No acute intracranial abnormality is identified.     XR CHEST (2 VW)    Result Date: 1/24/2024  EXAMINATION: TWO XRAY VIEWS OF THE CHEST 1/24/2024 2:55 pm COMPARISON: None. HISTORY: ORDERING SYSTEM PROVIDED HISTORY: chest pain TECHNOLOGIST PROVIDED HISTORY: Reason for exam:->chest pain Reason for Exam: chest pain FINDINGS: Normal

## 2024-01-25 NOTE — CARE COORDINATION
CASE MANAGEMENT DISCHARGE SUMMARY      Discharge to: Home NN    Precertification completed: N/A  Hospital Exemption Notification (HENS) completed: N/A    IMM given: (date) Obs    New Durable Medical Equipment ordered/agency: N/A    Transportation:    Family/car: Personal      Confirmed discharge plan with:     Patient: yes     Family:  yes        RN, name: Stormy    Note: Discharging nurse to complete PRANEETH, reconcile AVS, and place final copy with patient's discharge packet. RN to ensure that written prescriptions for  Level II medications are sent with patient to the facility as per protocol.      Lauren Escobedo, RN

## 2024-01-25 NOTE — CONSULTS
In patient Neurology consult        [facility] Neurology         Jaden Gauthier  1945    Date of Service: 1/25/2024    Referring Physician: Julio Chiu MD      Reason for the consult and CC: Transient vision changes and confusion    HPI:   The patient is a 78 y.o. male, with a PMH of HTN, HLD, right CEA, who presented to [facility] with transient vision loss and confusion.  The patient was in the [place] on Tuesday and reportedly had transient blurred vision in the left eye.  He also was seemingly confused and unable to understand his colleague.  His symptoms lasted for about 2 minutes.  Upon arrival to the ED, his BP was found to be 176/92.       Constitutional:   Vitals:    01/25/24 0737 01/25/24 1115 01/25/24 1155 01/25/24 1230   BP: (!) 155/72 (!) 188/89 (!) 187/84 (!) 198/86   Pulse: 66 60 59    Resp: 16 16     Temp: 98.2 °F (36.8 °C) 98.2 °F (36.8 °C)     TempSrc: Oral Oral     SpO2: 95% 96%     Weight:       Height:             I personally reviewed and updated social history, past medical history, medications, allergy, surgical history, and family history as documented in the patient's electronic health records.       ROS: 10-14 ROS reviewed with the patient/nurse/family which were unremarkable except mentioned in H&P.    General appearance:  Normal development and appear in no acute distress.   Mental Status:   Oriented to person, place, problem, and time.    Memory: Good immediate recall.  Intact remote memory  Normal attention span and concentration.  Language: intact naming, repeating and fluency   Good fund of Knowledge. Aware of current events and vocabulary   Cranial Nerves:   II: Visual fields: Full. Pupils: equal, round, reactive to light, bilaterally  III,IV,VI: Extra Ocular Movements are intact. No nystagmus  V: Facial sensation is intact  VII: Facial strength and movements: intact and symmetric  IX: Normal palatal elevation and shoulder shrug  XII: Tongue movements are

## 2024-01-25 NOTE — CONSULTS
Added Abou - [facility] to treatment team   Electronically signed by Prateek Lincoln on 1/24/2024 at 9:51 PM

## 2024-01-25 NOTE — PLAN OF CARE
Problem: Discharge Planning  Goal: Discharge to home or other facility with appropriate resources  1/25/2024 1028 by Stormy Gregg, RN  Outcome: Progressing  Flowsheets (Taken 1/25/2024 1028)  Discharge to home or other facility with appropriate resources:   Identify barriers to discharge with patient and caregiver   Arrange for needed discharge resources and transportation as appropriate   Identify discharge learning needs (meds, wound care, etc)   Arrange for interpreters to assist at discharge as needed   Refer to discharge planning if patient needs post-hospital services based on physician order or complex needs related to functional status, cognitive ability or social support system     Problem: Pain  Goal: Verbalizes/displays adequate comfort level or baseline comfort level  1/25/2024 1028 by Stormy Gregg, RN  Outcome: Progressing  Flowsheets (Taken 1/25/2024 1028)  Verbalizes/displays adequate comfort level or baseline comfort level:   Encourage patient to monitor pain and request assistance   Assess pain using appropriate pain scale   Administer analgesics based on type and severity of pain and evaluate response   Implement non-pharmacological measures as appropriate and evaluate response   Consider cultural and social influences on pain and pain management   Notify Licensed Independent Practitioner if interventions unsuccessful or patient reports new pain

## 2024-01-26 ENCOUNTER — CARE COORDINATION (OUTPATIENT)
Dept: CASE MANAGEMENT | Age: 79
End: 2024-01-26

## 2024-01-26 DIAGNOSIS — G45.9 TIA (TRANSIENT ISCHEMIC ATTACK): Primary | ICD-10-CM

## 2024-01-26 LAB
EST. AVERAGE GLUCOSE BLD GHB EST-MCNC: 111.2 MG/DL
HBA1C MFR BLD: 5.5 %

## 2024-01-26 PROCEDURE — 1111F DSCHRG MED/CURRENT MED MERGE: CPT | Performed by: FAMILY MEDICINE

## 2024-01-26 NOTE — CARE COORDINATION
Agreeable to f/u calls.  Educated on the use of urgent care or physician’s 24 hr access line if assistance is needed after hours.    Care Transition Nurse reviewed discharge instructions, medical action plan, and red flags with patient who verbalized understanding. The patient was given an opportunity to ask questions and does not have any further questions or concerns at this time. Were discharge instructions available to patient? Yes. Reviewed appropriate site of care based on symptoms and resources available to patient including: PCP  Specialist  When to call 911. The patient agrees to contact the PCP office for questions related to their healthcare.     Advance Care Planning:   Does patient have an Advance Directive: not on file.    Medication reconciliation was performed with patient, who verbalizes understanding of administration of home medications. Medications reviewed, 1111F entered: yes    Was patient discharged with a pulse oximeter? no    Non-face-to-face services provided:  Obtained and reviewed discharge summary and/or continuity of care documents    Offered patient enrollment in the Remote Patient Monitoring (RPM) program for in-home monitoring:  did not discuss .    Care Transitions 24 Hour Call    Do you have a copy of your discharge instructions?: Yes  Do you have all of your prescriptions and are they filled?: Yes  Have you been contacted by a [facility] Pharmacist?: No  Have you scheduled your follow up appointment?: Yes  How are you going to get to your appointment?: Car - drive self  Do you feel like you have everything you need to keep you well at home?: Yes  Care Transitions Interventions         Discussed follow-up appointments. If no appointment was previously scheduled, appointment scheduling offered: Yes.   Is follow up appointment scheduled within 7 days of discharge? No.    Follow Up  Future Appointments   Date Time Provider Department [place]   2/5/2024 11:15 AM Quinn Bradley MD [facility]

## 2024-01-29 ENCOUNTER — NURSE ONLY (OUTPATIENT)
Dept: FAMILY MEDICINE CLINIC | Age: 79
End: 2024-01-29

## 2024-01-29 VITALS — DIASTOLIC BLOOD PRESSURE: 58 MMHG | SYSTOLIC BLOOD PRESSURE: 138 MMHG

## 2024-01-29 NOTE — PROGRESS NOTES
Pt came in to have his BP checked. He feels fatigued. He said he wasn't feeling right and his BP was elevated last night. He does have an apt scheduled next week for a hosp f/u after his stroke.

## 2024-02-02 ENCOUNTER — CARE COORDINATION (OUTPATIENT)
Dept: CASE MANAGEMENT | Age: 79
End: 2024-02-02

## 2024-02-02 NOTE — CARE COORDINATION
Care Transitions Follow Up Call    Patient Current Location:  Home: [address] [place] [id number]    Care Transition Nurse contacted the patient by telephone to follow up after admission.  Verified name and  with patient as identifiers.    Patient: Jaden Gauthier  Patient : 1945   MRN: [id number]  Reason for Admission: TIA  Discharge Date: 24 RARS: No data recorded    Needs to be reviewed by the provider   Additional needs identified to be addressed with provider: No  none         Method of communication with provider: none.    Attempted to reach patient via phone for transition call.  VM left stating purpose of call along with my contact information requesting a return call.    Inbound call from patient after getting vm. Confirmed  and doing well since last call. Stated skin is clearing up from adhesive tape. Continues to take medications as directed. Patient has not scheduled visit with eye institute yet. Wants to see PCP and request referral to alternate provider. TCM visit scheduled for Monday of next week and [facility].   Denies any acute needs at present time.  Agreeable to f/u calls.  Educated on the use of urgent care or physician’s 24 hr access line if assistance is needed after hours.    Addressed changes since last contact:  none  Discussed follow-up appointments. If no appointment was previously scheduled, appointment scheduling offered: Yes.   Is follow up appointment scheduled within 7 days of discharge? Yes.    Follow Up  Future Appointments   Date Time Provider Department Center   2024 11:15 AM Quinn Bradley MD [facility] [place] [facility]     External follow up appointment(s):     Care Transition Nurse reviewed discharge instructions, medical action plan, and red flags with patient and discussed any barriers to care and/or understanding of plan of care after discharge. Discussed appropriate site of care based on symptoms and resources available to

## 2024-02-05 ENCOUNTER — OFFICE VISIT (OUTPATIENT)
Dept: FAMILY MEDICINE CLINIC | Age: 79
End: 2024-02-05

## 2024-02-05 VITALS
HEIGHT: 74 IN | BODY MASS INDEX: 31.18 KG/M2 | DIASTOLIC BLOOD PRESSURE: 66 MMHG | SYSTOLIC BLOOD PRESSURE: 130 MMHG | HEART RATE: 74 BPM | OXYGEN SATURATION: 97 % | WEIGHT: 243 LBS

## 2024-02-05 DIAGNOSIS — F33.1 MAJOR DEPRESSIVE DISORDER, RECURRENT, MODERATE (HCC): ICD-10-CM

## 2024-02-05 DIAGNOSIS — Z09 HOSPITAL DISCHARGE FOLLOW-UP: Primary | ICD-10-CM

## 2024-02-05 NOTE — PROGRESS NOTES
tablet Take 1 tablet by mouth daily 30 tablet 3    aspirin 81 MG EC tablet Take 1 tablet by mouth daily for 21 days 21 tablet 0    atorvastatin (LIPITOR) 80 MG tablet TAKE 1 TABLET BY MOUTH DAILY 90 tablet 2    NIFEdipine (ADALAT CC) 60 MG extended release tablet TAKE 1 TABLET BY MOUTH DAILY 30 tablet 3    hydrALAZINE (APRESOLINE) 50 MG tablet TAKE 1 TABLET BY MOUTH EVERY 8 HOURS 90 tablet 3    citalopram (CELEXA) 40 MG tablet TAKE 1 TABLET BY MOUTH DAILY 90 tablet 5    Cholecalciferol (VITAMIN D3 PO) Take by mouth      hydrocortisone 2.5 % cream Apply topically 2 times daily 453.6 g 0    omeprazole (PRILOSEC) 20 MG capsule Take 1 capsule by mouth daily      Multiple Vitamins-Minerals (THERAPEUTIC MULTIVITAMIN-MINERALS) tablet Take 1 tablet by mouth daily          Medications patient taking as of now reconciled against medications ordered at time of hospital discharge: Yes    A comprehensive review of systems was negative except for what was noted in the HPI.    Objective:    /66   Pulse 74   Ht 1.88 m (6' 2\")   Wt 110.2 kg (243 lb)   SpO2 97%   BMI 31.20 kg/m²   General Appearance: alert and oriented to person, place and time, well developed and well- nourished, in no acute distress  Skin: warm and dry, no rash or erythema  Head: normocephalic and atraumatic  Eyes: pupils equal, round, and reactive to light, extraocular eye movements intact, conjunctivae normal  ENT: tympanic membrane, external ear and ear canal normal bilaterally, nose without deformity, nasal mucosa and turbinates normal without polyps  Neck: supple and non-tender without mass, no thyromegaly or thyroid nodules, no cervical lymphadenopathy  Pulmonary/Chest: clear to auscultation bilaterally- no wheezes, rales or rhonchi, normal air movement, no respiratory distress  Cardiovascular: normal rate, regular rhythm, normal S1 and S2, no murmurs, rubs, clicks, or gallops, distal pulses intact, no carotid bruits  Abdomen: soft, non-tender,

## 2024-02-08 ENCOUNTER — CARE COORDINATION (OUTPATIENT)
Dept: CASE MANAGEMENT | Age: 79
End: 2024-02-08

## 2024-02-08 NOTE — CARE COORDINATION
Care Transitions Follow Up Call      Patient: Jaden Gauthier  Patient : 1945   MRN: [id number]  Reason for Admission: TIA  Discharge Date: 24 RARS: No data recorded    Attempted to reach patient via phone for transition call.  VM left stating purpose of call along with my contact information requesting a return call.        Follow Up  No future appointments.  External follow up appointment(s):   Care Transitions Subsequent and Final Call    Subsequent and Final Calls  Care Transitions Interventions  Other Interventions:             Luz Elena Phan RN

## 2024-02-15 ENCOUNTER — CARE COORDINATION (OUTPATIENT)
Dept: CASE MANAGEMENT | Age: 79
End: 2024-02-15

## 2024-02-15 NOTE — CARE COORDINATION
Care Transitions Follow Up Call      Patient: Jaden Gauthier  Patient : 1945   MRN: [id number]  Reason for Admission: TIA  Discharge Date: 24 RARS: No data recorded      Second and final attempt made to reach patient for transition call.  VM left stating purpose of call along with my contact information requesting a return call.    Follow Up  No future appointments.  External follow up appointment(s):      Care Transitions Subsequent and Final Call    Subsequent and Final Calls  Care Transitions Interventions  Other Interventions:         Luz Elena Phan RN

## 2024-02-20 ENCOUNTER — TELEPHONE (OUTPATIENT)
Dept: FAMILY MEDICINE CLINIC | Age: 79
End: 2024-02-20

## 2024-02-20 RX ORDER — CLOPIDOGREL BISULFATE 75 MG/1
75 TABLET ORAL DAILY
Qty: 90 TABLET | Refills: 0 | Status: SHIPPED | OUTPATIENT
Start: 2024-02-20

## 2024-02-20 RX ORDER — CLOPIDOGREL BISULFATE 75 MG/1
75 TABLET ORAL DAILY
Qty: 30 TABLET | Refills: 3 | Status: SHIPPED | OUTPATIENT
Start: 2024-02-20 | End: 2024-02-20

## 2024-02-20 NOTE — TELEPHONE ENCOUNTER
Received fax from pt asking if he should remain on Plavix? If so he needs a refill, if not should he restart Asprin 325mg  Advise

## 2024-03-04 ENCOUNTER — OFFICE VISIT (OUTPATIENT)
Dept: FAMILY MEDICINE CLINIC | Age: 79
End: 2024-03-04
Payer: MEDICARE

## 2024-03-04 ENCOUNTER — TELEPHONE (OUTPATIENT)
Dept: FAMILY MEDICINE CLINIC | Age: 79
End: 2024-03-04

## 2024-03-04 VITALS
WEIGHT: 243.6 LBS | OXYGEN SATURATION: 98 % | HEIGHT: 74 IN | BODY MASS INDEX: 31.26 KG/M2 | HEART RATE: 56 BPM | DIASTOLIC BLOOD PRESSURE: 80 MMHG | SYSTOLIC BLOOD PRESSURE: 134 MMHG

## 2024-03-04 DIAGNOSIS — Z12.5 SCREENING PSA (PROSTATE SPECIFIC ANTIGEN): ICD-10-CM

## 2024-03-04 DIAGNOSIS — N40.1 BENIGN PROSTATIC HYPERPLASIA WITH URINARY FREQUENCY: ICD-10-CM

## 2024-03-04 DIAGNOSIS — R35.0 URINARY FREQUENCY: Primary | ICD-10-CM

## 2024-03-04 DIAGNOSIS — R35.0 BENIGN PROSTATIC HYPERPLASIA WITH URINARY FREQUENCY: ICD-10-CM

## 2024-03-04 LAB
BILIRUBIN, POC: NEGATIVE
BLOOD URINE, POC: NEGATIVE
CLARITY, POC: NORMAL
COLOR, POC: NORMAL
GLUCOSE URINE, POC: NEGATIVE
KETONES, POC: NEGATIVE
LEUKOCYTE EST, POC: NEGATIVE
NITRITE, POC: NEGATIVE
PH, POC: 7
PROTEIN, POC: NEGATIVE
SPECIFIC GRAVITY, POC: 1.02
UROBILINOGEN, POC: 1

## 2024-03-04 PROCEDURE — 1036F TOBACCO NON-USER: CPT | Performed by: NURSE PRACTITIONER

## 2024-03-04 PROCEDURE — G8484 FLU IMMUNIZE NO ADMIN: HCPCS | Performed by: NURSE PRACTITIONER

## 2024-03-04 PROCEDURE — 3075F SYST BP GE 130 - 139MM HG: CPT | Performed by: NURSE PRACTITIONER

## 2024-03-04 PROCEDURE — 3079F DIAST BP 80-89 MM HG: CPT | Performed by: NURSE PRACTITIONER

## 2024-03-04 PROCEDURE — 81002 URINALYSIS NONAUTO W/O SCOPE: CPT | Performed by: NURSE PRACTITIONER

## 2024-03-04 PROCEDURE — 1124F ACP DISCUSS-NO DSCNMKR DOCD: CPT | Performed by: NURSE PRACTITIONER

## 2024-03-04 PROCEDURE — G8427 DOCREV CUR MEDS BY ELIG CLIN: HCPCS | Performed by: NURSE PRACTITIONER

## 2024-03-04 PROCEDURE — G8417 CALC BMI ABV UP PARAM F/U: HCPCS | Performed by: NURSE PRACTITIONER

## 2024-03-04 PROCEDURE — 99213 OFFICE O/P EST LOW 20 MIN: CPT | Performed by: NURSE PRACTITIONER

## 2024-03-04 RX ORDER — TAMSULOSIN HYDROCHLORIDE 0.4 MG/1
0.4 CAPSULE ORAL DAILY
Qty: 30 CAPSULE | Refills: 0 | Status: SHIPPED | OUTPATIENT
Start: 2024-03-04 | End: 2024-03-05

## 2024-03-04 ASSESSMENT — ENCOUNTER SYMPTOMS: RESPIRATORY NEGATIVE: 1

## 2024-03-04 NOTE — PROGRESS NOTES
Anesthesia Pre Eval Note    Anesthesia ROS/Med Hx        Anesthetic Complication History:  Patient does not have a history of anesthetic complications      Pulmonary Review:    The patient is a former smoker.     Neuro/Psych Review:    Positive for psychiatric history - Depression (ADHD)    Cardiovascular Review:  Exercise tolerance: good (>4 METS)    GI/HEPATIC/RENAL Review:    Positive for GERD - poorly controlled    End/Other Review:  Comments: Low back pain  Myofascial pain dysfunction syndrome  Positive for obesity class I - 30.00 - 34.99  Positive for chronic pain  Additional Results:     ALLERGIES:  No Known Allergies       Lab Results       Component                Value               Date                       WBC                      9.1                 03/09/2021                 WBC                      10.2                11/22/2019                 RBC                      4.35                03/09/2021                 RBC                      4.38                11/22/2019                 HGB                      13.5                03/09/2021                 HGB                      13.7                11/22/2019                 HCT                      40.3                03/09/2021                 HCT                      39.7                11/22/2019                 MCHC                     33.5                03/09/2021                 MCHC                     34.5                11/22/2019                 SODIUM                   137                 03/09/2021                 SODIUM                   143                 11/22/2019                 POTASSIUM                3.7                 03/09/2021                 POTASSIUM                4.1                 11/22/2019                 CHLORIDE                 107                 03/09/2021                 CHLORIDE                 106                 11/22/2019                 CO2                      24                  03/09/2021                  CO2                      26                  11/22/2019                 GLUCOSE                  88                  03/09/2021                 GLUCOSE                  83                  11/22/2019                 BUN                      11                  03/09/2021                 BUN                      12                  11/22/2019                 CREATININE               0.70                03/09/2021                 CREATININE               0.61                11/22/2019                 GFRESTIMATE              >90                 03/09/2021                 GFRA                     >90                 11/22/2019                 GFRNA                    >90                 11/22/2019                 CALCIUM                  9.1                 03/09/2021                 CALCIUM                  8.7                 11/22/2019                 PLT                      270                 03/09/2021                 PLT                      264                 11/22/2019                 PTT                      27                  08/12/2020                 INR                      1.0                 08/12/2020             Past Medical History:  No date: ADD (attention deficit disorder)  No date: ADHD  No date: Bunion  No date: Chronic pelvic pain in female  No date: Depression  08/19/2014: Low back pain  08/19/2014: Lumbar radiculopathy  08/19/2014: Myofascial pain dysfunction syndrome  No date: Nocturia    Past Surgical History:  07/07/2010: Anes hysteroscopy surg w endometrial ab  2013: Hysterectomy      Comment:  with [facility] for Chronic pelvic pain  No date: Laparoscopic cholecystotomy  No date: Removal gallbladder  No date: Tubal ligation       Prior to Admission medications :  Medication sucralfate (CARAFATE) 1 g tablet, Sig TAKE 1 TABLET BY MOUTH 4 TIMES DAILY (BEFORE MEALS AND NIGHTLY)., Start Date 9/20/21, End Date , Taking? , Authorizing Provider ELE Mcintosh    Medication mirtazapine  MULTIVITAMIN-MINERALS) tablet Take 1 tablet by mouth daily       No current facility-administered medications for this visit.       Patient's past medical history, surgical history, family history, medications,and allergies  were all reviewed and updated as appropriate today.      Review of Systems   Constitutional: Negative.    HENT: Negative.     Respiratory: Negative.     Genitourinary:  Positive for frequency.   Musculoskeletal: Negative.          Physical Exam  Vitals reviewed.   Cardiovascular:      Rate and Rhythm: Normal rate and regular rhythm.      Heart sounds: Normal heart sounds.   Neurological:      Mental Status: He is alert.   Psychiatric:         Behavior: Behavior normal.       Vitals:    03/04/24 1426   BP: 134/80   Pulse: 56   SpO2: 98%       This chart was generated using the Dragon dictation system.  I created this record but it may contain dictation errors due to the limitation of the software.     (REMERON) 15 MG tablet, Sig Take 1 tablet by mouth nightly. Do not start before September 19, 2021., Start Date 9/19/21, End Date , Taking? , Authorizing Provider Emiliano Bravo MD    Medication pantoprazole (PROTONIX) 40 MG tablet, Sig TAKE 1 TABLET BY MOUTH DAILY, Start Date 7/28/21, End Date , Taking? , Authorizing Provider Lobo Laguna MD    Medication predniSONE (DELTASONE) 20 MG tablet, Sig 80mg po Qam x2days, then 60mg X2 days, then 40mg X4 days, then 20mg X4 days, then 10mg X4 days, Start Date 7/28/21, End Date , Taking? , Authorizing Provider Emiliano Bravo MD    Medication nicotine (NICODERM) 21 MG/24HR patch, Sig Place 1 patch onto the skin every 24 hours., Start Date 7/15/21, End Date , Taking? , Authorizing Provider Lobo Laguna MD    Medication HYDROcodone-acetaminophen (NORCO) 5-325 MG per tablet, Sig Take 1 tablet by mouth every 8 hours as needed for Pain., Start Date 6/29/21, End Date , Taking? , Authorizing Provider Lobo Laguna MD    Medication ondansetron (Zofran ODT) 4 MG disintegrating tablet, Sig Place 1 tablet onto the tongue every 6 hours as needed for Nausea., Start Date 4/29/21, End Date , Taking? , Authorizing Provider Jhon Canseco MD    Medication capsaicin 0.1 % cream, Sig Apply topically 2 times daily as needed (AS NEEDED FOR PAIN)., Start Date 4/9/21, End Date , Taking? , Authorizing Provider Morro Huggins MD    Medication amphetamine-dextroamphetamine (ADDERALL) 30 MG tablet, Sig Take 1 tablet by mouth 2 times daily., Start Date 2/19/21, End Date , Taking? , Authorizing Provider Outside Provider    Medication estradiol (VIVELLE-DOT) 0.1 MG/24HR twice weekly patch, Sig UNWRAP AND APPLY 1 PATCH TO SKIN TWICE A WEEK ON MONDAY AND THURSDAY, Start Date 2/1/21, End Date , Taking? , Authorizing Provider Outside Provider            Relevant Problems   No relevant active problems       Physical Exam     Airway   Mallampati: I  TM Distance: >3 FB  Neck ROM:  Full  Neck: Non-tender and Able to place in sniff position    Cardiovascular  Cardiovascular exam normal  Cardio Rhythm: Regular  Cardio Rate: Normal    Head Assessment  Head assessment: Normocephalic    General Assessment  General Assessment: Alert and oriented    Dental Exam  Dental exam normal    Pulmonary Exam  Pulmonary exam normal    Abdominal Exam  Abdominal exam normal      Anesthesia Plan:  Anesthesia Plan    ASA Status: 3    Anesthesia Type: MAC    Induction: Intravenous  Preferred Airway Type: Mask  Maintenance: TIVA  Premedication: None      Post-op Pain Management: Per Surgeon  Postoperative analgesia plan does NOT include opiods    Checklist  Reviewed: Pregnancy Test Results, Lab Results, Patient Summary, Allergies, Past Med History, Medications and NPO Status  Consent/Risks Discussed Statement:  The proposed anesthetic plan, including its risks and benefits, have been discussed with the Patient and Spouse along with the risks and benefits of alternatives. Questions were encouraged and answered and the patient and/or representative understands and agrees to proceed.    I have discussed elements of the patient's history or examination, as noted above and/or as follows, that place the patient at higher risk of complications; BMI> 30 (obesity).    I discussed with the patient (and/or patient's legal representative) the risks and benefits of the proposed anesthesia plan, MAC, which may include services performed by other anesthesia providers.    Alternative anesthesia plans, if available, were reviewed with the patient (and/or patient's legal representative). Discussion has been held with the patient (and/or patient's legal representative) regarding risks of anesthesia, which include conversion to general anesthesia, hypotension, depressed breathing and nausea and emergent situations that may require change in anesthesia plan.    The patient (and/or patient's legal representative) has indicated  understanding, his/her questions have been answered, and he/she wishes to proceed with the planned anesthetic.    Informed Consent for Blood: Consented  Blood Products: Not Anticipated

## 2024-03-04 NOTE — TELEPHONE ENCOUNTER
Left message on vm for pt that flomax was sent into pharmacy and that flomax is not considered to be a sulfa medication so there are no interactions.

## 2024-03-05 DIAGNOSIS — R97.20 ELEVATED PSA: Primary | ICD-10-CM

## 2024-03-05 RX ORDER — TAMSULOSIN HYDROCHLORIDE 0.4 MG/1
0.4 CAPSULE ORAL DAILY
Qty: 90 CAPSULE | Refills: 0 | Status: SHIPPED | OUTPATIENT
Start: 2024-03-05

## 2024-03-08 NOTE — TELEPHONE ENCOUNTER
Last Office Visit  -  3/4/24  Next Office Visit  -  n/a  Last Filled  -    Last UDS -    Contract -

## 2024-03-14 RX ORDER — HYDRALAZINE HYDROCHLORIDE 50 MG/1
50 TABLET, FILM COATED ORAL EVERY 8 HOURS SCHEDULED
Qty: 90 TABLET | Refills: 3 | Status: SHIPPED | OUTPATIENT
Start: 2024-03-14

## 2024-05-11 ENCOUNTER — PATIENT MESSAGE (OUTPATIENT)
Dept: FAMILY MEDICINE CLINIC | Age: 79
End: 2024-05-11

## 2024-05-13 NOTE — TELEPHONE ENCOUNTER
From: Jaden Gauthier  To: Dr. Quinn Bradley  Sent: 5/11/2024 4:17 PM EDT  Subject: Exhaustion    If I perform any strenuous task, I’m immediately exhausted and must take a short.

## 2024-05-16 RX ORDER — CLOPIDOGREL BISULFATE 75 MG/1
75 TABLET ORAL DAILY
Qty: 90 TABLET | Refills: 1 | Status: SHIPPED | OUTPATIENT
Start: 2024-05-16

## 2024-05-16 NOTE — TELEPHONE ENCOUNTER
Last Office Visit  -  3/4/24  Next Office Visit  -  n/a    Last Filled  -  2/20/24  Last UDS -    Contract -

## 2024-06-03 RX ORDER — TAMSULOSIN HYDROCHLORIDE 0.4 MG/1
0.4 CAPSULE ORAL DAILY
Qty: 90 CAPSULE | Refills: 3 | Status: SHIPPED | OUTPATIENT
Start: 2024-06-03

## 2024-06-12 RX ORDER — HYDRALAZINE HYDROCHLORIDE 50 MG/1
50 TABLET, FILM COATED ORAL EVERY 8 HOURS SCHEDULED
Qty: 90 TABLET | Refills: 3 | Status: SHIPPED | OUTPATIENT
Start: 2024-06-12

## 2024-06-12 NOTE — TELEPHONE ENCOUNTER
Last Office Visit  -  03/04/2024  Next Office Visit  -  06/17/2024    Last Filled  -    Last UDS -    Contract -

## 2024-06-17 ENCOUNTER — OFFICE VISIT (OUTPATIENT)
Dept: FAMILY MEDICINE CLINIC | Age: 79
End: 2024-06-17
Payer: MEDICARE

## 2024-06-17 VITALS
WEIGHT: 229 LBS | HEIGHT: 74 IN | HEART RATE: 67 BPM | BODY MASS INDEX: 29.39 KG/M2 | DIASTOLIC BLOOD PRESSURE: 40 MMHG | SYSTOLIC BLOOD PRESSURE: 112 MMHG | OXYGEN SATURATION: 98 %

## 2024-06-17 DIAGNOSIS — Z86.73 HISTORY OF STROKE: ICD-10-CM

## 2024-06-17 DIAGNOSIS — R63.4 WEIGHT LOSS: ICD-10-CM

## 2024-06-17 DIAGNOSIS — M17.11 OSTEOARTHRITIS OF RIGHT KNEE, UNSPECIFIED OSTEOARTHRITIS TYPE: Primary | ICD-10-CM

## 2024-06-17 DIAGNOSIS — R68.81 EARLY SATIETY: ICD-10-CM

## 2024-06-17 PROCEDURE — G8417 CALC BMI ABV UP PARAM F/U: HCPCS | Performed by: FAMILY MEDICINE

## 2024-06-17 PROCEDURE — 1036F TOBACCO NON-USER: CPT | Performed by: FAMILY MEDICINE

## 2024-06-17 PROCEDURE — 1124F ACP DISCUSS-NO DSCNMKR DOCD: CPT | Performed by: FAMILY MEDICINE

## 2024-06-17 PROCEDURE — 3078F DIAST BP <80 MM HG: CPT | Performed by: FAMILY MEDICINE

## 2024-06-17 PROCEDURE — 3074F SYST BP LT 130 MM HG: CPT | Performed by: FAMILY MEDICINE

## 2024-06-17 PROCEDURE — 99214 OFFICE O/P EST MOD 30 MIN: CPT | Performed by: FAMILY MEDICINE

## 2024-06-17 PROCEDURE — G8427 DOCREV CUR MEDS BY ELIG CLIN: HCPCS | Performed by: FAMILY MEDICINE

## 2024-06-17 RX ORDER — TADALAFIL 20 MG
20 TABLET ORAL
COMMUNITY
Start: 2024-05-20

## 2024-06-17 RX ORDER — SILODOSIN 8 MG/1
8 CAPSULE ORAL DAILY
COMMUNITY
Start: 2024-04-16

## 2024-06-17 RX ORDER — ESOMEPRAZOLE MAGNESIUM 20 MG/1
20 GRANULE, DELAYED RELEASE ORAL DAILY
COMMUNITY

## 2024-06-17 SDOH — ECONOMIC STABILITY: HOUSING INSECURITY
IN THE LAST 12 MONTHS, WAS THERE A TIME WHEN YOU DID NOT HAVE A STEADY PLACE TO SLEEP OR SLEPT IN A SHELTER (INCLUDING NOW)?: NO

## 2024-06-17 SDOH — ECONOMIC STABILITY: INCOME INSECURITY: HOW HARD IS IT FOR YOU TO PAY FOR THE VERY BASICS LIKE FOOD, HOUSING, MEDICAL CARE, AND HEATING?: NOT HARD AT ALL

## 2024-06-17 SDOH — ECONOMIC STABILITY: FOOD INSECURITY: WITHIN THE PAST 12 MONTHS, YOU WORRIED THAT YOUR FOOD WOULD RUN OUT BEFORE YOU GOT MONEY TO BUY MORE.: NEVER TRUE

## 2024-06-17 SDOH — ECONOMIC STABILITY: FOOD INSECURITY: WITHIN THE PAST 12 MONTHS, THE FOOD YOU BOUGHT JUST DIDN'T LAST AND YOU DIDN'T HAVE MONEY TO GET MORE.: NEVER TRUE

## 2024-06-17 NOTE — PROGRESS NOTES
[facility]  [phone]  Fax: [phone]   Pre-operative History and Physical      DIAGNOSIS:  right knee osteoarthritis    PROCEDURE:  right knee TKR      History Obtained From:  patient    HISTORY OF PRESENT ILLNESS:    The patient is a 79 y.o. male with significant past medical history of Right knee pain who presents for preop evaluation       Past Medical History:   Diagnosis Date    Acute renal failure (ARF) ([facility]) 04/12/2022    Acute renal failure superimposed on stage 3 chronic kidney disease (HCC) 04/12/2022    Anxiety 2010    Bradycardia     Carotid artery occlusion without infarction, unspecified laterality 05/30/2021    Cerebral artery occlusion with cerebral infarction ([facility])     ED (erectile dysfunction)     GERD (gastroesophageal reflux disease) 1992    Hypercholesteremia     Hypertension     Low back pain 07/07/2014    Major depressive disorder, recurrent, mild 05/05/2023    Osteoarthritis 2010    Strabismus     TIA (transient ischemic attack) 04/12/2022    TIA (transient ischemic attack)     TIA (transient ischemic attack)      Past Surgical History:   Procedure Laterality Date    CAROTID ENDARTERECTOMY Right 06/07/2021    RIGHT SIDED CAROTID ENDARTERECTOMY performed by Merritt Arias MD at [facility] OR    COLONOSCOPY  2011    CYST REMOVAL      ESOPHAGOGASTRIC FUNDOPLICATION      EXTERNAL EAR SURGERY      cancer spots    EYE SURGERY Left     for strabsismus    KNEE ARTHROPLASTY  2018    KNEE ARTHROSCOPY Right 01/07/2020    EXAM UNDER ANESTHESIA VIDEO ARTHROSCOPY RIGHT KNEE, PARTIAL MEDIAL MENISCECTOMY, PATELLA FEMORAL CHONDROPLASTY, SYNOVECTOMY performed by Caden Veliz MD at [facility] OR    STRABISMUS SURGERY      TONSILLECTOMY      UPPER GASTROINTESTINAL ENDOSCOPY  2011    UPPER GASTROINTESTINAL ENDOSCOPY  09/26/2014    gastritis barretts biopsy taken     Current Outpatient Medications   Medication Sig Dispense Refill    CIALIS 20 MG tablet Take 1 tablet by mouth      silodosin (RAPAFLO) 8 MG

## 2024-06-18 DIAGNOSIS — R63.4 WEIGHT LOSS: ICD-10-CM

## 2024-06-18 DIAGNOSIS — R68.81 EARLY SATIETY: ICD-10-CM

## 2024-06-18 LAB
ALBUMIN SERPL-MCNC: 4.1 G/DL (ref 3.4–5)
ALBUMIN/GLOB SERPL: 1.8 {RATIO} (ref 1.1–2.2)
ALP SERPL-CCNC: 84 U/L (ref 40–129)
ALT SERPL-CCNC: 15 U/L (ref 10–40)
ANION GAP SERPL CALCULATED.3IONS-SCNC: 12 MMOL/L (ref 3–16)
AST SERPL-CCNC: 16 U/L (ref 15–37)
BILIRUB SERPL-MCNC: 0.3 MG/DL (ref 0–1)
BUN SERPL-MCNC: 14 MG/DL (ref 7–20)
CALCIUM SERPL-MCNC: 9.1 MG/DL (ref 8.3–10.6)
CHLORIDE SERPL-SCNC: 103 MMOL/L (ref 99–110)
CO2 SERPL-SCNC: 25 MMOL/L (ref 21–32)
CREAT SERPL-MCNC: 1.1 MG/DL (ref 0.8–1.3)
GFR SERPLBLD CREATININE-BSD FMLA CKD-EPI: 68 ML/MIN/{1.73_M2}
GLUCOSE SERPL-MCNC: 124 MG/DL (ref 70–99)
LIPASE SERPL-CCNC: 44 U/L (ref 13–60)
POTASSIUM SERPL-SCNC: 4.3 MMOL/L (ref 3.5–5.1)
PROT SERPL-MCNC: 6.4 G/DL (ref 6.4–8.2)
SODIUM SERPL-SCNC: 140 MMOL/L (ref 136–145)

## 2024-06-20 ENCOUNTER — TELEPHONE (OUTPATIENT)
Dept: FAMILY MEDICINE CLINIC | Age: 79
End: 2024-06-20

## 2024-06-20 DIAGNOSIS — M17.11 OSTEOARTHRITIS OF RIGHT KNEE, UNSPECIFIED OSTEOARTHRITIS TYPE: Primary | ICD-10-CM

## 2024-06-20 NOTE — TELEPHONE ENCOUNTER
[facility] is requesting a referral for a total Knee replacement for pt on Tuesday ,please fax referral to  atten: Selina

## 2024-06-21 ENCOUNTER — HOSPITAL ENCOUNTER (OUTPATIENT)
Dept: CT IMAGING | Age: 79
Discharge: HOME | End: 2024-06-21
Attending: FAMILY MEDICINE
Payer: MEDICARE

## 2024-06-21 DIAGNOSIS — R63.4 WEIGHT LOSS: ICD-10-CM

## 2024-06-21 DIAGNOSIS — R68.81 EARLY SATIETY: ICD-10-CM

## 2024-06-21 LAB
PERFORMED ON: NORMAL
POC CREATININE: 0.9 MG/DL (ref 0.8–1.3)
POC SAMPLE TYPE: NORMAL

## 2024-06-21 PROCEDURE — 74177 CT ABD & PELVIS W/CONTRAST: CPT

## 2024-06-21 PROCEDURE — 6360000004 HC RX CONTRAST MEDICATION: Performed by: FAMILY MEDICINE

## 2024-06-21 PROCEDURE — 82565 ASSAY OF CREATININE: CPT

## 2024-06-21 RX ADMIN — DIATRIZOATE MEGLUMINE AND DIATRIZOATE SODIUM 12 ML: 660; 100 LIQUID ORAL; RECTAL at 17:47

## 2024-06-21 RX ADMIN — IOPAMIDOL 75 ML: 755 INJECTION, SOLUTION INTRAVENOUS at 17:47

## 2024-06-26 ENCOUNTER — HOSPITAL ENCOUNTER (INPATIENT)
Age: 79
LOS: 11 days | Discharge: HOME HEALTH | DRG: 511 | End: 2024-07-08
Attending: EMERGENCY MEDICINE | Admitting: HOSPITALIST
Payer: MEDICARE

## 2024-06-26 DIAGNOSIS — S62.101A CLOSED FRACTURE OF RIGHT WRIST, INITIAL ENCOUNTER: Primary | ICD-10-CM

## 2024-06-26 DIAGNOSIS — F33.1 MAJOR DEPRESSIVE DISORDER, RECURRENT, MODERATE (HCC): ICD-10-CM

## 2024-06-26 DIAGNOSIS — E87.1 HYPONATREMIA: ICD-10-CM

## 2024-06-26 PROCEDURE — 2W3CX1Z IMMOBILIZATION OF RIGHT LOWER ARM USING SPLINT: ICD-10-PCS | Performed by: ORTHOPAEDIC SURGERY

## 2024-06-26 PROCEDURE — 99285 EMERGENCY DEPT VISIT HI MDM: CPT

## 2024-06-26 ASSESSMENT — PAIN DESCRIPTION - LOCATION: LOCATION: WRIST

## 2024-06-26 ASSESSMENT — PAIN SCALES - GENERAL: PAINLEVEL_OUTOF10: 8

## 2024-06-26 ASSESSMENT — PAIN - FUNCTIONAL ASSESSMENT: PAIN_FUNCTIONAL_ASSESSMENT: 0-10

## 2024-06-27 ENCOUNTER — APPOINTMENT (OUTPATIENT)
Dept: GENERAL RADIOLOGY | Age: 79
DRG: 511 | End: 2024-06-27
Payer: MEDICARE

## 2024-06-27 PROBLEM — S62.101A RIGHT WRIST FRACTURE, CLOSED, INITIAL ENCOUNTER: Status: ACTIVE | Noted: 2024-06-27

## 2024-06-27 LAB
ANION GAP SERPL CALCULATED.3IONS-SCNC: 8 MMOL/L (ref 3–16)
BASOPHILS # BLD: 0 K/UL (ref 0–0.2)
BASOPHILS NFR BLD: 0.2 %
BUN SERPL-MCNC: 17 MG/DL (ref 7–20)
CALCIUM SERPL-MCNC: 8.4 MG/DL (ref 8.3–10.6)
CHLORIDE SERPL-SCNC: 93 MMOL/L (ref 99–110)
CO2 SERPL-SCNC: 27 MMOL/L (ref 21–32)
CREAT SERPL-MCNC: 0.9 MG/DL (ref 0.8–1.3)
DEPRECATED RDW RBC AUTO: 13.5 % (ref 12.4–15.4)
EOSINOPHIL # BLD: 0 K/UL (ref 0–0.6)
EOSINOPHIL NFR BLD: 0 %
GFR SERPLBLD CREATININE-BSD FMLA CKD-EPI: 87 ML/MIN/{1.73_M2}
GLUCOSE SERPL-MCNC: 140 MG/DL (ref 70–99)
HCT VFR BLD AUTO: 33.4 % (ref 40.5–52.5)
HGB BLD-MCNC: 11.2 G/DL (ref 13.5–17.5)
INR PPP: 1.44 (ref 0.85–1.15)
LYMPHOCYTES # BLD: 0.9 K/UL (ref 1–5.1)
LYMPHOCYTES NFR BLD: 6.6 %
MCH RBC QN AUTO: 32 PG (ref 26–34)
MCHC RBC AUTO-ENTMCNC: 33.5 G/DL (ref 31–36)
MCV RBC AUTO: 95.5 FL (ref 80–100)
MONOCYTES # BLD: 1.5 K/UL (ref 0–1.3)
MONOCYTES NFR BLD: 11.5 %
NEUTROPHILS # BLD: 11 K/UL (ref 1.7–7.7)
NEUTROPHILS NFR BLD: 81.7 %
PLATELET # BLD AUTO: 210 K/UL (ref 135–450)
PMV BLD AUTO: 7.3 FL (ref 5–10.5)
POTASSIUM SERPL-SCNC: 3.9 MMOL/L (ref 3.5–5.1)
PROTHROMBIN TIME: 17.7 SEC (ref 11.9–14.9)
RBC # BLD AUTO: 3.5 M/UL (ref 4.2–5.9)
SODIUM SERPL-SCNC: 128 MMOL/L (ref 136–145)
WBC # BLD AUTO: 13.5 K/UL (ref 4–11)

## 2024-06-27 PROCEDURE — 85610 PROTHROMBIN TIME: CPT

## 2024-06-27 PROCEDURE — 6370000000 HC RX 637 (ALT 250 FOR IP): Performed by: NURSE PRACTITIONER

## 2024-06-27 PROCEDURE — 6370000000 HC RX 637 (ALT 250 FOR IP): Performed by: PHYSICIAN ASSISTANT

## 2024-06-27 PROCEDURE — 1200000000 HC SEMI PRIVATE

## 2024-06-27 PROCEDURE — 85025 COMPLETE CBC W/AUTO DIFF WBC: CPT

## 2024-06-27 PROCEDURE — 73110 X-RAY EXAM OF WRIST: CPT

## 2024-06-27 PROCEDURE — 6360000002 HC RX W HCPCS: Performed by: NURSE PRACTITIONER

## 2024-06-27 PROCEDURE — 2580000003 HC RX 258: Performed by: STUDENT IN AN ORGANIZED HEALTH CARE EDUCATION/TRAINING PROGRAM

## 2024-06-27 PROCEDURE — 80048 BASIC METABOLIC PNL TOTAL CA: CPT

## 2024-06-27 PROCEDURE — 2700000000 HC OXYGEN THERAPY PER DAY

## 2024-06-27 PROCEDURE — 94761 N-INVAS EAR/PLS OXIMETRY MLT: CPT

## 2024-06-27 PROCEDURE — 2580000003 HC RX 258: Performed by: NURSE PRACTITIONER

## 2024-06-27 RX ORDER — POLYETHYLENE GLYCOL 3350 17 G/17G
17 POWDER, FOR SOLUTION ORAL DAILY PRN
Status: DISCONTINUED | OUTPATIENT
Start: 2024-06-27 | End: 2024-07-08 | Stop reason: HOSPADM

## 2024-06-27 RX ORDER — FLUOXETINE HYDROCHLORIDE 20 MG/1
20 CAPSULE ORAL DAILY
Status: DISCONTINUED | OUTPATIENT
Start: 2024-06-27 | End: 2024-07-08 | Stop reason: HOSPADM

## 2024-06-27 RX ORDER — NALOXONE HYDROCHLORIDE 0.4 MG/ML
0.4 INJECTION, SOLUTION INTRAMUSCULAR; INTRAVENOUS; SUBCUTANEOUS PRN
Status: DISCONTINUED | OUTPATIENT
Start: 2024-06-27 | End: 2024-07-08 | Stop reason: HOSPADM

## 2024-06-27 RX ORDER — NIFEDIPINE 30 MG/1
60 TABLET, EXTENDED RELEASE ORAL DAILY
Status: DISCONTINUED | OUTPATIENT
Start: 2024-06-27 | End: 2024-07-04

## 2024-06-27 RX ORDER — CLOPIDOGREL BISULFATE 75 MG/1
75 TABLET ORAL DAILY
Status: DISCONTINUED | OUTPATIENT
Start: 2024-06-27 | End: 2024-07-03

## 2024-06-27 RX ORDER — SODIUM CHLORIDE 0.9 % (FLUSH) 0.9 %
5-40 SYRINGE (ML) INJECTION PRN
Status: DISCONTINUED | OUTPATIENT
Start: 2024-06-27 | End: 2024-07-08 | Stop reason: HOSPADM

## 2024-06-27 RX ORDER — ONDANSETRON 2 MG/ML
4 INJECTION INTRAMUSCULAR; INTRAVENOUS EVERY 6 HOURS PRN
Status: DISCONTINUED | OUTPATIENT
Start: 2024-06-27 | End: 2024-07-03

## 2024-06-27 RX ORDER — SODIUM CHLORIDE 0.9 % (FLUSH) 0.9 %
5-40 SYRINGE (ML) INJECTION EVERY 12 HOURS SCHEDULED
Status: DISCONTINUED | OUTPATIENT
Start: 2024-06-27 | End: 2024-07-08 | Stop reason: HOSPADM

## 2024-06-27 RX ORDER — ACETAMINOPHEN 650 MG/1
650 SUPPOSITORY RECTAL EVERY 6 HOURS PRN
Status: DISCONTINUED | OUTPATIENT
Start: 2024-06-27 | End: 2024-07-03

## 2024-06-27 RX ORDER — SODIUM CHLORIDE 9 MG/ML
INJECTION, SOLUTION INTRAVENOUS CONTINUOUS
Status: DISCONTINUED | OUTPATIENT
Start: 2024-06-27 | End: 2024-06-27

## 2024-06-27 RX ORDER — OXYCODONE HYDROCHLORIDE 5 MG/1
10 TABLET ORAL EVERY 4 HOURS PRN
Status: COMPLETED | OUTPATIENT
Start: 2024-06-27 | End: 2024-06-28

## 2024-06-27 RX ORDER — ATORVASTATIN CALCIUM 80 MG/1
80 TABLET, FILM COATED ORAL DAILY
Status: DISCONTINUED | OUTPATIENT
Start: 2024-06-27 | End: 2024-07-08 | Stop reason: HOSPADM

## 2024-06-27 RX ORDER — MECOBALAMIN 5000 MCG
10 TABLET,DISINTEGRATING ORAL NIGHTLY
Status: DISCONTINUED | OUTPATIENT
Start: 2024-06-28 | End: 2024-07-08 | Stop reason: HOSPADM

## 2024-06-27 RX ORDER — TAMSULOSIN HYDROCHLORIDE 0.4 MG/1
0.4 CAPSULE ORAL DAILY
Status: DISCONTINUED | OUTPATIENT
Start: 2024-06-27 | End: 2024-07-08 | Stop reason: HOSPADM

## 2024-06-27 RX ORDER — OXYCODONE HYDROCHLORIDE 5 MG/1
5 TABLET ORAL EVERY 4 HOURS PRN
Status: COMPLETED | OUTPATIENT
Start: 2024-06-27 | End: 2024-06-28

## 2024-06-27 RX ORDER — ACETAMINOPHEN 325 MG/1
650 TABLET ORAL EVERY 6 HOURS PRN
Status: DISCONTINUED | OUTPATIENT
Start: 2024-06-27 | End: 2024-07-03

## 2024-06-27 RX ORDER — HYDRALAZINE HYDROCHLORIDE 50 MG/1
50 TABLET, FILM COATED ORAL EVERY 8 HOURS SCHEDULED
Status: DISCONTINUED | OUTPATIENT
Start: 2024-06-27 | End: 2024-07-08 | Stop reason: HOSPADM

## 2024-06-27 RX ORDER — CITALOPRAM 20 MG/1
40 TABLET ORAL DAILY
Status: DISCONTINUED | OUTPATIENT
Start: 2024-06-27 | End: 2024-06-29

## 2024-06-27 RX ORDER — PANTOPRAZOLE SODIUM 40 MG/1
40 TABLET, DELAYED RELEASE ORAL DAILY
Status: DISCONTINUED | OUTPATIENT
Start: 2024-06-27 | End: 2024-07-08 | Stop reason: HOSPADM

## 2024-06-27 RX ORDER — SODIUM CHLORIDE 9 MG/ML
INJECTION, SOLUTION INTRAVENOUS PRN
Status: DISCONTINUED | OUTPATIENT
Start: 2024-06-27 | End: 2024-07-03

## 2024-06-27 RX ORDER — OXYCODONE HYDROCHLORIDE 5 MG/1
5 TABLET ORAL ONCE
Status: COMPLETED | OUTPATIENT
Start: 2024-06-27 | End: 2024-06-27

## 2024-06-27 RX ORDER — ENOXAPARIN SODIUM 100 MG/ML
40 INJECTION SUBCUTANEOUS DAILY
Status: DISCONTINUED | OUTPATIENT
Start: 2024-06-27 | End: 2024-07-03

## 2024-06-27 RX ORDER — ONDANSETRON 4 MG/1
4 TABLET, ORALLY DISINTEGRATING ORAL EVERY 8 HOURS PRN
Status: DISCONTINUED | OUTPATIENT
Start: 2024-06-27 | End: 2024-07-03

## 2024-06-27 RX ORDER — SODIUM CHLORIDE 9 MG/ML
INJECTION, SOLUTION INTRAVENOUS CONTINUOUS
Status: DISCONTINUED | OUTPATIENT
Start: 2024-06-27 | End: 2024-06-28

## 2024-06-27 RX ADMIN — ATORVASTATIN CALCIUM 80 MG: 80 TABLET, FILM COATED ORAL at 11:15

## 2024-06-27 RX ADMIN — SODIUM CHLORIDE: 9 INJECTION, SOLUTION INTRAVENOUS at 06:18

## 2024-06-27 RX ADMIN — TAMSULOSIN HYDROCHLORIDE 0.4 MG: 0.4 CAPSULE ORAL at 11:15

## 2024-06-27 RX ADMIN — ENOXAPARIN SODIUM 40 MG: 100 INJECTION SUBCUTANEOUS at 21:03

## 2024-06-27 RX ADMIN — SODIUM CHLORIDE, PRESERVATIVE FREE 10 ML: 5 INJECTION INTRAVENOUS at 21:07

## 2024-06-27 RX ADMIN — OXYCODONE 5 MG: 5 TABLET ORAL at 23:38

## 2024-06-27 RX ADMIN — SODIUM CHLORIDE: 9 INJECTION, SOLUTION INTRAVENOUS at 10:07

## 2024-06-27 RX ADMIN — OXYCODONE 10 MG: 5 TABLET ORAL at 06:37

## 2024-06-27 RX ADMIN — NIFEDIPINE 60 MG: 30 TABLET, FILM COATED, EXTENDED RELEASE ORAL at 11:15

## 2024-06-27 RX ADMIN — HYDRALAZINE HYDROCHLORIDE 50 MG: 50 TABLET ORAL at 06:37

## 2024-06-27 RX ADMIN — CITALOPRAM HYDROBROMIDE 40 MG: 20 TABLET ORAL at 11:15

## 2024-06-27 RX ADMIN — Medication 10 MG: at 23:41

## 2024-06-27 RX ADMIN — PANTOPRAZOLE SODIUM 40 MG: 40 TABLET, DELAYED RELEASE ORAL at 11:15

## 2024-06-27 RX ADMIN — HYDRALAZINE HYDROCHLORIDE 50 MG: 50 TABLET ORAL at 21:03

## 2024-06-27 RX ADMIN — FLUOXETINE HYDROCHLORIDE 20 MG: 20 CAPSULE ORAL at 11:15

## 2024-06-27 RX ADMIN — OXYCODONE HYDROCHLORIDE 5 MG: 5 TABLET ORAL at 00:51

## 2024-06-27 ASSESSMENT — PAIN DESCRIPTION - DESCRIPTORS
DESCRIPTORS: THROBBING
DESCRIPTORS: THROBBING
DESCRIPTORS: ACHING;DISCOMFORT
DESCRIPTORS: THROBBING
DESCRIPTORS: DISCOMFORT
DESCRIPTORS: ACHING
DESCRIPTORS: DISCOMFORT

## 2024-06-27 ASSESSMENT — PAIN DESCRIPTION - ORIENTATION
ORIENTATION: RIGHT

## 2024-06-27 ASSESSMENT — PAIN SCALES - GENERAL
PAINLEVEL_OUTOF10: 4
PAINLEVEL_OUTOF10: 8
PAINLEVEL_OUTOF10: 5
PAINLEVEL_OUTOF10: 7
PAINLEVEL_OUTOF10: 5
PAINLEVEL_OUTOF10: 6
PAINLEVEL_OUTOF10: 4
PAINLEVEL_OUTOF10: 6

## 2024-06-27 ASSESSMENT — PAIN DESCRIPTION - LOCATION
LOCATION: WRIST
LOCATION: ARM
LOCATION: WRIST
LOCATION: KNEE

## 2024-06-27 NOTE — ED PROVIDER NOTES
[facility]  ED  EMERGENCY DEPARTMENT ENCOUNTER        Pt Name: Jaden Gauthier  MRN: [id number]  Birthdate 1945  Date of evaluation: 6/26/2024  Provider: SOCORRO VIEIRA PA-C  PCP: Quinn Bradley MD  ED Attending: Fatmata Thomas MD       I have seen and evaluated this patient with my supervising physician Fatmata Thomas MD.    CHIEF COMPLAINT:     Chief Complaint   Patient presents with    Wrist Injury     Right wrist injury after fall, given 50mcg fentanyl given in route       HISTORY OF PRESENT ILLNESS:      History provided by the patient. No limitations.    Jaden Gauthier is a 79 y.o. male who arrives to the ED by EMS from home.  Patient is here to be evaluated for a right wrist injury status post fall.  This patient is less than 2 days status post right total knee replacement.  This was performed by Dr. Caden Membreno at their outpatient surgery center.  Patient lives alone.  He has been walking with a walker since his surgery on 6/25.  He suffered a mechanical fall using his walker tonight and injured his right wrist.  He denies striking his head.  He denies other injury.  En route to the hospital, patient was given fentanyl 50 mcg IV.  Pain rated 8/10 on arrival.    Nursing Notes were reviewed     REVIEW OF SYSTEMS:     Review of Systems  Positives and pertinent negatives as per HPI.      PAST MEDICAL HISTORY:     Past Medical History:   Diagnosis Date    Acute renal failure (ARF) ([facility]) 04/12/2022    Acute renal failure superimposed on stage 3 chronic kidney disease ([facility]) 04/12/2022    Anxiety 2010    Bradycardia     Carotid artery occlusion without infarction, unspecified laterality 05/30/2021    Cerebral artery occlusion with cerebral infarction ([facility])     ED (erectile dysfunction)     GERD (gastroesophageal reflux disease) 1992    Hypercholesteremia     Hypertension     Low back pain 07/07/2014    Major depressive disorder, recurrent, mild 05/05/2023    Osteoarthritis 
MD   [facility]       Fatmata Thomas MD  06/27/24 1247

## 2024-06-27 NOTE — ED NOTES
Patient resting in bed with call light to lap. Right wrist on pillow with ice bag. Patient denies needs at this time. Offered patient sip of ice water. Patient has rails up x2. Door closed and lights dimmed. Patient on monitor.

## 2024-06-27 NOTE — H&P
Hospital Medicine History & Physical      Date of Admission: 6/26/2024    Date of Service:  Pt seen/examined on 6/27/2024     [x]Admitted to Inpatient with expected LOS greater than two midnights due to medical therapy.    []Placed in Observation status.    Chief Admission Complaint:  right wrist pain, fall    Presenting Admission History:      79 y.o. male , with PMH of obesity, hyperlipidemia, hypertension, who presented to [facility] with right  wrist pain and fall. History obtained from the patient and review of EMR.  The patient stated he had a right total knee replacement 2 weeks ago by Dr. Veliz at [facility].  He stated since then he has been using a walker to assist him with ambulation.  However, tonight the patient lost his balance while reaching for his walker causing him to fall onto his right wrist.  He denies hitting his head and/or loss of consciousness.  EMS was called and the patient was given 50 mcgs of fentanyl enroute to the emergency department.  In the emergency department an x-ray of the right wrist was obtained that revealed mildly impacted and slightly comminuted distal radius fracture with moderate dorsal angulation of the distal fragments and probable intra-articular extension of the fracture.  Moderate soft tissue swelling along the dorsum of the wrist.  The patient was given oxycodone for his pain and a standard volar splint was applied to the fracture site.  Orthopedic surgery was consulted in the emergency department.  Upon further workup, the patient was found to be dehydrated with hyponatremia.  This is likely secondary to inadequate p.o. intake.  He will receive IV fluids.  The patient was admitted for further evaluation and treatment.  He denied any other associated symptoms as well as any aggravating and/or alleviating factors. At the time of this assessment, the patient was resting comfortably in bed. He currently denies any chest pain, back

## 2024-06-27 NOTE — ED NOTES
Pt called out needing a urinal. This tech went to assist pt. Per pt request, set urinal up and left urinal between patients legs. Pt stated, \"It just takes me a long time to get started.\" Pt voided 150ml urine. Inpatient RN aware

## 2024-06-27 NOTE — ED NOTES
0020- Called [facility]  Per Alfonso aguilar -- [facility]  0050- Called [facility] for 2nd attempt repage  010- Dr. Irwin returned page

## 2024-06-27 NOTE — CARE COORDINATION
Case Management Assessment  Initial Evaluation    Date/Time of Evaluation: 6/27/2024 1:46 PM  Assessment Completed by: VERONICA CARRASQUILLO RN    If patient is discharged prior to next notation, then this note serves as note for discharge by case management.    Patient Name: Jaden Gauthier                   YOB: 1945  Diagnosis: Hyponatremia [E87.1]  Right wrist fracture, closed, initial encounter [S62.101A]  Closed fracture of right wrist, initial encounter [S62.101A]                   Date / Time: 6/26/2024 11:50 PM    Patient Admission Status: Inpatient   Readmission Risk (Low < 19, Mod (19-27), High > 27): Readmission Risk Score: 12.6    Current PCP: Quinn Bradley MD  PCP verified by CM? Yes    Chart Reviewed: Yes      History Provided by: Patient  Patient Orientation: Alert and Oriented    Patient Cognition: Alert    Hospitalization in the last 30 days (Readmission):  No    If yes, Readmission Assessment in CM Navigator will be completed.    Advance Directives:      Code Status: Full Code   Patient's Primary Decision Maker is: Patient Declined (Legal Next of Kin Remains as Decision Maker)    Primary Decision Maker: khai abraham - [phone]    Discharge Planning:    Patient lives with: Alone Type of Home: House  Primary Care Giver: Self  Patient Support Systems include: Friends/Neighbors   Current Financial resources: Medicare  Current community resources: None  Current services prior to admission: Durable Medical Equipment            Current DME: Walker            Type of Home Care services:  PT, Nursing Services, OT, Aide Services    ADLS  Prior functional level: Independent in ADLs/IADLs  Current functional level: Assistance with the following:, Bathing, Dressing, Toileting, Cooking, Housework, Shopping, Mobility    PT AM-PAC:   /24  OT AM-PAC:   /24    Family can provide assistance at DC: No  Would you like Case Management to discuss the discharge plan with any other family

## 2024-06-28 LAB
ANION GAP SERPL CALCULATED.3IONS-SCNC: 8 MMOL/L (ref 3–16)
BASOPHILS # BLD: 0 K/UL (ref 0–0.2)
BASOPHILS NFR BLD: 0.2 %
BUN SERPL-MCNC: 18 MG/DL (ref 7–20)
CALCIUM SERPL-MCNC: 8.4 MG/DL (ref 8.3–10.6)
CHLORIDE SERPL-SCNC: 98 MMOL/L (ref 99–110)
CO2 SERPL-SCNC: 25 MMOL/L (ref 21–32)
CREAT SERPL-MCNC: 0.8 MG/DL (ref 0.8–1.3)
DEPRECATED RDW RBC AUTO: 13.5 % (ref 12.4–15.4)
EKG ATRIAL RATE: 75 BPM
EKG DIAGNOSIS: NORMAL
EKG P AXIS: 39 DEGREES
EKG P-R INTERVAL: 180 MS
EKG Q-T INTERVAL: 384 MS
EKG QRS DURATION: 86 MS
EKG QTC CALCULATION (BAZETT): 428 MS
EKG R AXIS: 29 DEGREES
EKG T AXIS: 156 DEGREES
EKG VENTRICULAR RATE: 75 BPM
EOSINOPHIL # BLD: 0 K/UL (ref 0–0.6)
EOSINOPHIL NFR BLD: 0.5 %
GFR SERPLBLD CREATININE-BSD FMLA CKD-EPI: 90 ML/MIN/{1.73_M2}
GLUCOSE SERPL-MCNC: 112 MG/DL (ref 70–99)
HCT VFR BLD AUTO: 29.4 % (ref 40.5–52.5)
HGB BLD-MCNC: 10.1 G/DL (ref 13.5–17.5)
LYMPHOCYTES # BLD: 1 K/UL (ref 1–5.1)
LYMPHOCYTES NFR BLD: 11.1 %
MCH RBC QN AUTO: 32.6 PG (ref 26–34)
MCHC RBC AUTO-ENTMCNC: 34.3 G/DL (ref 31–36)
MCV RBC AUTO: 95 FL (ref 80–100)
MONOCYTES # BLD: 1.3 K/UL (ref 0–1.3)
MONOCYTES NFR BLD: 14 %
NEUTROPHILS # BLD: 6.7 K/UL (ref 1.7–7.7)
NEUTROPHILS NFR BLD: 74.2 %
PLATELET # BLD AUTO: 181 K/UL (ref 135–450)
PMV BLD AUTO: 8.4 FL (ref 5–10.5)
POTASSIUM SERPL-SCNC: 3.6 MMOL/L (ref 3.5–5.1)
RBC # BLD AUTO: 3.1 M/UL (ref 4.2–5.9)
SODIUM SERPL-SCNC: 131 MMOL/L (ref 136–145)
WBC # BLD AUTO: 9 K/UL (ref 4–11)

## 2024-06-28 PROCEDURE — 97166 OT EVAL MOD COMPLEX 45 MIN: CPT

## 2024-06-28 PROCEDURE — 1200000000 HC SEMI PRIVATE

## 2024-06-28 PROCEDURE — 93005 ELECTROCARDIOGRAM TRACING: CPT | Performed by: STUDENT IN AN ORGANIZED HEALTH CARE EDUCATION/TRAINING PROGRAM

## 2024-06-28 PROCEDURE — 93010 ELECTROCARDIOGRAM REPORT: CPT | Performed by: INTERNAL MEDICINE

## 2024-06-28 PROCEDURE — 97530 THERAPEUTIC ACTIVITIES: CPT

## 2024-06-28 PROCEDURE — 97162 PT EVAL MOD COMPLEX 30 MIN: CPT

## 2024-06-28 PROCEDURE — 6370000000 HC RX 637 (ALT 250 FOR IP): Performed by: STUDENT IN AN ORGANIZED HEALTH CARE EDUCATION/TRAINING PROGRAM

## 2024-06-28 PROCEDURE — 2580000003 HC RX 258: Performed by: STUDENT IN AN ORGANIZED HEALTH CARE EDUCATION/TRAINING PROGRAM

## 2024-06-28 PROCEDURE — 6370000000 HC RX 637 (ALT 250 FOR IP): Performed by: NURSE PRACTITIONER

## 2024-06-28 PROCEDURE — 80048 BASIC METABOLIC PNL TOTAL CA: CPT

## 2024-06-28 PROCEDURE — 6360000002 HC RX W HCPCS: Performed by: NURSE PRACTITIONER

## 2024-06-28 PROCEDURE — 97116 GAIT TRAINING THERAPY: CPT

## 2024-06-28 PROCEDURE — 85025 COMPLETE CBC W/AUTO DIFF WBC: CPT

## 2024-06-28 PROCEDURE — 2580000003 HC RX 258: Performed by: NURSE PRACTITIONER

## 2024-06-28 RX ORDER — SODIUM CHLORIDE 9 MG/ML
INJECTION, SOLUTION INTRAVENOUS CONTINUOUS
Status: DISCONTINUED | OUTPATIENT
Start: 2024-06-28 | End: 2024-06-28

## 2024-06-28 RX ADMIN — FLUOXETINE HYDROCHLORIDE 20 MG: 20 CAPSULE ORAL at 08:28

## 2024-06-28 RX ADMIN — ATORVASTATIN CALCIUM 80 MG: 80 TABLET, FILM COATED ORAL at 08:28

## 2024-06-28 RX ADMIN — SODIUM CHLORIDE, PRESERVATIVE FREE 10 ML: 5 INJECTION INTRAVENOUS at 19:59

## 2024-06-28 RX ADMIN — ENOXAPARIN SODIUM 40 MG: 100 INJECTION SUBCUTANEOUS at 08:28

## 2024-06-28 RX ADMIN — HYDRALAZINE HYDROCHLORIDE 50 MG: 50 TABLET ORAL at 15:57

## 2024-06-28 RX ADMIN — OXYCODONE 10 MG: 5 TABLET ORAL at 18:50

## 2024-06-28 RX ADMIN — CITALOPRAM HYDROBROMIDE 40 MG: 20 TABLET ORAL at 08:28

## 2024-06-28 RX ADMIN — OXYCODONE 5 MG: 5 TABLET ORAL at 23:23

## 2024-06-28 RX ADMIN — HYDRALAZINE HYDROCHLORIDE 50 MG: 50 TABLET ORAL at 05:57

## 2024-06-28 RX ADMIN — Medication 10 MG: at 22:01

## 2024-06-28 RX ADMIN — Medication 30 G: at 15:57

## 2024-06-28 RX ADMIN — TAMSULOSIN HYDROCHLORIDE 0.4 MG: 0.4 CAPSULE ORAL at 08:28

## 2024-06-28 RX ADMIN — SODIUM CHLORIDE: 9 INJECTION, SOLUTION INTRAVENOUS at 05:49

## 2024-06-28 RX ADMIN — NIFEDIPINE 60 MG: 30 TABLET, FILM COATED, EXTENDED RELEASE ORAL at 08:28

## 2024-06-28 RX ADMIN — PANTOPRAZOLE SODIUM 40 MG: 40 TABLET, DELAYED RELEASE ORAL at 08:28

## 2024-06-28 RX ADMIN — HYDRALAZINE HYDROCHLORIDE 50 MG: 50 TABLET ORAL at 22:01

## 2024-06-28 ASSESSMENT — PAIN - FUNCTIONAL ASSESSMENT: PAIN_FUNCTIONAL_ASSESSMENT: PREVENTS OR INTERFERES WITH MANY ACTIVE NOT PASSIVE ACTIVITIES

## 2024-06-28 ASSESSMENT — PAIN DESCRIPTION - LOCATION
LOCATION: WRIST
LOCATION: WRIST

## 2024-06-28 ASSESSMENT — PAIN SCALES - GENERAL
PAINLEVEL_OUTOF10: 4
PAINLEVEL_OUTOF10: 4
PAINLEVEL_OUTOF10: 9

## 2024-06-28 ASSESSMENT — PAIN DESCRIPTION - ORIENTATION: ORIENTATION: RIGHT

## 2024-06-28 ASSESSMENT — PAIN DESCRIPTION - DESCRIPTORS: DESCRIPTORS: ACHING;DISCOMFORT;THROBBING

## 2024-06-28 NOTE — CARE COORDINATION
Chart reviewed day 2. Pt is followed by IM and Ortho. Pt will remain in house for monitoring of Hyponatremia. Agreeable to ARU, eval order placed. Pt from home alone w/little to no family support. Preference of [facility] if needed. Will follow for needs as they arise. Electronically signed by VERONICA CARRASQUILLO RN on 6/28/2024 at 2:44 PM

## 2024-06-28 NOTE — CARE COORDINATION
[facility]   Patient appears to be a good candidate for IPR, however unable to accept at this time as patient planned to have an OP ORIF next week. D/w Ofe PIERRE.     Thank you.   La Tilley M.A, CCC-SLP  Clinical Liaison

## 2024-06-29 LAB
ANION GAP SERPL CALCULATED.3IONS-SCNC: 12 MMOL/L (ref 3–16)
BUN SERPL-MCNC: 26 MG/DL (ref 7–20)
CALCIUM SERPL-MCNC: 8.7 MG/DL (ref 8.3–10.6)
CHLORIDE SERPL-SCNC: 97 MMOL/L (ref 99–110)
CO2 SERPL-SCNC: 23 MMOL/L (ref 21–32)
CREAT SERPL-MCNC: 0.9 MG/DL (ref 0.8–1.3)
GFR SERPLBLD CREATININE-BSD FMLA CKD-EPI: 87 ML/MIN/{1.73_M2}
GLUCOSE SERPL-MCNC: 119 MG/DL (ref 70–99)
MAGNESIUM SERPL-MCNC: 1.9 MG/DL (ref 1.8–2.4)
POTASSIUM SERPL-SCNC: 3.3 MMOL/L (ref 3.5–5.1)
SODIUM SERPL-SCNC: 132 MMOL/L (ref 136–145)

## 2024-06-29 PROCEDURE — 2580000003 HC RX 258: Performed by: FAMILY MEDICINE

## 2024-06-29 PROCEDURE — 6360000002 HC RX W HCPCS: Performed by: NURSE PRACTITIONER

## 2024-06-29 PROCEDURE — 2580000003 HC RX 258: Performed by: NURSE PRACTITIONER

## 2024-06-29 PROCEDURE — 1200000000 HC SEMI PRIVATE

## 2024-06-29 PROCEDURE — 6370000000 HC RX 637 (ALT 250 FOR IP): Performed by: NURSE PRACTITIONER

## 2024-06-29 PROCEDURE — 80048 BASIC METABOLIC PNL TOTAL CA: CPT

## 2024-06-29 PROCEDURE — 83735 ASSAY OF MAGNESIUM: CPT

## 2024-06-29 PROCEDURE — 6370000000 HC RX 637 (ALT 250 FOR IP): Performed by: STUDENT IN AN ORGANIZED HEALTH CARE EDUCATION/TRAINING PROGRAM

## 2024-06-29 PROCEDURE — 6370000000 HC RX 637 (ALT 250 FOR IP): Performed by: FAMILY MEDICINE

## 2024-06-29 RX ORDER — LORAZEPAM 1 MG/1
3 TABLET ORAL
Status: DISCONTINUED | OUTPATIENT
Start: 2024-06-29 | End: 2024-07-08 | Stop reason: HOSPADM

## 2024-06-29 RX ORDER — OXYCODONE HYDROCHLORIDE AND ACETAMINOPHEN 5; 325 MG/1; MG/1
1 TABLET ORAL EVERY 6 HOURS PRN
Status: DISCONTINUED | OUTPATIENT
Start: 2024-06-29 | End: 2024-07-03

## 2024-06-29 RX ORDER — CITALOPRAM 20 MG/1
35 TABLET ORAL DAILY
Status: DISCONTINUED | OUTPATIENT
Start: 2024-06-29 | End: 2024-06-29

## 2024-06-29 RX ORDER — SODIUM CHLORIDE 0.9 % (FLUSH) 0.9 %
5-40 SYRINGE (ML) INJECTION EVERY 12 HOURS SCHEDULED
Status: DISCONTINUED | OUTPATIENT
Start: 2024-06-29 | End: 2024-07-08 | Stop reason: HOSPADM

## 2024-06-29 RX ORDER — LORAZEPAM 2 MG/ML
3 INJECTION INTRAMUSCULAR
Status: DISCONTINUED | OUTPATIENT
Start: 2024-06-29 | End: 2024-07-08 | Stop reason: HOSPADM

## 2024-06-29 RX ORDER — QUETIAPINE FUMARATE 25 MG/1
25 TABLET, FILM COATED ORAL NIGHTLY
Status: DISCONTINUED | OUTPATIENT
Start: 2024-06-29 | End: 2024-07-08 | Stop reason: HOSPADM

## 2024-06-29 RX ORDER — LORAZEPAM 2 MG/ML
2 INJECTION INTRAMUSCULAR
Status: DISCONTINUED | OUTPATIENT
Start: 2024-06-29 | End: 2024-07-08 | Stop reason: HOSPADM

## 2024-06-29 RX ORDER — POTASSIUM CHLORIDE 20 MEQ/1
40 TABLET, EXTENDED RELEASE ORAL ONCE
Status: COMPLETED | OUTPATIENT
Start: 2024-06-29 | End: 2024-06-29

## 2024-06-29 RX ORDER — CITALOPRAM 20 MG/1
35 TABLET ORAL DAILY
Status: DISCONTINUED | OUTPATIENT
Start: 2024-06-30 | End: 2024-07-06

## 2024-06-29 RX ORDER — LORAZEPAM 1 MG/1
2 TABLET ORAL
Status: DISCONTINUED | OUTPATIENT
Start: 2024-06-29 | End: 2024-07-08 | Stop reason: HOSPADM

## 2024-06-29 RX ORDER — LORAZEPAM 2 MG/ML
4 INJECTION INTRAMUSCULAR
Status: DISCONTINUED | OUTPATIENT
Start: 2024-06-29 | End: 2024-07-08 | Stop reason: HOSPADM

## 2024-06-29 RX ORDER — LORAZEPAM 1 MG/1
4 TABLET ORAL
Status: DISCONTINUED | OUTPATIENT
Start: 2024-06-29 | End: 2024-07-08 | Stop reason: HOSPADM

## 2024-06-29 RX ORDER — SODIUM CHLORIDE 9 MG/ML
INJECTION, SOLUTION INTRAVENOUS PRN
Status: DISCONTINUED | OUTPATIENT
Start: 2024-06-29 | End: 2024-07-03

## 2024-06-29 RX ORDER — SODIUM CHLORIDE 0.9 % (FLUSH) 0.9 %
5-40 SYRINGE (ML) INJECTION PRN
Status: DISCONTINUED | OUTPATIENT
Start: 2024-06-29 | End: 2024-07-08 | Stop reason: HOSPADM

## 2024-06-29 RX ORDER — LORAZEPAM 2 MG/ML
1 INJECTION INTRAMUSCULAR
Status: DISCONTINUED | OUTPATIENT
Start: 2024-06-29 | End: 2024-07-08 | Stop reason: HOSPADM

## 2024-06-29 RX ORDER — LANOLIN ALCOHOL/MO/W.PET/CERES
100 CREAM (GRAM) TOPICAL DAILY
Status: DISCONTINUED | OUTPATIENT
Start: 2024-06-29 | End: 2024-07-08 | Stop reason: HOSPADM

## 2024-06-29 RX ORDER — LORAZEPAM 1 MG/1
1 TABLET ORAL
Status: DISCONTINUED | OUTPATIENT
Start: 2024-06-29 | End: 2024-07-08 | Stop reason: HOSPADM

## 2024-06-29 RX ADMIN — ENOXAPARIN SODIUM 40 MG: 100 INJECTION SUBCUTANEOUS at 09:33

## 2024-06-29 RX ADMIN — SODIUM CHLORIDE, PRESERVATIVE FREE 10 ML: 5 INJECTION INTRAVENOUS at 19:52

## 2024-06-29 RX ADMIN — Medication 100 MG: at 18:06

## 2024-06-29 RX ADMIN — PANTOPRAZOLE SODIUM 40 MG: 40 TABLET, DELAYED RELEASE ORAL at 09:30

## 2024-06-29 RX ADMIN — SODIUM CHLORIDE, PRESERVATIVE FREE 10 ML: 5 INJECTION INTRAVENOUS at 09:30

## 2024-06-29 RX ADMIN — FLUOXETINE HYDROCHLORIDE 20 MG: 20 CAPSULE ORAL at 09:30

## 2024-06-29 RX ADMIN — CITALOPRAM HYDROBROMIDE 40 MG: 20 TABLET ORAL at 09:30

## 2024-06-29 RX ADMIN — HYDRALAZINE HYDROCHLORIDE 50 MG: 50 TABLET ORAL at 14:45

## 2024-06-29 RX ADMIN — POTASSIUM CHLORIDE 40 MEQ: 1500 TABLET, EXTENDED RELEASE ORAL at 09:30

## 2024-06-29 RX ADMIN — QUETIAPINE FUMARATE 25 MG: 25 TABLET ORAL at 19:51

## 2024-06-29 RX ADMIN — Medication 10 MG: at 19:51

## 2024-06-29 RX ADMIN — NIFEDIPINE 60 MG: 30 TABLET, FILM COATED, EXTENDED RELEASE ORAL at 09:30

## 2024-06-29 RX ADMIN — HYDRALAZINE HYDROCHLORIDE 50 MG: 50 TABLET ORAL at 06:05

## 2024-06-29 RX ADMIN — HYDRALAZINE HYDROCHLORIDE 50 MG: 50 TABLET ORAL at 22:15

## 2024-06-29 RX ADMIN — ATORVASTATIN CALCIUM 80 MG: 80 TABLET, FILM COATED ORAL at 09:30

## 2024-06-29 RX ADMIN — Medication 30 G: at 09:26

## 2024-06-29 RX ADMIN — TAMSULOSIN HYDROCHLORIDE 0.4 MG: 0.4 CAPSULE ORAL at 09:30

## 2024-06-29 ASSESSMENT — PAIN SCALES - GENERAL
PAINLEVEL_OUTOF10: 0

## 2024-06-30 LAB
ANION GAP SERPL CALCULATED.3IONS-SCNC: 11 MMOL/L (ref 3–16)
BUN SERPL-MCNC: 25 MG/DL (ref 7–20)
CALCIUM SERPL-MCNC: 8.4 MG/DL (ref 8.3–10.6)
CHLORIDE SERPL-SCNC: 100 MMOL/L (ref 99–110)
CO2 SERPL-SCNC: 23 MMOL/L (ref 21–32)
CREAT SERPL-MCNC: 0.8 MG/DL (ref 0.8–1.3)
GFR SERPLBLD CREATININE-BSD FMLA CKD-EPI: 90 ML/MIN/{1.73_M2}
GLUCOSE SERPL-MCNC: 110 MG/DL (ref 70–99)
POTASSIUM SERPL-SCNC: 3.6 MMOL/L (ref 3.5–5.1)
SODIUM SERPL-SCNC: 134 MMOL/L (ref 136–145)

## 2024-06-30 PROCEDURE — 80048 BASIC METABOLIC PNL TOTAL CA: CPT

## 2024-06-30 PROCEDURE — 2580000003 HC RX 258: Performed by: NURSE PRACTITIONER

## 2024-06-30 PROCEDURE — 6360000002 HC RX W HCPCS: Performed by: NURSE PRACTITIONER

## 2024-06-30 PROCEDURE — 36415 COLL VENOUS BLD VENIPUNCTURE: CPT

## 2024-06-30 PROCEDURE — 2580000003 HC RX 258: Performed by: FAMILY MEDICINE

## 2024-06-30 PROCEDURE — 97530 THERAPEUTIC ACTIVITIES: CPT

## 2024-06-30 PROCEDURE — 6370000000 HC RX 637 (ALT 250 FOR IP): Performed by: NURSE PRACTITIONER

## 2024-06-30 PROCEDURE — 6370000000 HC RX 637 (ALT 250 FOR IP): Performed by: FAMILY MEDICINE

## 2024-06-30 PROCEDURE — 97116 GAIT TRAINING THERAPY: CPT

## 2024-06-30 PROCEDURE — 97110 THERAPEUTIC EXERCISES: CPT

## 2024-06-30 PROCEDURE — 1200000000 HC SEMI PRIVATE

## 2024-06-30 PROCEDURE — 6370000000 HC RX 637 (ALT 250 FOR IP): Performed by: STUDENT IN AN ORGANIZED HEALTH CARE EDUCATION/TRAINING PROGRAM

## 2024-06-30 RX ORDER — FLUTICASONE PROPIONATE 50 MCG
1 SPRAY, SUSPENSION (ML) NASAL DAILY
Status: DISCONTINUED | OUTPATIENT
Start: 2024-06-30 | End: 2024-07-08 | Stop reason: HOSPADM

## 2024-06-30 RX ORDER — POTASSIUM CHLORIDE 20 MEQ/1
20 TABLET, EXTENDED RELEASE ORAL ONCE
Status: COMPLETED | OUTPATIENT
Start: 2024-06-30 | End: 2024-06-30

## 2024-06-30 RX ORDER — OXYMETAZOLINE HYDROCHLORIDE 0.05 G/100ML
2 SPRAY NASAL 2 TIMES DAILY PRN
Status: DISPENSED | OUTPATIENT
Start: 2024-06-30 | End: 2024-07-02

## 2024-06-30 RX ADMIN — HYDRALAZINE HYDROCHLORIDE 50 MG: 50 TABLET ORAL at 05:52

## 2024-06-30 RX ADMIN — ENOXAPARIN SODIUM 40 MG: 100 INJECTION SUBCUTANEOUS at 09:54

## 2024-06-30 RX ADMIN — TAMSULOSIN HYDROCHLORIDE 0.4 MG: 0.4 CAPSULE ORAL at 09:55

## 2024-06-30 RX ADMIN — HYDRALAZINE HYDROCHLORIDE 50 MG: 50 TABLET ORAL at 22:30

## 2024-06-30 RX ADMIN — OXYMETAZOLINE HCL 2 SPRAY: 0.05 SPRAY NASAL at 22:30

## 2024-06-30 RX ADMIN — HYDRALAZINE HYDROCHLORIDE 50 MG: 50 TABLET ORAL at 14:25

## 2024-06-30 RX ADMIN — SODIUM CHLORIDE, PRESERVATIVE FREE 10 ML: 5 INJECTION INTRAVENOUS at 19:58

## 2024-06-30 RX ADMIN — Medication 100 MG: at 09:54

## 2024-06-30 RX ADMIN — ATORVASTATIN CALCIUM 80 MG: 80 TABLET, FILM COATED ORAL at 09:55

## 2024-06-30 RX ADMIN — QUETIAPINE FUMARATE 25 MG: 25 TABLET ORAL at 19:57

## 2024-06-30 RX ADMIN — NIFEDIPINE 60 MG: 30 TABLET, FILM COATED, EXTENDED RELEASE ORAL at 09:55

## 2024-06-30 RX ADMIN — CITALOPRAM HYDROBROMIDE 35 MG: 20 TABLET ORAL at 09:54

## 2024-06-30 RX ADMIN — FLUTICASONE PROPIONATE 1 SPRAY: 50 SPRAY, METERED NASAL at 09:54

## 2024-06-30 RX ADMIN — Medication 10 MG: at 19:57

## 2024-06-30 RX ADMIN — POTASSIUM CHLORIDE 20 MEQ: 1500 TABLET, EXTENDED RELEASE ORAL at 09:55

## 2024-06-30 RX ADMIN — PANTOPRAZOLE SODIUM 40 MG: 40 TABLET, DELAYED RELEASE ORAL at 09:55

## 2024-06-30 RX ADMIN — FLUOXETINE HYDROCHLORIDE 20 MG: 20 CAPSULE ORAL at 09:55

## 2024-06-30 ASSESSMENT — PAIN SCALES - WONG BAKER
WONGBAKER_NUMERICALRESPONSE: NO HURT

## 2024-06-30 ASSESSMENT — PAIN SCALES - GENERAL
PAINLEVEL_OUTOF10: 0
PAINLEVEL_OUTOF10: 0

## 2024-07-01 PROBLEM — G56.01 ACUTE CARPAL TUNNEL SYNDROME OF RIGHT WRIST: Status: ACTIVE | Noted: 2024-07-01

## 2024-07-01 LAB
ANION GAP SERPL CALCULATED.3IONS-SCNC: 12 MMOL/L (ref 3–16)
BUN SERPL-MCNC: 19 MG/DL (ref 7–20)
CALCIUM SERPL-MCNC: 8.6 MG/DL (ref 8.3–10.6)
CHLORIDE SERPL-SCNC: 98 MMOL/L (ref 99–110)
CO2 SERPL-SCNC: 23 MMOL/L (ref 21–32)
CREAT SERPL-MCNC: 0.8 MG/DL (ref 0.8–1.3)
GFR SERPLBLD CREATININE-BSD FMLA CKD-EPI: 90 ML/MIN/{1.73_M2}
GLUCOSE SERPL-MCNC: 178 MG/DL (ref 70–99)
POTASSIUM SERPL-SCNC: 3.7 MMOL/L (ref 3.5–5.1)
SODIUM SERPL-SCNC: 133 MMOL/L (ref 136–145)

## 2024-07-01 PROCEDURE — 36415 COLL VENOUS BLD VENIPUNCTURE: CPT

## 2024-07-01 PROCEDURE — 2580000003 HC RX 258: Performed by: FAMILY MEDICINE

## 2024-07-01 PROCEDURE — 6370000000 HC RX 637 (ALT 250 FOR IP): Performed by: NURSE PRACTITIONER

## 2024-07-01 PROCEDURE — 97535 SELF CARE MNGMENT TRAINING: CPT

## 2024-07-01 PROCEDURE — 1200000000 HC SEMI PRIVATE

## 2024-07-01 PROCEDURE — 6360000002 HC RX W HCPCS: Performed by: NURSE PRACTITIONER

## 2024-07-01 PROCEDURE — 97530 THERAPEUTIC ACTIVITIES: CPT

## 2024-07-01 PROCEDURE — 80048 BASIC METABOLIC PNL TOTAL CA: CPT

## 2024-07-01 PROCEDURE — 6370000000 HC RX 637 (ALT 250 FOR IP): Performed by: STUDENT IN AN ORGANIZED HEALTH CARE EDUCATION/TRAINING PROGRAM

## 2024-07-01 PROCEDURE — 6370000000 HC RX 637 (ALT 250 FOR IP): Performed by: FAMILY MEDICINE

## 2024-07-01 PROCEDURE — 97110 THERAPEUTIC EXERCISES: CPT

## 2024-07-01 RX ADMIN — SODIUM CHLORIDE, PRESERVATIVE FREE 10 ML: 5 INJECTION INTRAVENOUS at 20:44

## 2024-07-01 RX ADMIN — QUETIAPINE FUMARATE 25 MG: 25 TABLET ORAL at 20:44

## 2024-07-01 RX ADMIN — SODIUM CHLORIDE, PRESERVATIVE FREE 10 ML: 5 INJECTION INTRAVENOUS at 08:38

## 2024-07-01 RX ADMIN — FLUTICASONE PROPIONATE 1 SPRAY: 50 SPRAY, METERED NASAL at 08:37

## 2024-07-01 RX ADMIN — ATORVASTATIN CALCIUM 80 MG: 80 TABLET, FILM COATED ORAL at 08:38

## 2024-07-01 RX ADMIN — FLUOXETINE HYDROCHLORIDE 20 MG: 20 CAPSULE ORAL at 08:38

## 2024-07-01 RX ADMIN — OXYMETAZOLINE HCL 2 SPRAY: 0.05 SPRAY NASAL at 08:38

## 2024-07-01 RX ADMIN — NIFEDIPINE 60 MG: 30 TABLET, FILM COATED, EXTENDED RELEASE ORAL at 08:38

## 2024-07-01 RX ADMIN — ENOXAPARIN SODIUM 40 MG: 100 INJECTION SUBCUTANEOUS at 08:37

## 2024-07-01 RX ADMIN — HYDRALAZINE HYDROCHLORIDE 50 MG: 50 TABLET ORAL at 06:12

## 2024-07-01 RX ADMIN — TAMSULOSIN HYDROCHLORIDE 0.4 MG: 0.4 CAPSULE ORAL at 08:38

## 2024-07-01 RX ADMIN — CITALOPRAM HYDROBROMIDE 35 MG: 20 TABLET ORAL at 08:38

## 2024-07-01 RX ADMIN — Medication 10 MG: at 20:44

## 2024-07-01 RX ADMIN — OXYCODONE HYDROCHLORIDE AND ACETAMINOPHEN 1 TABLET: 5; 325 TABLET ORAL at 08:53

## 2024-07-01 RX ADMIN — OXYCODONE HYDROCHLORIDE AND ACETAMINOPHEN 1 TABLET: 5; 325 TABLET ORAL at 17:40

## 2024-07-01 RX ADMIN — Medication 100 MG: at 08:38

## 2024-07-01 RX ADMIN — HYDRALAZINE HYDROCHLORIDE 50 MG: 50 TABLET ORAL at 20:44

## 2024-07-01 ASSESSMENT — PAIN SCALES - WONG BAKER
WONGBAKER_NUMERICALRESPONSE: NO HURT

## 2024-07-01 ASSESSMENT — PAIN SCALES - GENERAL
PAINLEVEL_OUTOF10: 7
PAINLEVEL_OUTOF10: 6
PAINLEVEL_OUTOF10: 8

## 2024-07-01 ASSESSMENT — PAIN DESCRIPTION - FREQUENCY: FREQUENCY: INTERMITTENT

## 2024-07-01 ASSESSMENT — PAIN DESCRIPTION - LOCATION: LOCATION: KNEE

## 2024-07-01 ASSESSMENT — PAIN DESCRIPTION - ORIENTATION: ORIENTATION: RIGHT

## 2024-07-01 ASSESSMENT — PAIN DESCRIPTION - PAIN TYPE: TYPE: SURGICAL PAIN

## 2024-07-01 ASSESSMENT — PAIN DESCRIPTION - DESCRIPTORS: DESCRIPTORS: SHOOTING

## 2024-07-01 NOTE — ACP (ADVANCE CARE PLANNING)
Advance Care Planning     Advance Care Planning Inpatient Note  [facility] Department    Today's Date: 7/1/2024  Unit: [facility] C5 - MED SURG/ORTHO    Received request from IDT Member.  Upon review of chart and communication with care team, patient's decision making abilities are not in question.. Patient was/were present in the room during visit.    Goals of ACP Conversation:  Discuss advance care planning documents    Health Care Decision Makers:       Primary Decision Maker: khai abraham - [place] - [phone]  Summary:  Completed New Documents    Advance Care Planning Documents (Patient Wishes):  Healthcare Power of /Advance Directive Appointment of Health Care Agent     Assessment:  Documents completed IAW patient's guidance    Interventions:  Assisted in the completion of documents according to patient's wishes at this time    Care Preferences Communicated:   No    Outcomes/Plan:  ACP Discussion: Completed  New advance directive completed.    Electronically signed by ANNABELLE Burrows on 7/1/2024 at 9:19 AM

## 2024-07-01 NOTE — CARE COORDINATION
Chart reviewed day 5. Pt is followed by IM and Ortho. Planned surgery for R wrist on July 3rd. Pt w/a sitter and awaiting psych tx once pt is medically ready. Originally planned to d/c to [facility]; cert approved on 6/29. Pt from home alone. Will follow for needs as they arise. Electronically signed by VERONICA CARRASQUILLO RN on 7/1/2024 at 12:07 PM

## 2024-07-02 ENCOUNTER — ANESTHESIA EVENT (OUTPATIENT)
Dept: OPERATING ROOM | Age: 79
End: 2024-07-02
Payer: MEDICARE

## 2024-07-02 ENCOUNTER — APPOINTMENT (OUTPATIENT)
Dept: GENERAL RADIOLOGY | Age: 79
DRG: 511 | End: 2024-07-02
Payer: MEDICARE

## 2024-07-02 PROCEDURE — 6360000002 HC RX W HCPCS: Performed by: NURSE PRACTITIONER

## 2024-07-02 PROCEDURE — 97110 THERAPEUTIC EXERCISES: CPT

## 2024-07-02 PROCEDURE — 2580000003 HC RX 258: Performed by: FAMILY MEDICINE

## 2024-07-02 PROCEDURE — 97116 GAIT TRAINING THERAPY: CPT

## 2024-07-02 PROCEDURE — 6370000000 HC RX 637 (ALT 250 FOR IP): Performed by: NURSE PRACTITIONER

## 2024-07-02 PROCEDURE — 1200000000 HC SEMI PRIVATE

## 2024-07-02 PROCEDURE — 6370000000 HC RX 637 (ALT 250 FOR IP): Performed by: FAMILY MEDICINE

## 2024-07-02 PROCEDURE — 73560 X-RAY EXAM OF KNEE 1 OR 2: CPT

## 2024-07-02 PROCEDURE — 6370000000 HC RX 637 (ALT 250 FOR IP): Performed by: STUDENT IN AN ORGANIZED HEALTH CARE EDUCATION/TRAINING PROGRAM

## 2024-07-02 RX ORDER — SIMETHICONE 80 MG
80 TABLET,CHEWABLE ORAL EVERY 6 HOURS PRN
Status: DISCONTINUED | OUTPATIENT
Start: 2024-07-02 | End: 2024-07-08 | Stop reason: HOSPADM

## 2024-07-02 RX ADMIN — Medication 100 MG: at 08:17

## 2024-07-02 RX ADMIN — CITALOPRAM HYDROBROMIDE 35 MG: 20 TABLET ORAL at 08:17

## 2024-07-02 RX ADMIN — OXYCODONE HYDROCHLORIDE AND ACETAMINOPHEN 1 TABLET: 5; 325 TABLET ORAL at 11:46

## 2024-07-02 RX ADMIN — SIMETHICONE 80 MG: 80 TABLET, CHEWABLE ORAL at 22:07

## 2024-07-02 RX ADMIN — Medication 10 MG: at 21:57

## 2024-07-02 RX ADMIN — ENOXAPARIN SODIUM 40 MG: 100 INJECTION SUBCUTANEOUS at 08:17

## 2024-07-02 RX ADMIN — HYDRALAZINE HYDROCHLORIDE 50 MG: 50 TABLET ORAL at 21:57

## 2024-07-02 RX ADMIN — HYDRALAZINE HYDROCHLORIDE 50 MG: 50 TABLET ORAL at 15:16

## 2024-07-02 RX ADMIN — TAMSULOSIN HYDROCHLORIDE 0.4 MG: 0.4 CAPSULE ORAL at 08:17

## 2024-07-02 RX ADMIN — QUETIAPINE FUMARATE 25 MG: 25 TABLET ORAL at 21:57

## 2024-07-02 RX ADMIN — NIFEDIPINE 60 MG: 30 TABLET, FILM COATED, EXTENDED RELEASE ORAL at 08:17

## 2024-07-02 RX ADMIN — FLUTICASONE PROPIONATE 1 SPRAY: 50 SPRAY, METERED NASAL at 08:20

## 2024-07-02 RX ADMIN — SODIUM CHLORIDE, PRESERVATIVE FREE 10 ML: 5 INJECTION INTRAVENOUS at 08:23

## 2024-07-02 RX ADMIN — ATORVASTATIN CALCIUM 80 MG: 80 TABLET, FILM COATED ORAL at 08:17

## 2024-07-02 RX ADMIN — OXYCODONE HYDROCHLORIDE AND ACETAMINOPHEN 1 TABLET: 5; 325 TABLET ORAL at 21:57

## 2024-07-02 RX ADMIN — FLUOXETINE HYDROCHLORIDE 20 MG: 20 CAPSULE ORAL at 08:17

## 2024-07-02 RX ADMIN — PANTOPRAZOLE SODIUM 40 MG: 40 TABLET, DELAYED RELEASE ORAL at 08:17

## 2024-07-02 RX ADMIN — HYDRALAZINE HYDROCHLORIDE 50 MG: 50 TABLET ORAL at 05:55

## 2024-07-02 RX ADMIN — SODIUM CHLORIDE, PRESERVATIVE FREE 10 ML: 5 INJECTION INTRAVENOUS at 08:17

## 2024-07-02 RX ADMIN — SODIUM CHLORIDE, PRESERVATIVE FREE 10 ML: 5 INJECTION INTRAVENOUS at 21:57

## 2024-07-02 ASSESSMENT — PAIN DESCRIPTION - LOCATION
LOCATION: KNEE
LOCATION: KNEE
LOCATION: WRIST;KNEE
LOCATION: KNEE;WRIST
LOCATION_2: WRIST

## 2024-07-02 ASSESSMENT — PAIN DESCRIPTION - DESCRIPTORS
DESCRIPTORS: BURNING;ACHING;STABBING
DESCRIPTORS: DISCOMFORT;THROBBING

## 2024-07-02 ASSESSMENT — PAIN DESCRIPTION - ORIENTATION
ORIENTATION_2: RIGHT
ORIENTATION: RIGHT

## 2024-07-02 ASSESSMENT — PAIN SCALES - GENERAL
PAINLEVEL_OUTOF10: 5
PAINLEVEL_OUTOF10: 8
PAINLEVEL_OUTOF10: 4
PAINLEVEL_OUTOF10: 6

## 2024-07-02 ASSESSMENT — PAIN DESCRIPTION - FREQUENCY: FREQUENCY: INTERMITTENT

## 2024-07-02 ASSESSMENT — PAIN SCALES - WONG BAKER: WONGBAKER_NUMERICALRESPONSE: NO HURT

## 2024-07-02 ASSESSMENT — PAIN DESCRIPTION - PAIN TYPE
TYPE: ACUTE PAIN;SURGICAL PAIN
TYPE: SURGICAL PAIN
TYPE: SURGICAL PAIN

## 2024-07-02 ASSESSMENT — PAIN - FUNCTIONAL ASSESSMENT: PAIN_FUNCTIONAL_ASSESSMENT: PREVENTS OR INTERFERES WITH MANY ACTIVE NOT PASSIVE ACTIVITIES

## 2024-07-02 ASSESSMENT — PAIN DESCRIPTION - INTENSITY: RATING_2: 6

## 2024-07-02 NOTE — CARE COORDINATION
Chart reviewed day 6. Pt is followed by IM and Ortho. Plan for ORIF R Wrist on 7/3. Pt also awaiting placement for INPT Psych once medically stable. [facility] had accepted prior to Psych need. Pt from home alone w/little family support. Will follow for needs as they arise. Electronically signed by VERONICA CARRASQUILLO RN on 7/2/2024 at 9:42 AM

## 2024-07-03 ENCOUNTER — APPOINTMENT (OUTPATIENT)
Dept: GENERAL RADIOLOGY | Age: 79
DRG: 511 | End: 2024-07-03
Payer: MEDICARE

## 2024-07-03 ENCOUNTER — ANESTHESIA (OUTPATIENT)
Dept: OPERATING ROOM | Age: 79
End: 2024-07-03
Payer: MEDICARE

## 2024-07-03 LAB
ANION GAP SERPL CALCULATED.3IONS-SCNC: 9 MMOL/L (ref 3–16)
BASOPHILS # BLD: 0 K/UL (ref 0–0.2)
BASOPHILS NFR BLD: 0.5 %
BUN SERPL-MCNC: 18 MG/DL (ref 7–20)
CALCIUM SERPL-MCNC: 8.6 MG/DL (ref 8.3–10.6)
CHLORIDE SERPL-SCNC: 99 MMOL/L (ref 99–110)
CO2 SERPL-SCNC: 25 MMOL/L (ref 21–32)
CREAT SERPL-MCNC: 0.7 MG/DL (ref 0.8–1.3)
DEPRECATED RDW RBC AUTO: 13.5 % (ref 12.4–15.4)
EOSINOPHIL # BLD: 0.2 K/UL (ref 0–0.6)
EOSINOPHIL NFR BLD: 2.9 %
GFR SERPLBLD CREATININE-BSD FMLA CKD-EPI: >90 ML/MIN/{1.73_M2}
GLUCOSE SERPL-MCNC: 115 MG/DL (ref 70–99)
HCT VFR BLD AUTO: 29.4 % (ref 40.5–52.5)
HGB BLD-MCNC: 10.1 G/DL (ref 13.5–17.5)
INR PPP: 1.11 (ref 0.85–1.15)
LYMPHOCYTES # BLD: 0.9 K/UL (ref 1–5.1)
LYMPHOCYTES NFR BLD: 11.8 %
MCH RBC QN AUTO: 32.7 PG (ref 26–34)
MCHC RBC AUTO-ENTMCNC: 34.3 G/DL (ref 31–36)
MCV RBC AUTO: 95.3 FL (ref 80–100)
MONOCYTES # BLD: 0.9 K/UL (ref 0–1.3)
MONOCYTES NFR BLD: 12 %
NEUTROPHILS # BLD: 5.5 K/UL (ref 1.7–7.7)
NEUTROPHILS NFR BLD: 72.8 %
PLATELET # BLD AUTO: 344 K/UL (ref 135–450)
PMV BLD AUTO: 7 FL (ref 5–10.5)
POTASSIUM SERPL-SCNC: 3.9 MMOL/L (ref 3.5–5.1)
PROTHROMBIN TIME: 14.5 SEC (ref 11.9–14.9)
RBC # BLD AUTO: 3.08 M/UL (ref 4.2–5.9)
SODIUM SERPL-SCNC: 133 MMOL/L (ref 136–145)
WBC # BLD AUTO: 7.5 K/UL (ref 4–11)

## 2024-07-03 PROCEDURE — 2720000010 HC SURG SUPPLY STERILE: Performed by: ORTHOPAEDIC SURGERY

## 2024-07-03 PROCEDURE — 85610 PROTHROMBIN TIME: CPT

## 2024-07-03 PROCEDURE — 6360000002 HC RX W HCPCS: Performed by: ANESTHESIOLOGY

## 2024-07-03 PROCEDURE — 6370000000 HC RX 637 (ALT 250 FOR IP): Performed by: ORTHOPAEDIC SURGERY

## 2024-07-03 PROCEDURE — 3700000000 HC ANESTHESIA ATTENDED CARE: Performed by: ORTHOPAEDIC SURGERY

## 2024-07-03 PROCEDURE — 3600000014 HC SURGERY LEVEL 4 ADDTL 15MIN: Performed by: ORTHOPAEDIC SURGERY

## 2024-07-03 PROCEDURE — 7100000001 HC PACU RECOVERY - ADDTL 15 MIN: Performed by: ORTHOPAEDIC SURGERY

## 2024-07-03 PROCEDURE — 2580000003 HC RX 258: Performed by: NURSE ANESTHETIST, CERTIFIED REGISTERED

## 2024-07-03 PROCEDURE — 2709999900 HC NON-CHARGEABLE SUPPLY: Performed by: ORTHOPAEDIC SURGERY

## 2024-07-03 PROCEDURE — 36415 COLL VENOUS BLD VENIPUNCTURE: CPT

## 2024-07-03 PROCEDURE — 3600000004 HC SURGERY LEVEL 4 BASE: Performed by: ORTHOPAEDIC SURGERY

## 2024-07-03 PROCEDURE — 6360000002 HC RX W HCPCS: Performed by: ORTHOPAEDIC SURGERY

## 2024-07-03 PROCEDURE — 3700000001 HC ADD 15 MINUTES (ANESTHESIA): Performed by: ORTHOPAEDIC SURGERY

## 2024-07-03 PROCEDURE — C1713 ANCHOR/SCREW BN/BN,TIS/BN: HCPCS | Performed by: ORTHOPAEDIC SURGERY

## 2024-07-03 PROCEDURE — 01N50ZZ RELEASE MEDIAN NERVE, OPEN APPROACH: ICD-10-PCS | Performed by: ORTHOPAEDIC SURGERY

## 2024-07-03 PROCEDURE — 1200000000 HC SEMI PRIVATE

## 2024-07-03 PROCEDURE — 0PSH04Z REPOSITION RIGHT RADIUS WITH INTERNAL FIXATION DEVICE, OPEN APPROACH: ICD-10-PCS | Performed by: ORTHOPAEDIC SURGERY

## 2024-07-03 PROCEDURE — 6360000002 HC RX W HCPCS: Performed by: NURSE ANESTHETIST, CERTIFIED REGISTERED

## 2024-07-03 PROCEDURE — 2500000003 HC RX 250 WO HCPCS: Performed by: NURSE ANESTHETIST, CERTIFIED REGISTERED

## 2024-07-03 PROCEDURE — 80048 BASIC METABOLIC PNL TOTAL CA: CPT

## 2024-07-03 PROCEDURE — 2700000000 HC OXYGEN THERAPY PER DAY

## 2024-07-03 PROCEDURE — 7100000011 HC PHASE II RECOVERY - ADDTL 15 MIN: Performed by: ORTHOPAEDIC SURGERY

## 2024-07-03 PROCEDURE — 6370000000 HC RX 637 (ALT 250 FOR IP): Performed by: NURSE PRACTITIONER

## 2024-07-03 PROCEDURE — 2580000003 HC RX 258: Performed by: ANESTHESIOLOGY

## 2024-07-03 PROCEDURE — 85025 COMPLETE CBC W/AUTO DIFF WBC: CPT

## 2024-07-03 PROCEDURE — 2580000003 HC RX 258: Performed by: ORTHOPAEDIC SURGERY

## 2024-07-03 PROCEDURE — 7100000000 HC PACU RECOVERY - FIRST 15 MIN: Performed by: ORTHOPAEDIC SURGERY

## 2024-07-03 PROCEDURE — 6370000000 HC RX 637 (ALT 250 FOR IP): Performed by: ANESTHESIOLOGY

## 2024-07-03 PROCEDURE — 73100 X-RAY EXAM OF WRIST: CPT

## 2024-07-03 PROCEDURE — 7100000010 HC PHASE II RECOVERY - FIRST 15 MIN: Performed by: ORTHOPAEDIC SURGERY

## 2024-07-03 PROCEDURE — A4217 STERILE WATER/SALINE, 500 ML: HCPCS | Performed by: ORTHOPAEDIC SURGERY

## 2024-07-03 PROCEDURE — 94761 N-INVAS EAR/PLS OXIMETRY MLT: CPT

## 2024-07-03 DEVICE — SCREW BNE L18MM DIA2.7MM DST VOLAR RAD MULTDIR FULL THRD SQ: Type: IMPLANTABLE DEVICE | Site: WRIST | Status: FUNCTIONAL

## 2024-07-03 DEVICE — PLATE BONE W24XL51MM 12 HOLE RIGHT DST VOLAR RDL WRIST STNDR: Type: IMPLANTABLE DEVICE | Site: WRIST | Status: FUNCTIONAL

## 2024-07-03 DEVICE — SCREW BNE L13MM DIA2.7MM DST RAD NONLOCKING FULL THRD SQ: Type: IMPLANTABLE DEVICE | Site: WRIST | Status: FUNCTIONAL

## 2024-07-03 DEVICE — SCREW BNE L20MM DIA2.7MM DST RAD LOK FULL THRD SQ DRV HD LO: Type: IMPLANTABLE DEVICE | Site: WRIST | Status: FUNCTIONAL

## 2024-07-03 DEVICE — SCREW BNE L24MM DIA2.7MM DST RAD LOK FULL THRD SQ DRV HD LO: Type: IMPLANTABLE DEVICE | Site: WRIST | Status: FUNCTIONAL

## 2024-07-03 DEVICE — SCREW BNE L18MM DIA2.7MM DST VOLAR RAD NONLOCKING FULL THRD: Type: IMPLANTABLE DEVICE | Site: WRIST | Status: FUNCTIONAL

## 2024-07-03 DEVICE — SCREW BONE L22MM DIA2.7MM DSTL VOLAR RAD MULTDIR FULL THRD: Type: IMPLANTABLE DEVICE | Site: WRIST | Status: FUNCTIONAL

## 2024-07-03 DEVICE — SCREW BNE L22MM DIA2.7MM DST RAD LOK FULL THRD SQ DRV HD LO: Type: IMPLANTABLE DEVICE | Site: WRIST | Status: FUNCTIONAL

## 2024-07-03 RX ORDER — OXYCODONE HYDROCHLORIDE 5 MG/1
5 TABLET ORAL
Status: COMPLETED | OUTPATIENT
Start: 2024-07-03 | End: 2024-07-03

## 2024-07-03 RX ORDER — SODIUM CHLORIDE, SODIUM LACTATE, POTASSIUM CHLORIDE, CALCIUM CHLORIDE 600; 310; 30; 20 MG/100ML; MG/100ML; MG/100ML; MG/100ML
INJECTION, SOLUTION INTRAVENOUS CONTINUOUS PRN
Status: DISCONTINUED | OUTPATIENT
Start: 2024-07-03 | End: 2024-07-04 | Stop reason: SDUPTHER

## 2024-07-03 RX ORDER — NALOXONE HYDROCHLORIDE 0.4 MG/ML
INJECTION, SOLUTION INTRAMUSCULAR; INTRAVENOUS; SUBCUTANEOUS PRN
Status: DISCONTINUED | OUTPATIENT
Start: 2024-07-03 | End: 2024-07-03 | Stop reason: HOSPADM

## 2024-07-03 RX ORDER — SODIUM CHLORIDE 0.9 % (FLUSH) 0.9 %
5-40 SYRINGE (ML) INJECTION PRN
Status: DISCONTINUED | OUTPATIENT
Start: 2024-07-03 | End: 2024-07-03 | Stop reason: HOSPADM

## 2024-07-03 RX ORDER — ENOXAPARIN SODIUM 100 MG/ML
30 INJECTION SUBCUTANEOUS 2 TIMES DAILY
Status: DISCONTINUED | OUTPATIENT
Start: 2024-07-03 | End: 2024-07-08 | Stop reason: HOSPADM

## 2024-07-03 RX ORDER — ONDANSETRON 2 MG/ML
4 INJECTION INTRAMUSCULAR; INTRAVENOUS EVERY 6 HOURS PRN
Status: DISCONTINUED | OUTPATIENT
Start: 2024-07-03 | End: 2024-07-08 | Stop reason: HOSPADM

## 2024-07-03 RX ORDER — BUPIVACAINE HYDROCHLORIDE 5 MG/ML
INJECTION, SOLUTION EPIDURAL; INTRACAUDAL PRN
Status: DISCONTINUED | OUTPATIENT
Start: 2024-07-03 | End: 2024-07-03 | Stop reason: ALTCHOICE

## 2024-07-03 RX ORDER — SODIUM CHLORIDE 9 MG/ML
INJECTION, SOLUTION INTRAVENOUS PRN
Status: DISCONTINUED | OUTPATIENT
Start: 2024-07-03 | End: 2024-07-08 | Stop reason: HOSPADM

## 2024-07-03 RX ORDER — BISACODYL 5 MG/1
5 TABLET, DELAYED RELEASE ORAL DAILY PRN
Status: DISCONTINUED | OUTPATIENT
Start: 2024-07-03 | End: 2024-07-08 | Stop reason: HOSPADM

## 2024-07-03 RX ORDER — PROPOFOL 10 MG/ML
INJECTION, EMULSION INTRAVENOUS PRN
Status: DISCONTINUED | OUTPATIENT
Start: 2024-07-03 | End: 2024-07-04 | Stop reason: SDUPTHER

## 2024-07-03 RX ORDER — SODIUM CHLORIDE 0.9 % (FLUSH) 0.9 %
5-40 SYRINGE (ML) INJECTION PRN
Status: DISCONTINUED | OUTPATIENT
Start: 2024-07-03 | End: 2024-07-08 | Stop reason: HOSPADM

## 2024-07-03 RX ORDER — MAGNESIUM HYDROXIDE 1200 MG/15ML
LIQUID ORAL CONTINUOUS PRN
Status: COMPLETED | OUTPATIENT
Start: 2024-07-03 | End: 2024-07-03

## 2024-07-03 RX ORDER — SODIUM CHLORIDE 0.9 % (FLUSH) 0.9 %
5-40 SYRINGE (ML) INJECTION EVERY 12 HOURS SCHEDULED
Status: DISCONTINUED | OUTPATIENT
Start: 2024-07-03 | End: 2024-07-08 | Stop reason: HOSPADM

## 2024-07-03 RX ORDER — LORAZEPAM 2 MG/ML
0.5 INJECTION INTRAMUSCULAR
Status: DISCONTINUED | OUTPATIENT
Start: 2024-07-03 | End: 2024-07-03 | Stop reason: HOSPADM

## 2024-07-03 RX ORDER — OXYCODONE HYDROCHLORIDE 5 MG/1
10 TABLET ORAL EVERY 4 HOURS PRN
Status: DISCONTINUED | OUTPATIENT
Start: 2024-07-03 | End: 2024-07-08 | Stop reason: HOSPADM

## 2024-07-03 RX ORDER — ONDANSETRON 2 MG/ML
INJECTION INTRAMUSCULAR; INTRAVENOUS PRN
Status: DISCONTINUED | OUTPATIENT
Start: 2024-07-03 | End: 2024-07-04 | Stop reason: SDUPTHER

## 2024-07-03 RX ORDER — MORPHINE SULFATE 4 MG/ML
4 INJECTION, SOLUTION INTRAMUSCULAR; INTRAVENOUS
Status: DISCONTINUED | OUTPATIENT
Start: 2024-07-03 | End: 2024-07-08 | Stop reason: HOSPADM

## 2024-07-03 RX ORDER — SODIUM CHLORIDE 9 MG/ML
INJECTION, SOLUTION INTRAVENOUS PRN
Status: DISCONTINUED | OUTPATIENT
Start: 2024-07-03 | End: 2024-07-03 | Stop reason: HOSPADM

## 2024-07-03 RX ORDER — ONDANSETRON 2 MG/ML
4 INJECTION INTRAMUSCULAR; INTRAVENOUS
Status: DISCONTINUED | OUTPATIENT
Start: 2024-07-03 | End: 2024-07-03 | Stop reason: HOSPADM

## 2024-07-03 RX ORDER — DIPHENHYDRAMINE HYDROCHLORIDE 50 MG/ML
12.5 INJECTION INTRAMUSCULAR; INTRAVENOUS
Status: DISCONTINUED | OUTPATIENT
Start: 2024-07-03 | End: 2024-07-03 | Stop reason: HOSPADM

## 2024-07-03 RX ORDER — ONDANSETRON 4 MG/1
4 TABLET, ORALLY DISINTEGRATING ORAL EVERY 8 HOURS PRN
Status: DISCONTINUED | OUTPATIENT
Start: 2024-07-03 | End: 2024-07-08 | Stop reason: HOSPADM

## 2024-07-03 RX ORDER — PROCHLORPERAZINE EDISYLATE 5 MG/ML
5 INJECTION INTRAMUSCULAR; INTRAVENOUS
Status: DISCONTINUED | OUTPATIENT
Start: 2024-07-03 | End: 2024-07-03 | Stop reason: HOSPADM

## 2024-07-03 RX ORDER — MORPHINE SULFATE 2 MG/ML
2 INJECTION, SOLUTION INTRAMUSCULAR; INTRAVENOUS
Status: DISCONTINUED | OUTPATIENT
Start: 2024-07-03 | End: 2024-07-08 | Stop reason: HOSPADM

## 2024-07-03 RX ORDER — MEPERIDINE HYDROCHLORIDE 50 MG/ML
12.5 INJECTION INTRAMUSCULAR; INTRAVENOUS; SUBCUTANEOUS EVERY 5 MIN PRN
Status: DISCONTINUED | OUTPATIENT
Start: 2024-07-03 | End: 2024-07-03 | Stop reason: HOSPADM

## 2024-07-03 RX ORDER — ACETAMINOPHEN 325 MG/1
650 TABLET ORAL EVERY 4 HOURS PRN
Status: DISCONTINUED | OUTPATIENT
Start: 2024-07-03 | End: 2024-07-08 | Stop reason: HOSPADM

## 2024-07-03 RX ORDER — SODIUM CHLORIDE 0.9 % (FLUSH) 0.9 %
5-40 SYRINGE (ML) INJECTION EVERY 12 HOURS SCHEDULED
Status: DISCONTINUED | OUTPATIENT
Start: 2024-07-03 | End: 2024-07-03 | Stop reason: HOSPADM

## 2024-07-03 RX ORDER — ROCURONIUM BROMIDE 10 MG/ML
INJECTION, SOLUTION INTRAVENOUS PRN
Status: DISCONTINUED | OUTPATIENT
Start: 2024-07-03 | End: 2024-07-04 | Stop reason: SDUPTHER

## 2024-07-03 RX ORDER — SODIUM CHLORIDE, SODIUM LACTATE, POTASSIUM CHLORIDE, CALCIUM CHLORIDE 600; 310; 30; 20 MG/100ML; MG/100ML; MG/100ML; MG/100ML
INJECTION, SOLUTION INTRAVENOUS CONTINUOUS
Status: DISCONTINUED | OUTPATIENT
Start: 2024-07-03 | End: 2024-07-03 | Stop reason: HOSPADM

## 2024-07-03 RX ORDER — SODIUM CHLORIDE 9 MG/ML
INJECTION, SOLUTION INTRAVENOUS CONTINUOUS
Status: DISCONTINUED | OUTPATIENT
Start: 2024-07-03 | End: 2024-07-08 | Stop reason: HOSPADM

## 2024-07-03 RX ORDER — LABETALOL HYDROCHLORIDE 5 MG/ML
10 INJECTION, SOLUTION INTRAVENOUS
Status: DISCONTINUED | OUTPATIENT
Start: 2024-07-03 | End: 2024-07-03 | Stop reason: HOSPADM

## 2024-07-03 RX ORDER — OXYCODONE HYDROCHLORIDE 5 MG/1
5 TABLET ORAL EVERY 4 HOURS PRN
Status: DISCONTINUED | OUTPATIENT
Start: 2024-07-03 | End: 2024-07-08 | Stop reason: HOSPADM

## 2024-07-03 RX ORDER — CEFAZOLIN SODIUM IN 0.9 % NACL 2 G/100 ML
2000 PLASTIC BAG, INJECTION (ML) INTRAVENOUS EVERY 8 HOURS
Status: COMPLETED | OUTPATIENT
Start: 2024-07-03 | End: 2024-07-04

## 2024-07-03 RX ORDER — KETAMINE HCL IN NACL, ISO-OSM 100MG/10ML
SYRINGE (ML) INJECTION PRN
Status: DISCONTINUED | OUTPATIENT
Start: 2024-07-03 | End: 2024-07-04 | Stop reason: SDUPTHER

## 2024-07-03 RX ORDER — FENTANYL CITRATE 50 UG/ML
INJECTION, SOLUTION INTRAMUSCULAR; INTRAVENOUS PRN
Status: DISCONTINUED | OUTPATIENT
Start: 2024-07-03 | End: 2024-07-04 | Stop reason: SDUPTHER

## 2024-07-03 RX ADMIN — HYDRALAZINE HYDROCHLORIDE 50 MG: 50 TABLET ORAL at 22:01

## 2024-07-03 RX ADMIN — Medication 2000 MG: at 18:14

## 2024-07-03 RX ADMIN — PROPOFOL 100 MG: 10 INJECTION, EMULSION INTRAVENOUS at 13:10

## 2024-07-03 RX ADMIN — MORPHINE SULFATE 4 MG: 4 INJECTION, SOLUTION INTRAMUSCULAR; INTRAVENOUS at 18:04

## 2024-07-03 RX ADMIN — ONDANSETRON 4 MG: 2 INJECTION INTRAMUSCULAR; INTRAVENOUS at 13:45

## 2024-07-03 RX ADMIN — QUETIAPINE FUMARATE 25 MG: 25 TABLET ORAL at 22:01

## 2024-07-03 RX ADMIN — SODIUM CHLORIDE, POTASSIUM CHLORIDE, SODIUM LACTATE AND CALCIUM CHLORIDE: 600; 310; 30; 20 INJECTION, SOLUTION INTRAVENOUS at 12:15

## 2024-07-03 RX ADMIN — Medication 10 MG: at 22:01

## 2024-07-03 RX ADMIN — SODIUM CHLORIDE: 9 INJECTION, SOLUTION INTRAVENOUS at 18:13

## 2024-07-03 RX ADMIN — FENTANYL CITRATE 100 MCG: 50 INJECTION, SOLUTION INTRAMUSCULAR; INTRAVENOUS at 13:10

## 2024-07-03 RX ADMIN — Medication 10 MG: at 13:48

## 2024-07-03 RX ADMIN — HYDROMORPHONE HYDROCHLORIDE 0.5 MG: 1 INJECTION, SOLUTION INTRAMUSCULAR; INTRAVENOUS; SUBCUTANEOUS at 15:12

## 2024-07-03 RX ADMIN — HYDRALAZINE HYDROCHLORIDE 50 MG: 50 TABLET ORAL at 06:27

## 2024-07-03 RX ADMIN — OXYCODONE 10 MG: 5 TABLET ORAL at 19:49

## 2024-07-03 RX ADMIN — MORPHINE SULFATE 4 MG: 4 INJECTION, SOLUTION INTRAMUSCULAR; INTRAVENOUS at 21:59

## 2024-07-03 RX ADMIN — ROCURONIUM BROMIDE 50 MG: 50 INJECTION, SOLUTION INTRAVENOUS at 13:11

## 2024-07-03 RX ADMIN — Medication 2 AMPULE: at 12:56

## 2024-07-03 RX ADMIN — Medication 20 MG: at 13:27

## 2024-07-03 RX ADMIN — SODIUM CHLORIDE, SODIUM LACTATE, POTASSIUM CHLORIDE, AND CALCIUM CHLORIDE: .6; .31; .03; .02 INJECTION, SOLUTION INTRAVENOUS at 13:00

## 2024-07-03 RX ADMIN — HYDROMORPHONE HYDROCHLORIDE 0.5 MG: 1 INJECTION, SOLUTION INTRAMUSCULAR; INTRAVENOUS; SUBCUTANEOUS at 15:21

## 2024-07-03 RX ADMIN — OXYCODONE HYDROCHLORIDE 5 MG: 5 TABLET ORAL at 15:16

## 2024-07-03 ASSESSMENT — PAIN DESCRIPTION - DESCRIPTORS
DESCRIPTORS: THROBBING
DESCRIPTORS: DISCOMFORT
DESCRIPTORS: THROBBING

## 2024-07-03 ASSESSMENT — PAIN DESCRIPTION - INTENSITY
RATING_2: 4
RATING_2: 0

## 2024-07-03 ASSESSMENT — PAIN DESCRIPTION - LOCATION
LOCATION: WRIST
LOCATION_2: ARM
LOCATION: WRIST

## 2024-07-03 ASSESSMENT — PAIN SCALES - GENERAL
PAINLEVEL_OUTOF10: 9
PAINLEVEL_OUTOF10: 10
PAINLEVEL_OUTOF10: 10
PAINLEVEL_OUTOF10: 0
PAINLEVEL_OUTOF10: 9
PAINLEVEL_OUTOF10: 10
PAINLEVEL_OUTOF10: 8
PAINLEVEL_OUTOF10: 4

## 2024-07-03 ASSESSMENT — PAIN DESCRIPTION - ORIENTATION
ORIENTATION: RIGHT
ORIENTATION_2: RIGHT

## 2024-07-03 ASSESSMENT — PAIN - FUNCTIONAL ASSESSMENT
PAIN_FUNCTIONAL_ASSESSMENT: ACTIVITIES ARE NOT PREVENTED
PAIN_FUNCTIONAL_ASSESSMENT: 0-10
PAIN_FUNCTIONAL_ASSESSMENT: PREVENTS OR INTERFERES SOME ACTIVE ACTIVITIES AND ADLS

## 2024-07-03 ASSESSMENT — PAIN SCALES - WONG BAKER
WONGBAKER_NUMERICALRESPONSE: NO HURT
WONGBAKER_NUMERICALRESPONSE: HURTS LITTLE MORE

## 2024-07-03 NOTE — CARE COORDINATION
Chart reviewed day 7. Pt is followed by IM and Ortho. Plan for ORIF R Wrist today. Pt also awaiting placement for INPT Psych once medically stable. [facility] had accepted prior to Psych need; reviewing now. Pt from home alone w/little family support. Will follow for needs as they arise. Electronically signed by VERONICA CARRASQUILLO RN on 7/3/2024 at 1:09 PM

## 2024-07-03 NOTE — ANESTHESIA PRE PROCEDURE
Amlodipine Hives     Severe hives  Patient tolerates nicardipine    Lisinopril Swelling     Face swelled/arms and legs swelled       Problem List:    Patient Active Problem List   Diagnosis Code    Low back pain M54.50    Dyslipidemia E78.5    HTN (hypertension), benign I10    Unstable angina (HCC) I20.0    Chest pain R07.9    ED (erectile dysfunction) N52.9    Angioedema T78.3XXA    Pre-syncope R55    Bradycardia R00.1    Carotid artery occlusion without infarction, unspecified laterality I65.29    Symptomatic bradycardia R00.1    TIA (transient ischemic attack) G45.9    Fatigue R53.83    Right calf pain M79.661    Hypertensive urgency I16.0    Dizziness R42    Major depressive disorder, recurrent, mild F33.0    Major depressive disorder, recurrent, moderate F33.1    Major depressive disorder, recurrent, unspecified F33.9    History of stroke Z86.73    Right wrist fracture, closed, initial encounter S62.101A    Acute carpal tunnel syndrome of right wrist G56.01       Past Medical History:        Diagnosis Date    Acute renal failure (ARF) ([facility]) 04/12/2022    Acute renal failure superimposed on stage 3 chronic kidney disease (HCC) 04/12/2022    Anxiety 2010    Bradycardia     Carotid artery occlusion without infarction, unspecified laterality 05/30/2021    Cerebral artery occlusion with cerebral infarction (HCC)     ED (erectile dysfunction)     GERD (gastroesophageal reflux disease) 1992    Hypercholesteremia     Hypertension     Low back pain 07/07/2014    Major depressive disorder, recurrent, mild 05/05/2023    Osteoarthritis 2010    Strabismus     TIA (transient ischemic attack) 04/12/2022    TIA (transient ischemic attack)     TIA (transient ischemic attack)        Past Surgical History:        Procedure Laterality Date    CAROTID ENDARTERECTOMY Right 06/07/2021    RIGHT SIDED CAROTID ENDARTERECTOMY performed by Merritt Arias MD at [facility] OR    COLONOSCOPY  2011    CYST REMOVAL      ESOPHAGOGASTRIC

## 2024-07-03 NOTE — OP NOTE
Procedure Note      Jaden Gauthier  07/03/2024    Pre-operative Diagnosis:Right Distal radius fracture with acute carpal tunnel syndrome    Post-operative Diagnosis: same    Procedure:  1. Open reduction internal fixation of Right Distal radius fracture with 3 intra-articular fragments,     2. Right carpal tunnel release     3. Intraoperative interpretation of 3 views of Right wrist    Surgeon: MARSHALL CLANCY MD    Anesthesia:  General    Complications:  None    EBL: less than 50cc    INDICATIONS FOR PROCEDURE: The patient has an unstable Right distal radius fracture and acute carpal tunnel syndrome.  The patient understands the relevant risks, benefits, limitations, alternatives, and healing process of the procedure.     PROCEDURE: The patient was given IV antibiotics, and brought to the operating room and anesthetized with general anesthesia.  The identified and marked extremity was then prepped and draped in a standard fashion.  A well-padded tourniquet was applied to the upper arm.  The arm was exsanguinated and the tourniquet elevated to 250 mmHg.     A standard incision was made over palmar aspect of the affected hand.  Dissection carried through skin, subcutaneous tissue, and palmar fascia.  The transverse carpal ligament was identified and incised proximally.  A groove director was inserted and the distal ligament released.  Under direct visualization the proximal ligament was released.  The contents of the carpal tunnel were inspected, and the median nerve was found to have clear signs of acute pressure and erythema but was overall intact.    A separate volar radial incision was the made over the wrist.  Dissection carried through skin, subcutaneous tissue, and the FCR sheath.  The pronator quadratus was elevated off the distal radius and the fracture fragments identified.  Via traction, distraction, and manual reduction, the volar and intra-articular alignment was reduced. A [employer] Crosslock volar

## 2024-07-04 PROCEDURE — 6370000000 HC RX 637 (ALT 250 FOR IP): Performed by: ORTHOPAEDIC SURGERY

## 2024-07-04 PROCEDURE — 97166 OT EVAL MOD COMPLEX 45 MIN: CPT

## 2024-07-04 PROCEDURE — 6360000002 HC RX W HCPCS: Performed by: NURSE PRACTITIONER

## 2024-07-04 PROCEDURE — 97116 GAIT TRAINING THERAPY: CPT

## 2024-07-04 PROCEDURE — 1200000000 HC SEMI PRIVATE

## 2024-07-04 PROCEDURE — 97530 THERAPEUTIC ACTIVITIES: CPT

## 2024-07-04 PROCEDURE — 97162 PT EVAL MOD COMPLEX 30 MIN: CPT

## 2024-07-04 PROCEDURE — 97168 OT RE-EVAL EST PLAN CARE: CPT

## 2024-07-04 PROCEDURE — 6360000002 HC RX W HCPCS: Performed by: ORTHOPAEDIC SURGERY

## 2024-07-04 PROCEDURE — 2580000003 HC RX 258: Performed by: ORTHOPAEDIC SURGERY

## 2024-07-04 RX ORDER — HYDRALAZINE HYDROCHLORIDE 20 MG/ML
10 INJECTION INTRAMUSCULAR; INTRAVENOUS ONCE
Status: COMPLETED | OUTPATIENT
Start: 2024-07-04 | End: 2024-07-04

## 2024-07-04 RX ORDER — LABETALOL HYDROCHLORIDE 5 MG/ML
10 INJECTION, SOLUTION INTRAVENOUS ONCE
Status: DISCONTINUED | OUTPATIENT
Start: 2024-07-04 | End: 2024-07-04

## 2024-07-04 RX ORDER — NIFEDIPINE 30 MG/1
90 TABLET, EXTENDED RELEASE ORAL DAILY
Status: DISCONTINUED | OUTPATIENT
Start: 2024-07-05 | End: 2024-07-08 | Stop reason: HOSPADM

## 2024-07-04 RX ADMIN — MORPHINE SULFATE 4 MG: 4 INJECTION, SOLUTION INTRAMUSCULAR; INTRAVENOUS at 11:13

## 2024-07-04 RX ADMIN — MORPHINE SULFATE 4 MG: 4 INJECTION, SOLUTION INTRAMUSCULAR; INTRAVENOUS at 08:32

## 2024-07-04 RX ADMIN — FLUTICASONE PROPIONATE 1 SPRAY: 50 SPRAY, METERED NASAL at 08:29

## 2024-07-04 RX ADMIN — HYDRALAZINE HYDROCHLORIDE 50 MG: 50 TABLET ORAL at 20:37

## 2024-07-04 RX ADMIN — SIMETHICONE 80 MG: 80 TABLET, CHEWABLE ORAL at 20:37

## 2024-07-04 RX ADMIN — OXYCODONE 10 MG: 5 TABLET ORAL at 17:56

## 2024-07-04 RX ADMIN — OXYCODONE 10 MG: 5 TABLET ORAL at 10:07

## 2024-07-04 RX ADMIN — QUETIAPINE FUMARATE 25 MG: 25 TABLET ORAL at 20:37

## 2024-07-04 RX ADMIN — NIFEDIPINE 60 MG: 30 TABLET, FILM COATED, EXTENDED RELEASE ORAL at 08:28

## 2024-07-04 RX ADMIN — ENOXAPARIN SODIUM 30 MG: 100 INJECTION SUBCUTANEOUS at 20:37

## 2024-07-04 RX ADMIN — Medication 100 MG: at 08:28

## 2024-07-04 RX ADMIN — MORPHINE SULFATE 4 MG: 4 INJECTION, SOLUTION INTRAMUSCULAR; INTRAVENOUS at 00:45

## 2024-07-04 RX ADMIN — TAMSULOSIN HYDROCHLORIDE 0.4 MG: 0.4 CAPSULE ORAL at 08:28

## 2024-07-04 RX ADMIN — OXYCODONE 10 MG: 5 TABLET ORAL at 01:45

## 2024-07-04 RX ADMIN — HYDRALAZINE HYDROCHLORIDE 10 MG: 20 INJECTION INTRAMUSCULAR; INTRAVENOUS at 01:19

## 2024-07-04 RX ADMIN — OXYCODONE 5 MG: 5 TABLET ORAL at 23:17

## 2024-07-04 RX ADMIN — HYDRALAZINE HYDROCHLORIDE 50 MG: 50 TABLET ORAL at 06:08

## 2024-07-04 RX ADMIN — ATORVASTATIN CALCIUM 80 MG: 80 TABLET, FILM COATED ORAL at 08:28

## 2024-07-04 RX ADMIN — Medication 2000 MG: at 01:20

## 2024-07-04 RX ADMIN — FLUOXETINE HYDROCHLORIDE 20 MG: 20 CAPSULE ORAL at 08:28

## 2024-07-04 RX ADMIN — SIMETHICONE 80 MG: 80 TABLET, CHEWABLE ORAL at 13:30

## 2024-07-04 RX ADMIN — CITALOPRAM HYDROBROMIDE 35 MG: 20 TABLET ORAL at 08:28

## 2024-07-04 RX ADMIN — HYDRALAZINE HYDROCHLORIDE 50 MG: 50 TABLET ORAL at 13:29

## 2024-07-04 RX ADMIN — Medication 10 MG: at 20:37

## 2024-07-04 RX ADMIN — OXYCODONE 10 MG: 5 TABLET ORAL at 06:08

## 2024-07-04 RX ADMIN — ENOXAPARIN SODIUM 30 MG: 100 INJECTION SUBCUTANEOUS at 08:28

## 2024-07-04 RX ADMIN — SODIUM CHLORIDE, PRESERVATIVE FREE 10 ML: 5 INJECTION INTRAVENOUS at 19:52

## 2024-07-04 RX ADMIN — PANTOPRAZOLE SODIUM 40 MG: 40 TABLET, DELAYED RELEASE ORAL at 08:28

## 2024-07-04 RX ADMIN — MORPHINE SULFATE 4 MG: 4 INJECTION, SOLUTION INTRAMUSCULAR; INTRAVENOUS at 16:52

## 2024-07-04 RX ADMIN — MORPHINE SULFATE 4 MG: 4 INJECTION, SOLUTION INTRAMUSCULAR; INTRAVENOUS at 13:29

## 2024-07-04 ASSESSMENT — PAIN SCALES - GENERAL
PAINLEVEL_OUTOF10: 7
PAINLEVEL_OUTOF10: 10
PAINLEVEL_OUTOF10: 5
PAINLEVEL_OUTOF10: 7
PAINLEVEL_OUTOF10: 3
PAINLEVEL_OUTOF10: 8
PAINLEVEL_OUTOF10: 8
PAINLEVEL_OUTOF10: 4
PAINLEVEL_OUTOF10: 9
PAINLEVEL_OUTOF10: 10
PAINLEVEL_OUTOF10: 8
PAINLEVEL_OUTOF10: 10
PAINLEVEL_OUTOF10: 10
PAINLEVEL_OUTOF10: 9

## 2024-07-04 ASSESSMENT — PAIN DESCRIPTION - DESCRIPTORS
DESCRIPTORS: ACHING
DESCRIPTORS: THROBBING

## 2024-07-04 ASSESSMENT — PAIN DESCRIPTION - PAIN TYPE: TYPE: ACUTE PAIN;SURGICAL PAIN

## 2024-07-04 ASSESSMENT — PAIN DESCRIPTION - FREQUENCY: FREQUENCY: INTERMITTENT

## 2024-07-04 ASSESSMENT — PAIN DESCRIPTION - ORIENTATION
ORIENTATION: RIGHT

## 2024-07-04 ASSESSMENT — PAIN DESCRIPTION - LOCATION
LOCATION: WRIST
LOCATION: WRIST
LOCATION: ARM
LOCATION: WRIST
LOCATION: WRIST

## 2024-07-04 ASSESSMENT — PAIN - FUNCTIONAL ASSESSMENT: PAIN_FUNCTIONAL_ASSESSMENT: ACTIVITIES ARE NOT PREVENTED

## 2024-07-04 NOTE — CARE COORDINATION
Social work consult dated 7/3 5:45pm noted. Nurse  is following Pt for dc planning since admission. Pt s/p ORIF 7/3. Plan is [facility] vs. Inpatient Psych when medically ready. Pt under suicide precautions, PCA at bedside.

## 2024-07-04 NOTE — ANESTHESIA POSTPROCEDURE EVALUATION
Department of Anesthesiology  Postprocedure Note    Patient: Jaden Gauthier  MRN: [id number]  YOB: 1945  Date of evaluation: 7/4/2024    Procedure Summary       Date: 07/03/24 Room / Location: [address]    Anesthesia Start: 1300 Anesthesia Stop: 1440    Procedure: OPEN REDUCTION INTERNAL FIXATION RIGHT DISTAL RADIUS FRACTURE, RIGHT CARPAL TUNNEL RELEASE (Right: Wrist) Diagnosis:       Closed fracture of distal end of right radius, unspecified fracture morphology, initial encounter      (Closed fracture of distal end of right radius, unspecified fracture morphology, initial encounter [S52.501A])    Surgeons: Elton Glass MD Responsible Provider: Luan Zamora MD    Anesthesia Type: general ASA Status: 3            Anesthesia Type: No value filed.    Fernando Phase I: Feranndo Score: 10    Fernando Phase II: Fernando Score: 10    Anesthesia Post Evaluation    Patient location during evaluation: PACU  Patient participation: complete - patient participated  Level of consciousness: awake and alert  Airway patency: patent  Nausea & Vomiting: no vomiting and no nausea  Cardiovascular status: hemodynamically stable and blood pressure returned to baseline  Respiratory status: acceptable  Hydration status: euvolemic  Comments: --------------------            07/04/24               0906     --------------------   BP:     (!) 181/80   Pulse:               Resp:                Temp:                SpO2:               --------------------    Pain management: adequate    No notable events documented.

## 2024-07-05 LAB
ANION GAP SERPL CALCULATED.3IONS-SCNC: 8 MMOL/L (ref 3–16)
BUN SERPL-MCNC: 11 MG/DL (ref 7–20)
CALCIUM SERPL-MCNC: 8.4 MG/DL (ref 8.3–10.6)
CHLORIDE SERPL-SCNC: 96 MMOL/L (ref 99–110)
CO2 SERPL-SCNC: 26 MMOL/L (ref 21–32)
CREAT SERPL-MCNC: 0.6 MG/DL (ref 0.8–1.3)
GFR SERPLBLD CREATININE-BSD FMLA CKD-EPI: >90 ML/MIN/{1.73_M2}
GLUCOSE SERPL-MCNC: 101 MG/DL (ref 70–99)
POTASSIUM SERPL-SCNC: 3.7 MMOL/L (ref 3.5–5.1)
SODIUM SERPL-SCNC: 130 MMOL/L (ref 136–145)

## 2024-07-05 PROCEDURE — 80048 BASIC METABOLIC PNL TOTAL CA: CPT

## 2024-07-05 PROCEDURE — 6370000000 HC RX 637 (ALT 250 FOR IP): Performed by: ORTHOPAEDIC SURGERY

## 2024-07-05 PROCEDURE — 6370000000 HC RX 637 (ALT 250 FOR IP): Performed by: INTERNAL MEDICINE

## 2024-07-05 PROCEDURE — 6360000002 HC RX W HCPCS: Performed by: ORTHOPAEDIC SURGERY

## 2024-07-05 PROCEDURE — 1200000000 HC SEMI PRIVATE

## 2024-07-05 PROCEDURE — 2580000003 HC RX 258: Performed by: ORTHOPAEDIC SURGERY

## 2024-07-05 RX ADMIN — SODIUM CHLORIDE, PRESERVATIVE FREE 10 ML: 5 INJECTION INTRAVENOUS at 21:15

## 2024-07-05 RX ADMIN — SODIUM CHLORIDE, PRESERVATIVE FREE 10 ML: 5 INJECTION INTRAVENOUS at 09:24

## 2024-07-05 RX ADMIN — HYDRALAZINE HYDROCHLORIDE 50 MG: 50 TABLET ORAL at 06:09

## 2024-07-05 RX ADMIN — HYDRALAZINE HYDROCHLORIDE 50 MG: 50 TABLET ORAL at 13:33

## 2024-07-05 RX ADMIN — SIMETHICONE 80 MG: 80 TABLET, CHEWABLE ORAL at 21:15

## 2024-07-05 RX ADMIN — ENOXAPARIN SODIUM 30 MG: 100 INJECTION SUBCUTANEOUS at 21:18

## 2024-07-05 RX ADMIN — FLUOXETINE HYDROCHLORIDE 20 MG: 20 CAPSULE ORAL at 09:22

## 2024-07-05 RX ADMIN — QUETIAPINE FUMARATE 25 MG: 25 TABLET ORAL at 21:15

## 2024-07-05 RX ADMIN — BISACODYL 5 MG: 5 TABLET, COATED ORAL at 21:15

## 2024-07-05 RX ADMIN — POLYETHYLENE GLYCOL 3350 17 G: 17 POWDER, FOR SOLUTION ORAL at 09:35

## 2024-07-05 RX ADMIN — Medication 100 MG: at 09:23

## 2024-07-05 RX ADMIN — SIMETHICONE 80 MG: 80 TABLET, CHEWABLE ORAL at 09:35

## 2024-07-05 RX ADMIN — TAMSULOSIN HYDROCHLORIDE 0.4 MG: 0.4 CAPSULE ORAL at 09:22

## 2024-07-05 RX ADMIN — PANTOPRAZOLE SODIUM 40 MG: 40 TABLET, DELAYED RELEASE ORAL at 09:23

## 2024-07-05 RX ADMIN — CITALOPRAM HYDROBROMIDE 35 MG: 20 TABLET ORAL at 09:23

## 2024-07-05 RX ADMIN — ATORVASTATIN CALCIUM 80 MG: 80 TABLET, FILM COATED ORAL at 09:23

## 2024-07-05 RX ADMIN — NIFEDIPINE 90 MG: 30 TABLET, FILM COATED, EXTENDED RELEASE ORAL at 09:22

## 2024-07-05 RX ADMIN — ENOXAPARIN SODIUM 30 MG: 100 INJECTION SUBCUTANEOUS at 09:22

## 2024-07-05 RX ADMIN — HYDRALAZINE HYDROCHLORIDE 50 MG: 50 TABLET ORAL at 21:22

## 2024-07-05 RX ADMIN — Medication 10 MG: at 21:15

## 2024-07-05 NOTE — ACP (ADVANCE CARE PLANNING)
Advance Care Planning     Advance Care Planning Inpatient Note  [facility] Emergency [facility] Department    Today's Date: 7/5/2024  Unit: [facility] C5 - MED SURG/ORTHO    Received request from HealthCare Provider.  Upon review of chart and communication with care team, patient's decision making abilities are not in question.. Patient was/were present in the room during visit.    Goals of ACP Conversation:  Discuss advance care planning documents  Facilitate a discussion related to patient's goals of care as they align with the patient's values and beliefs.    Health Care Decision Makers:       Primary Decision Maker: khai abraham - Lan[place] - [phone]  Summary:  No Decision Maker named by patient at this time  Jaden desires to rescind documentation completed on 7.1.24. He desires to name a different person as his HCPOA.   Advance Care Planning Documents (Patient Wishes):  Jaden desires to update his ACP documents with a new PDM. He will be calling a friend to confirm and [facility] will follow-up.       Assessment:  Jaden engaged in conversation and confirmed his wishes to rescind documentation completed on July 1. He desires to name another person as his PDM.     Interventions:   offered support while Jaden decides who to name as his PDM.     Care Preferences Communicated:   No    Outcomes/Plan:  ACP Discussion:  will follow-up to confirm HCPOA/PDM documentation updates.     Electronically signed by Chaplain SAKSHI on 7/5/2024 at 3:18 PM

## 2024-07-05 NOTE — CARE COORDINATION
Chart reviewed day 9. Pt is followed by IM and Ortho. Pt has been medically clear for d/c by both providers. Per  request call placed to [phone] for initiation of Psych transfer. Pt from home alone prior. [facility] had initially accepted prior to Psych change. Will follow for needs as they arise. Electronically signed by VERONICA CARRASQUILLO RN on 7/5/2024 at 9:38 AM

## 2024-07-05 NOTE — ACP (ADVANCE CARE PLANNING)
Advance Care Planning     Advance Care Planning Inpatient Note  [facility] Department    Today's Date: 7/5/2024  Unit: [facility] C5 - MED SURG/ORTHO    Received request from HealthCare Provider and patient.  Upon review of chart and communication with care team, patient's decision making abilities are not in question.. Patient was/were present in the room during visit.    Goals of ACP Conversation:  Discuss advance care planning documents    Health Care Decision Makers:       Primary Decision Maker: Ca Santana - Friend - [phone]  Summary:  Completed New Documents  Updated Healthcare Decision Maker  Freddy desired to update PDM from previous listed name documented on 7.1.24.   Advance Care Planning Documents (Patient Wishes):  Healthcare Power of /Advance Directive Appointment of Health Care Agent  Living Will/Advance Directive     Assessment:  Jaden desired to rescind documentation completed on 7.1.24. He desired to reflect his current wishes for his primary decision maker in HCPOA and Living Will documents.     Interventions:  Provided education on documents for clarity and greater understanding  Assisted in the completion of documents according to patient's wishes at this time  Encouraged ongoing ACP conversation with future decision makers and loved ones    Care Preferences Communicated:   No    Outcomes/Plan:  ACP Discussion: Completed  New advance directive completed.  Returned original document(s) to patient, as well as copies for distribution to appointed agents  Copy of advance directive given to staff to scan into medical record.    Electronically signed by Chaplain SAKSHI on 7/5/2024 at 4:46 PM

## 2024-07-06 PROCEDURE — 6370000000 HC RX 637 (ALT 250 FOR IP): Performed by: ORTHOPAEDIC SURGERY

## 2024-07-06 PROCEDURE — 1200000000 HC SEMI PRIVATE

## 2024-07-06 PROCEDURE — 6360000002 HC RX W HCPCS: Performed by: ORTHOPAEDIC SURGERY

## 2024-07-06 PROCEDURE — 6370000000 HC RX 637 (ALT 250 FOR IP): Performed by: INTERNAL MEDICINE

## 2024-07-06 PROCEDURE — 2580000003 HC RX 258: Performed by: ORTHOPAEDIC SURGERY

## 2024-07-06 RX ORDER — CITALOPRAM 20 MG/1
30 TABLET ORAL DAILY
Status: DISCONTINUED | OUTPATIENT
Start: 2024-07-07 | End: 2024-07-08

## 2024-07-06 RX ADMIN — BISACODYL 5 MG: 5 TABLET, COATED ORAL at 20:59

## 2024-07-06 RX ADMIN — BISACODYL 5 MG: 5 TABLET, COATED ORAL at 07:45

## 2024-07-06 RX ADMIN — ATORVASTATIN CALCIUM 80 MG: 80 TABLET, FILM COATED ORAL at 09:00

## 2024-07-06 RX ADMIN — ENOXAPARIN SODIUM 30 MG: 100 INJECTION SUBCUTANEOUS at 20:59

## 2024-07-06 RX ADMIN — HYDRALAZINE HYDROCHLORIDE 50 MG: 50 TABLET ORAL at 20:59

## 2024-07-06 RX ADMIN — FLUOXETINE HYDROCHLORIDE 20 MG: 20 CAPSULE ORAL at 08:59

## 2024-07-06 RX ADMIN — SODIUM CHLORIDE, PRESERVATIVE FREE 10 ML: 5 INJECTION INTRAVENOUS at 21:00

## 2024-07-06 RX ADMIN — SODIUM CHLORIDE, PRESERVATIVE FREE 10 ML: 5 INJECTION INTRAVENOUS at 07:45

## 2024-07-06 RX ADMIN — SIMETHICONE 80 MG: 80 TABLET, CHEWABLE ORAL at 21:00

## 2024-07-06 RX ADMIN — QUETIAPINE FUMARATE 25 MG: 25 TABLET ORAL at 21:00

## 2024-07-06 RX ADMIN — TAMSULOSIN HYDROCHLORIDE 0.4 MG: 0.4 CAPSULE ORAL at 08:58

## 2024-07-06 RX ADMIN — HYDRALAZINE HYDROCHLORIDE 50 MG: 50 TABLET ORAL at 13:47

## 2024-07-06 RX ADMIN — NIFEDIPINE 90 MG: 30 TABLET, FILM COATED, EXTENDED RELEASE ORAL at 08:58

## 2024-07-06 RX ADMIN — FLUTICASONE PROPIONATE 1 SPRAY: 50 SPRAY, METERED NASAL at 09:01

## 2024-07-06 RX ADMIN — SIMETHICONE 80 MG: 80 TABLET, CHEWABLE ORAL at 07:45

## 2024-07-06 RX ADMIN — CITALOPRAM HYDROBROMIDE 35 MG: 20 TABLET ORAL at 09:00

## 2024-07-06 RX ADMIN — Medication 10 MG: at 20:59

## 2024-07-06 RX ADMIN — Medication 100 MG: at 09:00

## 2024-07-06 RX ADMIN — ENOXAPARIN SODIUM 30 MG: 100 INJECTION SUBCUTANEOUS at 09:03

## 2024-07-06 ASSESSMENT — PAIN DESCRIPTION - LOCATION
LOCATION: KNEE;WRIST
LOCATION: KNEE

## 2024-07-06 ASSESSMENT — PAIN - FUNCTIONAL ASSESSMENT: PAIN_FUNCTIONAL_ASSESSMENT: ACTIVITIES ARE NOT PREVENTED

## 2024-07-06 ASSESSMENT — PAIN DESCRIPTION - ORIENTATION
ORIENTATION: RIGHT
ORIENTATION: RIGHT

## 2024-07-06 ASSESSMENT — PAIN SCALES - GENERAL
PAINLEVEL_OUTOF10: 4
PAINLEVEL_OUTOF10: 3

## 2024-07-06 ASSESSMENT — PAIN DESCRIPTION - DESCRIPTORS: DESCRIPTORS: ACHING;THROBBING

## 2024-07-06 ASSESSMENT — PAIN DESCRIPTION - PAIN TYPE
TYPE: SURGICAL PAIN
TYPE: SURGICAL PAIN

## 2024-07-06 ASSESSMENT — PAIN DESCRIPTION - FREQUENCY: FREQUENCY: INTERMITTENT

## 2024-07-07 PROCEDURE — 6370000000 HC RX 637 (ALT 250 FOR IP): Performed by: ORTHOPAEDIC SURGERY

## 2024-07-07 PROCEDURE — 2580000003 HC RX 258: Performed by: ORTHOPAEDIC SURGERY

## 2024-07-07 PROCEDURE — 1200000000 HC SEMI PRIVATE

## 2024-07-07 PROCEDURE — 6370000000 HC RX 637 (ALT 250 FOR IP): Performed by: INTERNAL MEDICINE

## 2024-07-07 PROCEDURE — 6360000002 HC RX W HCPCS: Performed by: ORTHOPAEDIC SURGERY

## 2024-07-07 RX ADMIN — CITALOPRAM HYDROBROMIDE 30 MG: 20 TABLET ORAL at 08:55

## 2024-07-07 RX ADMIN — FLUOXETINE HYDROCHLORIDE 20 MG: 20 CAPSULE ORAL at 08:54

## 2024-07-07 RX ADMIN — ATORVASTATIN CALCIUM 80 MG: 80 TABLET, FILM COATED ORAL at 08:55

## 2024-07-07 RX ADMIN — ENOXAPARIN SODIUM 30 MG: 100 INJECTION SUBCUTANEOUS at 20:34

## 2024-07-07 RX ADMIN — NIFEDIPINE 90 MG: 30 TABLET, FILM COATED, EXTENDED RELEASE ORAL at 08:54

## 2024-07-07 RX ADMIN — HYDRALAZINE HYDROCHLORIDE 50 MG: 50 TABLET ORAL at 14:23

## 2024-07-07 RX ADMIN — SODIUM CHLORIDE, PRESERVATIVE FREE 10 ML: 5 INJECTION INTRAVENOUS at 08:56

## 2024-07-07 RX ADMIN — TAMSULOSIN HYDROCHLORIDE 0.4 MG: 0.4 CAPSULE ORAL at 08:54

## 2024-07-07 RX ADMIN — HYDRALAZINE HYDROCHLORIDE 50 MG: 50 TABLET ORAL at 06:04

## 2024-07-07 RX ADMIN — SIMETHICONE 80 MG: 80 TABLET, CHEWABLE ORAL at 20:46

## 2024-07-07 RX ADMIN — SODIUM CHLORIDE, PRESERVATIVE FREE 10 ML: 5 INJECTION INTRAVENOUS at 20:35

## 2024-07-07 RX ADMIN — Medication 100 MG: at 08:55

## 2024-07-07 RX ADMIN — QUETIAPINE FUMARATE 25 MG: 25 TABLET ORAL at 20:34

## 2024-07-07 RX ADMIN — Medication 10 MG: at 20:34

## 2024-07-07 RX ADMIN — FLUTICASONE PROPIONATE 1 SPRAY: 50 SPRAY, METERED NASAL at 08:58

## 2024-07-07 RX ADMIN — ENOXAPARIN SODIUM 30 MG: 100 INJECTION SUBCUTANEOUS at 08:58

## 2024-07-07 RX ADMIN — HYDRALAZINE HYDROCHLORIDE 50 MG: 50 TABLET ORAL at 20:34

## 2024-07-07 ASSESSMENT — PAIN - FUNCTIONAL ASSESSMENT: PAIN_FUNCTIONAL_ASSESSMENT: ACTIVITIES ARE NOT PREVENTED

## 2024-07-07 ASSESSMENT — PAIN DESCRIPTION - FREQUENCY: FREQUENCY: INTERMITTENT

## 2024-07-07 ASSESSMENT — PAIN DESCRIPTION - ORIENTATION: ORIENTATION: RIGHT

## 2024-07-07 ASSESSMENT — PAIN DESCRIPTION - DESCRIPTORS: DESCRIPTORS: BURNING

## 2024-07-07 ASSESSMENT — PAIN SCALES - GENERAL: PAINLEVEL_OUTOF10: 4

## 2024-07-07 ASSESSMENT — PAIN DESCRIPTION - PAIN TYPE: TYPE: SURGICAL PAIN;ACUTE PAIN

## 2024-07-07 ASSESSMENT — PAIN DESCRIPTION - LOCATION: LOCATION: KNEE

## 2024-07-08 VITALS
RESPIRATION RATE: 16 BRPM | HEIGHT: 74 IN | TEMPERATURE: 98 F | OXYGEN SATURATION: 98 % | HEART RATE: 76 BPM | BODY MASS INDEX: 29.74 KG/M2 | SYSTOLIC BLOOD PRESSURE: 139 MMHG | DIASTOLIC BLOOD PRESSURE: 77 MMHG | WEIGHT: 231.7 LBS

## 2024-07-08 PROCEDURE — 6360000002 HC RX W HCPCS: Performed by: ORTHOPAEDIC SURGERY

## 2024-07-08 PROCEDURE — 6370000000 HC RX 637 (ALT 250 FOR IP): Performed by: ORTHOPAEDIC SURGERY

## 2024-07-08 PROCEDURE — 2580000003 HC RX 258: Performed by: ORTHOPAEDIC SURGERY

## 2024-07-08 PROCEDURE — 99223 1ST HOSP IP/OBS HIGH 75: CPT | Performed by: PSYCHIATRY & NEUROLOGY

## 2024-07-08 PROCEDURE — 6370000000 HC RX 637 (ALT 250 FOR IP): Performed by: INTERNAL MEDICINE

## 2024-07-08 PROCEDURE — 97110 THERAPEUTIC EXERCISES: CPT

## 2024-07-08 PROCEDURE — 97116 GAIT TRAINING THERAPY: CPT

## 2024-07-08 RX ORDER — FLUOXETINE HYDROCHLORIDE 20 MG/1
20 CAPSULE ORAL DAILY
Qty: 30 CAPSULE | Refills: 1 | Status: SHIPPED | OUTPATIENT
Start: 2024-07-08 | End: 2024-09-06

## 2024-07-08 RX ORDER — NIFEDIPINE 30 MG/1
90 TABLET, EXTENDED RELEASE ORAL DAILY
Qty: 30 TABLET | Refills: 1 | Status: SHIPPED | OUTPATIENT
Start: 2024-07-09

## 2024-07-08 RX ORDER — HYDRALAZINE HYDROCHLORIDE 50 MG/1
50 TABLET, FILM COATED ORAL EVERY 8 HOURS SCHEDULED
Qty: 90 TABLET | Refills: 1 | Status: SHIPPED | OUTPATIENT
Start: 2024-07-08

## 2024-07-08 RX ORDER — ATORVASTATIN CALCIUM 80 MG/1
80 TABLET, FILM COATED ORAL DAILY
Qty: 30 TABLET | Refills: 1 | Status: SHIPPED | OUTPATIENT
Start: 2024-07-08

## 2024-07-08 RX ORDER — TAMSULOSIN HYDROCHLORIDE 0.4 MG/1
0.4 CAPSULE ORAL DAILY
Qty: 30 CAPSULE | Refills: 3 | Status: SHIPPED | OUTPATIENT
Start: 2024-07-08

## 2024-07-08 RX ADMIN — Medication 100 MG: at 08:16

## 2024-07-08 RX ADMIN — FLUOXETINE HYDROCHLORIDE 20 MG: 20 CAPSULE ORAL at 08:16

## 2024-07-08 RX ADMIN — BISACODYL 5 MG: 5 TABLET, COATED ORAL at 08:16

## 2024-07-08 RX ADMIN — TAMSULOSIN HYDROCHLORIDE 0.4 MG: 0.4 CAPSULE ORAL at 08:16

## 2024-07-08 RX ADMIN — HYDRALAZINE HYDROCHLORIDE 50 MG: 50 TABLET ORAL at 05:51

## 2024-07-08 RX ADMIN — SODIUM CHLORIDE, PRESERVATIVE FREE 10 ML: 5 INJECTION INTRAVENOUS at 08:17

## 2024-07-08 RX ADMIN — ENOXAPARIN SODIUM 30 MG: 100 INJECTION SUBCUTANEOUS at 08:17

## 2024-07-08 RX ADMIN — FLUTICASONE PROPIONATE 1 SPRAY: 50 SPRAY, METERED NASAL at 08:22

## 2024-07-08 RX ADMIN — HYDRALAZINE HYDROCHLORIDE 50 MG: 50 TABLET ORAL at 14:41

## 2024-07-08 RX ADMIN — CITALOPRAM HYDROBROMIDE 30 MG: 20 TABLET ORAL at 08:17

## 2024-07-08 RX ADMIN — POLYETHYLENE GLYCOL 3350 17 G: 17 POWDER, FOR SOLUTION ORAL at 08:17

## 2024-07-08 RX ADMIN — NIFEDIPINE 90 MG: 30 TABLET, FILM COATED, EXTENDED RELEASE ORAL at 08:16

## 2024-07-08 RX ADMIN — ATORVASTATIN CALCIUM 80 MG: 80 TABLET, FILM COATED ORAL at 08:16

## 2024-07-08 ASSESSMENT — PAIN SCALES - GENERAL: PAINLEVEL_OUTOF10: 0

## 2024-07-08 NOTE — PLAN OF CARE
Asked by my partners to assist in coordinating hand surgery specialty care for this patient's right wrist fx    Reviewed xrays, patient has a severely displaced, angulated, comminuted right distal radius fx    Assuming pt remains in hospital, will plan on ORIF right distal radius with [facility] on Wednesday July 3 at 1PM.    Please keep patient NPO after MN the night before surgery      MARSHALL CLANCY MD  [facility]  [phone] (cell)  
  Problem: Safety - Adult  Goal: Free from fall injury  6/29/2024 0251 by Danita Roca RN  Outcome: Progressing  6/28/2024 1641 by Pooja Lewis RN  Outcome: Progressing     Problem: Discharge Planning  Goal: Discharge to home or other facility with appropriate resources  Outcome: Progressing     Problem: Pain  Goal: Verbalizes/displays adequate comfort level or baseline comfort level  6/29/2024 0251 by Danita Roca RN  Outcome: Progressing  6/28/2024 1641 by Pooja Lewis RN  Outcome: Progressing     Problem: Skin/Tissue Integrity  Goal: Absence of new skin breakdown  Description: 1.  Monitor for areas of redness and/or skin breakdown  2.  Assess vascular access sites hourly  3.  Every 4-6 hours minimum:  Change oxygen saturation probe site  4.  Every 4-6 hours:  If on nasal continuous positive airway pressure, respiratory therapy assess nares and determine need for appliance change or resting period.  Outcome: Progressing     Problem: ABCDS Injury Assessment  Goal: Absence of physical injury  Outcome: Progressing     Problem: Chronic Conditions and Co-morbidities  Goal: Patient's chronic conditions and co-morbidity symptoms are monitored and maintained or improved  Outcome: Progressing     Problem: Nutrition Deficit:  Goal: Optimize nutritional status  Outcome: Progressing     
  Problem: Safety - Adult  Goal: Free from fall injury  6/29/2024 1637 by Pooja Lewis RN  Outcome: Progressing  6/29/2024 0251 by Danita Roca RN  Outcome: Progressing     Problem: Pain  Goal: Verbalizes/displays adequate comfort level or baseline comfort level  6/29/2024 1637 by Pooja Lewis RN  Outcome: Progressing  6/29/2024 0251 by Danita Roca RN  Outcome: Progressing     Problem: Skin/Tissue Integrity  Goal: Absence of new skin breakdown  Description: 1.  Monitor for areas of redness and/or skin breakdown  2.  Assess vascular access sites hourly  3.  Every 4-6 hours minimum:  Change oxygen saturation probe site  4.  Every 4-6 hours:  If on nasal continuous positive airway pressure, respiratory therapy assess nares and determine need for appliance change or resting period.  6/29/2024 1637 by Pooja Lewis RN  Outcome: Progressing  6/29/2024 0251 by Danita Roca RN  Outcome: Progressing     
  Problem: Safety - Adult  Goal: Free from fall injury  6/29/2024 2101 by Arya Burgos RN  Outcome: Progressing  6/29/2024 1637 by Pooja Lewis RN  Outcome: Progressing     Problem: Pain  Goal: Verbalizes/displays adequate comfort level or baseline comfort level  6/29/2024 2101 by Arya Burgos RN  Outcome: Progressing  6/29/2024 1637 by Pooja Lewis RN  Outcome: Progressing     Problem: Skin/Tissue Integrity  Goal: Absence of new skin breakdown  Description: 1.  Monitor for areas of redness and/or skin breakdown  2.  Assess vascular access sites hourly  3.  Every 4-6 hours minimum:  Change oxygen saturation probe site  4.  Every 4-6 hours:  If on nasal continuous positive airway pressure, respiratory therapy assess nares and determine need for appliance change or resting period.  6/29/2024 2101 by Arya Burgos RN  Outcome: Progressing  6/29/2024 1637 by Pooja Lewis RN  Outcome: Progressing     Problem: ABCDS Injury Assessment  Goal: Absence of physical injury  Outcome: Progressing     Problem: Chronic Conditions and Co-morbidities  Goal: Patient's chronic conditions and co-morbidity symptoms are monitored and maintained or improved  Outcome: Progressing     
  Problem: Safety - Adult  Goal: Free from fall injury  6/30/2024 2123 by Arya Burgos RN  Outcome: Progressing  6/30/2024 1524 by Pooja Lewis RN  Outcome: Progressing     Problem: Discharge Planning  Goal: Discharge to home or other facility with appropriate resources  Outcome: Progressing     Problem: Pain  Goal: Verbalizes/displays adequate comfort level or baseline comfort level  Outcome: Progressing     Problem: Skin/Tissue Integrity  Goal: Absence of new skin breakdown  Description: 1.  Monitor for areas of redness and/or skin breakdown  2.  Assess vascular access sites hourly  3.  Every 4-6 hours minimum:  Change oxygen saturation probe site  4.  Every 4-6 hours:  If on nasal continuous positive airway pressure, respiratory therapy assess nares and determine need for appliance change or resting period.  6/30/2024 2123 by Arya Burgos RN  Outcome: Progressing  6/30/2024 1524 by Pooja Lewis RN  Outcome: Progressing     Problem: ABCDS Injury Assessment  Goal: Absence of physical injury  Outcome: Progressing     Problem: Chronic Conditions and Co-morbidities  Goal: Patient's chronic conditions and co-morbidity symptoms are monitored and maintained or improved  Outcome: Progressing     
  Problem: Safety - Adult  Goal: Free from fall injury  7/2/2024 0315 by Chelly Barillas, RN  Outcome: Progressing     Problem: Self Harm/Suicidality  Goal: Will have no self-injury during hospital stay  Description: INTERVENTIONS:  1.  Ensure constant observer at bedside with Q15M safety checks  2.  Maintain a safe environment  3.  Secure patient belongings  4.  Ensure family/visitors adhere to safety recommendations  5.  Ensure safety tray has been added to patient's diet order  6.  Every shift and PRN: Re-assess suicidal risk via Frequent Screener    7/2/2024 0753 by Pooja Lewis, RN  Outcome: Progressing  7/2/2024 0315 by Chelly Barillas, RN  Outcome: Progressing  7/1/2024 1831 by Debbie Pritchard, RN  Outcome: Progressing     
  Problem: Safety - Adult  Goal: Free from fall injury  Outcome: Progressing     Problem: Discharge Planning  Goal: Discharge to home or other facility with appropriate resources  Outcome: Progressing     Problem: Pain  Goal: Verbalizes/displays adequate comfort level or baseline comfort level  Outcome: Progressing     Problem: Skin/Tissue Integrity  Goal: Absence of new skin breakdown  Description: 1.  Monitor for areas of redness and/or skin breakdown  2.  Assess vascular access sites hourly  3.  Every 4-6 hours minimum:  Change oxygen saturation probe site  4.  Every 4-6 hours:  If on nasal continuous positive airway pressure, respiratory therapy assess nares and determine need for appliance change or resting period.  7/2/2024 2047 by Chelly Barillas, RN  Outcome: Progressing     Problem: Self Harm/Suicidality  Goal: Will have no self-injury during hospital stay  Description: INTERVENTIONS:  1.  Ensure constant observer at bedside with Q15M safety checks  2.  Maintain a safe environment  3.  Secure patient belongings  4.  Ensure family/visitors adhere to safety recommendations  5.  Ensure safety tray has been added to patient's diet order  6.  Every shift and PRN: Re-assess suicidal risk via Frequent Screener    7/2/2024 2047 by Chelly Barillas, RN  Outcome: Progressing     Problem: Skin/Tissue Integrity - Adult  Goal: Skin integrity remains intact  Outcome: Progressing     Problem: Skin/Tissue Integrity - Adult  Goal: Incisions, wounds, or drain sites healing without S/S of infection  Outcome: Progressing     Problem: Musculoskeletal - Adult  Goal: Return mobility to safest level of function  Outcome: Progressing     
  Problem: Safety - Adult  Goal: Free from fall injury  Outcome: Progressing     Problem: Discharge Planning  Goal: Discharge to home or other facility with appropriate resources  Outcome: Progressing     Problem: Pain  Goal: Verbalizes/displays adequate comfort level or baseline comfort level  Outcome: Progressing     Problem: Skin/Tissue Integrity  Goal: Absence of new skin breakdown  Description: 1.  Monitor for areas of redness and/or skin breakdown  2.  Assess vascular access sites hourly  3.  Every 4-6 hours minimum:  Change oxygen saturation probe site  4.  Every 4-6 hours:  If on nasal continuous positive airway pressure, respiratory therapy assess nares and determine need for appliance change or resting period.  Outcome: Progressing     Problem: ABCDS Injury Assessment  Goal: Absence of physical injury  Outcome: Progressing     Problem: Chronic Conditions and Co-morbidities  Goal: Patient's chronic conditions and co-morbidity symptoms are monitored and maintained or improved  Outcome: Progressing     Problem: Nutrition Deficit:  Goal: Optimize nutritional status  Outcome: Progressing     
  Problem: Safety - Adult  Goal: Free from fall injury  Outcome: Progressing     Problem: Discharge Planning  Goal: Discharge to home or other facility with appropriate resources  Outcome: Progressing     Problem: Pain  Goal: Verbalizes/displays adequate comfort level or baseline comfort level  Outcome: Progressing     Problem: Skin/Tissue Integrity  Goal: Absence of new skin breakdown  Description: 1.  Monitor for areas of redness and/or skin breakdown  2.  Assess vascular access sites hourly  3.  Every 4-6 hours minimum:  Change oxygen saturation probe site  4.  Every 4-6 hours:  If on nasal continuous positive airway pressure, respiratory therapy assess nares and determine need for appliance change or resting period.  Outcome: Progressing     Problem: ABCDS Injury Assessment  Goal: Absence of physical injury  Outcome: Progressing     Problem: Chronic Conditions and Co-morbidities  Goal: Patient's chronic conditions and co-morbidity symptoms are monitored and maintained or improved  Outcome: Progressing     Problem: Nutrition Deficit:  Goal: Optimize nutritional status  Outcome: Progressing     Problem: Self Harm/Suicidality  Goal: Will have no self-injury during hospital stay  Description: INTERVENTIONS:  1.  Ensure constant observer at bedside with Q15M safety checks  2.  Maintain a safe environment  3.  Secure patient belongings  4.  Ensure family/visitors adhere to safety recommendations  5.  Ensure safety tray has been added to patient's diet order  6.  Every shift and PRN: Re-assess suicidal risk via Frequent Screener    Outcome: Progressing     Problem: Neurosensory - Adult  Goal: Achieves stable or improved neurological status  Outcome: Progressing     Problem: Skin/Tissue Integrity - Adult  Goal: Skin integrity remains intact  Outcome: Progressing  Goal: Incisions, wounds, or drain sites healing without S/S of infection  Outcome: Progressing     Problem: Musculoskeletal - Adult  Goal: Return mobility to 
  Problem: Safety - Adult  Goal: Free from fall injury  Outcome: Progressing     Problem: Discharge Planning  Goal: Discharge to home or other facility with appropriate resources  Outcome: Progressing     Problem: Pain  Goal: Verbalizes/displays adequate comfort level or baseline comfort level  Outcome: Progressing     Problem: Skin/Tissue Integrity  Goal: Absence of new skin breakdown  Description: 1.  Monitor for areas of redness and/or skin breakdown  2.  Assess vascular access sites hourly  3.  Every 4-6 hours minimum:  Change oxygen saturation probe site  4.  Every 4-6 hours:  If on nasal continuous positive airway pressure, respiratory therapy assess nares and determine need for appliance change or resting period.  Outcome: Progressing     Problem: ABCDS Injury Assessment  Goal: Absence of physical injury  Outcome: Progressing     Problem: Chronic Conditions and Co-morbidities  Goal: Patient's chronic conditions and co-morbidity symptoms are monitored and maintained or improved  Outcome: Progressing     Problem: Nutrition Deficit:  Goal: Optimize nutritional status  Outcome: Progressing     Problem: Self Harm/Suicidality  Goal: Will have no self-injury during hospital stay  Description: INTERVENTIONS:  1.  Ensure constant observer at bedside with Q15M safety checks  2.  Maintain a safe environment  3.  Secure patient belongings  4.  Ensure family/visitors adhere to safety recommendations  5.  Ensure safety tray has been added to patient's diet order  6.  Every shift and PRN: Re-assess suicidal risk via Frequent Screener    Outcome: Progressing     Problem: Neurosensory - Adult  Goal: Achieves stable or improved neurological status  Outcome: Progressing     Problem: Skin/Tissue Integrity - Adult  Goal: Skin integrity remains intact  Outcome: Progressing  Goal: Incisions, wounds, or drain sites healing without S/S of infection  Outcome: Progressing     Problem: Musculoskeletal - Adult  Goal: Return mobility to 
  Problem: Safety - Adult  Goal: Free from fall injury  Outcome: Progressing     Problem: Discharge Planning  Goal: Discharge to home or other facility with appropriate resources  Outcome: Progressing     Problem: Pain  Goal: Verbalizes/displays adequate comfort level or baseline comfort level  Outcome: Progressing     Problem: Skin/Tissue Integrity  Goal: Absence of new skin breakdown  Description: 1.  Monitor for areas of redness and/or skin breakdown  2.  Assess vascular access sites hourly  3.  Every 4-6 hours minimum:  Change oxygen saturation probe site  4.  Every 4-6 hours:  If on nasal continuous positive airway pressure, respiratory therapy assess nares and determine need for appliance change or resting period.  Outcome: Progressing     Problem: ABCDS Injury Assessment  Goal: Absence of physical injury  Outcome: Progressing     Problem: Chronic Conditions and Co-morbidities  Goal: Patient's chronic conditions and co-morbidity symptoms are monitored and maintained or improved  Outcome: Progressing     Problem: Self Harm/Suicidality  Goal: Will have no self-injury during hospital stay  Description: INTERVENTIONS:  1.  Ensure constant observer at bedside with Q15M safety checks  2.  Maintain a safe environment  3.  Secure patient belongings  4.  Ensure family/visitors adhere to safety recommendations  5.  Ensure safety tray has been added to patient's diet order  6.  Every shift and PRN: Re-assess suicidal risk via Frequent Screener    7/2/2024 0315 by Chelly Barillas RN  Outcome: Progressing     Problem: Neurosensory - Adult  Goal: Achieves stable or improved neurological status  Outcome: Progressing     Problem: Skin/Tissue Integrity - Adult  Goal: Skin integrity remains intact  Outcome: Progressing  Goal: Incisions, wounds, or drain sites healing without S/S of infection  Outcome: Progressing     Problem: Musculoskeletal - Adult  Goal: Return mobility to safest level of function  Outcome: Progressing     
  Problem: Safety - Adult  Goal: Free from fall injury  Outcome: Progressing     Problem: Discharge Planning  Goal: Discharge to home or other facility with appropriate resources  Outcome: Progressing  Flowsheets (Taken 7/5/2024 2025)  Discharge to home or other facility with appropriate resources: Identify barriers to discharge with patient and caregiver     Problem: Skin/Tissue Integrity  Goal: Absence of new skin breakdown  Description: 1.  Monitor for areas of redness and/or skin breakdown  2.  Assess vascular access sites hourly  3.  Every 4-6 hours minimum:  Change oxygen saturation probe site  4.  Every 4-6 hours:  If on nasal continuous positive airway pressure, respiratory therapy assess nares and determine need for appliance change or resting period.  Outcome: Progressing     
  Problem: Safety - Adult  Goal: Free from fall injury  Outcome: Progressing     Problem: Pain  Goal: Verbalizes/displays adequate comfort level or baseline comfort level  Outcome: Progressing     
  Problem: Safety - Adult  Goal: Free from fall injury  Outcome: Progressing     Problem: Skin/Tissue Integrity  Goal: Absence of new skin breakdown  Description: 1.  Monitor for areas of redness and/or skin breakdown  2.  Assess vascular access sites hourly  3.  Every 4-6 hours minimum:  Change oxygen saturation probe site  4.  Every 4-6 hours:  If on nasal continuous positive airway pressure, respiratory therapy assess nares and determine need for appliance change or resting period.  Outcome: Progressing     Problem: Nutrition Deficit:  Goal: Optimize nutritional status  Outcome: Progressing     
  Problem: Safety - Adult  Goal: Free from fall injury  Outcome: Progressing   Pt instructed to use call light for assistance. Bed in low position, 2/4 side rails up, bed alarm engaged, call light within reach, and staff sitter bedside.   
  Problem: Self Harm/Suicidality  Goal: Will have no self-injury during hospital stay  Description: INTERVENTIONS:  1.  Ensure constant observer at bedside with Q15M safety checks  2.  Maintain a safe environment  3.  Secure patient belongings  4.  Ensure family/visitors adhere to safety recommendations  5.  Ensure safety tray has been added to patient's diet order  6.  Every shift and PRN: Re-assess suicidal risk via Frequent Screener    7/3/2024 2329 by Chelly Barillas RN  Outcome: Progressing     
  Problem: Skin/Tissue Integrity  Goal: Absence of new skin breakdown  Description: 1.  Monitor for areas of redness and/or skin breakdown  2.  Assess vascular access sites hourly  3.  Every 4-6 hours minimum:  Change oxygen saturation probe site  4.  Every 4-6 hours:  If on nasal continuous positive airway pressure, respiratory therapy assess nares and determine need for appliance change or resting period.  Outcome: Progressing     Problem: Nutrition Deficit:  Goal: Optimize nutritional status  Outcome: Progressing     Problem: Self Harm/Suicidality  Goal: Will have no self-injury during hospital stay  Description: INTERVENTIONS:  1.  Ensure constant observer at bedside with Q15M safety checks  2.  Maintain a safe environment  3.  Secure patient belongings  4.  Ensure family/visitors adhere to safety recommendations  5.  Ensure safety tray has been added to patient's diet order  6.  Every shift and PRN: Re-assess suicidal risk via Frequent Screener    7/2/2024 1822 by Pooja Lewis, RN  Outcome: Progressing  7/2/2024 0753 by Pooja Lewis, RN  Outcome: Progressing     
Bed in lowest position, wheels locked, 2/4 side rails up, nonskid footwear on. Bed/ chair check alarm in place, call light within reach. Pt instructed to call out when needing assistance. Pt stated understanding.     Problem: Safety - Adult  Goal: Free from fall injury  7/8/2024 0831 by Helen Stone, RN  Outcome: Progressing  7/7/2024 2332 by Missy Agustin RN  Outcome: Progressing     
Discussed Technician note from yesterday requesting Consult Call back with pt's nurse    Pt's nurse states pt slept through the night. No change in pt status, no new psychiatric needs or concerns. She states Technician note was in error. She reports pt is still sleeping this morning.    Continue plan as documented in full consult note. Please enter new consult for psychiatry for further questions.     Elizabeth Booker MD  Family Medicine, [facility]  Psychiatry, [facility]        
Increase function to baseline.  
Increase function to baseline.    
Increase patients ADLs/functional status to baseline.    
Increase patients ADLs/functional status to baseline.    
Pt alert & oriented, no verbalization of hurting self this shift. Psych consulted. Sitter continued @ bedside for pt's safety. Pt free of any self harm. Pt appears comfortable, calm & cooperative. Pt kept comfortable, provided safe, calm, nonjudgemental care setting. Per pt, states \"just depressed.\" Pt safety maintained.     Pt will verbalize anxiety before it becomes overwhelming and difficult to manage. Pt will use appropriate coping mechanisms to manage signs & symptoms of anxiety at manageable level.     Problem: Self Harm/Suicidality  Goal: Will have no self-injury during hospital stay  Description: INTERVENTIONS:  1.  Ensure constant observer at bedside with Q15M safety checks  2.  Maintain a safe environment  3.  Secure patient belongings  4.  Ensure family/visitors adhere to safety recommendations  5.  Ensure safety tray has been added to patient's diet order  6.  Every shift and PRN: Re-assess suicidal risk via Frequent Screener    7/4/2024 2024 by Dawna Guzman RN  Outcome: Progressing           7/4/2024     7:57 PM   [facility] Suicide Screening   1) Within the past month, have you wished you were dead or wished you could go to sleep and not wake up?  Yes   2) Have you actually had any thoughts of killing yourself?  Yes   3) Have you been thinking about how you might kill yourself?  Yes   4) Have you had these thoughts and had some intention of acting on them?  Yes   5) Have you started to work out or worked out the details of how to kill yourself? Do you intend to carry out this plan?  Yes   6) Have you ever done anything, started to do anything, or prepared to do anything to end your life? No   Did this occur within the past 3 months?  Yes          Pt rates pain level using 0-10 pain scale. Pain meds administered as ordered per MAR. Pt instructed to use call light for increasing pain or ineffective pain management. Pt verbalizes understanding. Call light within reach. Will continue to monitor. 
Pt scoring pain on 0-10 scale. Pain medications given per MAR. Pt instructed to call out when pain level increasing. Call light within reach.     Problem: Pain  Goal: Verbalizes/displays adequate comfort level or baseline comfort level  Outcome: Progressing     
Pt scoring pain on 0-10 scale. Pain medications given per MAR. Pt instructed to call out when pain level increasing. Call light within reach. Nurse will continue to reassess and monitor.   
infection  Outcome: Progressing     Problem: Musculoskeletal - Adult  Goal: Return mobility to safest level of function  Outcome: Progressing     
status  7/4/2024 2149 by Christine Doherty RN  Outcome: Progressing  7/4/2024 2024 by Dawna Guzman RN  Outcome: Progressing  7/4/2024 2018 by Dawna Guzman RN  Outcome: Progressing     Problem: Self Harm/Suicidality  Goal: Will have no self-injury during hospital stay  Description: INTERVENTIONS:  1.  Ensure constant observer at bedside with Q15M safety checks  2.  Maintain a safe environment  3.  Secure patient belongings  4.  Ensure family/visitors adhere to safety recommendations  5.  Ensure safety tray has been added to patient's diet order  6.  Every shift and PRN: Re-assess suicidal risk via Frequent Screener    7/4/2024 2149 by Christine Doherty RN  Outcome: Progressing  7/4/2024 2024 by Dawna Guzman RN  Outcome: Progressing  7/4/2024 2018 by Dawna Guzman RN  Outcome: Progressing     Problem: Neurosensory - Adult  Goal: Achieves stable or improved neurological status  7/4/2024 2149 by Christine Doherty RN  Outcome: Progressing  7/4/2024 2018 by Dawna Guzman RN  Outcome: Progressing     Problem: Skin/Tissue Integrity - Adult  Goal: Skin integrity remains intact  7/4/2024 2149 by Christine Doherty RN  Outcome: Progressing  7/4/2024 2018 by Dawna Guzman RN  Outcome: Progressing  Goal: Incisions, wounds, or drain sites healing without S/S of infection  7/4/2024 2149 by Christine Doherty RN  Outcome: Progressing  7/4/2024 2018 by Dawna Guzman RN  Outcome: Progressing     Problem: Musculoskeletal - Adult  Goal: Return mobility to safest level of function  7/4/2024 2149 by Christine Doherty RN  Outcome: Progressing  7/4/2024 2024 by Dawna Guzman RN  Outcome: Progressing  7/4/2024 2018 by Dawna Guzman RN  Outcome: Progressing

## 2024-07-08 NOTE — CARE COORDINATION
CASE MANAGEMENT DISCHARGE SUMMARY      Discharge to: Home w/private duty 24/7 care for a week. [facility] SN/PT/OT.    IMM given: 7/8/24    New Durable Medical Equipment ordered/agency: [facility] w/[employer]. [employer] providing R arm platform which [employer] will need to replace.     Transportation:    Family/car:private.    Confirmed discharge plan with:     Patient: yes     Family:  per pt he has contacted his ride.     Facility/Agency, name:  [facility] faxed. Angela ramírez/[facility] notified.     RN, name: Helen     Note: Discharging nurse to complete [facility], reconcile [facility], and place final copy with patient's discharge packet.

## 2024-07-08 NOTE — DISCHARGE SUMMARY
Hospital Medicine Discharge Summary    Patient: Jaden Gauthier   : 1945     Admit Date: 2024   Discharge Date: 2024    Disposition:  [x]Home   []HHC  []SNF  []ECF  []Acute Rehab  []LTAC  []Hospice  Code status:  [x]Full  []DNR/CCA  []Limited (DNR/CCA with Do Not Intubate)  []DNRCC  Condition at Discharge: Stable  Primary Care Provider: Quinn Bradley MD    Admitting Provider: Lauren Mckinley MD  Discharge Provider: ZULEIKA MARTINEZ MD     Discharge Diagnoses:      Active Hospital Problems    Diagnosis     Acute carpal tunnel syndrome of right wrist [G56.01]     Right wrist fracture, closed, initial encounter [S62.101A]        Presenting Admission History:      79 y.o. male , with PMH of obesity, hyperlipidemia, hypertension, who presented to [facility] with right wrist pain after a fall.       Of pertinence is that the patient stated he had a right total knee replacement 2 weeks ago by Dr. Veliz at [facility] [place].  He stated since then he has been using a walker to assist him with ambulation.  However on the day of admission the patient lost his balance while reaching for his walker causing him to fall onto his right wrist.    He denied hitting his head and/or loss of consciousness.       In the emergency department an x-ray of the right wrist was obtained that revealed mildly impacted and slightly comminuted distal radius fracture with moderate dorsal angulation of the distal fragments and probable intra-articular extension of the fracture.       Orthopedic surgery was consulted.  He did go on 7/3/2024 for open reduction internal fixation of right distal radius fracture with 3 intra articular fragments.  Right carpal tunnel release     Hospitalization was complicated by suicidal ideation.  Psychiatry consulted.        Assessment/Plan:      Right Wrist fracture  Secondary to mechanical fall  Orthopedic surgery consulted he did go on 7/3/2024 for open reduction internal

## 2024-07-08 NOTE — PROGRESS NOTES
Occupational Therapy    Chart reviewed, attempted to see pt for follow up treatment this date;  pt declining therapy this date.    Nay Gutierrez,OT  
    Patient is POD #1 s/p ORIF right distal radius fx with plate fixation    From ortho standpoint he may proceed to any appopriate treatment program and follow up with me in the office in approx 7-10 days. Leave dressing in place.    In PT if platform attachment is available for walker he may gently rest right forearm on platform for balance and safety. Encourage AROM to fingers and thumb.    Feel free to text or call for any concerns regarding his wrist.      MARSHALL CLANCY MD  [facility]  [phone] (cell)  
    V2.0  [facility] [place] Hospitalist Progress Note      Name:  Jaden Gauthier /Age/Sex: 1945  (79 y.o. male)   MRN & CSN:  [id number] & [id number] Encounter Date/Time: 2024 2:27 PM EDT    Location:  [facility] PCP: Quinn Bradley MD       Hospital Day: 5    Assessment and Plan:   Jaden Gauthier is a 79 y.o. male who presents with Right wrist fracture, closed, initial encounter      Plan:    Hyponatremia  Patient with a sodium of 128 at presentation.  Received IV normal saline  Stop IV fluids  Fluid restriction of 1800 cc      Wrist fracture  Secondary to mechanical fall  Orthopedic surgery consulted  Splint with plans for surgical intervention: Will determine if inpatient versus outpatient  PT OT for placement    Recent total knee replacement  Patient had his knee replaced 2 weeks ago.  Orthopedics  consulted  PT OT: Patient benefit from inpatient rehab    Hypertension  Continue nifedipine, hydralazine    Hyperlipidemia  Continue statin    Severe depression with suicidal ideation  Patient with suicidal thoughts as well as plan.  Endorsed that he would take a while bills and ended all.  Psychiatry consult  Suicidal precaution  Sitter at bedside  Will determine if patient needs inpatient psychiatry at discharge    Diet ADULT DIET; Regular; 1800 ml; Safety Tray; Safety Tray (Disposables)   DVT Prophylaxis [] Lovenox, []  Heparin, [] SCDs, [] Ambulation,    [] Eliquis, [] Xarelto  [] Coumadin [] other   Code Status Full Code   Disposition From: Home  Expected Disposition:  inpatient psych  Estimated Date of Discharge: 1 to 2 days  Patient requires continued admission due to hyponatremia   Surrogate Decision Maker/ POA      Personally reviewed Lab Studies and Imaging     Discussed management of the case with orthopedics who recommended splinting and outpatient surgical intervention    EKG interpreted personally and results no acute ST changes    Imaging that was interpreted personally includes wrist x-ray and results 
    V2.0  [facility] [place] Note      Name:  Jaden Gauthier /Age/Sex: 1945  (79 y.o. male)   MRN & CSN:  [id number] & [id number] Encounter Date/Time: 2024 2:27 PM EDT    Location:  [id number] PCP: Quinn Bradley MD       Hospital Day: 6    Assessment and Plan:   Jaden Gauthier is a 79 y.o. male who presents with Right wrist fracture, closed, initial encounter      Plan:    Hyponatremia  Patient with a sodium of 128 at presentation.  Received IV normal saline  Stop IV fluids  Fluid restriction of 1800 cc      Wrist fracture  Secondary to mechanical fall  Orthopedic surgery consulted  Splint with plans for surgical intervention:Surgery on July 3 at 1pm  PT OT for placement    Recent total knee replacement  Patient had his knee replaced 2 weeks ago.  Orthopedics  consulted  PT OT: Patient benefit from inpatient rehab    Hypertension  Continue nifedipine, hydralazine    Hyperlipidemia  Continue statin    Severe depression with suicidal ideation  Patient with suicidal thoughts as well as plan.  Endorsed that he would take a while bills and ended all.  Psychiatry consult  Suicidal precaution  Sitter at bedside  Will determine if patient needs inpatient psychiatry at discharge    Diet ADULT DIET; Regular; 1800 ml; Safety Tray; Safety Tray (Disposables)   DVT Prophylaxis [] Lovenox, []  Heparin, [] SCDs, [] Ambulation,    [] Eliquis, [] Xarelto  [] Coumadin [] other   Code Status Full Code   Disposition From: Home  Expected Disposition:  inpatient psych  Estimated Date of Discharge: 1 to 2 days  Patient requires continued admission due to Surgery on July 3 at 1pm   Surrogate Decision Maker/ POA      Personally reviewed Lab Studies and Imaging     Discussed management of the case with orthopedics who recommended splinting and outpatient surgical intervention    EKG interpreted personally and results no acute ST changes    Imaging that was interpreted personally includes wrist x-ray and results 
   07/01/24 0932   Encounter Summary   Encounter Overview/Reason Advance Care Planning   Service Provided For Patient   Referral/Consult From Multi-disciplinary team   Support System Children   Last Encounter  07/01/24  ( visited; provided pastoral support and assisted with completion of ADs -- HCPOA.)   Begin Time 0855   End Time  0920   Total Time Calculated 25 min   Advance Care Planning   Type ACP conversation;Completed AD/ACP document(s)   Assessment/Intervention/Outcome   Assessment Coping       
  Hospital Medicine Progress Note      Date of Admission: 6/26/2024  Hospital Day: 10    Chief Admission Complaint:  Fall     Subjective:  He is sitting up in bed, has pain in his right wrist but fairly well controlled.  Denies chest pain or shortness of breath.      Presenting Admission History:       79 y.o. male , with PMH of obesity, hyperlipidemia, hypertension, who presented to [facility] with right  wrist pain after a fall.       Of pertinence is that the patient stated he had a right total knee replacement 2 weeks ago by Dr. Veliz at [facility] [place].  He stated since then he has been using a walker to assist him with ambulation.  However on the day of admission the patient lost his balance while reaching for his walker causing him to fall onto his right wrist.    He denied hitting his head and/or loss of consciousness.       In the emergency department an x-ray of the right wrist was obtained that revealed mildly impacted and slightly comminuted distal radius fracture with moderate dorsal angulation of the distal fragments and probable intra-articular extension of the fracture.       Orthopedic surgery was consulted.  He did go on 7/3/2024 for open reduction internal fixation of right distal radius fracture with 3 intra articular fragments.  Right carpal tunnel release     Hospitalization is complicated by suicidal ideation.  Psychiatry consulted.  He has a sitter at bedside and planning on inpatient psychiatry at discharge,    He is currently medically stable for transfer to inpatient psychiatry    Assessment/Plan:      Current Principal Problem:  Right wrist fracture, closed, initial encounter    Right Wrist fracture  Secondary to mechanical fall  Orthopedic surgery consulted he did go on 7/3/2024 for open reduction internal fixation of right distal radius fracture with 3 intra articular fragments.  Right carpal tunnel release.  Continue analgesia, continue PT/OT        Recent total 
  Hospital Medicine Progress Note      Date of Admission: 6/26/2024  Hospital Day: 11    Chief Admission Complaint:  fall     Subjective:  He is sitting up in bed, has some pain in his right wrist but well controlled.  Denies chest pain or shortness of breath.     Presenting Admission History:       9 y.o. male , with PMH of obesity, hyperlipidemia, hypertension, who presented to [facility] with right  wrist pain after a fall.       Of pertinence is that the patient stated he had a right total knee replacement 2 weeks ago by Dr. Veliz at [facility].  He stated since then he has been using a walker to assist him with ambulation.  However on the day of admission the patient lost his balance while reaching for his walker causing him to fall onto his right wrist.    He denied hitting his head and/or loss of consciousness.       In the emergency department an x-ray of the right wrist was obtained that revealed mildly impacted and slightly comminuted distal radius fracture with moderate dorsal angulation of the distal fragments and probable intra-articular extension of the fracture.       Orthopedic surgery was consulted.  He did go on 7/3/2024 for open reduction internal fixation of right distal radius fracture with 3 intra articular fragments.  Right carpal tunnel release     Hospitalization is complicated by suicidal ideation.  Psychiatry consulted.  He has a sitter at bedside and planning on inpatient psychiatry at discharge,         Assessment/Plan:      Current Principal Problem:  Right wrist fracture, closed, initial encounter    Right Wrist fracture  Secondary to mechanical fall  Orthopedic surgery consulted he did go on 7/3/2024 for open reduction internal fixation of right distal radius fracture with 3 intra articular fragments.  Right carpal tunnel release.  Continue analgesia, continue PT/OT        Recent total knee replacement  Patient had his knee replaced 2 weeks 
  Hospital Medicine Progress Note      Date of Admission: 6/26/2024  Hospital Day: 13    Chief Admission Complaint:  fall      Subjective:  He is sitting up in room, pain in wrist is well controlled. No complaints at this time.     Presenting Admission History:       79 y.o. male , with PMH of obesity, hyperlipidemia, hypertension, [facility].       Of pertinence is that the patient stated he had a right total knee replacement 2 weeks ago by Dr. Veliz at [facility].  He stated since then he has been using a walker to assist him with ambulation.  However on the day of admission the patient lost his balance while reaching for his walker causing him to fall onto his right wrist.    He denied hitting his head and/or loss of consciousness.       In the emergency department an x-ray of the right wrist was obtained that revealed mildly impacted and slightly comminuted distal radius fracture with moderate dorsal angulation of the distal fragments and probable intra-articular extension of the fracture.       Orthopedic surgery was consulted.  He did go on 7/3/2024 for open reduction internal fixation of right distal radius fracture with 3 intra articular fragments.  Right carpal tunnel release     Hospitalization is complicated by suicidal ideation.  Psychiatry consulted.  He has a sitter at bedside and we have been planning on inpatient psychiatry at discharge,          Assessment/Plan:      Current Principal Problem:  Right wrist fracture, closed, initial encounter    Right Wrist fracture  Secondary to mechanical fall  Orthopedic surgery consulted he did go on 7/3/2024 for open reduction internal fixation of right distal radius fracture with 3 intra articular fragments.  Right carpal tunnel release.  Continue analgesia, continue PT/OT. He is doing well from this issue.         Recent total knee replacement  Patient had his knee replaced 2 
  Hospital Medicine Progress Note      Date of Admission: 6/26/2024  Hospital Day: 7    Chief Admission Complaint:  Fall      Subjective:  Reports pain adequately controlled. Plan OR 7/3.     Presenting Admission History:       Jaden Gauthier is a 79 y.o. male who presents with Right wrist fracture, closed, initial encounter    Assessment/Plan:      Hyponatremia  Patient with a sodium of 128 at presentation.  Received IV normal saline  Stop IV fluids  Fluid restriction of 1800 cc  Improving      Wrist fracture  Secondary to mechanical fall  Orthopedic surgery consulted  Splint with plans for surgical intervention on July 3 at 1pm    Recent total knee replacement  Patient had his knee replaced 2 weeks ago.  Orthopedics  consulted  PT OT  He is ambulating better; due to psychiatric concerns, not suitable for rehab at this time     Hypertension  Continue nifedipine, hydralazine     Hyperlipidemia  Continue statin     Severe depression with suicidal ideation  Patient with suicidal thoughts as well as plan.  Endorsed that he would take a while bills and ended all.  Psychiatry consult  Suicidal precaution  Sitter at bedside  Inpatient psychiatry at discharge    Physical Exam Performed:      General appearance:  No apparent distress  Respiratory:  Normal respiratory effort.   Cardiovascular:  Regular rate and rhythm.  Abdomen:  Soft, non-tender, non-distended.  Musculoskelatal:  No edema  Neurologic:  Non-focal  Psychiatric:  Alert and oriented    BP (!) 146/69   Pulse 70   Temp 97.8 °F (36.6 °C) (Oral)   Resp 16   Ht 1.88 m (6' 2\")   Wt 105.1 kg (231 lb 11.3 oz)   SpO2 96%   BMI 29.75 kg/m²     Diet: ADULT DIET; Regular; 1800 ml; Safety Tray; Safety Tray (Disposables)  Diet NPO  DVT Prophylaxis: [x]PPx LMWH  []SQ Heparin  []IPC/SCDs  []Eliquis  []Xarelto  []Coumadin  [] Heparin Drip  []Other -      Code status: Full Code  PT/OT Eval Status:   []NOT yet ordered  []Ordered and Pending   [x]Seen with Recommendations 
  Hospital Medicine Progress Note      Date of Admission: 6/26/2024  Hospital Day: 8    Chief Admission Complaint:  Fall      Subjective:  Reports pain adequately controlled. Plan OR 7/3.     Presenting Admission History:       Jaden Gauthier is a 79 y.o. male who presents with Right wrist fracture, closed, initial encounter    Assessment/Plan:       Wrist fracture  Secondary to mechanical fall  Orthopedic surgery consulted  Splint with plans for surgical intervention on July 3 at 1pm    Recent total knee replacement  Patient had his knee replaced 2 weeks ago.  Orthopedics  consulted  PT OT  He is ambulating better; due to psychiatric concerns, not suitable for rehab at this time  Hyponatremia  Patient with a sodium of 128 at presentation.  Received IV normal saline  Stop IV fluids  Fluid restriction of 1800 cc     Hypertension  Continue nifedipine, hydralazine     Hyperlipidemia  Continue statin     Severe depression with suicidal ideation  Patient with suicidal thoughts as well as plan.  Endorsed that he would take a while bills and ended all.  Psychiatry consult  Suicidal precaution  Sitter at bedside  Inpatient psychiatry at discharge    Physical Exam Performed:      General appearance:  No apparent distress  Respiratory:  Normal respiratory effort.   Cardiovascular:  Regular rate and rhythm.  Abdomen:  Soft, non-tender, non-distended.  Musculoskelatal: Upper extremity in splint and dressing  Neurologic:  Non-focal  Psychiatric:  Alert and oriented    BP (!) 141/64   Pulse 66   Temp 98.1 °F (36.7 °C)   Resp 14   Ht 1.88 m (6' 2\")   Wt 105.1 kg (231 lb 11.3 oz)   SpO2 95%   BMI 29.75 kg/m²     Diet: Diet NPO  DVT Prophylaxis: [x]PPx LMWH  []SQ Heparin  []IPC/SCDs  []Eliquis  []Xarelto  []Coumadin  [] Heparin Drip  []Other -      Code status: Full Code  PT/OT Eval Status:   []NOT yet ordered  []Ordered and Pending   [x]Seen with Recommendations for:  []Home independently  []Home w/ assist  []HHC  []SNF  
/Patient expressed that if he had a bridge or a high building he would jump off but that he does stay away from heights. Patient states he just wants to see his wife who passed away three years ago.   
4 Eyes Skin Assessment     NAME:  Jaden Gauthier  YOB: 1945  MEDICAL RECORD NUMBER:  [id number]    The patient is being assessed for  Admission    I agree that at least one RN has performed a thorough Head to Toe Skin Assessment on the patient. ALL assessment sites listed below have been assessed.      Areas assessed by both nurses:    Head, Face, Ears, Shoulders, Back, Chest, Arms, Elbows, Hands, Sacrum. Buttock, Coccyx, Ischium, Legs. Feet and Heels, and Under Medical Devices         Does the Patient have a Wound? No noted wound(s)       Ajay Prevention initiated by RN: No  Wound Care Orders initiated by RN: No    Pressure Injury (Stage 3,4, Unstageable, DTI, NWPT, and Complex wounds) if present, place Wound referral order by RN under : No    New Ostomies, if present place, Ostomy referral order under : No     Nurse 1 eSignature: Electronically signed by Janeth Alvarez RN on 6/27/24 at 6:17 AM EDT    **SHARE this note so that the co-signing nurse can place an eSignature**    Nurse 2 eSignature: Electronically signed by Talita Duvall RN on 6/27/24 at 7:53 AM EDT   
Anika Rosario, contact in chart, called back stating pt told her he wants nothing to do with her. She states she is putting the guns and narcotics back in his home and doesn't want to be involved.     Helen Stone RN    
Attempted to visit with Pt, which he declined. Assured Pt of our continued availability for support if needed.    Lev Thrasher         07/03/24 1006   Encounter Summary   Encounter Overview/Reason Follow-up   Service Provided For Patient   Referral/Consult From Rounding   Last Encounter    (7/3 PT declined visit)   Complexity of Encounter Low   Begin Time 1001   End Time  1006   Total Time Calculated 5 min       
Comprehensive Nutrition Assessment    Type and Reason for Visit:  Reassess    Nutrition Recommendations/Plan:   Resume regular diet when able to consume PO  Add strawberry ensure when medically appropriate   RD to order new updated weight  Monitor nutrition adequacy, pertinent labs, bowel habits, wt changes, and clinical progress     Malnutrition Assessment:  Malnutrition Status:  At risk for malnutrition (Comment) (07/03/24 1256)    Context:  Chronic Illness     Findings of the 6 clinical characteristics of malnutrition:  Energy Intake:  Mild decrease in energy intake (Comment)  Weight Loss:  Mild weight loss (specify amount and time period) (-9.7% wt change x 6 months)     Muscle Mass Loss:  Mild muscle mass loss Clavicles (pectoralis & deltoids), Temples (temporalis)  Fluid Accumulation:  Mild Extremities    Nutrition Assessment:    Follow up: Pt NPO for splint with plans for surgical intervention today. Pt unavailable at time of visit this morning, now in OR. Previously on regular diet w/ PO intakes of % of meals in EMR. Recommend resuming diet and add strawberry ensure BID when medically appropriate and able to consume PO. Will continue to monitor.    Nutrition Related Findings:    RUE + 1, RLE + 3 pitting edema. Na 133. + 2 BM on 7/1. Wound Type: None       Current Nutrition Intake & Therapies:    Average Meal Intake: 26-50%, 51-75%, %  Average Supplements Intake: Unable to assess  Diet NPO    Anthropometric Measures:  Height: 188 cm (6' 2\")  Ideal Body Weight (IBW): 190 lbs (86 kg)       Current Body Weight: 104.8 kg (231 lb), 129.5 % IBW. Weight Source: Bed Scale  Current BMI (kg/m2): 29.6  Usual Body Weight: 116.1 kg (256 lb) (On 12/12/23)  % Weight Change (Calculated): -3.9  Weight Adjustment For: No Adjustment                 BMI Categories: Obese Class 1 (BMI 30.0-34.9)    Estimated Daily Nutrient Needs:  Energy Requirements Based On: Kcal/kg (22-28 kcals/kg)  Weight Used for Energy 
Contacted by staff over weekend that this patient had psychiatric deterioration, suicidal ideation and will now require commitment to [facility]win.    We will try to schedule ORIF Distal radius in next few days,while he is inpatient,  working on this with my specialty partners.  Timing CÉSAR Irwin   
Dr Gandhi aware of patient in pain of 10 and aware patient treated with dilaudid per mar md would not give order for additional oxycodone more than 5mg ordered on mar patient treated per mar with 5 mg oxycodone  
I spoke with Dr Augie Vallejo and his team, they agree with plan;    Mobilize with PT/OT and platform walker.    Schedule outpatient surgery for wrist - can see Dr Elton Glass next week   Dispo plan [facility] if needed. Cont [facility] Re[facility]    TW  
Occupational Therapy    Pt unable to be see for follow up tx this PM for OT, as pt is presently out of room for ORIF surgery. Will re-attempt at later time as schedule and pt condition allows. Thank you.    Missy Soto OTR/L  
Occupational Therapy  Attempted to see pt for OT follow-up this afternoon. Pt politely declined services d/t being fatigued from PT session and wanting to rest before surgery tomorrow. Will re-attempt at a later time as schedule and pt condition allows. Thank you.    Riya Lopez, OTR/L  
Occupational Therapy  Facility/Department: [facility] C[facility] - MED SURG/ORTHO  Treatment Note    Name: Jaden Gauthier  : 1945  MRN: [id number]  Date of Service: 2024    Discharge Recommendations:  Subacute/Skilled Nursing Facility  Therapy discharge recommendations take into account each patient's current medical complexities and are subject to input/oversight from the patient's healthcare team.   Barriers to Home Discharge:   [x] Steps to access home entry or bed/bath:   [x] Unable to transfer, ambulate, or propel wheelchair household distances without assist   [x] Limited available assist at home upon discharge      [x] Poor cognition/safety awareness for d/c to home alone       If pt is unable to be seen after this session, please let this note serve as discharge summary.  Please see case management note for discharge disposition.  Thank you.           Patient Diagnosis(es): The primary encounter diagnosis was Closed fracture of right wrist, initial encounter. A diagnosis of Hyponatremia was also pertinent to this visit.  Past Medical History:  has a past medical history of Acute renal failure (ARF) ([facility]), Acute renal failure superimposed on stage 3 chronic kidney disease (HCC), Anxiety, Bradycardia, Carotid artery occlusion without infarction, unspecified laterality, Cerebral artery occlusion with cerebral infarction ([facility]), ED (erectile dysfunction), GERD (gastroesophageal reflux disease), Hypercholesteremia, Hypertension, Low back pain, Major depressive disorder, recurrent, mild, Osteoarthritis, Strabismus, TIA (transient ischemic attack), TIA (transient ischemic attack), and TIA (transient ischemic attack).  Past Surgical History:  has a past surgical history that includes cyst removal; Esophagogastric fundoplication; Tonsillectomy; Strabismus surgery; External ear surgery; Colonoscopy (); Upper gastrointestinal endoscopy (); Upper gastrointestinal endoscopy (2014); eye surgery (Left); Knee 
Occupational Therapy  Facility/Department: [facility] C[facility] - MED SURG/ORTHO  Daily Treatment Note  NAME: Jaden Gauthier  : 1945  MRN: [id number]  Date of Service: 2024    Discharge Recommendations:  Subacute/Skilled Nursing Facility  OT Equipment Recommendations  Equipment Needed: No  Other: Defer to next level of care    AM-PAC score  AM-PAC Inpatient Daily Activity Raw Score: 16 (24)  AM-PAC Inpatient ADL T-Scale Score : 35.96 (24)  ADL Inpatient CMS 0-100% Score: 53.32 (24)  ADL Inpatient CMS G-Code Modifier : CK (24)    Therapy discharge recommendations take into account each patient's current medical complexities and are subject to input/oversight from the patient's healthcare team.   Barriers to Home Discharge:   [x] Steps to access home entry or bed/bath:   [x] Unable to transfer, ambulate, or propel wheelchair household distances without assist   [x] Limited available assist at home upon discharge    [x] Patient or family requests d/c to post-acute facility    [x] Poor cognition/safety awareness for d/c to home alone    []Unable to maintain ordered weight bearing status    [] Patient with salient signs of long-standing immobility   [x] Patient is at risk for falls   [x] Other: Decreased safety and independence w/ ADLs.     If pt is unable to be seen after this session, please let this note serve as discharge summary.  Please see case management note for discharge disposition.  Thank you.    Patient Diagnosis(es): The primary encounter diagnosis was Closed fracture of right wrist, initial encounter. A diagnosis of Hyponatremia was also pertinent to this visit.     Assessment    Assessment: Pt received for OT session resting in bed to address current functional deficits that inhibit independence and safety with ADLs and functional mobility. Pt agreeable to session, reporting little pain in R knee. During session, pt completed bed mobility w/ increased time and mod. 
Ortho    Called last night re this patient who is post op TKR by my partner DR Augie Veliz.  He suffered a fall and has displaced distal radius fracture.  Difficulty managing his discharge from ER due to poor mobility with fracture and was admitted.    With regard to the fracture, he is splinted.  He can weight bear through a platform walker.  We can elevate the hand and encourage finger ROM    He may need placement at a SNF due to recent fall demonstrating balance issues and lack of help    He can then schedule and outpatient ORIF distal radius next week with one of our hand specialists.      I spoke to the nurse, ordered diet and PT/OT    NIKITA Irwin MD   
Patient awake alert assessment unchanged states pain diminished Report called to Pooja and patient  to room   
Patient continues to endorse pain in the wrist.  States that pain medication are helping.  Awaiting evaluation by orthopedics for neck steps.      Toro Magallon MD  Hospitalist    
Patient endorsing suicidal ideation with a plan.      Patient stated to nursing staff that he wanted take a bottle of pills and Adderall.  The patient's wife committed suicide in a similar manner 3 years ago and he stated that he just wants to go be with his wife.  Endorse that if he did have a bottle of pills he would take them now.    At this time I will:    Placed the patient in suicide precautions   Sitter at bedside  Psychiatry consulted  Will place patient on medical hold and if he desires to leave will utilize involuntary hold (pink slip)until seen by psychiatry    Toro Magallon MD  Hospitalist    
Patient on suicide watch with constant PCA at bedside and PCA is documenting in chart on observations. Patient awake alert no issues no concerns no complaints per patient. Patient states feels safe at the moment.   
Patient unit alert and oriented x3, able to make needs known. Skin assessment completed x2 Rn, no open wounds. Right knee swollen, red and warm, patient states he fell on his knee as well as his right wrist. Patient oriented to room, call light and orders. Urinal at bedside, ice packs applied to right wrist and right knee. Call light in reach, bed in lowest position with bed alarm activated.   
Patient with PCA at bedside under suicide watch   
Physical Therapy  Attempted to see pt this am however pt reports he just ambulated in the hallway. Pt requests for PT to check back in after lunch. Will re-attempt as schedule allows.    Dima Galan, PT, DPT    
Physical Therapy  Facility/Department: [facility] C5 - MED SURG/ORTHO  Daily Treatment Note  NAME: Jaden Gauthier  : 1945  MRN: [id number]    Date of Service: 2024    Discharge Recommendations:  Subacute/Skilled Nursing Facility   PT Equipment Recommendations  Equipment Needed: No  Other: defer to next level of care, anticipate platform RW    Therapy discharge recommendations are subject to collaboration from the patient’s interdisciplinary healthcare team, including MD and case management recommendations.    Barriers to Home Discharge:   [x] Steps to access home entry or bed/bath:   [x] Unable to transfer, ambulate, or propel wheelchair household distances without assist   [x] Limited available assist at home upon discharge    [] Patient or family requests d/c to post-acute facility    [x] Poor safety awareness for d/c to home alone    [] Unable to maintain ordered weight bearing status    [] Patient with salient signs of long-standing immobility   [x] Decreased independence with ADLs   [x] Increased risk for falls   [] Other:      Patient Diagnosis(es): The primary encounter diagnosis was Closed fracture of right wrist, initial encounter. A diagnosis of Hyponatremia was also pertinent to this visit.    Assessment   Assessment: pt required 2 skilled therapists to progress to higher level of function with maximal safety, PT focus on gait and stair training, pt required CGA for ambulation of functional distance, pt required Min A to avoid loss of balance when up on curb, pt required assist to lift and lower walker on step, pt tolerated bed mobility training, transfer training, gait training, stair training, and seated therex, pt with warm, red R knee, RN aware, pt will benefit from continued Acute Inpatient Skilled PT to address functional mobility deficits, this PT recommends Short-term Skilled PT at SNF at [place]  Activity Tolerance: Patient tolerated treatment well  Equipment Needed: No  Other: defer to next 
Physical Therapy  Facility/Department: [facility] C5 - MED SURG/ORTHO  Daily Treatment Note  NAME: Jaden Gauthier  : 1945  MRN: [id number]    Date of Service: 2024    Discharge Recommendations:  Home with assist PRN, Home with Home health PT   PT Equipment Recommendations  Equipment Needed: Yes  Mobility Devices: Walker  Walker: Guillermo-walker    Patient Diagnosis(es): The primary encounter diagnosis was Closed fracture of right wrist, initial encounter. Diagnoses of Hyponatremia and Major depressive disorder, recurrent, moderate (HCC) were also pertinent to this visit.    Assessment   Assessment: Pt seen for second session to re-assess for required adaptive equipment for safe d/c home. PT introduced guillermo-walker for least restrictive assistive device. Pt required SBA progressing to supervision with L guillermo-walker and 3-point gait pattern. Pt demo good understanding on use of guillermo-walker and reports he feels steady using it. Pt would benefit from continued skilled PT to address remaining deficits. Recommend home with PRN assist and HHPT upon d/c  Activity Tolerance: Patient tolerated treatment well  Equipment Needed: Yes  Mobility Devices: Walker     Plan    Physical Therapy Plan  General Plan: 3-5 times per week  Current Treatment Recommendations: Strengthening;ROM;Balance training;Functional mobility training;Transfer training;Endurance training;Neuromuscular re-education;Gait training;Stair training;Therapeutic activities;Patient/Caregiver education & training;Safety education & training;Equipment evaluation, education, & procurement  Additional Comments: Pt seen for additional session to re-assess for appropriate DME by PT for safe d/c home     Restrictions  Restrictions/Precautions  Restrictions/Precautions: General Precautions, Up as Tolerated, Weight Bearing, Suicide, Fall Risk  Lower Extremity Weight Bearing Restrictions  Right Lower Extremity Weight Bearing: Weight Bearing As Tolerated  Upper Extremity 
Physical Therapy  Facility/Department: [facility] C5 - MED SURG/ORTHO  Daily Treatment Note  NAME: Jaden Gauthier  : 1945  MRN: [id number]    Date of Service: 2024    Discharge Recommendations:  Home with assist PRN, Home with Home health PT   PT Equipment Recommendations  Equipment Needed: Yes  Walker: Platform Right    Patient Diagnosis(es): The primary encounter diagnosis was Closed fracture of right wrist, initial encounter. A diagnosis of Hyponatremia was also pertinent to this visit.    Assessment   Assessment: Pt agreeable to PT tx this pm and making good progress toward goals. Pt able to complete STS transfers with platform walker and superv, ambulate 250 ft with platform walker and superv, and ascend/descend 7 steps with superv. Pt continues to be limited by R knee and wrist pain, decreased activity tolerance and safety awareness and would continue to benefit from skilled PT to address. Continue to rec SNF upon dc.  Activity Tolerance: Patient tolerated treatment well  Equipment Needed: Yes     Plan    Physical Therapy Plan  General Plan: 3-5 times per week  Current Treatment Recommendations: Strengthening;ROM;Balance training;Functional mobility training;Transfer training;Endurance training;Neuromuscular re-education;Gait training;Stair training;Therapeutic activities     Restrictions  Restrictions/Precautions  Restrictions/Precautions: General Precautions, Up as Tolerated, Weight Bearing, Suicide, Fall Risk  Lower Extremity Weight Bearing Restrictions  Right Lower Extremity Weight Bearing: Weight Bearing As Tolerated  Upper Extremity Weight Bearing Restrictions  Right Upper Extremity Weight Bearing: Non Weight Bearing  Position Activity Restriction  Other position/activity restrictions: IV, tele     Subjective    Subjective  Subjective: Pt up in chair; agreeable to PT tx.  Pain: Pt denies pain at rest.  Orientation  Overall Orientation Status: Within Normal Limits  Orientation Level: Oriented 
Physical Therapy  Facility/Department: [facility] C5 - MED SURG/ORTHO  Daily Treatment Note  NAME: Jaden Gauthier  : 1945  MRN: [id number]    Date of Service: 2024    Discharge Recommendations:  Subacute/Skilled Nursing Facility   PT Equipment Recommendations  Equipment Needed: No  Other: defer to next level of care, anticipate platform RW    Patient Diagnosis(es): The primary encounter diagnosis was Closed fracture of right wrist, initial encounter. A diagnosis of Hyponatremia was also pertinent to this visit.    Assessment   Assessment: Pt agreeable to PT tx this am and making good progress toward goals. Pt able to complete bed mobility with mod I, STS transfers with platform walker and CGA, ambulate 250 ft with platform walker and CGA, and ascend/descend 1 curb step with platform walker and CGA. Pt continues to be limited by R knee and wrist pain, decreased activity tolerance and safety awareness and would continue to benefit from skilled PT to address. Continue to rec SNF upon dc.  Activity Tolerance: Patient tolerated treatment well  Equipment Needed: No  Other: defer to next level of care, anticipate platform RW     Plan    Physical Therapy Plan  General Plan: 3-5 times per week  Current Treatment Recommendations: Strengthening;ROM;Balance training;Functional mobility training;Transfer training;Endurance training;Neuromuscular re-education;Gait training;Stair training;Therapeutic activities     Restrictions  Restrictions/Precautions  Restrictions/Precautions: General Precautions, Up as Tolerated, Weight Bearing, Suicide, Fall Risk  Lower Extremity Weight Bearing Restrictions  Right Lower Extremity Weight Bearing: Weight Bearing As Tolerated  Upper Extremity Weight Bearing Restrictions  Right Upper Extremity Weight Bearing: Non Weight Bearing  Position Activity Restriction  Other position/activity restrictions: IV, tele     Subjective    Subjective  Subjective: Pt semi-supine; agreeable to PT tx.  Pain: Pt 
Physical Therapy: Two attempts to see pt for follow up session. On 1st attempt pt had agreed to therapy and performed 10 ankle pumps when session interupted for zoom Psychiatry visit. On 2nd attempt to cont session pt stated \"I'm not going to do physical therapy today, I know my exercises\". PT encouraged pt to do knee ex to benefit recovery from recent TKR done 6/25. Pt cont to decline.  Elizabeth Wesley PT [id number]  
Pre and post operative expectations and routines explained to patient, verbalized understanding.  
Pt discharged from facility. All questions answered. IV removed w/o complications. Azalia received.     Helen Stone RN    
Pt states no BM in >1 week. Bowel sounds active. RN gave PRN glycolax per MAR.    Helen Stone RN    
RN called [facility] - Pt still at [facility]. He will not be making 1000 appt in office.     Helen Stone RN    
Received report and patient from pre-op area from T.M. rn. [facility] for surgical procedure. Patient was under direct supervision by THERESA basilio throughout alyssa-op and received into pacu directly after surgery and under supervision.   
[facility] called requesting a \"pink slip\" for transfer to [facility].  The note had not been filled out, nor was it clear that the patient has a hold.  Writer reached out to hospitalist and psych, and psych responded that they would not be in the facility until 7/8.  Psych stated that attending could submit documentation to proceed with transfer.  Writer messaged several providers, and no solution was provided to expedite the transfer process.    
Requirements: Ideal (86 kg)  Energy (kcal/day): 0995-6601  Weight Used for Protein Requirements: Ideal (1-1.2 g/kg)  Protein (g/day):  g  Method Used for Fluid Requirements: 1 ml/kcal  Fluid (ml/day):      Nutrition Diagnosis:   Inadequate oral intake related to inadequate protein-energy intake as evidenced by poor intake prior to admission, weight loss, mild muscle loss, NPO or clear liquid status due to medical condition    Nutrition Interventions:   Food and/or Nutrient Delivery: Start Oral Diet, Start Oral Nutrition Supplement  Nutrition Education/Counseling: Education not appropriate  Coordination of Nutrition Care: Continue to monitor while inpatient       Goals:     Goals: PO intake 50% or greater, prior to discharge       Nutrition Monitoring and Evaluation:   Behavioral-Environmental Outcomes: None Identified  Food/Nutrient Intake Outcomes: Supplement Intake, Food and Nutrient Intake  Physical Signs/Symptoms Outcomes: Biochemical Data, Weight, Nutrition Focused Physical Findings    Discharge Planning:    Too soon to determine     Nataliya Alejandra MS, RD, LD  Contact: Office: [phone]; Luis: [phone]      
activity change, appetite change, chills, fatigue and fever.   HENT:  Negative for congestion, hearing loss, sinus pressure, sinus pain, sore throat, trouble swallowing and voice change.    Eyes:  Negative for visual disturbance.   Respiratory:  Negative for cough, chest tightness, shortness of breath and wheezing.    Cardiovascular:  Negative for chest pain, palpitations and leg swelling.   Gastrointestinal:  Negative for abdominal pain, constipation, diarrhea, nausea and vomiting.   Endocrine: Negative for cold intolerance, heat intolerance and polyuria.   Genitourinary:  Negative for dysuria.   Musculoskeletal:  Positive for arthralgias. Negative for back pain and gait problem.   Skin:  Negative for color change.   Neurological:  Negative for dizziness, weakness and headaches.   Psychiatric/Behavioral:  Negative for agitation and confusion. The patient is not nervous/anxious.          Objective:     Intake/Output Summary (Last 24 hours) at 6/28/2024 1427  Last data filed at 6/28/2024 0600  Gross per 24 hour   Intake --   Output 1300 ml   Net -1300 ml        Vitals:   Vitals:    06/28/24 1116   BP: 119/62   Pulse: 74   Resp: 15   Temp: 98.5 °F (36.9 °C)   SpO2: 97%       Physical Exam:     Physical Exam  Constitutional:       General: He is not in acute distress.     Appearance: Normal appearance.   HENT:      Head: Normocephalic and atraumatic.      Nose: Nose normal.      Mouth/Throat:      Mouth: Mucous membranes are moist.      Pharynx: Oropharynx is clear.   Eyes:      Extraocular Movements: Extraocular movements intact.      Conjunctiva/sclera: Conjunctivae normal.      Pupils: Pupils are equal, round, and reactive to light.   Cardiovascular:      Rate and Rhythm: Normal rate and regular rhythm.      Pulses: Normal pulses.      Heart sounds: Normal heart sounds.   Pulmonary:      Effort: Pulmonary effort is normal.   Abdominal:      General: Abdomen is flat. Bowel sounds are normal.      Palpations: Abdomen 
psychiatric concerns, not suitable for rehab at this time     Hyponatremia  Patient with a sodium of 128 at presentation.  Received IV normal saline  Stop IV fluids  Fluid restriction of 1800 cc  Sodium= 130 today IV fluids dc'd.  Suspect sodium will normalize over next few days and will follow.      HTN - w/out known CAD and no evidence of active signs/sxs of ischemia/failure. Currently controlled on home meds w/ vitals documented and reviewed.      HyperLipidemia - normally controlled on home Statin. Continued.  F/U w/ PCP outpt for medication initiation/adjustment as needed.       Severe depression with suicidal ideation  Patient with suicidal thoughts as well as plan.  Psychiatry consult is noted from 6/29/24   Suicidal precaution  Sitter at bedside  He was recommended Inpatient psychiatry at discharge      He now informs me that he is not suicidal. He does not want to harm himself or anyone else.   He does not want to go to inpatient psych at [place] where it was arranged. He is adamant about this.   To transfer him to inpatient psych does require me to complete a 72 hour hold stating that he is a risk to himself.  As he is now denying this I cannot complete this hold.   I would like Psychiatry to see him again to get their opinion and they are not available till Monday.  Will hold off on sending him to inpatient psych and will keep him here due to this reason.        Physical Exam Performed:      General appearance: He is up in bed, in no apparent distress  Respiratory: Bilateral breath sounds, clear to auscultation bilaterally normal respiratory effort.   Cardiovascular: S1, S2 regular rate and rhythm.  Abdomen: Positive bowel sounds soft, non-tender, non-distended.  Musculoskeletal:  No edema  Right wrist with dressing in place clean and  Neurologic:  Non-focal  Psychiatric:  Alert and oriented x 3         BP (!) 146/66   Pulse 65   Temp 97.9 °F (36.6 °C) (Oral)   Resp 18   Ht 1.88 m (6' 2\")   Wt 105.1 
notes from yesterday/today were reviewed  [x] All current labs were reviewed and interpreted for clinical significance   [x] Appropriate follow-up labs were ordered  [] Collateral history obtained from:        Medications:  Personally reviewed in detail in conjunction w/ labs as documented for evidence of drug toxicity.     Infusion Medications    sodium chloride 75 mL/hr at 07/03/24 1813    sodium chloride       Scheduled Medications    [START ON 7/5/2024] NIFEdipine  90 mg Oral Daily    sodium chloride flush  5-40 mL IntraVENous 2 times per day    enoxaparin  30 mg SubCUTAneous BID    fluticasone  1 spray Each Nostril Daily    QUEtiapine  25 mg Oral Nightly    sodium chloride flush  5-40 mL IntraVENous 2 times per day    thiamine  100 mg Oral Daily    citalopram  35 mg Oral Daily    atorvastatin  80 mg Oral Daily    pantoprazole  40 mg Oral Daily    FLUoxetine  20 mg Oral Daily    hydrALAZINE  50 mg Oral 3 times per day    tamsulosin  0.4 mg Oral Daily    sodium chloride flush  5-40 mL IntraVENous 2 times per day    melatonin  10 mg Oral Nightly     PRN Meds: sodium chloride flush, sodium chloride, acetaminophen, oxyCODONE **OR** oxyCODONE, morphine **OR** morphine, bisacodyl, ondansetron **OR** ondansetron, simethicone, sodium chloride flush, LORazepam **OR** LORazepam **OR** LORazepam **OR** LORazepam **OR** LORazepam **OR** LORazepam **OR** LORazepam **OR** LORazepam, sodium chloride flush, polyethylene glycol, naloxone     Labs:  Personally reviewed and interpreted for clinical significance.     Recent Labs     07/03/24  0720   WBC 7.5   HGB 10.1*   HCT 29.4*        Recent Labs     07/03/24  0720   *   K 3.9   CL 99   CO2 25   BUN 18   CREATININE 0.7*   CALCIUM 8.6     No results for input(s): \"PROBNP\", \"TROPHS\" in the last 72 hours.  No results for input(s): \"LABA1C\" in the last 72 hours.  No results for input(s): \"AST\", \"ALT\", \"BILIDIR\", \"BILITOT\", \"ALKPHOS\" in the last 72 hours.  Recent Labs    
relief;Tactile cues  Sit to Stand: Contact-guard assistance;Adaptive equipment  Stand to Sit: Contact-guard assistance;Adaptive equipment       AROM: Grossly decreased, non-functional (R ORIF with NWB/ROM restricitons)  PROM: Grossly decreased, non-functional (R ORIF with NWB/ROM restricitons)  Strength: Grossly decreased, non-functional (R ORIF with NWB/ROM restricitons)  Coordination: Grossly decreased, non-functional (R ORIF with NWB/ROM restricitons)  Tone: Normal  Sensation: Intact  ADL  Feeding: Setup  Grooming: Setup;Stand by assistance  Grooming Skilled Clinical Factors: seated EOB to wash face; pt declining other ADLs  LE Dressing: Dependent/Total  LE Dressing Skilled Clinical Factors: socks  Functional Mobility: Contact guard assistance  Functional Mobility Skilled Clinical Factors: CGA for functional mobility with platform walker >community distance ambulation and within room     Activity Tolerance  Activity Tolerance: Patient tolerated treatment well        Vision  Vision: Impaired  Vision Exceptions: Wears glasses at all times  Hearing  Hearing: Within functional limits  Cognition  Overall Cognitive Status: WFL  Orientation  Overall Orientation Status: Within Normal Limits  Orientation Level: Oriented X4;Oriented to place;Oriented to time;Oriented to situation;Oriented to person                  Education Given To: Patient  Education Provided: Role of Therapy;Plan of Care;Precautions;Energy Conservation;Transfer Training;Equipment  Education Provided Comments: disease specific: use of RED/nurse call light for assist with ADL needs, positioning, transfers; OOB to chair/bathroom with staff assist;  Education Method: Verbal;Demonstration  Barriers to Learning: None  Education Outcome: Verbalized understanding;Continued education needed;Demonstrated understanding    AM-PAC - ADL  AM-PAC Inpatient Daily Activity Raw Score: 15  AM-PAC Inpatient ADL T-Scale Score : 34.69  ADL Inpatient CMS 0-100% Score: 
WS)  Stand to Sit: Minimum assistance;Additional time;Adaptive equipment (x3 to EOB. VC for RLE placement and improving RW proximity)    Gait Training  Right Side Weight Bearing: As tolerated  Left Side Weight Bearing: Full  Gait  Gait Training: Yes  Left Side Weight Bearing: Full  Right Side Weight Bearing: As tolerated  Overall Level of Assistance: Minimum assistance;Adaptive equipment;Additional time  Distance (ft): 25 Feet  Assistive Device: Gait belt;Walker, rolling;Other (comment) (with platform for RUE)  Interventions: Demonstration;Verbal cues;Safety awareness training;Tactile cues;Weight shifting training/pressure relief  Base of Support: Shift to left  Speed/Agata: Slow  Step Length: Left shortened;Right shortened  Stance: Left increased  Gait Abnormalities: Step to gait;Decreased step clearance (Pt ambulates with platform walker and Eduardo for balance. VC for sequencing and maintaining NWB on RUE. Mild unsteadiness when advancing platform walker)     AM-PAC - Mobility  AM-PAC Basic Mobility - Inpatient   How much help is needed turning from your back to your side while in a flat bed without using bedrails?: A Little  How much help is needed moving from lying on your back to sitting on the side of a flat bed without using bedrails?: A Little  How much help is needed moving to and from a bed to a chair?: A Little  How much help is needed standing up from a chair using your arms?: A Little  How much help is needed walking in hospital room?: A Little  How much help is needed climbing 3-5 steps with a railing?: Total  AM-PAC Inpatient Mobility Raw Score : 16  AM-PAC Inpatient T-Scale Score : 40.78  Mobility Inpatient CMS 0-100% Score: 54.16  Mobility Inpatient CMS G-Code Modifier : CK     Goals  Short Term Goals  Time Frame for Short Term Goals: 7 days: 7/5/25 (unless stated otherwise)  Short Term Goal 1: Pt will be able to perform bed mobility with mod I  Short Term Goal 2: Pt will be able to perform STS 
equipment;Additional time  Distance (ft): 25 Feet  Assistive Device: Gait belt;Walker, rolling;Other (comment) (with platform for RUE)  Interventions: Demonstration;Verbal cues;Safety awareness training;Tactile cues;Weight shifting training/pressure relief  Base of Support: Shift to left  Speed/Agata: Slow  Step Length: Left shortened;Right shortened  Stance: Left increased  Gait Abnormalities: Step to gait;Decreased step clearance (Pt ambulates with platform walker and Eduardo for balance. VC for sequencing and maintaining NWB on RUE. Mild unsteadiness when advancing platform walker)     AROM: Grossly decreased, non-functional (RUE in splint from MCPs to elbow, shoulder ROM WFL)  PROM: Grossly decreased, non-functional (RUE in splint from MCPs to elbow, shoulder ROM WFL)  Strength: Grossly decreased, non-functional (NWB RUE)  Coordination: Grossly decreased, non-functional (R hand in splint)  Tone: Normal  Sensation: Intact  ADL  Feeding: Independent  Feeding Skilled Clinical Factors: using L hand  Functional Mobility: Minimal assistance  Functional Mobility Skilled Clinical Factors: with platform walker     Activity Tolerance  Activity Tolerance: Patient tolerated evaluation without incident;Patient limited by pain        Vision  Vision: Impaired  Vision Exceptions: Wears glasses at all times  Hearing  Hearing: Within functional limits  Cognition  Overall Cognitive Status: WNL  Orientation  Overall Orientation Status: Within Normal Limits  Orientation Level: Oriented X4                  Education Given To: Patient  Education Provided: Role of Therapy;Plan of Care;Precautions;ADL Adaptive Strategies;Transfer Training;Energy Conservation;Mobility Training;Fall Prevention Strategies  Education Provided Comments: disease specific: role of OT, importance of mobility, fall prevention, NWB of RUE, d/c planning  Education Method: Verbal;Demonstration  Barriers to Learning: None  Education Outcome: Verbalized 
route) FINDINGS: There is a mildly impacted and slightly comminuted transverse fracture along the distal radius extending into joint space with moderate dorsal angulation of the distal fragments.  The ulna is intact.  The carpal bones are intact and appear in good position.  There is moderate soft tissue swelling around the wrist extending dorsally.     Mildly impacted and slightly comminuted distal radius fracture with moderate dorsal angulation of the distal fragments and probable intra-articular extension of the fracture. Moderate soft tissue swelling along the dorsum of the wrist       Comment: Please note this report has been produced using speech recognition software and may contain errors related to that system including errors in grammar, punctuation, and spelling, as well as words and phrases that may be inappropriate. If there are any questions or concerns please feel free to contact the dictating provider for clarification.     Electronically signed by Kushal Daugherty MD on 6/29/2024 at 12:26 PM    
up from a chair using your arms?: A Little  How much help is needed walking in hospital room?: A Little  How much help is needed climbing 3-5 steps with a railing?: A Little  AM-PAC Inpatient Mobility Raw Score : 20  AM-PAC Inpatient T-Scale Score : 47.67  Mobility Inpatient CMS 0-100% Score: 35.83  Mobility Inpatient CMS G-Code Modifier : CJ    Goals  Short Term Goals  Time Frame for Short Term Goals: 1 week, 7/11/24  Short Term Goal 1: Pt will be able to perform bed mobility with IND  Short Term Goal 2: Pt will be able to perform STS transfers with platform walker and mod I  Short Term Goal 3: Pt will be able to ambulate 400 ft with platform walker and GETACHEW  Short Term Goal 4: Pt will tolerate x15 reps of seated BLE strengthening exercises (7/8/24)  Patient Goals   Patient Goals : \"to get home safely\"       Education  Patient Education  Education Given To: Patient  Education Provided: Role of Therapy;Plan of Care;Precautions;Transfer Training;Equipment;Fall Prevention Strategies  Education Provided Comments: Pt educated on role of PT for current deficits and proper use of AD.  Education Method: Demonstration;Verbal  Barriers to Learning: None  Education Outcome: Verbalized understanding;Demonstrated understanding      Therapy Time   Individual Concurrent Group Co-treatment   Time In 0740         Time Out 0802         Minutes 22         Timed Code Treatment Minutes: 12 Minutes plus 10 min jessica Johnson, CHINO     If pt is unable to be seen after this session, please let this note serve as discharge summary.  Please see case management note for discharge disposition.  Thank you.

## 2024-07-08 NOTE — DISCHARGE INSTR - COC
Discharge:   Respiratory Treatments: ***  Oxygen Therapy:  is not on home oxygen therapy.  Ventilator:    - No ventilator support    Rehab Therapies: Physical Therapy and Occupational Therapy  Weight Bearing Status/Restrictions: FWB to Bilateral LE  Other Medical Equipment (for information only, NOT a DME order):  walker  Other Treatments: ***    Patient's personal belongings (please select all that are sent with patient):  Belongings sent with patient    RN SIGNATURE:  Electronically signed by Helen Stone RN on 7/8/24 at 2:05 PM EDT    CASE MANAGEMENT/SOCIAL WORK SECTION    Inpatient Status Date: 6/27/24    Readmission Risk Assessment Score:  Readmission Risk              Risk of Unplanned Readmission:  20           Discharging to Facility/ Agency   [facility]  [address]  [facility] [id number] [phone]     / signature: Electronically signed by VERONICA CARRASQUILLO RN on 7/8/24 at 2:19 PM EDT    PHYSICIAN SECTION    Prognosis: {Prognosis:1115491318}    Condition at Discharge: { Patient Condition:654201480}    Rehab Potential (if transferring to Rehab): {Prognosis:2862031416}    Recommended Labs or Other Treatments After Discharge: ***    Physician Certification: I certify the above information and transfer of Jaden Gauthier  is necessary for the continuing treatment of the diagnosis listed and that he requires {Admit to Appropriate Level of Care:51733} for {GREATER/LESS:013198329} 30 days.     Update Admission H&P: {CHP DME Changes in HandP:886002445}    PHYSICIAN SIGNATURE:  {Esignature:581763129}

## 2024-07-08 NOTE — CONSULTS
Attending   Consult Note        Reason for Consult:  right wrist pain  Requesting Physician: Kushal Daugherty,*  Date of Service: 7/1/2024 4:35 PM    CHIEF COMPLAINT:  As Above    History Obtained From:  patient, electronic medical record    HISTORY OF PRESENT ILLNESS:                The patient is a 79 y.o. male who presents with above chief complaint.  Recent fall during recuperation after total knee surgery. Found to have a severely displaced right distal radius fx. Also reports significant discomfort even with light ROM to fingers with some decreased sensation in the fingers.    Past Medical History:        Diagnosis Date    Acute renal failure (ARF) ([facility]) 04/12/2022    Acute renal failure superimposed on stage 3 chronic kidney disease ([facility]) 04/12/2022    Anxiety 2010    Bradycardia     Carotid artery occlusion without infarction, unspecified laterality 05/30/2021    Cerebral artery occlusion with cerebral infarction ([facility])     ED (erectile dysfunction)     GERD (gastroesophageal reflux disease) 1992    Hypercholesteremia     Hypertension     Low back pain 07/07/2014    Major depressive disorder, recurrent, mild 05/05/2023    Osteoarthritis 2010    Strabismus     TIA (transient ischemic attack) 04/12/2022    TIA (transient ischemic attack)     TIA (transient ischemic attack)      Past Surgical History:        Procedure Laterality Date    CAROTID ENDARTERECTOMY Right 06/07/2021    RIGHT SIDED CAROTID ENDARTERECTOMY performed by Merritt Arias MD at [facility] OR    COLONOSCOPY  2011    CYST REMOVAL      ESOPHAGOGASTRIC FUNDOPLICATION      EXTERNAL EAR SURGERY      cancer spots    EYE SURGERY Left     for strabsismus    KNEE ARTHROPLASTY  2018    KNEE ARTHROSCOPY Right 01/07/2020    EXAM UNDER ANESTHESIA VIDEO ARTHROSCOPY RIGHT KNEE, PARTIAL MEDIAL MENISCECTOMY, PATELLA FEMORAL CHONDROPLASTY, SYNOVECTOMY performed by Caden Veliz MD at [facility] OR    STRABISMUS SURGERY      TONSILLECTOMY      UPPER 
Consult Call Back    Who:Cassia Phan   Date:6/28/2024,  Time:9:30 PM    Electronically signed by Prateek Lincoln on 6/28/24 at 9:30 PM EDT    
Consult Call Back    Who:Elizabeth Booker MD   Date:6/29/2024,  Time:3:18 PM    Electronically signed by Prateek Lincoln on 6/29/24 at 3:18 PM EDT    
Full note dictated.  Met with patient, reviewed chart and called and spoke to Anika.      Dx:  major depression recurrent without psychosis    Rec:  1). I do not think he currently represents an acute suicide risk.  We discussed what had changed since he met with Dr Booker.  He discussed the number of friends who have called and the promise he had made to them.  He agrees to seek counseling and \"not lie to them like I did in the past\".  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  He has no hx of suicide attempts.  The anniversary of his  wife's suicide is , so not this time of year.  We discussed how I cannot read his mind but I think his crisis has passed and he is safe to return home with outpatient therapy. I do think it is OK to discontinue the celexa and continue the prozac at 20 mgs daily.       Alvaro Carmen MD  
PSYCHIATRY CONSULT, INITIAL EVALUATION    Attending Provider:  Kushal Daugherty,*    CC/Reason for Consult: safety evaluation due to SI with plan    HPI:   Patient is a 79 y.o. male with hx of CKD3, HTN, HLD, depression, anxiety, and recent R total knee replacement 2 weeks ago, who presented to [facility] with right wrist pain after a fall and was found to have a R wrist fracture.    Per medical MD documentation yesterday evening;  \"Patient stated to nursing staff that he wanted take a bottle of pills and Adderall. The patient's wife committed suicide in a similar manner 3 years ago and he stated that he just wants to go be with his wife. Endorse that if he did have a bottle of pills he would take them now. \"    Rehab was consulted but denied patient due to plans for pt to have outpatient ORIF distal radius currently scheduled for Thursday. Per pt's nurse, medical plan is for patient to discharge to SNF today which has been difficult for pt because pt previously very independent. Per nursing, pt disclosed that his wife had cancer, and MS, and  completed suicide by intentional overdose on pills while pt was not at home almost 3 years ago. The anniversary of her death is approaching. This, and the pt's recent health issues, have been very difficulty and overwhelming for him. Pt's nurse reports that he has stated multiple times to nursing that he would like to \"end it all,\" and has disclosed plans to overdose or jump off a bridge/building.     Today pt states, \"I'm broken\". He states that he is aware that the medical team asked for me to see him, and states that this is so that when he dies, he \"won't be on their conscience.\"  Pt reports feeling, \"I'm despondent, I can't drag myself out of it. I'm guilty, I'm lonely, I'm tired.\"    Reports recent and significant worsening in his depression since his knee surgery.  Pt reports, \"I don't see any reason to keep up, I don't see any reason to keep on living.\"  He 
Patient: Jaden Gauthier  [id number]  Date: 6/28/2024      Chief Complaint: right wrist pain    History of Present Illness/Hospital Course:  Jaden Gauthier is a 79 year old male with a past medical history significant for CKD stage 3, HTN, HLD, depression, anxiety, and recent right total knee replacement who [facility] with right wrist pain after a fall at home. He was found to have a displaced distal radius fracture. Orthopedic Surgery was consulted. He is able to bear weight through a platform walker. They are tentatively planning on outpatient ORIF next week. Course has been notable for hyponatremia. He continues to have functional deficits below his baseline. Today he is seen without family present. He reports continued pain in his wrist. He also reports frustration with his situation. He expresses that he would \"end it all\" if he could. Patient denies being suicidal and states he would not hurt himself, but is frustrated with his current situation. Discussed that our team is unable to accept him to ARU since he is having surgery next week.      has a past medical history of Acute renal failure (ARF) (HCC), Acute renal failure superimposed on stage 3 chronic kidney disease (HCC), Anxiety, Bradycardia, Carotid artery occlusion without infarction, unspecified laterality, Cerebral artery occlusion with cerebral infarction (HCC), ED (erectile dysfunction), GERD (gastroesophageal reflux disease), Hypercholesteremia, Hypertension, Low back pain, Major depressive disorder, recurrent, mild, Osteoarthritis, Strabismus, TIA (transient ischemic attack), TIA (transient ischemic attack), and TIA (transient ischemic attack).     has a past surgical history that includes cyst removal; Esophagogastric fundoplication; Tonsillectomy; Strabismus surgery; External ear surgery; Colonoscopy (2011); Upper gastrointestinal endoscopy (2011); Upper gastrointestinal endoscopy (09/26/2014); eye surgery (Left); Knee 
had seen him and he simply no longer feels that way.  I did talk to semi-daughter, even though their relationship is strained in the past 2 weeks, and she is concerned that he \"ultimately will commit suicide\" and although I certainly cannot promise that is not the case.  I do not see any immediate future that is consistent with his plans or his current mental status and I do not think he is appropriate for 72-hour hold.  I will, however, go ahead and complete the transition from Celexa to Prozac and discontinue the Celexa, leaving him on just the Prozac and he is agreeing to go to outpatient treatment and will follow up with his primary care physician for his medication.  2. More than 80 minutes was spent on the consultation, more than half of which was spent in direct patient care and included care coordination and treatment planning.          LUIS GARCIA MD      D:  07/08/2024 12:59:37     T:  07/08/2024 16:31:37     LAG/IGLESIA  Job #:  [id number]     Doc#:  [id number]

## 2024-07-08 NOTE — CARE COORDINATION
Follow-Up copy of Important Message from Medicare (IMM2) has been explained to patient and/or designated healthcare decision maker (HCDM). Pt and/or HCDM aware that patient is permitted to stay an additional 4 hours prior to discharge to consider an appeal if they feel as though they are being discharged too soon. Patient may discharge as planned if chooses to do so.  Patient/HCDM voice no other concerns or questions regarding this process.

## 2024-07-09 ENCOUNTER — CARE COORDINATION (OUTPATIENT)
Dept: CASE MANAGEMENT | Age: 79
End: 2024-07-09

## 2024-07-09 DIAGNOSIS — S62.101A RIGHT WRIST FRACTURE, CLOSED, INITIAL ENCOUNTER: Primary | ICD-10-CM

## 2024-07-09 PROCEDURE — 1111F DSCHRG MED/CURRENT MED MERGE: CPT | Performed by: FAMILY MEDICINE

## 2024-07-09 NOTE — CARE COORDINATION
Care Transitions Note    Initial Call - Call within 2 business days of discharge: Yes    Patient Current Location:  [place]    Care Transition Nurse contacted the patient by telephone to perform post hospital discharge assessment, verified name and  as identifiers. Provided introduction to self, and explanation of the Care Transition Nurse role.     Patient: Jaden Gauthier    Patient : 1945   MRN: [id number]    Reason for Admission: right wrist fracture and stitches to knee   Discharge Date: 24  RURS: Readmission Risk Score: 10.5      Last Discharge Facility       Date Complaint Diagnosis Description Type Department Provider    24 Wrist Injury Closed fracture of right wrist, initial encounter ... ED to Hosp-Admission (Discharged) (ADMITTED) Janis Manley MD; Kathy Thomas...            Was this an external facility discharge? No    Additional needs identified to be addressed with provider   No needs identified             Method of communication with provider: none.    Patients top risk factors for readmission: functional physical ability and medical condition-.    Interventions to address risk factors:   Education: .  Review of patient management of conditions/medications: .  Home Health: .     Care Summary Note: CTN spoke with patient who reported he is doing well. Patient reported his wrist has dressing in place that is CD&I and swelling is improving. Patient reported his knee has bandage in place over the stitches. Patient is getting around well on his own. Patient has private duty nursing that is helping him and [facility] will be out today around 10 am. CTN reviewed all medications with patient who reported he is taking all as prescribed. CTN discussed post op instructions and s/sx to monitor for and when to report to the provider. CTN advised patient of use of urgent care or physician’s 24 hr access line if assistance is needed after hours.Also advised that they can always contact their

## 2024-07-16 ENCOUNTER — CARE COORDINATION (OUTPATIENT)
Dept: CASE MANAGEMENT | Age: 79
End: 2024-07-16

## 2024-07-16 NOTE — CARE COORDINATION
Care Transitions Note    Follow Up Call     Patient Current Location:  [place]    Care Transition Nurse contacted the patient by telephone. Verified name and  as identifiers.    Additional needs identified to be addressed with provider   No needs identified                 Method of communication with provider: none.    Care Summary Note: CTN spoke with patient who reported he is doing ok. Patient reported he his wrist bandage is CD&I. Patient has apt with orthopedic provider on . Patient reported a nurse was supposed to come today to his home but didn't show up nor call., CTN will follow up. CTN asked patient about PT/OT and patient reported he hasn't been seen or contacted about PT/OT, CTN will follow up with [facility] as well. Patient denied any other issues or concerns at this time. Patient reported he has good days and bad days, CTN asked if patient has good support from family and friends and patient reported he doesn't have any support. CTN asked if patient has any support in the community such as Judaism and patient reported no. CTN asked patient if he would like any resources of community services and patient declined. CTN encouraged patient to reach out if changes his mind or discuss any changes with provider.     CTN contacted [facility] and spoke with Erin banks who reported patient is plotted to be seen by RN on  and nurse Tee will contact patient tonight. Patient declined PT on  and PT contacted Dr. Veliz's office.     Plan of care updates since last contact:  Education: .  Review of patient management of conditions/medications: .       Advance Care Planning:   Does patient have an Advance Directive: reviewed during previous call, see note. .    Medication Review:  No changes since last call.     Remote Patient Monitoring:  Offered patient enrollment in the Remote Patient Monitoring (RPM) program for in-home monitoring: discuss on follow up     Assessments:  Care Transitions Subsequent and Final

## 2024-07-19 ENCOUNTER — OFFICE VISIT (OUTPATIENT)
Dept: PRIMARY CARE CLINIC | Age: 79
End: 2024-07-19

## 2024-07-19 VITALS
BODY MASS INDEX: 27.85 KG/M2 | SYSTOLIC BLOOD PRESSURE: 98 MMHG | DIASTOLIC BLOOD PRESSURE: 64 MMHG | HEIGHT: 74 IN | OXYGEN SATURATION: 96 % | HEART RATE: 77 BPM | WEIGHT: 217 LBS

## 2024-07-19 DIAGNOSIS — Z09 HOSPITAL DISCHARGE FOLLOW-UP: Primary | ICD-10-CM

## 2024-07-19 DIAGNOSIS — E87.1 HYPONATREMIA: ICD-10-CM

## 2024-07-19 DIAGNOSIS — F33.1 MAJOR DEPRESSIVE DISORDER, RECURRENT, MODERATE (HCC): ICD-10-CM

## 2024-07-19 RX ORDER — ARIPIPRAZOLE 15 MG/1
15 TABLET ORAL DAILY
Qty: 30 TABLET | Refills: 3 | Status: SHIPPED | OUTPATIENT
Start: 2024-07-19

## 2024-07-19 RX ORDER — FLUOXETINE HYDROCHLORIDE 40 MG/1
40 CAPSULE ORAL DAILY
Qty: 30 CAPSULE | Refills: 2 | Status: SHIPPED | OUTPATIENT
Start: 2024-07-19 | End: 2024-10-17

## 2024-07-19 NOTE — PROGRESS NOTES
Post-Discharge Transitional Care  Follow Up      Jaden Gauthier   YOB: 1945    Date of Office Visit:  7/19/2024  Date of Hospital Admission: 6/26/24  Date of Hospital Discharge: 7/8/24  Risk of hospital readmission (high >=14%. Medium >=10%) :Readmission Risk Score: 10.5      Care management risk score Rising risk (score 2-5) and Complex Care (Scores >=6): No Risk Score On File     Non face to face  following discharge, date last encounter closed (first attempt may have been earlier): 07/09/2024    Call initiated 2 business days of discharge: Yes    ASSESSMENT/PLAN:   Hospital discharge follow-up  -     OK DISCHARGE MEDS RECONCILED W/ CURRENT OUTPATIENT MED LIST  -     right wrist fracture s/p ORIF, continue to follow ortho and PT/OT, pain reportedly well-controlled  Major depressive disorder, recurrent, moderate (HCC)        -     not controlled        -     increase fluoxetine from 20 to 40 mg, start aripiprazole 15 mg        -     referral to therapy provided        -     call office or go to ED if becomes acutely suicidal, patient verbalized understanding  -     FLUoxetine (PROZAC) 40 MG capsule; Take 1 capsule by mouth daily, Disp-30 capsule, R-2Normal  -     External Referral to Psychiatry  -     External Referral To Psychology  -     ARIPiprazole (ABILIFY) 15 MG tablet; Take 1 tablet by mouth daily, Disp-30 tablet, R-3Normal  Hyponatremia        -     mild hypoNa during hospitalization likely 2/2 SIADH from acute fracture pain        -     will recheck and if low, consider hyponatremia workup  -     Basic Metabolic Panel; Future      Medical Decision Making: low complexity  Return in 2 weeks (on 8/2/2024) for severe depression follow-up.           Subjective:   HPI:  Follow up of Hospital problems/diagnosis(es): right wrist fracture, hyponatremia, depression. The patient was recently hospitalized for right wrist fracture 2/2 mechanical fall, ortho was consulted and the pt undergone ORIF on

## 2024-07-22 ENCOUNTER — TELEPHONE (OUTPATIENT)
Dept: FAMILY MEDICINE CLINIC | Age: 79
End: 2024-07-22

## 2024-07-22 NOTE — TELEPHONE ENCOUNTER
----- Message from Alma Esteban Reyes sent at 7/22/2024  9:38 AM EDT -----  Regarding: [facility] Appointment Request  [facility] Appointment Request    Patient needs appointment for [facility] Appointment Type: Existing Condition Follow Up.    Patient Requested Dates(s): Aug 02,2024  Patient Requested Time: Anytime   Provider Name:Quinn Bradley MD        Reason for Appointment Request: New Patient - No appointments available during search    Patient called in and said that Dr. Plasencia wants see him in two weeks that falls on Aug 2,2024 but no available   --------------------------------------------------------------------------------------------------------------------------    Relationship to Patient: Self     Call Back Information: OK to leave message on voicemail  Preferred Call Back Number: Phone [phone] (home)

## 2024-07-23 ENCOUNTER — CARE COORDINATION (OUTPATIENT)
Dept: CASE MANAGEMENT | Age: 79
End: 2024-07-23

## 2024-07-23 NOTE — CARE COORDINATION
Care Transitions Note    Follow Up Call     Attempted to reach patient for transitions of care follow up.  Unable to reach patient.      Outreach Attempts:   HIPAA compliant voicemail left for patient.     Care Summary Note: LVM    Follow Up Appointment:   Future Appointments         Provider Specialty Dept Phone    8/2/2024 10:00 AM Quinn Bradley MD Family Medicine [phone]            Plan for follow-up call in 6-10 days based on severity of symptoms and risk factors. Plan for next call: self management-.    Pili REYESN, RN, [facility]  Care Transition Nurse  [phone] mobile \

## 2024-07-30 ENCOUNTER — CARE COORDINATION (OUTPATIENT)
Dept: CASE MANAGEMENT | Age: 79
End: 2024-07-30

## 2024-07-30 NOTE — CARE COORDINATION
Care Transitions Note    Final Call     Attempted to reach patient for transitions of care follow up.  Unable to reach patient.      Outreach Attempts:   HIPAA compliant voicemail left for patient.     Patient closed (unable to reach patient) from the Care Transitions program on 7/30/24.    Handoff:   Patient was not referred to the ACM team due to unable to contact patient.      Care Summary Note: LVM    Assessments:  Care Transitions Subsequent and Final Call    Subsequent and Final Calls  Are you currently active with any services?: Home Health  Care Transitions Interventions  Other Interventions:              Upcoming Appointments:    Future Appointments         Provider Specialty Dept Phone    8/2/2024 10:00 AM O'Kae, Quinn CLINE MD Family Medicine [phone]          Pili CONNER, RN, [facility]  Care Transition Nurse  [phone] mobile

## 2024-08-02 ENCOUNTER — TELEMEDICINE (OUTPATIENT)
Dept: FAMILY MEDICINE CLINIC | Age: 79
End: 2024-08-02
Payer: MEDICARE

## 2024-08-02 ENCOUNTER — TELEPHONE (OUTPATIENT)
Dept: FAMILY MEDICINE CLINIC | Age: 79
End: 2024-08-02

## 2024-08-02 ENCOUNTER — HOSPITAL ENCOUNTER (EMERGENCY)
Age: 79
Discharge: HOME | End: 2024-08-02
Attending: EMERGENCY MEDICINE
Payer: MEDICARE

## 2024-08-02 VITALS
WEIGHT: 213 LBS | DIASTOLIC BLOOD PRESSURE: 70 MMHG | SYSTOLIC BLOOD PRESSURE: 162 MMHG | OXYGEN SATURATION: 98 % | TEMPERATURE: 97.7 F | HEART RATE: 66 BPM | RESPIRATION RATE: 18 BRPM | HEIGHT: 74 IN | BODY MASS INDEX: 27.34 KG/M2

## 2024-08-02 DIAGNOSIS — R33.9 RETENTION OF URINE: Primary | ICD-10-CM

## 2024-08-02 DIAGNOSIS — F33.1 MAJOR DEPRESSIVE DISORDER, RECURRENT, MODERATE (HCC): ICD-10-CM

## 2024-08-02 DIAGNOSIS — R30.9 URINARY PAIN: Primary | ICD-10-CM

## 2024-08-02 LAB
BILIRUB UR QL STRIP.AUTO: NEGATIVE
CLARITY UR: CLEAR
COLOR UR: YELLOW
GLUCOSE UR STRIP.AUTO-MCNC: NEGATIVE MG/DL
HGB UR QL STRIP.AUTO: NEGATIVE
KETONES UR STRIP.AUTO-MCNC: NEGATIVE MG/DL
LEUKOCYTE ESTERASE UR QL STRIP.AUTO: NEGATIVE
NITRITE UR QL STRIP.AUTO: NEGATIVE
PH UR STRIP.AUTO: 6.5 [PH] (ref 5–8)
PROT UR STRIP.AUTO-MCNC: NEGATIVE MG/DL
SP GR UR STRIP.AUTO: 1.01 (ref 1–1.03)
UA DIPSTICK W REFLEX MICRO PNL UR: NORMAL
URN SPEC COLLECT METH UR: NORMAL
UROBILINOGEN UR STRIP-ACNC: 0.2 E.U./DL

## 2024-08-02 PROCEDURE — 1124F ACP DISCUSS-NO DSCNMKR DOCD: CPT | Performed by: FAMILY MEDICINE

## 2024-08-02 PROCEDURE — G8417 CALC BMI ABV UP PARAM F/U: HCPCS | Performed by: FAMILY MEDICINE

## 2024-08-02 PROCEDURE — 1111F DSCHRG MED/CURRENT MED MERGE: CPT | Performed by: FAMILY MEDICINE

## 2024-08-02 PROCEDURE — 1036F TOBACCO NON-USER: CPT | Performed by: FAMILY MEDICINE

## 2024-08-02 PROCEDURE — 99283 EMERGENCY DEPT VISIT LOW MDM: CPT

## 2024-08-02 PROCEDURE — 81003 URINALYSIS AUTO W/O SCOPE: CPT

## 2024-08-02 PROCEDURE — 51798 US URINE CAPACITY MEASURE: CPT

## 2024-08-02 PROCEDURE — 51702 INSERT TEMP BLADDER CATH: CPT

## 2024-08-02 PROCEDURE — G8427 DOCREV CUR MEDS BY ELIG CLIN: HCPCS | Performed by: FAMILY MEDICINE

## 2024-08-02 PROCEDURE — 99214 OFFICE O/P EST MOD 30 MIN: CPT | Performed by: FAMILY MEDICINE

## 2024-08-02 RX ORDER — PHENAZOPYRIDINE HYDROCHLORIDE 200 MG/1
200 TABLET, FILM COATED ORAL 3 TIMES DAILY PRN
Qty: 9 TABLET | Refills: 0 | Status: SHIPPED | OUTPATIENT
Start: 2024-08-02 | End: 2024-08-05

## 2024-08-02 RX ORDER — NITROFURANTOIN 25; 75 MG/1; MG/1
100 CAPSULE ORAL 2 TIMES DAILY
Qty: 20 CAPSULE | Refills: 0 | Status: SHIPPED | OUTPATIENT
Start: 2024-08-02 | End: 2024-08-12

## 2024-08-02 ASSESSMENT — PAIN SCALES - GENERAL
PAINLEVEL_OUTOF10: 0
PAINLEVEL_OUTOF10: 8

## 2024-08-02 ASSESSMENT — LIFESTYLE VARIABLES
HOW OFTEN DO YOU HAVE A DRINK CONTAINING ALCOHOL: NEVER
HOW MANY STANDARD DRINKS CONTAINING ALCOHOL DO YOU HAVE ON A TYPICAL DAY: PATIENT DOES NOT DRINK

## 2024-08-02 ASSESSMENT — PAIN - FUNCTIONAL ASSESSMENT
PAIN_FUNCTIONAL_ASSESSMENT: NONE - DENIES PAIN
PAIN_FUNCTIONAL_ASSESSMENT: 0-10

## 2024-08-02 ASSESSMENT — PAIN DESCRIPTION - LOCATION: LOCATION: GROIN

## 2024-08-02 NOTE — PROGRESS NOTES
Jaden Gauthier, was evaluated through a synchronous (real-time) audio-video encounter. The patient (or guardian if applicable) is aware that this is a billable service, which includes applicable co-pays. This Virtual Visit was conducted with patient's (and/or legal guardian's) consent. Patient identification was verified, and a caregiver was present when appropriate.   The patient was located at Home: [address] [place], [place] [id number]  Provider was located at Facility (Appt Dept): [address] [place], [place] [address]  Confirm you are appropriately licensed, registered, or certified to deliver care in the state where the patient is located as indicated above. If you are not or unsure, please re-schedule the visit: Yes, I confirm.     Jaden Gauthier (:  1945) is a Established patient, presenting virtually for evaluation of the following:    Assessment & Plan   Below is the assessment and plan developed based on review of pertinent history, physical exam, labs, studies, and medications.  1. Urinary pain  -     nitrofurantoin, macrocrystal-monohydrate, (MACROBID) 100 MG capsule; Take 1 capsule by mouth 2 times daily for 10 days, Disp-20 capsule, R-0Normal  -     phenazopyridine (PYRIDIUM) 200 MG tablet; Take 1 tablet by mouth 3 times daily as needed for Pain, Disp-9 tablet, R-0Normal  2. Major depressive disorder, recurrent, moderate (HCC)  Improving, will send to [facility] for some brief counseling. Comorbid grief/adjustment reaction.  No follow-ups on file.       Subjective   Jaden Gauthier is a 79 y.o. male. Patient presents with:  Depression      78 yo male who presents for f/u of depression. Started abilify and is much better.  Notes he feels grief still, but is not as miserable as he has been. Notes sleeping better, better appetite.    Is going to get scheduled for prostate procedure for BPH, but has been having issues with increased urinary pain and frequency for 2-3 days.    The patients PMH,

## 2024-08-02 NOTE — TELEPHONE ENCOUNTER
[facility] is requesting a RX change for Phenazopyridine 200 mg     Pharmacy [phone] Fax [phone]    NOTE- Drug not covered by patient plan. The preferred alternative is ALTDRUGTHERAPYPREFERREDPRODUCTREQUIRED (this was how it was typed on form)    Please call/fax the pharmacy to change medication along with strength, directions, quantity and refills.    Last OV 8/2/2024      Next OV Visit date not found

## 2024-08-03 NOTE — ED PROVIDER NOTES
[facility]  ED  Emergency Department Encounter    Patient Name: Jaden Gauthier  MRN: [id number]  YOB: 1945  Date of Evaluation: 8/2/2024  Provider: Quinn Bradley MD  Note Started: 9:09 PM EDT 8/2/24    CHIEF COMPLAINT  Urinary Retention (Patient reports being unable to urinate since last night. Reports only dribbles and mucus. Neighbor said patient has been having dizzy spells for years and is seeing PCP about it. Pt had medication sent to pharmacy today for retention, medication has been picked up but patient has not taken any. )    SHARED SERVICE VISIT  Evaluated by SARAH.  My supervising physician was available for consultation.     HISTORY OF PRESENT ILLNESS  Jaden Gauthier is a 79 y.o. male who presents to the ED for evaluation of urinary retention.  Patient states that he has a history of BPH.  Is scheduled for a TURP on September 5.  States that he has been unable to urinate since last night.  States that he has urge to pee although unable to do so.  Has had no associated abdominal pain.  Denies pain in the back of the flanks.  He denies nausea vomiting or diarrhea.  No fevers or chills.  No chest pain or shortness of breath.  Denies headaches, lightheadedness or dizziness..    No other complaints, modifying factors or associated symptoms.     Nursing notes reviewed were all reviewed and agreed with or any disagreements were addressed in the HPI.    PMH:  Past Medical History:   Diagnosis Date    Acute renal failure (ARF) (HCC) 04/12/2022    Acute renal failure superimposed on stage 3 chronic kidney disease (HCC) 04/12/2022    Anxiety 2010    Bradycardia     Carotid artery occlusion without infarction, unspecified laterality 05/30/2021    Cerebral artery occlusion with cerebral infarction (HCC)     ED (erectile dysfunction)     GERD (gastroesophageal reflux disease) 1992    Hypercholesteremia     Hypertension     Low back pain 07/07/2014    Major depressive disorder, recurrent,

## 2024-08-05 DIAGNOSIS — E87.1 HYPONATREMIA: ICD-10-CM

## 2024-08-05 DIAGNOSIS — R68.81 EARLY SATIETY: ICD-10-CM

## 2024-08-05 DIAGNOSIS — R63.4 WEIGHT LOSS: ICD-10-CM

## 2024-08-05 LAB
ANION GAP SERPL CALCULATED.3IONS-SCNC: 16 MMOL/L (ref 3–16)
BASOPHILS # BLD: 0 K/UL (ref 0–0.2)
BASOPHILS NFR BLD: 0.6 %
BUN SERPL-MCNC: 17 MG/DL (ref 7–20)
CALCIUM SERPL-MCNC: 9.8 MG/DL (ref 8.3–10.6)
CHLORIDE SERPL-SCNC: 99 MMOL/L (ref 99–110)
CO2 SERPL-SCNC: 23 MMOL/L (ref 21–32)
CREAT SERPL-MCNC: 0.9 MG/DL (ref 0.8–1.3)
DEPRECATED RDW RBC AUTO: 14.8 % (ref 12.4–15.4)
EOSINOPHIL # BLD: 0.3 K/UL (ref 0–0.6)
EOSINOPHIL NFR BLD: 4.7 %
GFR SERPLBLD CREATININE-BSD FMLA CKD-EPI: 87 ML/MIN/{1.73_M2}
GLUCOSE SERPL-MCNC: 102 MG/DL (ref 70–99)
HCT VFR BLD AUTO: 36.6 % (ref 40.5–52.5)
HGB BLD-MCNC: 12.1 G/DL (ref 13.5–17.5)
LYMPHOCYTES # BLD: 1.6 K/UL (ref 1–5.1)
LYMPHOCYTES NFR BLD: 26.8 %
MCH RBC QN AUTO: 31.4 PG (ref 26–34)
MCHC RBC AUTO-ENTMCNC: 33.1 G/DL (ref 31–36)
MCV RBC AUTO: 94.7 FL (ref 80–100)
MONOCYTES # BLD: 0.6 K/UL (ref 0–1.3)
MONOCYTES NFR BLD: 9.2 %
NEUTROPHILS # BLD: 3.5 K/UL (ref 1.7–7.7)
NEUTROPHILS NFR BLD: 58.7 %
PLATELET # BLD AUTO: 257 K/UL (ref 135–450)
PMV BLD AUTO: 8 FL (ref 5–10.5)
POTASSIUM SERPL-SCNC: 4.4 MMOL/L (ref 3.5–5.1)
RBC # BLD AUTO: 3.87 M/UL (ref 4.2–5.9)
SODIUM SERPL-SCNC: 138 MMOL/L (ref 136–145)
WBC # BLD AUTO: 6 K/UL (ref 4–11)

## 2024-08-09 NOTE — PROGRESS NOTES
Jaden Gauthier    Age 79 y.o.    male    1945    MRN [id number]    8/16/2024  Arrival Time_____________  OR Time____________60 Min     Procedure(s):  CYSTOSCOPY, TRANSURETHRAL RESECTION OF THE PROSTATE                      General   Surgeon(s):  Mello Alejandro, MD      DAY ADMIT ___  SDS/OP ___  OUTPT IN BED ___        Phone [phone] (home)                  PCP _____________________ Phone_________________ Epic ( ) Epic CE ( ) Appt ________    NOTES: _________________________________________ Consult/Cardio _______________    ____________________________________________________________________________    ____________________________________________________________________________  PAT APPT DATE:________ TIME: ________  FAXED QAD: _______  (__) H&P w/ Hospitalist    (__) PAT orders in EPIC    (__) Meet with PAT nurse  __________________________________________________________________________  Preop Nurse phone screen complete: _____________  (__) CBC     (__) W/ DIFF ___________  (__) CT CHEST  __________   (__) Hgb A1C    ___________  (__) CHEST X RAY   __________  (__) LIPID PROFILE  ___________  (__) EKG   __________  (__) PT-INR / APTT  ___________  (__) PFT's   __________  (__) BMP   ___________  (__) CAROTIDS  __________  (__) CMP   ___________  (__) VEIN MAPPING  __________  (__) U/A   ___________  ( X ) HISTORY & PHYSICAL __________  (__) URINE C & S  ___________  (__) CARDIAC CLEARANCE __________  (__) U/A W/ FLEX  ___________  (__) PULM. CLEARANCE __________  (__) SERUM PREGNANCY ___________  (__) Preop Orders in EPIC __________  (__) TYPE & SCREEN __________repeat ( ) (__)  __________________ __________  (__) Albumin   ___________  (__)  __________________ __________  (__) TRANSFERRIN  ___________  (__)  __________________ __________  (__) LIVER PROFILE  ___________  (__) URINE PREG DOS __________  (__) MRSA NASAL SWAB ___________  (__) BLOOD SUGAR DOS __________  (__) SED

## 2024-08-11 RX ORDER — ATORVASTATIN CALCIUM 80 MG/1
80 TABLET, FILM COATED ORAL DAILY
Qty: 90 TABLET | Refills: 1 | Status: SHIPPED | OUTPATIENT
Start: 2024-08-11

## 2024-08-12 ENCOUNTER — OFFICE VISIT (OUTPATIENT)
Dept: FAMILY MEDICINE CLINIC | Age: 79
End: 2024-08-12
Payer: MEDICARE

## 2024-08-12 VITALS
HEIGHT: 74 IN | HEART RATE: 65 BPM | SYSTOLIC BLOOD PRESSURE: 100 MMHG | BODY MASS INDEX: 27.9 KG/M2 | WEIGHT: 217.4 LBS | DIASTOLIC BLOOD PRESSURE: 50 MMHG | OXYGEN SATURATION: 98 %

## 2024-08-12 DIAGNOSIS — N40.0 ENLARGED PROSTATE: ICD-10-CM

## 2024-08-12 DIAGNOSIS — Z01.818 PRE-OP EXAMINATION: Primary | ICD-10-CM

## 2024-08-12 DIAGNOSIS — Z01.818 PRE-OP EXAMINATION: ICD-10-CM

## 2024-08-12 DIAGNOSIS — F33.1 MAJOR DEPRESSIVE DISORDER, RECURRENT, MODERATE (HCC): ICD-10-CM

## 2024-08-12 PROCEDURE — 3074F SYST BP LT 130 MM HG: CPT | Performed by: NURSE PRACTITIONER

## 2024-08-12 PROCEDURE — 93000 ELECTROCARDIOGRAM COMPLETE: CPT | Performed by: NURSE PRACTITIONER

## 2024-08-12 PROCEDURE — 3078F DIAST BP <80 MM HG: CPT | Performed by: NURSE PRACTITIONER

## 2024-08-12 PROCEDURE — G8417 CALC BMI ABV UP PARAM F/U: HCPCS | Performed by: NURSE PRACTITIONER

## 2024-08-12 PROCEDURE — 1124F ACP DISCUSS-NO DSCNMKR DOCD: CPT | Performed by: NURSE PRACTITIONER

## 2024-08-12 PROCEDURE — 99214 OFFICE O/P EST MOD 30 MIN: CPT | Performed by: NURSE PRACTITIONER

## 2024-08-12 PROCEDURE — G8427 DOCREV CUR MEDS BY ELIG CLIN: HCPCS | Performed by: NURSE PRACTITIONER

## 2024-08-12 PROCEDURE — 1036F TOBACCO NON-USER: CPT | Performed by: NURSE PRACTITIONER

## 2024-08-12 RX ORDER — FLUOXETINE HYDROCHLORIDE 40 MG/1
40 CAPSULE ORAL DAILY
Qty: 30 CAPSULE | Refills: 2 | Status: SHIPPED | OUTPATIENT
Start: 2024-08-12 | End: 2024-11-10

## 2024-08-12 RX ORDER — SILODOSIN 8 MG/1
8 CAPSULE ORAL DAILY
Status: ON HOLD | COMMUNITY
End: 2024-08-17 | Stop reason: HOSPADM

## 2024-08-12 RX ORDER — ARIPIPRAZOLE 15 MG/1
15 TABLET ORAL DAILY
Qty: 30 TABLET | Refills: 3 | Status: SHIPPED | OUTPATIENT
Start: 2024-08-12

## 2024-08-12 NOTE — PROGRESS NOTES
[facility]  [phone]  Fax: [phone]   Pre-operative History and Physical    Jaden Gauthier is a 79 y.o. male who is referred by Dr. Mello Alejandro for a consultation for preoperative physical examination and medical clearance for surgery. Patient is scheduled to have Cystoscopy, Transurethral resection of the prostate at [facility] on 8/16/2024.    DIAGNOSIS:  enlarged prostate        History Obtained From:  patient    HISTORY OF PRESENT ILLNESS:    The patient is a 79 y.o. male with significant past medical history of enlarged prostate who presents for pre-op.        Past Medical History:   Diagnosis Date    Acute renal failure (ARF) ([facility]) 04/12/2022    Acute renal failure superimposed on stage 3 chronic kidney disease (HCC) 04/12/2022    Anxiety 2010    BPH (benign prostatic hyperplasia)     Bradycardia     Carotid artery occlusion without infarction, unspecified laterality 05/30/2021    Cerebral artery occlusion with cerebral infarction ([facility])     ED (erectile dysfunction)     GERD (gastroesophageal reflux disease) 1992    Hypercholesteremia     Hypertension     Low back pain 07/07/2014    Major depressive disorder, recurrent, mild 05/05/2023    Osteoarthritis 2010    Strabismus     TIA (transient ischemic attack) 04/12/2022     Past Surgical History:   Procedure Laterality Date    CAROTID ENDARTERECTOMY Right 06/07/2021    RIGHT SIDED CAROTID ENDARTERECTOMY performed by Merritt Arias MD at [facility] OR    COLONOSCOPY  2011    CYST REMOVAL      ESOPHAGOGASTRIC FUNDOPLICATION      EXTERNAL EAR SURGERY      cancer spots    EYE SURGERY Left     for strabsismus    KNEE ARTHROPLASTY  2018    KNEE ARTHROSCOPY Right 01/07/2020    EXAM UNDER ANESTHESIA VIDEO ARTHROSCOPY RIGHT KNEE, PARTIAL MEDIAL MENISCECTOMY, PATELLA FEMORAL CHONDROPLASTY, SYNOVECTOMY performed by Caden Veliz MD at [facility] OR    STRABISMUS SURGERY      TONSILLECTOMY      UPPER GASTROINTESTINAL ENDOSCOPY  2011    UPPER

## 2024-08-13 LAB
APTT BLD: 28 SEC (ref 22.1–36.4)
INR PPP: 0.97 (ref 0.85–1.15)
PROTHROMBIN TIME: 13.1 SEC (ref 11.9–14.9)

## 2024-08-13 NOTE — PROGRESS NOTES
Surgery Date and Time: 8/16/24 @ 10:00 am   Arrival Time:  08:00 am    The instructions given when and if a patient needs to stop oral intake prior to surgery varies. Follow the instructions you were given by your    Surgeon or RN during the Pre-op call.       __X__Nothing to eat or to drink after Midnight the night before the surgery. NO gum, mints, candy or ice chips day of surgery.                  Only take the following medications with a small sip of water the morning of surgery: nifedipine, hydralazine             Aspirin, Ibuprofen, Advil, Naproxen, Vitamin E and other Anti-inflammatory products and supplements should be stopped for 5 -7days before surgery      or as directed by your physician.      Check with your Surgeon/PCP/Cardiologist regarding stopping Plavix, Coumadin, Eliquis, Lovenox, Effient, Pradaxa, Xarelto, Fragmin or other blood thinners     and follow their instructions.  Medication:  plavix           Last dose:  8/6/24 per patient     - Do not smoke or vape, and do not drink any alcoholic beverages 24 hours prior to surgery, this includes NA Beer. Refrain from using any recreational drugs,     including non-prescribed prescription drugs.     -You may brush your teeth and gargle the morning of surgery.  DO NOT SWALLOW WATER.    -You MUST plan for a responsible adult to stay on site while you are here and take you home after your surgery. You will not be allowed to leave alone or drive               yourself home. It is requested someone stay with you the first 24 hrs. Your surgery will be cancelled if you do not have a ride home with a responsible adult.    -A parent/legal guardian must accompany a child scheduled for surgery and plan to stay at the hospital until the child is discharged.  Please do not bring other                children with you.    -Please wear simple, loose-fitting clothing to the hospital. Do not bring valuables (money, credit cards, checkbooks, etc.) Do not wear any

## 2024-08-15 ENCOUNTER — TELEMEDICINE (OUTPATIENT)
Dept: PSYCHIATRY | Age: 79
End: 2024-08-15

## 2024-08-15 DIAGNOSIS — F32.A DEPRESSION, UNSPECIFIED DEPRESSION TYPE: Primary | ICD-10-CM

## 2024-08-15 NOTE — PROGRESS NOTES
Patient is establishing virtually person today.     Referred By: Dr. Plasencia   Work:     Family:Lives with   Children: 2 Children 58 & 52   Education: Grad School   Legal History or Arrest: Yes   Social Support: Lady Friend   Access to Firearms or Weapons: Yes (Locked Up)  Pets: Dog & Cat   Hobbies: Work &    Abuse Trauma History:   Plans or Goals:   New Symptoms:  Alcohol Use: Socially   Smoke: Vape   Medicinal Marijuana or Controlled Substances: Vape Marihuana   Any Attempted Suicide: No  Any Previous Psychiatric Admissions: No   Previous Diagnosis:   Family Psychiatric History: Not Aware

## 2024-08-16 ENCOUNTER — HOSPITAL ENCOUNTER (OUTPATIENT)
Age: 79
Setting detail: OBSERVATION
Discharge: HOME | End: 2024-08-17
Attending: UROLOGY | Admitting: UROLOGY
Payer: MEDICARE

## 2024-08-16 ENCOUNTER — ANESTHESIA (OUTPATIENT)
Dept: OPERATING ROOM | Age: 79
End: 2024-08-16
Payer: MEDICARE

## 2024-08-16 ENCOUNTER — ANESTHESIA EVENT (OUTPATIENT)
Dept: OPERATING ROOM | Age: 79
End: 2024-08-16
Payer: MEDICARE

## 2024-08-16 DIAGNOSIS — N13.8 BPH WITH URINARY OBSTRUCTION: ICD-10-CM

## 2024-08-16 DIAGNOSIS — N40.1 BPH WITH URINARY OBSTRUCTION: ICD-10-CM

## 2024-08-16 PROCEDURE — 2709999900 HC NON-CHARGEABLE SUPPLY: Performed by: UROLOGY

## 2024-08-16 PROCEDURE — 2580000003 HC RX 258: Performed by: NURSE ANESTHETIST, CERTIFIED REGISTERED

## 2024-08-16 PROCEDURE — 6370000000 HC RX 637 (ALT 250 FOR IP): Performed by: UROLOGY

## 2024-08-16 PROCEDURE — 2580000003 HC RX 258: Performed by: UROLOGY

## 2024-08-16 PROCEDURE — 3600000004 HC SURGERY LEVEL 4 BASE: Performed by: UROLOGY

## 2024-08-16 PROCEDURE — 88305 TISSUE EXAM BY PATHOLOGIST: CPT

## 2024-08-16 PROCEDURE — G0378 HOSPITAL OBSERVATION PER HR: HCPCS

## 2024-08-16 PROCEDURE — 2500000003 HC RX 250 WO HCPCS: Performed by: NURSE ANESTHETIST, CERTIFIED REGISTERED

## 2024-08-16 PROCEDURE — 2720000010 HC SURG SUPPLY STERILE: Performed by: UROLOGY

## 2024-08-16 PROCEDURE — 6360000002 HC RX W HCPCS: Performed by: NURSE ANESTHETIST, CERTIFIED REGISTERED

## 2024-08-16 PROCEDURE — 7100000000 HC PACU RECOVERY - FIRST 15 MIN: Performed by: UROLOGY

## 2024-08-16 PROCEDURE — 6360000002 HC RX W HCPCS: Performed by: ANESTHESIOLOGY

## 2024-08-16 PROCEDURE — 88342 IMHCHEM/IMCYTCHM 1ST ANTB: CPT

## 2024-08-16 PROCEDURE — 6360000002 HC RX W HCPCS: Performed by: UROLOGY

## 2024-08-16 PROCEDURE — 7100000001 HC PACU RECOVERY - ADDTL 15 MIN: Performed by: UROLOGY

## 2024-08-16 PROCEDURE — 88341 IMHCHEM/IMCYTCHM EA ADD ANTB: CPT

## 2024-08-16 PROCEDURE — 3700000001 HC ADD 15 MINUTES (ANESTHESIA): Performed by: UROLOGY

## 2024-08-16 PROCEDURE — 3700000000 HC ANESTHESIA ATTENDED CARE: Performed by: UROLOGY

## 2024-08-16 PROCEDURE — 3600000014 HC SURGERY LEVEL 4 ADDTL 15MIN: Performed by: UROLOGY

## 2024-08-16 RX ORDER — PROCHLORPERAZINE EDISYLATE 5 MG/ML
5 INJECTION INTRAMUSCULAR; INTRAVENOUS
Status: DISCONTINUED | OUTPATIENT
Start: 2024-08-16 | End: 2024-08-16 | Stop reason: HOSPADM

## 2024-08-16 RX ORDER — DIPHENHYDRAMINE HYDROCHLORIDE 50 MG/ML
12.5 INJECTION INTRAMUSCULAR; INTRAVENOUS
Status: DISCONTINUED | OUTPATIENT
Start: 2024-08-16 | End: 2024-08-16 | Stop reason: HOSPADM

## 2024-08-16 RX ORDER — OXYCODONE HYDROCHLORIDE 5 MG/1
5 TABLET ORAL EVERY 4 HOURS PRN
Status: DISCONTINUED | OUTPATIENT
Start: 2024-08-16 | End: 2024-08-17 | Stop reason: HOSPADM

## 2024-08-16 RX ORDER — SODIUM CHLORIDE 0.9 % (FLUSH) 0.9 %
5-40 SYRINGE (ML) INJECTION EVERY 12 HOURS SCHEDULED
Status: DISCONTINUED | OUTPATIENT
Start: 2024-08-16 | End: 2024-08-16 | Stop reason: HOSPADM

## 2024-08-16 RX ORDER — FENTANYL CITRATE 50 UG/ML
INJECTION, SOLUTION INTRAMUSCULAR; INTRAVENOUS PRN
Status: DISCONTINUED | OUTPATIENT
Start: 2024-08-16 | End: 2024-08-16 | Stop reason: SDUPTHER

## 2024-08-16 RX ORDER — SODIUM CHLORIDE, SODIUM LACTATE, POTASSIUM CHLORIDE, CALCIUM CHLORIDE 600; 310; 30; 20 MG/100ML; MG/100ML; MG/100ML; MG/100ML
INJECTION, SOLUTION INTRAVENOUS CONTINUOUS PRN
Status: DISCONTINUED | OUTPATIENT
Start: 2024-08-16 | End: 2024-08-16 | Stop reason: SDUPTHER

## 2024-08-16 RX ORDER — PROPOFOL 10 MG/ML
INJECTION, EMULSION INTRAVENOUS PRN
Status: DISCONTINUED | OUTPATIENT
Start: 2024-08-16 | End: 2024-08-16 | Stop reason: SDUPTHER

## 2024-08-16 RX ORDER — ONDANSETRON 2 MG/ML
4 INJECTION INTRAMUSCULAR; INTRAVENOUS EVERY 6 HOURS PRN
Status: DISCONTINUED | OUTPATIENT
Start: 2024-08-16 | End: 2024-08-17 | Stop reason: HOSPADM

## 2024-08-16 RX ORDER — POTASSIUM CHLORIDE 20 MEQ/1
40 TABLET, EXTENDED RELEASE ORAL PRN
Status: DISCONTINUED | OUTPATIENT
Start: 2024-08-16 | End: 2024-08-17 | Stop reason: HOSPADM

## 2024-08-16 RX ORDER — LANOLIN ALCOHOL/MO/W.PET/CERES
3 CREAM (GRAM) TOPICAL NIGHTLY PRN
Status: DISCONTINUED | OUTPATIENT
Start: 2024-08-16 | End: 2024-08-17 | Stop reason: HOSPADM

## 2024-08-16 RX ORDER — SODIUM CHLORIDE 0.9 % (FLUSH) 0.9 %
5-40 SYRINGE (ML) INJECTION EVERY 12 HOURS SCHEDULED
Status: DISCONTINUED | OUTPATIENT
Start: 2024-08-16 | End: 2024-08-17 | Stop reason: HOSPADM

## 2024-08-16 RX ORDER — ARIPIPRAZOLE 10 MG/1
15 TABLET ORAL DAILY
Status: DISCONTINUED | OUTPATIENT
Start: 2024-08-16 | End: 2024-08-17 | Stop reason: HOSPADM

## 2024-08-16 RX ORDER — MAGNESIUM SULFATE IN WATER 40 MG/ML
2000 INJECTION, SOLUTION INTRAVENOUS PRN
Status: DISCONTINUED | OUTPATIENT
Start: 2024-08-16 | End: 2024-08-17 | Stop reason: HOSPADM

## 2024-08-16 RX ORDER — LANSOPRAZOLE 30 MG/1
30 TABLET, ORALLY DISINTEGRATING, DELAYED RELEASE ORAL DAILY
Status: DISCONTINUED | OUTPATIENT
Start: 2024-08-16 | End: 2024-08-17 | Stop reason: HOSPADM

## 2024-08-16 RX ORDER — ONDANSETRON 4 MG/1
4 TABLET, ORALLY DISINTEGRATING ORAL EVERY 8 HOURS PRN
Status: DISCONTINUED | OUTPATIENT
Start: 2024-08-16 | End: 2024-08-17 | Stop reason: HOSPADM

## 2024-08-16 RX ORDER — LORAZEPAM 2 MG/ML
0.5 INJECTION INTRAMUSCULAR
Status: DISCONTINUED | OUTPATIENT
Start: 2024-08-16 | End: 2024-08-16 | Stop reason: HOSPADM

## 2024-08-16 RX ORDER — ROCURONIUM BROMIDE 10 MG/ML
INJECTION, SOLUTION INTRAVENOUS PRN
Status: DISCONTINUED | OUTPATIENT
Start: 2024-08-16 | End: 2024-08-16 | Stop reason: SDUPTHER

## 2024-08-16 RX ORDER — SODIUM CHLORIDE 0.9 % (FLUSH) 0.9 %
5-40 SYRINGE (ML) INJECTION PRN
Status: DISCONTINUED | OUTPATIENT
Start: 2024-08-16 | End: 2024-08-17 | Stop reason: HOSPADM

## 2024-08-16 RX ORDER — LIDOCAINE HYDROCHLORIDE 10 MG/ML
2 INJECTION, SOLUTION INFILTRATION; PERINEURAL
Status: DISCONTINUED | OUTPATIENT
Start: 2024-08-16 | End: 2024-08-16 | Stop reason: HOSPADM

## 2024-08-16 RX ORDER — SENNOSIDES A AND B 8.6 MG/1
1 TABLET, FILM COATED ORAL DAILY PRN
Status: DISCONTINUED | OUTPATIENT
Start: 2024-08-16 | End: 2024-08-17 | Stop reason: HOSPADM

## 2024-08-16 RX ORDER — NIFEDIPINE 30 MG/1
60 TABLET, EXTENDED RELEASE ORAL 2 TIMES DAILY
Status: DISCONTINUED | OUTPATIENT
Start: 2024-08-16 | End: 2024-08-17 | Stop reason: HOSPADM

## 2024-08-16 RX ORDER — HYDRALAZINE HYDROCHLORIDE 25 MG/1
25 TABLET, FILM COATED ORAL 2 TIMES DAILY
Status: DISCONTINUED | OUTPATIENT
Start: 2024-08-16 | End: 2024-08-17 | Stop reason: HOSPADM

## 2024-08-16 RX ORDER — LEVOFLOXACIN 5 MG/ML
500 INJECTION, SOLUTION INTRAVENOUS
Status: COMPLETED | OUTPATIENT
Start: 2024-08-16 | End: 2024-08-16

## 2024-08-16 RX ORDER — SODIUM CHLORIDE, SODIUM LACTATE, POTASSIUM CHLORIDE, CALCIUM CHLORIDE 600; 310; 30; 20 MG/100ML; MG/100ML; MG/100ML; MG/100ML
INJECTION, SOLUTION INTRAVENOUS CONTINUOUS
Status: DISCONTINUED | OUTPATIENT
Start: 2024-08-16 | End: 2024-08-16 | Stop reason: HOSPADM

## 2024-08-16 RX ORDER — ONDANSETRON 2 MG/ML
4 INJECTION INTRAMUSCULAR; INTRAVENOUS
Status: DISCONTINUED | OUTPATIENT
Start: 2024-08-16 | End: 2024-08-16 | Stop reason: HOSPADM

## 2024-08-16 RX ORDER — LIDOCAINE HYDROCHLORIDE 20 MG/ML
INJECTION, SOLUTION INFILTRATION; PERINEURAL PRN
Status: DISCONTINUED | OUTPATIENT
Start: 2024-08-16 | End: 2024-08-16 | Stop reason: SDUPTHER

## 2024-08-16 RX ORDER — LEVOFLOXACIN 5 MG/ML
500 INJECTION, SOLUTION INTRAVENOUS EVERY 24 HOURS
Status: DISCONTINUED | OUTPATIENT
Start: 2024-08-17 | End: 2024-08-17 | Stop reason: HOSPADM

## 2024-08-16 RX ORDER — SODIUM CHLORIDE 9 MG/ML
INJECTION, SOLUTION INTRAVENOUS CONTINUOUS
Status: DISCONTINUED | OUTPATIENT
Start: 2024-08-16 | End: 2024-08-17 | Stop reason: HOSPADM

## 2024-08-16 RX ORDER — LABETALOL HYDROCHLORIDE 5 MG/ML
10 INJECTION, SOLUTION INTRAVENOUS
Status: DISCONTINUED | OUTPATIENT
Start: 2024-08-16 | End: 2024-08-16 | Stop reason: HOSPADM

## 2024-08-16 RX ORDER — OXYCODONE HYDROCHLORIDE 5 MG/1
5 TABLET ORAL
Status: DISCONTINUED | OUTPATIENT
Start: 2024-08-16 | End: 2024-08-16 | Stop reason: HOSPADM

## 2024-08-16 RX ORDER — ONDANSETRON 2 MG/ML
INJECTION INTRAMUSCULAR; INTRAVENOUS PRN
Status: DISCONTINUED | OUTPATIENT
Start: 2024-08-16 | End: 2024-08-16 | Stop reason: SDUPTHER

## 2024-08-16 RX ORDER — DEXAMETHASONE SODIUM PHOSPHATE 4 MG/ML
INJECTION, SOLUTION INTRA-ARTICULAR; INTRALESIONAL; INTRAMUSCULAR; INTRAVENOUS; SOFT TISSUE PRN
Status: DISCONTINUED | OUTPATIENT
Start: 2024-08-16 | End: 2024-08-16 | Stop reason: SDUPTHER

## 2024-08-16 RX ORDER — MEPERIDINE HYDROCHLORIDE 50 MG/ML
12.5 INJECTION INTRAMUSCULAR; INTRAVENOUS; SUBCUTANEOUS EVERY 5 MIN PRN
Status: DISCONTINUED | OUTPATIENT
Start: 2024-08-16 | End: 2024-08-16 | Stop reason: HOSPADM

## 2024-08-16 RX ORDER — SODIUM CHLORIDE 9 MG/ML
INJECTION, SOLUTION INTRAVENOUS PRN
Status: DISCONTINUED | OUTPATIENT
Start: 2024-08-16 | End: 2024-08-17 | Stop reason: HOSPADM

## 2024-08-16 RX ORDER — FLUOXETINE HYDROCHLORIDE 20 MG/1
40 CAPSULE ORAL DAILY
Status: DISCONTINUED | OUTPATIENT
Start: 2024-08-16 | End: 2024-08-17 | Stop reason: HOSPADM

## 2024-08-16 RX ORDER — ATORVASTATIN CALCIUM 80 MG/1
80 TABLET, FILM COATED ORAL DAILY
Status: DISCONTINUED | OUTPATIENT
Start: 2024-08-16 | End: 2024-08-17 | Stop reason: HOSPADM

## 2024-08-16 RX ORDER — POTASSIUM CHLORIDE 7.45 MG/ML
10 INJECTION INTRAVENOUS PRN
Status: DISCONTINUED | OUTPATIENT
Start: 2024-08-16 | End: 2024-08-17 | Stop reason: HOSPADM

## 2024-08-16 RX ORDER — SODIUM CHLORIDE 0.9 % (FLUSH) 0.9 %
5-40 SYRINGE (ML) INJECTION PRN
Status: DISCONTINUED | OUTPATIENT
Start: 2024-08-16 | End: 2024-08-16 | Stop reason: HOSPADM

## 2024-08-16 RX ORDER — SODIUM CHLORIDE 9 MG/ML
INJECTION, SOLUTION INTRAVENOUS PRN
Status: DISCONTINUED | OUTPATIENT
Start: 2024-08-16 | End: 2024-08-16 | Stop reason: HOSPADM

## 2024-08-16 RX ORDER — NALOXONE HYDROCHLORIDE 0.4 MG/ML
INJECTION, SOLUTION INTRAMUSCULAR; INTRAVENOUS; SUBCUTANEOUS PRN
Status: DISCONTINUED | OUTPATIENT
Start: 2024-08-16 | End: 2024-08-16 | Stop reason: HOSPADM

## 2024-08-16 RX ADMIN — PROPOFOL 150 MG: 10 INJECTION, EMULSION INTRAVENOUS at 09:46

## 2024-08-16 RX ADMIN — HYDROMORPHONE HYDROCHLORIDE 0.5 MG: 1 INJECTION, SOLUTION INTRAMUSCULAR; INTRAVENOUS; SUBCUTANEOUS at 11:14

## 2024-08-16 RX ADMIN — LEVOFLOXACIN 500 MG: 5 INJECTION, SOLUTION INTRAVENOUS at 09:46

## 2024-08-16 RX ADMIN — FLUOXETINE HYDROCHLORIDE 40 MG: 20 CAPSULE ORAL at 13:06

## 2024-08-16 RX ADMIN — NIFEDIPINE 60 MG: 30 TABLET, FILM COATED, EXTENDED RELEASE ORAL at 20:20

## 2024-08-16 RX ADMIN — SODIUM CHLORIDE, SODIUM LACTATE, POTASSIUM CHLORIDE, AND CALCIUM CHLORIDE: .6; .31; .03; .02 INJECTION, SOLUTION INTRAVENOUS at 09:43

## 2024-08-16 RX ADMIN — HYDRALAZINE HYDROCHLORIDE 25 MG: 25 TABLET ORAL at 20:20

## 2024-08-16 RX ADMIN — LIDOCAINE HYDROCHLORIDE 100 MG: 20 INJECTION, SOLUTION INFILTRATION; PERINEURAL at 09:46

## 2024-08-16 RX ADMIN — Medication 3 MG: at 22:03

## 2024-08-16 RX ADMIN — LANSOPRAZOLE 30 MG: 30 TABLET, ORALLY DISINTEGRATING, DELAYED RELEASE ORAL at 13:07

## 2024-08-16 RX ADMIN — ATORVASTATIN CALCIUM 80 MG: 80 TABLET, FILM COATED ORAL at 13:06

## 2024-08-16 RX ADMIN — ONDANSETRON 4 MG: 2 INJECTION INTRAMUSCULAR; INTRAVENOUS at 09:46

## 2024-08-16 RX ADMIN — FENTANYL CITRATE 100 MCG: 50 INJECTION, SOLUTION INTRAMUSCULAR; INTRAVENOUS at 09:46

## 2024-08-16 RX ADMIN — MEPERIDINE HYDROCHLORIDE 12.5 MG: 50 INJECTION INTRAMUSCULAR; INTRAVENOUS; SUBCUTANEOUS at 11:17

## 2024-08-16 RX ADMIN — ROCURONIUM BROMIDE 50 MG: 50 INJECTION, SOLUTION INTRAVENOUS at 09:46

## 2024-08-16 RX ADMIN — DEXAMETHASONE SODIUM PHOSPHATE 4 MG: 4 INJECTION, SOLUTION INTRAMUSCULAR; INTRAVENOUS at 09:46

## 2024-08-16 RX ADMIN — ARIPIPRAZOLE 15 MG: 10 TABLET ORAL at 13:06

## 2024-08-16 RX ADMIN — SODIUM CHLORIDE: 9 INJECTION, SOLUTION INTRAVENOUS at 21:11

## 2024-08-16 RX ADMIN — SODIUM CHLORIDE: 9 INJECTION, SOLUTION INTRAVENOUS at 13:09

## 2024-08-16 RX ADMIN — OXYCODONE HYDROCHLORIDE 5 MG: 5 TABLET ORAL at 13:07

## 2024-08-16 RX ADMIN — Medication 10 ML: at 20:22

## 2024-08-16 RX ADMIN — HYDROMORPHONE HYDROCHLORIDE 0.5 MG: 1 INJECTION, SOLUTION INTRAMUSCULAR; INTRAVENOUS; SUBCUTANEOUS at 10:40

## 2024-08-16 RX ADMIN — OXYCODONE HYDROCHLORIDE 5 MG: 5 TABLET ORAL at 17:38

## 2024-08-16 RX ADMIN — SUGAMMADEX 200 MG: 100 INJECTION, SOLUTION INTRAVENOUS at 10:15

## 2024-08-16 ASSESSMENT — PAIN DESCRIPTION - LOCATION
LOCATION: PENIS

## 2024-08-16 ASSESSMENT — PAIN SCALES - GENERAL
PAINLEVEL_OUTOF10: 0
PAINLEVEL_OUTOF10: 4
PAINLEVEL_OUTOF10: 7
PAINLEVEL_OUTOF10: 6
PAINLEVEL_OUTOF10: 7
PAINLEVEL_OUTOF10: 5
PAINLEVEL_OUTOF10: 4

## 2024-08-16 ASSESSMENT — PAIN - FUNCTIONAL ASSESSMENT
PAIN_FUNCTIONAL_ASSESSMENT: 0-10
PAIN_FUNCTIONAL_ASSESSMENT: ACTIVITIES ARE NOT PREVENTED
PAIN_FUNCTIONAL_ASSESSMENT: ACTIVITIES ARE NOT PREVENTED

## 2024-08-16 ASSESSMENT — PAIN DESCRIPTION - DESCRIPTORS
DESCRIPTORS: BURNING
DESCRIPTORS: BURNING
DESCRIPTORS: ACHING
DESCRIPTORS: BURNING
DESCRIPTORS: BURNING

## 2024-08-16 ASSESSMENT — PAIN DESCRIPTION - ORIENTATION: ORIENTATION: MID

## 2024-08-16 NOTE — CARE COORDINATION
Case Management Assessment  Initial Evaluation    Date/Time of Evaluation: 8/16/2024 4:08 PM  Assessment Completed by: Cinthia Ambriz RN    If patient is discharged prior to next notation, then this note serves as note for discharge by case management.    Patient Name: Jaden Gauthier                   YOB: 1945  Diagnosis: BPH with urinary obstruction [N40.1, N13.8]  Enlarged prostate with urinary obstruction [N40.1, N13.8]                   Date / Time: 8/16/2024  8:05 AM    Patient Admission Status: Observation   Readmission Risk (Low < 19, Mod (19-27), High > 27): Readmission Risk Score: 10.5    Current PCP: Quinn Bradley MD  PCP verified by ? Yes    Chart Reviewed: Yes      History Provided by: Patient  Patient Orientation: Alert and Oriented, Person, Place, Situation, Self    Patient Cognition: Alert    Hospitalization in the last 30 days (Readmission):  No    If yes, Readmission Assessment in  Navigator will be completed.    Advance Directives:      Code Status: Full Code   Patient's Primary Decision Maker is: Legal Next of Kin    Primary Decision Maker: MaxCa - Antoine - [phone]    Discharge Planning:    Patient lives with: Spouse/Significant Other Type of Home: House  Primary Care Giver: Self  Patient Support Systems include: Friends/Neighbors   Current Financial resources: Medicare  Current community resources: None  Current services prior to admission: None            Current DME:              Type of Home Care services:  None    ADLS  Prior functional level: Independent in ADLs/IADLs  Current functional level: Independent in ADLs/IADLs    PT AM-PAC:   /24  OT AM-PAC:   /24    Family can provide assistance at DC: Yes  Would you like Case Management to discuss the discharge plan with any other family members/significant others, and if so, who? No  Plans to Return to Present Housing: Yes  Other Identified Issues/Barriers to RETURNING to current housing: none  Potential

## 2024-08-16 NOTE — PROGRESS NOTES
Pt brought to PACU. Report obtained from OR RN and anesthesia. Pt placed on monitor SR and O2 at  RA. CBI decreased urine light pink.

## 2024-08-16 NOTE — ANESTHESIA POSTPROCEDURE EVALUATION
Department of Anesthesiology  Postprocedure Note    Patient: Jaden Gauthier  MRN: [id number]  YOB: 1945  Date of evaluation: 8/16/2024    Procedure Summary       Date: 08/16/24 Room / Location: [address]    Anesthesia Start: 0943 Anesthesia Stop: 1023    Procedure: CYSTOSCOPY, TRANSURETHRAL RESECTION OF THE PROSTATE (Urethra) Diagnosis:       BPH with urinary obstruction      (BPH with urinary obstruction [N40.1, N13.8])    Surgeons: Mello Alejandro MD Responsible Provider: Luan Zamora MD    Anesthesia Type: general ASA Status: 2            Anesthesia Type: No value filed.    Fernando Phase I: Fernando Score: 10    Fernando Phase II:      Anesthesia Post Evaluation    Patient location during evaluation: PACU  Patient participation: complete - patient participated  Level of consciousness: awake and alert  Airway patency: patent  Nausea & Vomiting: no vomiting and no nausea  Cardiovascular status: hemodynamically stable and blood pressure returned to baseline  Respiratory status: acceptable  Hydration status: euvolemic  Comments: ---------------------------               08/16/24                      1026         ---------------------------   BP:                         Pulse:                      Resp:                       Temp:   97.3 °F (36.3 °C)   SpO2:          98%         ---------------------------    Pain management: adequate    No notable events documented.

## 2024-08-16 NOTE — PLAN OF CARE
Problem: Safety - Adult  Goal: Free from fall injury  Outcome: Progressing     Problem: ABCDS Injury Assessment  Goal: Absence of physical injury  Outcome: Progressing  Flowsheets (Taken 8/16/2024 1957)  Absence of Physical Injury: Implement safety measures based on patient assessment     Problem: Pain  Goal: Verbalizes/displays adequate comfort level or baseline comfort level  Outcome: Progressing  Flowsheets (Taken 8/16/2024 1957)  Verbalizes/displays adequate comfort level or baseline comfort level:   Encourage patient to monitor pain and request assistance   Assess pain using appropriate pain scale   Administer analgesics based on type and severity of pain and evaluate response   Implement non-pharmacological measures as appropriate and evaluate response     Problem: Genitourinary - Adult  Goal: Urinary catheter remains patent  Outcome: Progressing  Flowsheets (Taken 8/16/2024 1957)  Urinary catheter remains patent: Assess patency of urinary catheter     Problem: Hematologic - Adult  Goal: Maintains hematologic stability  Outcome: Progressing  Flowsheets (Taken 8/16/2024 1957)  Maintains hematologic stability: Assess for signs and symptoms of bleeding or hemorrhage

## 2024-08-16 NOTE — OP NOTE
Operative Note      Patient: Jadne Gauthier  YOB: 1945  MRN: [id number]    Date of Procedure: 8/16/2024    Pre-Op Diagnosis Codes:      * BPH with urinary obstruction [N40.1, N13.8]    Post-Op Diagnosis: Same       Procedure(s):  CYSTOSCOPY, TRANSURETHRAL RESECTION OF THE PROSTATE    Surgeon(s):  Mello Alejandro MD    Assistant:   Surgical Assistant: Jhon Radford    Anesthesia: General    Estimated Blood Loss (mL): Minimal    Complications: None    Specimens:   ID Type Source Tests Collected by Time Destination   A : PROSTATE CHIPS Tissue Tissue SURGICAL PATHOLOGY Mello Alejandro MD 8/16/2024 0932        Implants:  * No implants in log *      Drains:   Urinary Catheter 08/16/24 (Active)       [REMOVED] Urinary Catheter 08/02/24 (Removed)   $ Urethral catheter insertion $ Not inserted for procedure 08/02/24 2003   Catheter Indications Urinary retention (acute or chronic), continuous bladder irrigation or bladder outlet obstruction 08/02/24 2003   Urine Color Yellow 08/02/24 2003   Collection Container Leg bag 08/02/24 2003   Securement Method Securing device (Describe) 08/02/24 2003   Catheter Best Practices  Bag below bladder;Drainage tube clipped to bed;Lack of dependent loop in tubing;Drainage bag less than half full;Catheter secured to thigh;Bag not on floor;Tamper seal intact 08/02/24 2003   Status Draining 08/02/24 2003       Findings:  Infection Present At Time Of Surgery (PATOS) (choose all levels that have infection present):  No infection present    Other Findings: Significantly enlarged prostate    Detailed Description of Procedure:   After the induction General the patient was placed in the dorsal lithotomy position and prepped and draped in the usual sterile fashion. The urethral meatus was normal. A 24 Montenegrin resectoscope sheath was placed without difficulty. No strictures were noted along the urethra. Cystoscopic examination proceeded.  The ureteral orifices were  identified. There were no suspicious lesions in the bladder. At this point, the median lobe was resected, taking care to avoid the ureteral orifices. Then, attention was turned to the 12 o'clock position, where, in a clock-wise fashion the left lateral lobe was resected to the capsule. Hemostasis was achieved with coagulation current. The right lobe was then resected in a similar fashion, with hemostasis again obtained using coagulation current. The chips were recovered from the bladder using the Ellick evacuator. The ureteral orifices were noted to be uninjured after the resection. A 24 German 3 way Admas catheter was inserted at the conclusion of the procedure using a guide, and placed to traction. Continuous bladder irrigation initiated with clear returns.          Electronically signed by JAVON PRATER MD on 8/16/2024 at 10:25 AM

## 2024-08-16 NOTE — PROGRESS NOTES
Patient from PACU @1211, report from Claudine OSCAR. Four eye assessment completed, no skin issues noted. Patient oriented to room and supplied with regular diet menu. Takes pills whole with water. CBI running normal saline at moderate speed, urine is pink at this time. Assessment completed and documented. VSS, BP on high side, however patient states he has \"white coat syndrome\" and is in a little bit pain. A/ox4. C/o 4/10 surgical pain, given prn pain medication per mar. Bed locked and in lowest position. Bedside table and call light within reach. Denies further needs at this time.

## 2024-08-16 NOTE — PROGRESS NOTES
4 Eyes Skin Assessment     The patient is being assess for  Admission    I agree that 2 RN's have performed a thorough Head to Toe Skin Assessment on the patient. ALL assessment sites listed below have been assessed.       Areas assessed by both nurses: Kyle OSCAR  []   Head, Face, and Ears   []   Shoulders, Back, and Chest  []   Arms, Elbows, and Hands   []   Coccyx, Sacrum, and Ischum  []   Legs, Feet, and Heels        Does the Patient have Skin Breakdown?  No         Ajay Prevention initiated:  NA   Wound Care Orders initiated:  NA      WOC nurse consulted for Pressure Injury (Stage 3,4, Unstageable, DTI, NWPT, and Complex wounds):  NA      Nurse 1 eSignature: Electronically signed by Kyle Nichols RN on 8/16/24 at 1:17 PM EDT    **SHARE this note so that the co-signing nurse is able to place an eSignature**    Nurse 2 eSignature: {Esignature:[id number]}

## 2024-08-16 NOTE — H&P
Urology Attending Admission Note      Reason for Admission: BPH, urinary retention    History: 79 year old male with history of urinary retention and BPH here for TURP    Meds: see med rec  Family History, Social History, Review of Systems:  Reviewed and agreed to as per chart    Exam:    Vitals:  Ht 1.88 m (6' 2\")   Wt 98.4 kg (217 lb)   BMI 27.86 kg/m²   No data recorded  No intake or output data in the 24 hours ending 08/16/24 0840    Physical:   Well developed, well nourished in no acute distress  Mood indicates no abnormalities. Pt doesn’t appear depressed  Orientated to time and place  Neck is supple, trachea is midline  Respiratory effort is normal  Cardiovascular show no extremity swelling  Abdomen no masses or hernias are palpated, there is no tenderness. Liver and Spleen appear normal.  Skin show no abnormal lesions  Lymph nodes are not palpated in the inguinal, neck, or axillary area.    Labs:  WBC:    Lab Results   Component Value Date/Time    WBC 6.0 08/05/2024 09:55 AM     Hemoglobin/Hematocrit:    Lab Results   Component Value Date/Time    HGB 12.1 08/05/2024 09:55 AM    HCT 36.6 08/05/2024 09:55 AM     BMP:    Lab Results   Component Value Date/Time     08/05/2024 09:55 AM    K 4.4 08/05/2024 09:55 AM    K 3.9 07/03/2024 07:20 AM    CL 99 08/05/2024 09:55 AM    CO2 23 08/05/2024 09:55 AM    BUN 17 08/05/2024 09:55 AM    CREATININE 0.9 08/05/2024 09:55 AM    CALCIUM 9.8 08/05/2024 09:55 AM    GFRAA >60 04/14/2022 05:55 AM    LABGLOM 87 08/05/2024 09:55 AM    LABGLOM >60 01/25/2024 06:05 AM     PT/INR:    Lab Results   Component Value Date/Time    PROTIME 13.1 08/12/2024 03:34 PM    INR 0.97 08/12/2024 03:34 PM     PTT:    Lab Results   Component Value Date/Time    APTT 28.0 08/12/2024 03:34 PM   [APTT      Impression/Plan:     BPH refractory to medical management  Proceed with cystoscopy and TURP      JAVON PRATER MD

## 2024-08-17 VITALS
BODY MASS INDEX: 27.84 KG/M2 | OXYGEN SATURATION: 95 % | HEART RATE: 63 BPM | WEIGHT: 216.93 LBS | HEIGHT: 74 IN | RESPIRATION RATE: 16 BRPM | DIASTOLIC BLOOD PRESSURE: 65 MMHG | SYSTOLIC BLOOD PRESSURE: 125 MMHG | TEMPERATURE: 97.2 F

## 2024-08-17 LAB
ANION GAP SERPL CALCULATED.3IONS-SCNC: 9 MMOL/L (ref 3–16)
BASOPHILS # BLD: 0 K/UL (ref 0–0.2)
BASOPHILS NFR BLD: 0.2 %
BUN SERPL-MCNC: 7 MG/DL (ref 7–20)
CALCIUM SERPL-MCNC: 8.6 MG/DL (ref 8.3–10.6)
CHLORIDE SERPL-SCNC: 103 MMOL/L (ref 99–110)
CO2 SERPL-SCNC: 25 MMOL/L (ref 21–32)
CREAT SERPL-MCNC: 0.7 MG/DL (ref 0.8–1.3)
DEPRECATED RDW RBC AUTO: 14.6 % (ref 12.4–15.4)
EOSINOPHIL # BLD: 0 K/UL (ref 0–0.6)
EOSINOPHIL NFR BLD: 0.5 %
GFR SERPLBLD CREATININE-BSD FMLA CKD-EPI: >90 ML/MIN/{1.73_M2}
GLUCOSE SERPL-MCNC: 107 MG/DL (ref 70–99)
HCT VFR BLD AUTO: 35.8 % (ref 40.5–52.5)
HGB BLD-MCNC: 12 G/DL (ref 13.5–17.5)
LYMPHOCYTES # BLD: 1.3 K/UL (ref 1–5.1)
LYMPHOCYTES NFR BLD: 13.2 %
MCH RBC QN AUTO: 31.7 PG (ref 26–34)
MCHC RBC AUTO-ENTMCNC: 33.4 G/DL (ref 31–36)
MCV RBC AUTO: 94.9 FL (ref 80–100)
MONOCYTES # BLD: 0.9 K/UL (ref 0–1.3)
MONOCYTES NFR BLD: 8.9 %
NEUTROPHILS # BLD: 7.6 K/UL (ref 1.7–7.7)
NEUTROPHILS NFR BLD: 77.2 %
PLATELET # BLD AUTO: 227 K/UL (ref 135–450)
PMV BLD AUTO: 7.6 FL (ref 5–10.5)
POTASSIUM SERPL-SCNC: 3.9 MMOL/L (ref 3.5–5.1)
RBC # BLD AUTO: 3.77 M/UL (ref 4.2–5.9)
SODIUM SERPL-SCNC: 137 MMOL/L (ref 136–145)
WBC # BLD AUTO: 9.9 K/UL (ref 4–11)

## 2024-08-17 PROCEDURE — 85025 COMPLETE CBC W/AUTO DIFF WBC: CPT

## 2024-08-17 PROCEDURE — 6360000002 HC RX W HCPCS: Performed by: UROLOGY

## 2024-08-17 PROCEDURE — 96365 THER/PROPH/DIAG IV INF INIT: CPT

## 2024-08-17 PROCEDURE — 6370000000 HC RX 637 (ALT 250 FOR IP): Performed by: UROLOGY

## 2024-08-17 PROCEDURE — 36415 COLL VENOUS BLD VENIPUNCTURE: CPT

## 2024-08-17 PROCEDURE — G0378 HOSPITAL OBSERVATION PER HR: HCPCS

## 2024-08-17 PROCEDURE — 80048 BASIC METABOLIC PNL TOTAL CA: CPT

## 2024-08-17 PROCEDURE — 2580000003 HC RX 258: Performed by: UROLOGY

## 2024-08-17 RX ORDER — CEFDINIR 300 MG/1
300 CAPSULE ORAL 2 TIMES DAILY
Qty: 20 CAPSULE | Refills: 0 | Status: SHIPPED | OUTPATIENT
Start: 2024-08-17 | End: 2024-08-27

## 2024-08-17 RX ORDER — PHENAZOPYRIDINE HYDROCHLORIDE 200 MG/1
200 TABLET, FILM COATED ORAL 3 TIMES DAILY PRN
Qty: 20 TABLET | Refills: 0 | Status: SHIPPED | OUTPATIENT
Start: 2024-08-17 | End: 2024-08-24

## 2024-08-17 RX ADMIN — LANSOPRAZOLE 30 MG: 30 TABLET, ORALLY DISINTEGRATING, DELAYED RELEASE ORAL at 08:48

## 2024-08-17 RX ADMIN — ATORVASTATIN CALCIUM 80 MG: 80 TABLET, FILM COATED ORAL at 08:42

## 2024-08-17 RX ADMIN — SODIUM CHLORIDE: 9 INJECTION, SOLUTION INTRAVENOUS at 04:36

## 2024-08-17 RX ADMIN — LEVOFLOXACIN 500 MG: 5 INJECTION, SOLUTION INTRAVENOUS at 08:53

## 2024-08-17 RX ADMIN — HYDRALAZINE HYDROCHLORIDE 25 MG: 25 TABLET ORAL at 08:42

## 2024-08-17 RX ADMIN — OXYCODONE HYDROCHLORIDE 5 MG: 5 TABLET ORAL at 13:44

## 2024-08-17 RX ADMIN — NIFEDIPINE 60 MG: 30 TABLET, FILM COATED, EXTENDED RELEASE ORAL at 08:42

## 2024-08-17 ASSESSMENT — PAIN SCALES - GENERAL: PAINLEVEL_OUTOF10: 3

## 2024-08-17 ASSESSMENT — PAIN DESCRIPTION - LOCATION: LOCATION: PENIS

## 2024-08-17 NOTE — PROGRESS NOTES
08/17/24 1455   Encounter Summary   Encounter Overview/Reason Loneliness/Social Isolation   Service Provided For Patient   Referral/Consult From Multi-disciplinary team   Last Encounter  08/17/24  (patient requested no future pastoral care visits)   Plan and Referrals   Plan/Referrals No future visits requested

## 2024-08-17 NOTE — PROGRESS NOTES
Pt maggi regular diet states some burning at meatus. Refuse offer of pain medications. Adams catheter with CBI, slowed flow rate, urine pink with small streaks of blood in the tubing, SR 2/4, call light w/in reach.

## 2024-08-17 NOTE — PROGRESS NOTES
Discharge instructions and medications reviewed with the patient. All questions answered. Pt left in stable condition vias WC  to private car.

## 2024-08-17 NOTE — PROGRESS NOTES
Adams 3 way catheter dc'd as ordered, bright red blood from penis post catheter removal, moderate amt, Depends on, urinal at bedside. Will check frequently.

## 2024-08-17 NOTE — DISCHARGE INSTRUCTIONS
Transurethral Resection of the Prostate (TURP): What to Expect at Home  Your Recovery     Transurethral resection of the prostate (TURP) is surgery to remove prostate tissue. It is done when an overgrown prostate gland is pressing on the urethra and making it hard to urinate.  You may need a urinary catheter for a short time. It is a flexible plastic tube used to drain urine from your bladder when you can't urinate on your own. If it's still in place when you go home, your doctor will give you instructions on how to care for your catheter.  For several days after surgery, you may feel burning when you urinate. Your urine may be pink for 1 to 3 weeks after surgery. You also may have bladder cramps, or spasms. Your doctor may give you medicine to help control the spasms.  You may still feel like you need to urinate often in the weeks after your surgery. It often takes up to 6 weeks for this to get better. After you have healed, you may have less trouble urinating. You may have better control over starting and stopping your urine stream. And you may feel like you get more relief when you urinate.  Most people can return to work or many of their usual tasks in 1 to 3 weeks. But for about 6 weeks, try to avoid heavy lifting and strenuous activities that might put extra pressure on your bladder.  Most people still can have erections after surgery (if they were able to have them before surgery). But they may not ejaculate when they have an orgasm. Semen may go into the bladder instead of out through the penis. This is called retrograde ejaculation. It does not hurt and is not harmful to your health.  This care sheet gives you a general idea about how long it will take for you to recover. But each person recovers at a different pace. Follow the steps below to get better as quickly as possible.  How can you care for yourself at home?  Activity    Rest when you feel tired.     Be active. Walking is a good choice.      anytime you think you may need emergency care. For example, call if:    You passed out (lost consciousness).     You have chest pain, are short of breath, or cough up blood.   Call your doctor now or seek immediate medical care if:    You have pain that does not get better after you take pain medicine.     You have new or more blood clots in your urine. (It is normal for the urine to be pink for a few days.)     You can't pass urine.     You have symptoms of a urinary tract infection. These may include:  Pain or burning when you urinate.  A frequent need to urinate without being able to pass much urine.  Pain in the flank, which is just below the rib cage and above the waist on either side of the back.  Blood in your urine.  A fever.     You are sick to your stomach or can't keep down fluids.     You have signs of a blood clot in your leg (called a deep vein thrombosis), such as:  Pain in your calf, back of the knee, thigh, or groin.  Redness or swelling in your leg.   Watch closely for changes in your health, and be sure to contact your doctor if you have problems.  Where can you learn more?  Go to https://www.[employer].net/patientEd and enter W155 to learn more about \"Transurethral Resection of the Prostate (TURP): What to Expect at Home.\"  Current as of: November 15, 2023  Content Version: 14.1  © 4133-7292 [employer].   Care instructions adapted under license by [employer]. If you have questions about a medical condition or this instruction, always ask your healthcare professional. [employer] disclaims any warranty or liability for your use of this information.

## 2024-08-19 ENCOUNTER — CARE COORDINATION (OUTPATIENT)
Dept: CASE MANAGEMENT | Age: 79
End: 2024-08-19

## 2024-08-19 NOTE — PROGRESS NOTES
Patient was meant to be scheduled for [facility], not Psychiatry. His Psychiatry appt was therefore cancelled.

## 2024-08-19 NOTE — CARE COORDINATION
site of care based on symptoms and resources available to patient including: PCP  Specialist. The patient agrees to contact the primary care provider and/or specialist office for questions related to their healthcare.      Advance Care Planning:   Does patient have an Advance Directive: reviewed and current.  Advance Directives:       Code Status: Full Code   Patient's Primary Decision Maker is: Legal Next of Kin    Primary Decision Maker: Ca Santana - [phone]    Medication Reconciliation:  Medication reconciliation was not performed during this call, patient declined.     Remote Patient Monitoring:  Offered patient enrollment in the Remote Patient Monitoring (RPM) program for in-home monitoring: Deferred at this time because declined further support; will discuss at next outreach.    Assessments:  Care Transitions 24 Hour Call    Do you have all of your prescriptions and are they filled?: Yes  Do you have support at home?: Alone  Are you an active caregiver in your home?: No  Care Transitions Interventions          Follow Up Appointment:   Discussed follow up appointments. Patient has hospital follow up appointment scheduled  declined f/u appt needed        Patient closed (declined further JONATHAN services) from the Care Transitions program.    Handoff:   Patient was not referred to the ACM team due to patient declined services.      Patient has agreed to contact primary care provider and/or specialist for any further questions, concerns, or needs.    Luz Elena Phan RN

## 2024-08-21 ENCOUNTER — TELEPHONE (OUTPATIENT)
Dept: FAMILY MEDICINE CLINIC | Age: 79
End: 2024-08-21

## 2024-08-21 ENCOUNTER — OFFICE VISIT (OUTPATIENT)
Dept: FAMILY MEDICINE CLINIC | Age: 79
End: 2024-08-21
Payer: MEDICARE

## 2024-08-21 VITALS
BODY MASS INDEX: 28.11 KG/M2 | OXYGEN SATURATION: 98 % | WEIGHT: 219 LBS | HEART RATE: 94 BPM | DIASTOLIC BLOOD PRESSURE: 64 MMHG | HEIGHT: 74 IN | SYSTOLIC BLOOD PRESSURE: 130 MMHG

## 2024-08-21 DIAGNOSIS — F51.01 PRIMARY INSOMNIA: Primary | ICD-10-CM

## 2024-08-21 DIAGNOSIS — C61 PROSTATE CANCER (HCC): ICD-10-CM

## 2024-08-21 PROCEDURE — G2211 COMPLEX E/M VISIT ADD ON: HCPCS | Performed by: FAMILY MEDICINE

## 2024-08-21 PROCEDURE — G8417 CALC BMI ABV UP PARAM F/U: HCPCS | Performed by: FAMILY MEDICINE

## 2024-08-21 PROCEDURE — 99214 OFFICE O/P EST MOD 30 MIN: CPT | Performed by: FAMILY MEDICINE

## 2024-08-21 PROCEDURE — G8427 DOCREV CUR MEDS BY ELIG CLIN: HCPCS | Performed by: FAMILY MEDICINE

## 2024-08-21 PROCEDURE — 1123F ACP DISCUSS/DSCN MKR DOCD: CPT | Performed by: FAMILY MEDICINE

## 2024-08-21 PROCEDURE — 3078F DIAST BP <80 MM HG: CPT | Performed by: FAMILY MEDICINE

## 2024-08-21 PROCEDURE — 3075F SYST BP GE 130 - 139MM HG: CPT | Performed by: FAMILY MEDICINE

## 2024-08-21 PROCEDURE — 1036F TOBACCO NON-USER: CPT | Performed by: FAMILY MEDICINE

## 2024-08-21 RX ORDER — ZOLPIDEM TARTRATE 5 MG/1
5 TABLET ORAL NIGHTLY PRN
Qty: 14 TABLET | Refills: 0 | Status: SHIPPED | OUTPATIENT
Start: 2024-08-21 | End: 2024-09-04

## 2024-08-21 NOTE — TELEPHONE ENCOUNTER
Pt called to let ESDRAS know that he had been given a dx of cancer and is requesting a call back to know what to do.

## 2024-08-24 NOTE — PROGRESS NOTES
Heart sounds: Normal heart sounds. No murmur heard.     No friction rub. No gallop.   Pulmonary:      Effort: Pulmonary effort is normal. No respiratory distress.      Breath sounds: Normal breath sounds. No wheezing.   Abdominal:      General: Bowel sounds are normal. There is no distension.      Palpations: Abdomen is soft.      Tenderness: There is no abdominal tenderness.   Musculoskeletal:         General: No tenderness. Normal range of motion.      Cervical back: Normal range of motion and neck supple.   Skin:     General: Skin is warm and dry.   Neurological:      Mental Status: He is alert and oriented to person, place, and time.      Deep Tendon Reflexes: Reflexes are normal and symmetric.   Psychiatric:         Behavior: Behavior normal.         Thought Content: Thought content normal.         Judgment: Judgment normal.              An electronic signature was used to authenticate this note.    --Quinn Bradley MD

## 2024-09-10 DIAGNOSIS — F32.A DEPRESSION, UNSPECIFIED DEPRESSION TYPE: ICD-10-CM

## 2024-09-10 RX ORDER — CITALOPRAM HYDROBROMIDE 40 MG/1
40 TABLET ORAL DAILY
Qty: 90 TABLET | Refills: 5 | Status: SHIPPED | OUTPATIENT
Start: 2024-09-10

## 2024-10-02 ENCOUNTER — PATIENT MESSAGE (OUTPATIENT)
Dept: FAMILY MEDICINE CLINIC | Age: 79
End: 2024-10-02

## 2024-10-14 RX ORDER — HYDRALAZINE HYDROCHLORIDE 50 MG/1
25 TABLET, FILM COATED ORAL 2 TIMES DAILY
Qty: 180 TABLET | Refills: 0 | Status: SHIPPED | OUTPATIENT
Start: 2024-10-14

## 2024-10-28 ENCOUNTER — TELEPHONE (OUTPATIENT)
Dept: FAMILY MEDICINE CLINIC | Age: 79
End: 2024-10-28

## 2024-10-28 NOTE — TELEPHONE ENCOUNTER
Jose C sent a fax asking if pt is taking Citalopram and fluoxetine. Confirmed w Dr. YANCEY\"Kae that pt is not to take both and contacted pt. He stated that he d/c the Citalopram. This has been removed from him med list.

## 2024-11-05 DIAGNOSIS — F33.1 MAJOR DEPRESSIVE DISORDER, RECURRENT, MODERATE (HCC): ICD-10-CM

## 2024-11-05 RX ORDER — FLUOXETINE 40 MG/1
40 CAPSULE ORAL DAILY
Qty: 30 CAPSULE | Refills: 2 | Status: SHIPPED | OUTPATIENT
Start: 2024-11-05 | End: 2025-02-03

## 2024-11-05 NOTE — TELEPHONE ENCOUNTER
Last Office Visit  -  8/21/24  Next Office Visit  -  n/a    Last Filled  -  8/12/24  Last UDS -    Contract -

## 2024-12-02 DIAGNOSIS — F33.1 MAJOR DEPRESSIVE DISORDER, RECURRENT, MODERATE (HCC): ICD-10-CM

## 2024-12-02 RX ORDER — ARIPIPRAZOLE 15 MG/1
15 TABLET ORAL DAILY
Qty: 30 TABLET | Refills: 3 | Status: SHIPPED | OUTPATIENT
Start: 2024-12-02

## 2025-01-03 ENCOUNTER — HOSPITAL ENCOUNTER (EMERGENCY)
Age: 80
Discharge: HOME | End: 2025-01-03
Payer: MEDICARE

## 2025-01-03 ENCOUNTER — PATIENT MESSAGE (OUTPATIENT)
Dept: FAMILY MEDICINE CLINIC | Age: 80
End: 2025-01-03

## 2025-01-03 VITALS
WEIGHT: 231.8 LBS | SYSTOLIC BLOOD PRESSURE: 147 MMHG | HEART RATE: 73 BPM | TEMPERATURE: 97.9 F | RESPIRATION RATE: 16 BRPM | OXYGEN SATURATION: 96 % | BODY MASS INDEX: 29.76 KG/M2 | DIASTOLIC BLOOD PRESSURE: 73 MMHG

## 2025-01-03 DIAGNOSIS — R03.0 ELEVATED BLOOD PRESSURE READING: Primary | ICD-10-CM

## 2025-01-03 PROCEDURE — 99282 EMERGENCY DEPT VISIT SF MDM: CPT

## 2025-01-03 ASSESSMENT — PAIN SCALES - GENERAL: PAINLEVEL_OUTOF10: 0

## 2025-01-03 ASSESSMENT — PAIN - FUNCTIONAL ASSESSMENT: PAIN_FUNCTIONAL_ASSESSMENT: 0-10

## 2025-01-03 NOTE — TELEPHONE ENCOUNTER
Contacted pt and explained that because BP is this high it is highly recommended to be seen at ED. Pt states that he is also having a headache. Pt will be going to ED for evaluation

## 2025-01-05 NOTE — ED PROVIDER NOTES
containing 0.5 oz of alcohol per week    Drug use: Yes     Frequency: 2.0 times per week     Types: Marijuana (Weed)     Comment: every now and then, gummies, smoke weed       SCREENINGS        [place] Coma Scale  Eye Opening: Spontaneous  Best Verbal Response: Oriented  Best Motor Response: Obeys commands  [place] Coma Scale Score: 15                CIWA Assessment  BP: (!) 147/73  Pulse: 73           PHYSICAL EXAM  1 or more Elements     ED Triage Vitals [01/03/25 1658]   BP Systolic BP Percentile Diastolic BP Percentile Temp Temp Source Pulse Respirations SpO2   (!) 147/73 -- -- 97.9 °F (36.6 °C) Oral 73 16 96 %      Height Weight - Scale         -- 105.1 kg (231 lb 12.8 oz)             Physical Exam  Constitutional:       General: He is not in acute distress.     Appearance: Normal appearance. He is not ill-appearing.   HENT:      Head: Normocephalic and atraumatic.   Cardiovascular:      Rate and Rhythm: Normal rate.   Pulmonary:      Effort: Pulmonary effort is normal.   Skin:     General: Skin is warm and dry.      Coloration: Skin is not jaundiced.      Findings: No erythema.   Neurological:      General: No focal deficit present.      Mental Status: He is alert and oriented to person, place, and time.      Cranial Nerves: No cranial nerve deficit.      Sensory: No sensory deficit.           DIAGNOSTIC RESULTS   LABS:    Labs Reviewed - No data to display    When ordered only abnormal lab results are displayed. All other labs were within normal range or not returned as of this dictation.    EKG: When ordered, EKG's are interpreted by the Emergency Department Physician in the absence of a cardiologist.  Please see their note for interpretation of EKG.    RADIOLOGY:   Non-plain film images such as CT, Ultrasound and MRI are read by the radiologist. Plain radiographic images are visualized and preliminarily interpreted by the ED Provider with the below findings:    None    Interpretation per the Radiologist

## 2025-01-15 ENCOUNTER — OFFICE VISIT (OUTPATIENT)
Dept: FAMILY MEDICINE CLINIC | Age: 80
End: 2025-01-15
Payer: MEDICARE

## 2025-01-15 ENCOUNTER — HOSPITAL ENCOUNTER (INPATIENT)
Age: 80
LOS: 2 days | Discharge: HOME | End: 2025-01-17
Attending: STUDENT IN AN ORGANIZED HEALTH CARE EDUCATION/TRAINING PROGRAM | Admitting: STUDENT IN AN ORGANIZED HEALTH CARE EDUCATION/TRAINING PROGRAM
Payer: MEDICARE

## 2025-01-15 ENCOUNTER — APPOINTMENT (OUTPATIENT)
Dept: GENERAL RADIOLOGY | Age: 80
End: 2025-01-15
Payer: MEDICARE

## 2025-01-15 VITALS — HEART RATE: 54 BPM | DIASTOLIC BLOOD PRESSURE: 78 MMHG | SYSTOLIC BLOOD PRESSURE: 172 MMHG | OXYGEN SATURATION: 98 %

## 2025-01-15 DIAGNOSIS — E83.42 HYPOMAGNESEMIA: ICD-10-CM

## 2025-01-15 DIAGNOSIS — R94.31 ABNORMAL EKG: ICD-10-CM

## 2025-01-15 DIAGNOSIS — E87.6 HYPOKALEMIA: ICD-10-CM

## 2025-01-15 DIAGNOSIS — I10 HTN (HYPERTENSION), BENIGN: ICD-10-CM

## 2025-01-15 DIAGNOSIS — R79.89 ELEVATED TROPONIN: ICD-10-CM

## 2025-01-15 DIAGNOSIS — I16.1 HYPERTENSIVE EMERGENCY: ICD-10-CM

## 2025-01-15 DIAGNOSIS — I49.9 IRREGULAR CARDIAC RHYTHM: Primary | ICD-10-CM

## 2025-01-15 DIAGNOSIS — R53.83 OTHER FATIGUE: Primary | ICD-10-CM

## 2025-01-15 DIAGNOSIS — R07.9 CHEST PAIN, UNSPECIFIED TYPE: ICD-10-CM

## 2025-01-15 LAB
ALBUMIN SERPL-MCNC: 3.8 G/DL (ref 3.4–5)
ALBUMIN/GLOB SERPL: 1.5 {RATIO} (ref 1.1–2.2)
ALP SERPL-CCNC: 80 U/L (ref 40–129)
ALT SERPL-CCNC: 23 U/L (ref 10–40)
ANION GAP SERPL CALCULATED.3IONS-SCNC: 10 MMOL/L (ref 3–16)
ANION GAP SERPL CALCULATED.3IONS-SCNC: 11 MMOL/L (ref 3–16)
AST SERPL-CCNC: 22 U/L (ref 15–37)
BASOPHILS # BLD: 0 K/UL (ref 0–0.2)
BASOPHILS NFR BLD: 0.7 %
BILIRUB SERPL-MCNC: 0.6 MG/DL (ref 0–1)
BUN SERPL-MCNC: 10 MG/DL (ref 7–20)
BUN SERPL-MCNC: 12 MG/DL (ref 7–20)
CALCIUM SERPL-MCNC: 8.9 MG/DL (ref 8.3–10.6)
CALCIUM SERPL-MCNC: 9 MG/DL (ref 8.3–10.6)
CHLORIDE SERPL-SCNC: 100 MMOL/L (ref 99–110)
CHLORIDE SERPL-SCNC: 101 MMOL/L (ref 99–110)
CO2 SERPL-SCNC: 31 MMOL/L (ref 21–32)
CO2 SERPL-SCNC: 32 MMOL/L (ref 21–32)
CREAT SERPL-MCNC: 0.7 MG/DL (ref 0.8–1.3)
CREAT SERPL-MCNC: 0.9 MG/DL (ref 0.8–1.3)
DEPRECATED RDW RBC AUTO: 13.9 % (ref 12.4–15.4)
EKG ATRIAL RATE: 75 BPM
EKG DIAGNOSIS: NORMAL
EKG P AXIS: 42 DEGREES
EKG P-R INTERVAL: 180 MS
EKG Q-T INTERVAL: 386 MS
EKG QRS DURATION: 96 MS
EKG QTC CALCULATION (BAZETT): 431 MS
EKG R AXIS: 39 DEGREES
EKG T AXIS: -23 DEGREES
EKG VENTRICULAR RATE: 75 BPM
EOSINOPHIL # BLD: 0 K/UL (ref 0–0.6)
EOSINOPHIL NFR BLD: 0.8 %
GFR SERPLBLD CREATININE-BSD FMLA CKD-EPI: 87 ML/MIN/{1.73_M2}
GFR SERPLBLD CREATININE-BSD FMLA CKD-EPI: >90 ML/MIN/{1.73_M2}
GLUCOSE SERPL-MCNC: 91 MG/DL (ref 70–99)
GLUCOSE SERPL-MCNC: 94 MG/DL (ref 70–99)
HCT VFR BLD AUTO: 37 % (ref 40.5–52.5)
HGB BLD-MCNC: 12.5 G/DL (ref 13.5–17.5)
LYMPHOCYTES # BLD: 1.3 K/UL (ref 1–5.1)
LYMPHOCYTES NFR BLD: 26.6 %
MAGNESIUM SERPL-MCNC: 1.68 MG/DL (ref 1.8–2.4)
MAGNESIUM SERPL-MCNC: 1.98 MG/DL (ref 1.8–2.4)
MCH RBC QN AUTO: 32.6 PG (ref 26–34)
MCHC RBC AUTO-ENTMCNC: 33.8 G/DL (ref 31–36)
MCV RBC AUTO: 96.5 FL (ref 80–100)
MONOCYTES # BLD: 0.6 K/UL (ref 0–1.3)
MONOCYTES NFR BLD: 12.7 %
NEUTROPHILS # BLD: 2.9 K/UL (ref 1.7–7.7)
NEUTROPHILS NFR BLD: 59.2 %
NT-PROBNP SERPL-MCNC: 912 PG/ML (ref 0–449)
PLATELET # BLD AUTO: 202 K/UL (ref 135–450)
PMV BLD AUTO: 8.2 FL (ref 5–10.5)
POTASSIUM SERPL-SCNC: 2.4 MMOL/L (ref 3.5–5.1)
POTASSIUM SERPL-SCNC: 2.4 MMOL/L (ref 3.5–5.1)
PROT SERPL-MCNC: 6.3 G/DL (ref 6.4–8.2)
RBC # BLD AUTO: 3.83 M/UL (ref 4.2–5.9)
SODIUM SERPL-SCNC: 142 MMOL/L (ref 136–145)
SODIUM SERPL-SCNC: 143 MMOL/L (ref 136–145)
TROPONIN, HIGH SENSITIVITY: 28 NG/L (ref 0–22)
TROPONIN, HIGH SENSITIVITY: 29 NG/L (ref 0–22)
WBC # BLD AUTO: 5 K/UL (ref 4–11)

## 2025-01-15 PROCEDURE — 1123F ACP DISCUSS/DSCN MKR DOCD: CPT | Performed by: REGISTERED NURSE

## 2025-01-15 PROCEDURE — 6370000000 HC RX 637 (ALT 250 FOR IP): Performed by: STUDENT IN AN ORGANIZED HEALTH CARE EDUCATION/TRAINING PROGRAM

## 2025-01-15 PROCEDURE — 6360000002 HC RX W HCPCS: Performed by: STUDENT IN AN ORGANIZED HEALTH CARE EDUCATION/TRAINING PROGRAM

## 2025-01-15 PROCEDURE — 1036F TOBACCO NON-USER: CPT | Performed by: REGISTERED NURSE

## 2025-01-15 PROCEDURE — G8427 DOCREV CUR MEDS BY ELIG CLIN: HCPCS | Performed by: REGISTERED NURSE

## 2025-01-15 PROCEDURE — 83735 ASSAY OF MAGNESIUM: CPT

## 2025-01-15 PROCEDURE — 84484 ASSAY OF TROPONIN QUANT: CPT

## 2025-01-15 PROCEDURE — 99285 EMERGENCY DEPT VISIT HI MDM: CPT

## 2025-01-15 PROCEDURE — 3077F SYST BP >= 140 MM HG: CPT | Performed by: REGISTERED NURSE

## 2025-01-15 PROCEDURE — 80053 COMPREHEN METABOLIC PANEL: CPT

## 2025-01-15 PROCEDURE — 2000000000 HC ICU R&B

## 2025-01-15 PROCEDURE — 1159F MED LIST DOCD IN RCRD: CPT | Performed by: REGISTERED NURSE

## 2025-01-15 PROCEDURE — 36415 COLL VENOUS BLD VENIPUNCTURE: CPT

## 2025-01-15 PROCEDURE — 83880 ASSAY OF NATRIURETIC PEPTIDE: CPT

## 2025-01-15 PROCEDURE — 96374 THER/PROPH/DIAG INJ IV PUSH: CPT

## 2025-01-15 PROCEDURE — 93005 ELECTROCARDIOGRAM TRACING: CPT | Performed by: STUDENT IN AN ORGANIZED HEALTH CARE EDUCATION/TRAINING PROGRAM

## 2025-01-15 PROCEDURE — 93010 ELECTROCARDIOGRAM REPORT: CPT | Performed by: INTERNAL MEDICINE

## 2025-01-15 PROCEDURE — 71045 X-RAY EXAM CHEST 1 VIEW: CPT

## 2025-01-15 PROCEDURE — G8417 CALC BMI ABV UP PARAM F/U: HCPCS | Performed by: REGISTERED NURSE

## 2025-01-15 PROCEDURE — 93000 ELECTROCARDIOGRAM COMPLETE: CPT | Performed by: REGISTERED NURSE

## 2025-01-15 PROCEDURE — 99215 OFFICE O/P EST HI 40 MIN: CPT | Performed by: REGISTERED NURSE

## 2025-01-15 PROCEDURE — 85025 COMPLETE CBC W/AUTO DIFF WBC: CPT

## 2025-01-15 PROCEDURE — 3078F DIAST BP <80 MM HG: CPT | Performed by: REGISTERED NURSE

## 2025-01-15 RX ORDER — MAGNESIUM SULFATE IN WATER 40 MG/ML
2000 INJECTION, SOLUTION INTRAVENOUS ONCE
Status: DISCONTINUED | OUTPATIENT
Start: 2025-01-15 | End: 2025-01-17 | Stop reason: HOSPADM

## 2025-01-15 RX ORDER — POTASSIUM CHLORIDE 1500 MG/1
40 TABLET, EXTENDED RELEASE ORAL ONCE
Status: COMPLETED | OUTPATIENT
Start: 2025-01-15 | End: 2025-01-15

## 2025-01-15 RX ORDER — ARIPIPRAZOLE 5 MG/1
15 TABLET ORAL DAILY
Status: DISCONTINUED | OUTPATIENT
Start: 2025-01-15 | End: 2025-01-17 | Stop reason: HOSPADM

## 2025-01-15 RX ORDER — MAGNESIUM SULFATE 1 G/100ML
1000 INJECTION INTRAVENOUS ONCE
Status: COMPLETED | OUTPATIENT
Start: 2025-01-15 | End: 2025-01-15

## 2025-01-15 RX ORDER — NITROGLYCERIN 20 MG/100ML
5-200 INJECTION INTRAVENOUS CONTINUOUS
Status: DISCONTINUED | OUTPATIENT
Start: 2025-01-15 | End: 2025-01-17 | Stop reason: HOSPADM

## 2025-01-15 RX ORDER — PANTOPRAZOLE SODIUM 40 MG/1
40 TABLET, DELAYED RELEASE ORAL
Status: DISCONTINUED | OUTPATIENT
Start: 2025-01-15 | End: 2025-01-17 | Stop reason: HOSPADM

## 2025-01-15 RX ORDER — POLYETHYLENE GLYCOL 3350 17 G/17G
17 POWDER, FOR SOLUTION ORAL DAILY PRN
Status: DISCONTINUED | OUTPATIENT
Start: 2025-01-15 | End: 2025-01-17 | Stop reason: HOSPADM

## 2025-01-15 RX ORDER — NIFEDIPINE 30 MG/1
30 TABLET, EXTENDED RELEASE ORAL DAILY
Status: DISCONTINUED | OUTPATIENT
Start: 2025-01-15 | End: 2025-01-16

## 2025-01-15 RX ORDER — NIFEDIPINE 30 MG/1
30 TABLET, EXTENDED RELEASE ORAL DAILY
Qty: 30 TABLET | Refills: 3 | Status: ON HOLD | OUTPATIENT
Start: 2025-01-15 | End: 2025-01-17 | Stop reason: HOSPADM

## 2025-01-15 RX ORDER — CLOPIDOGREL BISULFATE 75 MG/1
75 TABLET ORAL DAILY
Status: DISCONTINUED | OUTPATIENT
Start: 2025-01-16 | End: 2025-01-17 | Stop reason: HOSPADM

## 2025-01-15 RX ORDER — ACETAMINOPHEN 325 MG/1
650 TABLET ORAL EVERY 6 HOURS PRN
Status: DISCONTINUED | OUTPATIENT
Start: 2025-01-15 | End: 2025-01-17 | Stop reason: HOSPADM

## 2025-01-15 RX ORDER — ENOXAPARIN SODIUM 100 MG/ML
30 INJECTION SUBCUTANEOUS 2 TIMES DAILY
Status: DISCONTINUED | OUTPATIENT
Start: 2025-01-15 | End: 2025-01-17 | Stop reason: HOSPADM

## 2025-01-15 RX ORDER — SODIUM CHLORIDE 0.9 % (FLUSH) 0.9 %
5-40 SYRINGE (ML) INJECTION PRN
Status: DISCONTINUED | OUTPATIENT
Start: 2025-01-15 | End: 2025-01-17 | Stop reason: HOSPADM

## 2025-01-15 RX ORDER — ATORVASTATIN CALCIUM 80 MG/1
80 TABLET, FILM COATED ORAL DAILY
Status: DISCONTINUED | OUTPATIENT
Start: 2025-01-15 | End: 2025-01-17 | Stop reason: HOSPADM

## 2025-01-15 RX ORDER — POTASSIUM CHLORIDE 7.45 MG/ML
10 INJECTION INTRAVENOUS ONCE
Status: COMPLETED | OUTPATIENT
Start: 2025-01-15 | End: 2025-01-15

## 2025-01-15 RX ORDER — HYDRALAZINE HYDROCHLORIDE 50 MG/1
100 TABLET, FILM COATED ORAL 2 TIMES DAILY
Status: DISCONTINUED | OUTPATIENT
Start: 2025-01-15 | End: 2025-01-16

## 2025-01-15 RX ORDER — SODIUM CHLORIDE 0.9 % (FLUSH) 0.9 %
5-40 SYRINGE (ML) INJECTION EVERY 12 HOURS SCHEDULED
Status: DISCONTINUED | OUTPATIENT
Start: 2025-01-15 | End: 2025-01-17 | Stop reason: HOSPADM

## 2025-01-15 RX ORDER — HYDRALAZINE HYDROCHLORIDE 100 MG/1
100 TABLET, FILM COATED ORAL 2 TIMES DAILY
Qty: 60 TABLET | Refills: 3 | Status: ON HOLD | OUTPATIENT
Start: 2025-01-15 | End: 2025-01-17 | Stop reason: HOSPADM

## 2025-01-15 RX ORDER — ONDANSETRON 4 MG/1
4 TABLET, ORALLY DISINTEGRATING ORAL EVERY 8 HOURS PRN
Status: DISCONTINUED | OUTPATIENT
Start: 2025-01-15 | End: 2025-01-17 | Stop reason: HOSPADM

## 2025-01-15 RX ORDER — ACETAMINOPHEN 650 MG/1
650 SUPPOSITORY RECTAL EVERY 6 HOURS PRN
Status: DISCONTINUED | OUTPATIENT
Start: 2025-01-15 | End: 2025-01-17 | Stop reason: HOSPADM

## 2025-01-15 RX ORDER — POTASSIUM CHLORIDE 7.45 MG/ML
10 INJECTION INTRAVENOUS
Status: ACTIVE | OUTPATIENT
Start: 2025-01-15 | End: 2025-01-15

## 2025-01-15 RX ORDER — HYDRALAZINE HYDROCHLORIDE 25 MG/1
100 TABLET, FILM COATED ORAL ONCE
Status: COMPLETED | OUTPATIENT
Start: 2025-01-15 | End: 2025-01-15

## 2025-01-15 RX ORDER — POTASSIUM CHLORIDE 1500 MG/1
40 TABLET, EXTENDED RELEASE ORAL ONCE
Status: DISCONTINUED | OUTPATIENT
Start: 2025-01-15 | End: 2025-01-17

## 2025-01-15 RX ORDER — SODIUM CHLORIDE 9 MG/ML
INJECTION, SOLUTION INTRAVENOUS PRN
Status: DISCONTINUED | OUTPATIENT
Start: 2025-01-15 | End: 2025-01-17 | Stop reason: HOSPADM

## 2025-01-15 RX ORDER — ONDANSETRON 2 MG/ML
4 INJECTION INTRAMUSCULAR; INTRAVENOUS EVERY 6 HOURS PRN
Status: DISCONTINUED | OUTPATIENT
Start: 2025-01-15 | End: 2025-01-17 | Stop reason: HOSPADM

## 2025-01-15 RX ADMIN — FLUOXETINE HYDROCHLORIDE 40 MG: 20 CAPSULE ORAL at 21:02

## 2025-01-15 RX ADMIN — POTASSIUM CHLORIDE 40 MEQ: 1500 TABLET, EXTENDED RELEASE ORAL at 21:02

## 2025-01-15 RX ADMIN — MAGNESIUM SULFATE HEPTAHYDRATE 1000 MG: 1 INJECTION, SOLUTION INTRAVENOUS at 16:49

## 2025-01-15 RX ADMIN — ENOXAPARIN SODIUM 30 MG: 100 INJECTION SUBCUTANEOUS at 21:00

## 2025-01-15 RX ADMIN — PANTOPRAZOLE SODIUM 40 MG: 40 TABLET, DELAYED RELEASE ORAL at 22:45

## 2025-01-15 RX ADMIN — POTASSIUM CHLORIDE 40 MEQ: 1500 TABLET, EXTENDED RELEASE ORAL at 16:16

## 2025-01-15 RX ADMIN — POTASSIUM CHLORIDE 10 MEQ: 7.46 INJECTION, SOLUTION INTRAVENOUS at 21:17

## 2025-01-15 RX ADMIN — HYDRALAZINE HYDROCHLORIDE 100 MG: 25 TABLET ORAL at 14:53

## 2025-01-15 RX ADMIN — HYDRALAZINE HYDROCHLORIDE 100 MG: 50 TABLET ORAL at 21:02

## 2025-01-15 RX ADMIN — Medication 6 MG: at 21:22

## 2025-01-15 RX ADMIN — ATORVASTATIN CALCIUM 80 MG: 80 TABLET, FILM COATED ORAL at 21:02

## 2025-01-15 RX ADMIN — NITROGLYCERIN 5 MCG/MIN: 20 INJECTION INTRAVENOUS at 18:56

## 2025-01-15 RX ADMIN — POTASSIUM CHLORIDE 10 MEQ: 7.46 INJECTION, SOLUTION INTRAVENOUS at 16:23

## 2025-01-15 ASSESSMENT — PATIENT HEALTH QUESTIONNAIRE - PHQ9
SUM OF ALL RESPONSES TO PHQ QUESTIONS 1-9: 0
7. TROUBLE CONCENTRATING ON THINGS, SUCH AS READING THE NEWSPAPER OR WATCHING TELEVISION: NOT AT ALL
9. THOUGHTS THAT YOU WOULD BE BETTER OFF DEAD, OR OF HURTING YOURSELF: NOT AT ALL
SUM OF ALL RESPONSES TO PHQ QUESTIONS 1-9: 0
5. POOR APPETITE OR OVEREATING: NOT AT ALL
6. FEELING BAD ABOUT YOURSELF - OR THAT YOU ARE A FAILURE OR HAVE LET YOURSELF OR YOUR FAMILY DOWN: NOT AT ALL
1. LITTLE INTEREST OR PLEASURE IN DOING THINGS: NOT AT ALL
4. FEELING TIRED OR HAVING LITTLE ENERGY: NOT AT ALL
10. IF YOU CHECKED OFF ANY PROBLEMS, HOW DIFFICULT HAVE THESE PROBLEMS MADE IT FOR YOU TO DO YOUR WORK, TAKE CARE OF THINGS AT HOME, OR GET ALONG WITH OTHER PEOPLE: NOT DIFFICULT AT ALL
2. FEELING DOWN, DEPRESSED OR HOPELESS: NOT AT ALL
SUM OF ALL RESPONSES TO PHQ9 QUESTIONS 1 & 2: 0
8. MOVING OR SPEAKING SO SLOWLY THAT OTHER PEOPLE COULD HAVE NOTICED. OR THE OPPOSITE, BEING SO FIGETY OR RESTLESS THAT YOU HAVE BEEN MOVING AROUND A LOT MORE THAN USUAL: NOT AT ALL
3. TROUBLE FALLING OR STAYING ASLEEP: NOT AT ALL

## 2025-01-15 ASSESSMENT — ENCOUNTER SYMPTOMS
EYES NEGATIVE: 1
RESPIRATORY NEGATIVE: 1
SHORTNESS OF BREATH: 0
GASTROINTESTINAL NEGATIVE: 1

## 2025-01-15 NOTE — PATIENT INSTRUCTIONS
Go to the emergency department for further evaluation due to abnormal EKG with irregular heart rate with ST depression and junctional rhythm.      Continue hydralazine 100mg twice a day.     Restart Nifedipine 30mg daily.     Check blood pressure daily.     Return in 2 weeks for blood pressure check.     Astapro for congestion.     Get a new blood pressure cuff.

## 2025-01-15 NOTE — H&P
[facility] History & Physical    V 1.6    Date of Admission: 1/15/2025    Date of Service:  Pt seen/examined on 1/15/2025    [x]Admitted to Inpatient with expected LOS greater than two midnights due to medical therapy.  []Placed in Observation status.    Chief Admission Complaint: Abnormal EKG    Presenting Admission History:      79 y.o. male who presented to [facility] with abnormal EKG from PCP office.  PMHx significant for history of CVA and TIA without residual symptoms, MDD, essential hypertension and history of prostate cancer.      Patient had presented to her PCP office for follow-up and there they obtained EKG which had shown ST depression and therefore patient was referred to ED.  In ED patient was having still persistent ST depression in anterior leads and did have transient left-sided chest pain.  During this time patient had significant hypertension with systolic in 200s.  Patient also had elevated troponin which was downtrending.  Patient upon my evaluation denies any chest pain, shortness of breath palpitations, orthopnea or any other symptoms.  Patient reports that he only takes hydralazine for hypertension and the nifedipine that was prescribed for him he has not been taking for several months as it was not refilled.  Patient denies any history of heart attack.  Denies any stents in the heart or any other cardiac conditions.  In ED patient was also found to have severely hypokalemic and hypomagnesemic and was also found to have elevated BNP chest x-ray was unremarkable.    Assessment/Plan:      Current Principal Problem:  Hypertensive emergency    HTN Emergency - w/ SBP>180 and/or DBP>120 w/ associated organ damage -MI.  Requires immediate treatment, usually w/ IV meds.    Troponin elevation - c/w myocardial injury 2nd to      [] STEMI  [] NSTEMI  [] Acute/Chronic Myocardial Injury 2nd to CHF  [] Recent MI (with 4 weeks of admission) dated:   [x] Type II MI w/ demand

## 2025-01-15 NOTE — PROGRESS NOTES
Patient: Jaden Gauthier is a 79 y.o. male who presents today with the following Chief Complaint(s):  Chief Complaint   Patient presents with    Follow-up     For high BP       Assessment/Plan:    1. Irregular cardiac rhythm  Irregular cardiac rhythm was auscultated on clinical exam.  An EKG was completed and ST depression with junctional rhythm was identified.  This is a significant change from previous EKG.  He was instructed to go directly to the emergency department for further evaluation.  He felt that he was stable to drive.  - EKG 12 lead    2. HTN (hypertension), benign  Blood pressure remains elevated.  He has been taking hydralazine 100 mg twice a day.  Recommend he continue this dose of hydralazine.  He has been out of his nifedipine.  Plan is to restart nifedipine at 30 mg daily.  Follow-up in 2 weeks for further evaluation.  - NIFEdipine (PROCARDIA XL) 30 MG extended release tablet; Take 1 tablet by mouth daily  Dispense: 30 tablet; Refill: 3  - hydrALAZINE (APRESOLINE) 100 MG tablet; Take 1 tablet by mouth 2 times daily  Dispense: 60 tablet; Refill: 3        Return in about 2 weeks (around 1/29/2025), or if symptoms worsen or fail to improve.    HPI:     He is here for blood pressure check. He had a headache and checked his blood pressure and it was elevated. He was seen in the ED and was told to follow up with his PCP.     Blood pressure has been elevated for about three weeks. He has had a headache.  He is experiencing fatigue.  He had been on Nifedipine for a couple of years but could not get a refill so he stopped the medication.     He has been on hydralazine- he recently increased his dose to 100mg daily.     His blood pressure cuff 220/99 while the manual cuff read 172/78.  Recommend he get a new blood pressure cuff due to significant discrepancies in readings.        No current facility-administered medications for this visit.     Current Outpatient Medications   Medication Sig Dispense Refill

## 2025-01-15 NOTE — ED PROVIDER NOTES
Strabismus, TIA (transient ischemic attack) (04/12/2022), and Wears glasses.    Past surgical history:  has a past surgical history that includes cyst removal; Esophagogastric fundoplication; Tonsillectomy; Strabismus surgery; External ear surgery; Colonoscopy (2011); Upper gastrointestinal endoscopy (2011); Upper gastrointestinal endoscopy (09/26/2014); eye surgery (Left); Knee arthroscopy (Right, 01/07/2020); Carotid endarterectomy (Right, 06/07/2021); Knee Arthroplasty (2018); Wrist surgery (Right, 7/3/2024); and TURP (N/A, 8/16/2024).    Home medications:   Prior to Admission medications    Medication Sig Start Date End Date Taking? Authorizing Provider   NIFEdipine (PROCARDIA XL) 30 MG extended release tablet Take 1 tablet by mouth daily 1/15/25   Rachel Jung APRN - CNP   hydrALAZINE (APRESOLINE) 100 MG tablet Take 1 tablet by mouth 2 times daily 1/15/25 5/15/25  Rachel Jung APRN - CNP   abiraterone acetate (ZYTIGA) 250 MG tablet Take 4 tablets by mouth 12/9/24   Aisha Plummer MD   Relugolix 120 MG TABS Take 120 mg by mouth 12/27/24   Aisha Plummer MD   ARIPiprazole (ABILIFY) 15 MG tablet TAKE 1 TABLET BY MOUTH DAILY 12/2/24   Quinn Bradley MD   FLUoxetine (PROZAC) 40 MG capsule TAKE 1 CAPSULE BY MOUTH DAILY 11/5/24 2/3/25  Quinn Bradley MD   atorvastatin (LIPITOR) 80 MG tablet TAKE 1 TABLET BY MOUTH DAILY 8/11/24   Quinn Bradley MD   esomeprazole Magnesium (NEXIUM) 20 MG PACK Take 2 packets by mouth daily    Aisha Plummer MD   Multiple Vitamins-Minerals (THERAPEUTIC MULTIVITAMIN-MINERALS) tablet Take 1 tablet by mouth daily    Aisha Plummer MD       Social history:  reports that he has never smoked. He has never used smokeless tobacco. He reports current alcohol use of about 10.0 standard drinks of alcohol per week. He reports current drug use. Frequency: 2.00 times per week. Drug: Marijuana (Weed).    Family history:    Family History   Problem Relation  acute abnormality   - Lab:   CBC within normal white blood cell count, normocytic anemia with a hemoglobin of 12.5, hematocrit of 37, normal platelets  CMP with normal sodium, low potassium of 2.4, normal renal function, low magnesium of 1.68, normal LFTs and bilirubin  Elevated troponin to 29 with repeat 28  Elevated NT proBNP to 912      - I discussed the results with patient.  We agreed to admit given EKG changes in addition to elevated troponin from baseline.  He has remained hypertensive.  Vitals otherwise unremarkable      Clinical Impression:  1. Other fatigue    2. Abnormal EKG    3. Elevated troponin    4. Hypokalemia    5. Hypomagnesemia          Disposition:  Admit to med/surg floor in stable condition.    Blood pressure (!) 199/78, pulse 67, temperature 97.7 °F (36.5 °C), resp. rate 18, weight 103.7 kg (228 lb 9.6 oz), SpO2 96%.    Patient was given scripts for the following medications. I counseled patient how to take these medications.   New Prescriptions    No medications on file       Disposition referral (if applicable):  No follow-up provider specified.      Total critical care time is 31 minutes, which excludes separately billable procedures and updating family. Time spent is specifically for management of the presenting complaint and symptoms initially, direct bedside care, reevaluation, review of records, and consultation.  There was a high probability of clinically significant life-threatening deterioration in the patient's condition, which required my urgent intervention.     This chart was generated in part by using Dragon Dictation system and may contain errors related to that system including errors in grammar, punctuation, and spelling, as well as words and phrases that may be inappropriate. If there are any questions or concerns please feel free to contact the dictating provider for clarification.     Pattie Esquivel MD   [employer]        Pattie Esquivel MD  01/15/25

## 2025-01-15 NOTE — ED NOTES
Jaden Gauthier is a 79 y.o. male admitted for  Principal Problem:    Hypertensive emergency  Resolved Problems:    * No resolved hospital problems. *  .   Patient Home via self with   Chief Complaint   Patient presents with    Abnormal Test Results     Was sent in for abnormal EKG, was told he has an irreg hr no cp or sob, was at doctor to adjust BP medication s   .  Patient is alert and Person, Place, Time, and Situation  Patient's baseline mobility: Baseline Mobility: na, Walker, and Independent   Code Status: Full Code   Cardiac Rhythm:       Is patient on baseline Oxygen: no how many Liters na :   Abnormal Assessment Findings: na    Isolation: None      NIH Score:    C-SSRS: Risk of Suicide: No Risk  Bedside swallow:        Active LDA's:   Peripheral IV 01/15/25 Right Antecubital (Active)         Family/Caregiver Present no Any Concerns: no   Restraints no  Sitter no         Vitals: MEWS Score: 3    Vitals:    01/15/25 1428 01/15/25 1429 01/15/25 1526 01/15/25 1646   BP: (!) 219/90  (!) 198/89 (!) 199/78   Pulse: 74  61 67   Resp: 18  14 18   Temp: 97.7 °F (36.5 °C)      SpO2: 97%  96%    Weight:  103.7 kg (228 lb 9.6 oz)         Last documented pain score (0-10 scale)    Pain medication administered No.    Pertinent or High Risk Medications/Drips: No.    Pending Blood Product Administration: no    Abnormal labs:   Abnormal Labs Reviewed   CBC WITH AUTO DIFFERENTIAL - Abnormal; Notable for the following components:       Result Value    RBC 3.83 (*)     Hemoglobin 12.5 (*)     Hematocrit 37.0 (*)     All other components within normal limits   COMPREHENSIVE METABOLIC PANEL W/ REFLEX TO MG FOR LOW K - Abnormal; Notable for the following components:    Potassium reflex Magnesium 2.4 (*)     Total Protein 6.3 (*)     All other components within normal limits    Narrative:     CALL  [place]  [facility] tel. [phone],  Chemistry results called to and read back by DAYNE Garduno, 01/15/2025  15:31, by ANTONIO OCAMPO -

## 2025-01-16 ENCOUNTER — APPOINTMENT (OUTPATIENT)
Age: 80
End: 2025-01-16
Attending: STUDENT IN AN ORGANIZED HEALTH CARE EDUCATION/TRAINING PROGRAM
Payer: MEDICARE

## 2025-01-16 ENCOUNTER — APPOINTMENT (OUTPATIENT)
Dept: VASCULAR LAB | Age: 80
End: 2025-01-16
Attending: INTERNAL MEDICINE
Payer: MEDICARE

## 2025-01-16 LAB
ANION GAP SERPL CALCULATED.3IONS-SCNC: 10 MMOL/L (ref 3–16)
ANION GAP SERPL CALCULATED.3IONS-SCNC: 8 MMOL/L (ref 3–16)
BASOPHILS # BLD: 0 K/UL (ref 0–0.2)
BASOPHILS NFR BLD: 0.7 %
BUN SERPL-MCNC: 13 MG/DL (ref 7–20)
BUN SERPL-MCNC: 14 MG/DL (ref 7–20)
CALCIUM SERPL-MCNC: 8.6 MG/DL (ref 8.3–10.6)
CALCIUM SERPL-MCNC: 8.7 MG/DL (ref 8.3–10.6)
CHLORIDE SERPL-SCNC: 104 MMOL/L (ref 99–110)
CHLORIDE SERPL-SCNC: 105 MMOL/L (ref 99–110)
CO2 SERPL-SCNC: 30 MMOL/L (ref 21–32)
CO2 SERPL-SCNC: 31 MMOL/L (ref 21–32)
CREAT SERPL-MCNC: 0.9 MG/DL (ref 0.8–1.3)
CREAT SERPL-MCNC: 1.1 MG/DL (ref 0.8–1.3)
DEPRECATED RDW RBC AUTO: 13.9 % (ref 12.4–15.4)
ECHO AO ARCH DIAM: 3 CM
ECHO AO ASC DIAM: 3.6 CM
ECHO AO ASCENDING AORTA INDEX: 1.58 CM/M2
ECHO AO DESC DIAM: 2.4 CM
ECHO AO DESCENDING AORTA INDEX: 1.05 CM/M2
ECHO AO ROOT DIAM: 3.9 CM
ECHO AO ROOT INDEX: 1.71 CM/M2
ECHO AV AREA PEAK VELOCITY: 3.3 CM2
ECHO AV AREA/BSA PEAK VELOCITY: 1.4 CM2/M2
ECHO AV CUSP MM: 2.3 CM
ECHO AV PEAK GRADIENT: 7 MMHG
ECHO AV PEAK GRADIENT: 7 MMHG
ECHO AV PEAK VELOCITY: 1.3 M/S
ECHO AV VELOCITY RATIO: 0.92
ECHO BSA: 2.31 M2
ECHO BSA: 2.31 M2
ECHO EST RA PRESSURE: 8 MMHG
ECHO LA AREA 2C: 31.8 CM2
ECHO LA AREA 4C: 32.6 CM2
ECHO LA DIAMETER INDEX: 1.75 CM/M2
ECHO LA DIAMETER: 4 CM
ECHO LA MAJOR AXIS: 6.9 CM
ECHO LA MINOR AXIS: 7.1 CM
ECHO LA TO AORTIC ROOT RATIO: 1.03
ECHO LA VOL BP: 118 ML (ref 18–58)
ECHO LA VOL MOD A2C: 113 ML (ref 18–58)
ECHO LA VOL MOD A4C: 122 ML (ref 18–58)
ECHO LA VOL/BSA BIPLANE: 52 ML/M2 (ref 16–34)
ECHO LA VOLUME INDEX MOD A2C: 50 ML/M2 (ref 16–34)
ECHO LA VOLUME INDEX MOD A4C: 54 ML/M2 (ref 16–34)
ECHO LV E' LATERAL VELOCITY: 7.83 CM/S
ECHO LV E' SEPTAL VELOCITY: 7.51 CM/S
ECHO LV EDV 3D: 194 ML
ECHO LV EDV INDEX 3D: 85 ML/M2
ECHO LV EF PHYSICIAN: 55 %
ECHO LV EJECTION FRACTION 3D: 56 %
ECHO LV ESV 3D: 85 ML
ECHO LV ESV INDEX 3D: 37 ML/M2
ECHO LV FRACTIONAL SHORTENING: 29 % (ref 28–44)
ECHO LV GLOBAL LONGITUDINAL STRAIN (GLS): -13.8 %
ECHO LV INTERNAL DIMENSION DIASTOLE INDEX: 2.59 CM/M2
ECHO LV INTERNAL DIMENSION DIASTOLIC: 5.9 CM (ref 4.2–5.9)
ECHO LV INTERNAL DIMENSION SYSTOLIC INDEX: 1.84 CM/M2
ECHO LV INTERNAL DIMENSION SYSTOLIC: 4.2 CM
ECHO LV ISOVOLUMETRIC RELAXATION TIME (IVRT): 109 MS
ECHO LV IVSD: 1 CM (ref 0.6–1)
ECHO LV MASS 2D: 239.9 G (ref 88–224)
ECHO LV MASS 3D INDEX: 99.6 G/M2
ECHO LV MASS 3D: 227 G
ECHO LV MASS INDEX 2D: 105.2 G/M2 (ref 49–115)
ECHO LV POSTERIOR WALL DIASTOLIC: 1 CM (ref 0.6–1)
ECHO LV RELATIVE WALL THICKNESS RATIO: 0.34
ECHO LVOT AREA: 3.8 CM2
ECHO LVOT DIAM: 2.2 CM
ECHO LVOT MEAN GRADIENT: 3 MMHG
ECHO LVOT PEAK GRADIENT: 5 MMHG
ECHO LVOT PEAK VELOCITY: 1.2 M/S
ECHO LVOT STROKE VOLUME INDEX: 46 ML/M2
ECHO LVOT SV: 104.9 ML
ECHO LVOT VTI: 27.6 CM
ECHO MV A VELOCITY: 0.7 M/S
ECHO MV E DECELERATION TIME (DT): 187 MS
ECHO MV E VELOCITY: 0.8 M/S
ECHO MV E/A RATIO: 1.14
ECHO MV E/E' LATERAL: 10.22
ECHO MV E/E' RATIO (AVERAGED): 10.43
ECHO MV E/E' SEPTAL: 10.65
ECHO PULMONARY ARTERY END DIASTOLIC PRESSURE: 8 MMHG
ECHO PV REGURGITANT END DIASTOLIC MAX VELOCITY: 1.5 M/S
ECHO RA AREA 4C: 32.3 CM2
ECHO RA END SYSTOLIC VOLUME APICAL 4 CHAMBER INDEX BSA: 52 ML/M2
ECHO RA VOLUME: 118 ML
ECHO RIGHT VENTRICULAR SYSTOLIC PRESSURE (RVSP): 30 MMHG
ECHO RV BASAL DIMENSION: 4.7 CM
ECHO RV EJECTION FRACTION 3D: 48 %
ECHO RV FRACTIONAL AREA CHANGE: 31 %
ECHO RV FREE WALL PEAK S': 15.2 CM/S
ECHO RV GLOBAL SYSTOLIC STRAIN (GLS): -29.7 %
ECHO RV LONGITUDINAL DIMENSION: 7.7 CM
ECHO RV MID DIMENSION: 4 CM
ECHO RV TAPSE: 2.9 CM (ref 1.7–?)
ECHO TV REGURGITANT MAX VELOCITY: 2.34 M/S
ECHO TV REGURGITANT PEAK GRADIENT: 22 MMHG
EKG ATRIAL RATE: 59 BPM
EKG DIAGNOSIS: NORMAL
EKG P AXIS: 54 DEGREES
EKG P-R INTERVAL: 166 MS
EKG Q-T INTERVAL: 484 MS
EKG QRS DURATION: 90 MS
EKG QTC CALCULATION (BAZETT): 479 MS
EKG R AXIS: 53 DEGREES
EKG T AXIS: 10 DEGREES
EKG VENTRICULAR RATE: 59 BPM
EOSINOPHIL # BLD: 0.1 K/UL (ref 0–0.6)
EOSINOPHIL NFR BLD: 2.4 %
GFR SERPLBLD CREATININE-BSD FMLA CKD-EPI: 68 ML/MIN/{1.73_M2}
GFR SERPLBLD CREATININE-BSD FMLA CKD-EPI: 87 ML/MIN/{1.73_M2}
GLUCOSE SERPL-MCNC: 111 MG/DL (ref 70–99)
GLUCOSE SERPL-MCNC: 133 MG/DL (ref 70–99)
HCT VFR BLD AUTO: 35.8 % (ref 40.5–52.5)
HGB BLD-MCNC: 12.2 G/DL (ref 13.5–17.5)
LYMPHOCYTES # BLD: 1.2 K/UL (ref 1–5.1)
LYMPHOCYTES NFR BLD: 31.1 %
MAGNESIUM SERPL-MCNC: 1.89 MG/DL (ref 1.8–2.4)
MAGNESIUM SERPL-MCNC: 2.06 MG/DL (ref 1.8–2.4)
MCH RBC QN AUTO: 32.8 PG (ref 26–34)
MCHC RBC AUTO-ENTMCNC: 34 G/DL (ref 31–36)
MCV RBC AUTO: 96.5 FL (ref 80–100)
MONOCYTES # BLD: 0.5 K/UL (ref 0–1.3)
MONOCYTES NFR BLD: 13.5 %
NEUTROPHILS # BLD: 2 K/UL (ref 1.7–7.7)
NEUTROPHILS NFR BLD: 52.3 %
PLATELET # BLD AUTO: 198 K/UL (ref 135–450)
PMV BLD AUTO: 8.4 FL (ref 5–10.5)
POTASSIUM SERPL-SCNC: 2.7 MMOL/L (ref 3.5–5.1)
POTASSIUM SERPL-SCNC: 3.2 MMOL/L (ref 3.5–5.1)
RBC # BLD AUTO: 3.71 M/UL (ref 4.2–5.9)
SODIUM SERPL-SCNC: 143 MMOL/L (ref 136–145)
SODIUM SERPL-SCNC: 145 MMOL/L (ref 136–145)
VAS AORTA MID AP: 1.65 CM
VAS AORTA MID PSV: 97.1 CM/S
VAS L RENAL ORIG RI: 0.8
VAS LEFT INTERLOBAR EDV: 4.6 CM/S
VAS LEFT INTERLOBAR PSV: 15.4 CM/S
VAS LEFT INTERLOBAR RI: 0.7
VAS LEFT KIDNEY LENGTH: 11.2 CM
VAS LEFT RENAL DIST EDV: 8.7 CM/S
VAS LEFT RENAL DIST PSV: 48.8 CM/S
VAS LEFT RENAL DIST RAR: 0.5
VAS LEFT RENAL DIST RI: 0.82
VAS LEFT RENAL MID EDV: 14 CM/S
VAS LEFT RENAL MID PSV: 60 CM/S
VAS LEFT RENAL MID RAR: 0.62
VAS LEFT RENAL MID RI: 0.77
VAS LEFT RENAL ORIGIN EDV: 19.7 CM/S
VAS LEFT RENAL ORIGIN PSV: 97.1 CM/S
VAS LEFT RENAL ORIGIN RAR: 1
VAS LEFT RENAL PROX EDV: 15.6 CM/S
VAS LEFT RENAL PROX PSV: 71 CM/S
VAS LEFT RENAL PROX RAR: 0.73
VAS LEFT RENAL PROX RI: 0.78
VAS LEFT RENAL RAR: 1
VAS RIGHT INTERLOBAR EDV: 5.2 CM/S
VAS RIGHT INTERLOBAR PSV: 19 CM/S
VAS RIGHT INTERLOBAR RI: 0.73
VAS RIGHT KIDNEY LENGTH: 10.6 CM
VAS RIGHT RENAL DIST EDV: 12.9 CM/S
VAS RIGHT RENAL DIST PSV: 66.6 CM/S
VAS RIGHT RENAL DIST RAR: 0.69
VAS RIGHT RENAL DIST RI: 0.81
VAS RIGHT RENAL MID EDV: 17.2 CM/S
VAS RIGHT RENAL MID PSV: 83.6 CM/S
VAS RIGHT RENAL MID RAR: 0.86
VAS RIGHT RENAL MID RI: 0.79
VAS RIGHT RENAL RAR: 0.86
WBC # BLD AUTO: 3.8 K/UL (ref 4–11)

## 2025-01-16 PROCEDURE — 36415 COLL VENOUS BLD VENIPUNCTURE: CPT

## 2025-01-16 PROCEDURE — 97165 OT EVAL LOW COMPLEX 30 MIN: CPT

## 2025-01-16 PROCEDURE — 6370000000 HC RX 637 (ALT 250 FOR IP): Performed by: INTERNAL MEDICINE

## 2025-01-16 PROCEDURE — 93010 ELECTROCARDIOGRAM REPORT: CPT | Performed by: INTERNAL MEDICINE

## 2025-01-16 PROCEDURE — 83735 ASSAY OF MAGNESIUM: CPT

## 2025-01-16 PROCEDURE — 6370000000 HC RX 637 (ALT 250 FOR IP): Performed by: STUDENT IN AN ORGANIZED HEALTH CARE EDUCATION/TRAINING PROGRAM

## 2025-01-16 PROCEDURE — 93306 TTE W/DOPPLER COMPLETE: CPT | Performed by: INTERNAL MEDICINE

## 2025-01-16 PROCEDURE — 2000000000 HC ICU R&B

## 2025-01-16 PROCEDURE — 6360000002 HC RX W HCPCS: Performed by: STUDENT IN AN ORGANIZED HEALTH CARE EDUCATION/TRAINING PROGRAM

## 2025-01-16 PROCEDURE — 99223 1ST HOSP IP/OBS HIGH 75: CPT | Performed by: INTERNAL MEDICINE

## 2025-01-16 PROCEDURE — 6360000002 HC RX W HCPCS

## 2025-01-16 PROCEDURE — 93975 VASCULAR STUDY: CPT

## 2025-01-16 PROCEDURE — 93356 MYOCRD STRAIN IMG SPCKL TRCK: CPT | Performed by: INTERNAL MEDICINE

## 2025-01-16 PROCEDURE — 76376 3D RENDER W/INTRP POSTPROCES: CPT | Performed by: INTERNAL MEDICINE

## 2025-01-16 PROCEDURE — 6370000000 HC RX 637 (ALT 250 FOR IP)

## 2025-01-16 PROCEDURE — 97530 THERAPEUTIC ACTIVITIES: CPT

## 2025-01-16 PROCEDURE — 2500000003 HC RX 250 WO HCPCS: Performed by: STUDENT IN AN ORGANIZED HEALTH CARE EDUCATION/TRAINING PROGRAM

## 2025-01-16 PROCEDURE — 93356 MYOCRD STRAIN IMG SPCKL TRCK: CPT

## 2025-01-16 PROCEDURE — 80048 BASIC METABOLIC PNL TOTAL CA: CPT

## 2025-01-16 PROCEDURE — 93005 ELECTROCARDIOGRAM TRACING: CPT | Performed by: STUDENT IN AN ORGANIZED HEALTH CARE EDUCATION/TRAINING PROGRAM

## 2025-01-16 PROCEDURE — 93975 VASCULAR STUDY: CPT | Performed by: SURGERY

## 2025-01-16 PROCEDURE — 97161 PT EVAL LOW COMPLEX 20 MIN: CPT

## 2025-01-16 PROCEDURE — 85025 COMPLETE CBC W/AUTO DIFF WBC: CPT

## 2025-01-16 RX ORDER — NIFEDIPINE 30 MG/1
30 TABLET, EXTENDED RELEASE ORAL ONCE
Status: DISCONTINUED | OUTPATIENT
Start: 2025-01-16 | End: 2025-01-16

## 2025-01-16 RX ORDER — ISOSORBIDE MONONITRATE 30 MG/1
60 TABLET, EXTENDED RELEASE ORAL DAILY
Status: DISCONTINUED | OUTPATIENT
Start: 2025-01-16 | End: 2025-01-17 | Stop reason: HOSPADM

## 2025-01-16 RX ORDER — POTASSIUM CHLORIDE 7.45 MG/ML
10 INJECTION INTRAVENOUS
Status: COMPLETED | OUTPATIENT
Start: 2025-01-16 | End: 2025-01-16

## 2025-01-16 RX ORDER — NIFEDIPINE 30 MG/1
60 TABLET, EXTENDED RELEASE ORAL DAILY
Status: DISCONTINUED | OUTPATIENT
Start: 2025-01-17 | End: 2025-01-16

## 2025-01-16 RX ORDER — NIFEDIPINE 30 MG/1
60 TABLET, EXTENDED RELEASE ORAL DAILY
Status: DISCONTINUED | OUTPATIENT
Start: 2025-01-17 | End: 2025-01-17

## 2025-01-16 RX ORDER — NIFEDIPINE 30 MG/1
60 TABLET, EXTENDED RELEASE ORAL DAILY
Status: DISCONTINUED | OUTPATIENT
Start: 2025-01-16 | End: 2025-01-16

## 2025-01-16 RX ORDER — NIFEDIPINE 30 MG/1
90 TABLET, EXTENDED RELEASE ORAL DAILY
Status: DISCONTINUED | OUTPATIENT
Start: 2025-01-17 | End: 2025-01-16

## 2025-01-16 RX ORDER — HYDRALAZINE HYDROCHLORIDE 20 MG/ML
10 INJECTION INTRAMUSCULAR; INTRAVENOUS EVERY 4 HOURS PRN
Status: DISCONTINUED | OUTPATIENT
Start: 2025-01-16 | End: 2025-01-17 | Stop reason: HOSPADM

## 2025-01-16 RX ORDER — POTASSIUM CHLORIDE 1500 MG/1
40 TABLET, EXTENDED RELEASE ORAL ONCE
Status: COMPLETED | OUTPATIENT
Start: 2025-01-16 | End: 2025-01-16

## 2025-01-16 RX ORDER — NIFEDIPINE 30 MG/1
30 TABLET, EXTENDED RELEASE ORAL DAILY
Status: DISCONTINUED | OUTPATIENT
Start: 2025-01-16 | End: 2025-01-16

## 2025-01-16 RX ORDER — HYDRALAZINE HYDROCHLORIDE 50 MG/1
100 TABLET, FILM COATED ORAL EVERY 8 HOURS SCHEDULED
Status: DISCONTINUED | OUTPATIENT
Start: 2025-01-16 | End: 2025-01-17 | Stop reason: HOSPADM

## 2025-01-16 RX ORDER — MUPIROCIN 20 MG/G
OINTMENT TOPICAL 2 TIMES DAILY
Status: DISCONTINUED | OUTPATIENT
Start: 2025-01-16 | End: 2025-01-17 | Stop reason: HOSPADM

## 2025-01-16 RX ADMIN — PANTOPRAZOLE SODIUM 40 MG: 40 TABLET, DELAYED RELEASE ORAL at 19:46

## 2025-01-16 RX ADMIN — ENOXAPARIN SODIUM 30 MG: 100 INJECTION SUBCUTANEOUS at 09:58

## 2025-01-16 RX ADMIN — ENOXAPARIN SODIUM 30 MG: 100 INJECTION SUBCUTANEOUS at 19:46

## 2025-01-16 RX ADMIN — POTASSIUM BICARBONATE 40 MEQ: 782 TABLET, EFFERVESCENT ORAL at 02:45

## 2025-01-16 RX ADMIN — POTASSIUM CHLORIDE 10 MEQ: 7.46 INJECTION, SOLUTION INTRAVENOUS at 03:52

## 2025-01-16 RX ADMIN — NIFEDIPINE 60 MG: 30 TABLET, FILM COATED, EXTENDED RELEASE ORAL at 10:03

## 2025-01-16 RX ADMIN — MUPIROCIN: 20 OINTMENT TOPICAL at 09:58

## 2025-01-16 RX ADMIN — POTASSIUM CHLORIDE 10 MEQ: 7.46 INJECTION, SOLUTION INTRAVENOUS at 13:07

## 2025-01-16 RX ADMIN — ARIPIPRAZOLE 15 MG: 5 TABLET ORAL at 19:46

## 2025-01-16 RX ADMIN — POTASSIUM CHLORIDE 10 MEQ: 7.46 INJECTION, SOLUTION INTRAVENOUS at 10:16

## 2025-01-16 RX ADMIN — SODIUM CHLORIDE, PRESERVATIVE FREE 10 ML: 5 INJECTION INTRAVENOUS at 10:03

## 2025-01-16 RX ADMIN — POTASSIUM CHLORIDE 10 MEQ: 7.46 INJECTION, SOLUTION INTRAVENOUS at 11:47

## 2025-01-16 RX ADMIN — HYDRALAZINE HYDROCHLORIDE 100 MG: 50 TABLET ORAL at 21:55

## 2025-01-16 RX ADMIN — POTASSIUM CHLORIDE 40 MEQ: 1500 TABLET, EXTENDED RELEASE ORAL at 10:01

## 2025-01-16 RX ADMIN — POTASSIUM CHLORIDE 10 MEQ: 7.46 INJECTION, SOLUTION INTRAVENOUS at 02:46

## 2025-01-16 RX ADMIN — NIFEDIPINE 30 MG: 30 TABLET, FILM COATED, EXTENDED RELEASE ORAL at 02:45

## 2025-01-16 RX ADMIN — CLOPIDOGREL BISULFATE 75 MG: 75 TABLET ORAL at 10:01

## 2025-01-16 RX ADMIN — ATORVASTATIN CALCIUM 80 MG: 80 TABLET, FILM COATED ORAL at 19:46

## 2025-01-16 RX ADMIN — ACETAMINOPHEN 650 MG: 325 TABLET ORAL at 08:28

## 2025-01-16 RX ADMIN — POTASSIUM CHLORIDE 10 MEQ: 7.46 INJECTION, SOLUTION INTRAVENOUS at 14:17

## 2025-01-16 RX ADMIN — ISOSORBIDE MONONITRATE 60 MG: 30 TABLET, EXTENDED RELEASE ORAL at 10:19

## 2025-01-16 RX ADMIN — POTASSIUM CHLORIDE 10 MEQ: 7.46 INJECTION, SOLUTION INTRAVENOUS at 05:00

## 2025-01-16 RX ADMIN — FLUOXETINE HYDROCHLORIDE 40 MG: 20 CAPSULE ORAL at 19:46

## 2025-01-16 RX ADMIN — ACETAMINOPHEN 650 MG: 325 TABLET ORAL at 15:25

## 2025-01-16 RX ADMIN — SODIUM CHLORIDE, PRESERVATIVE FREE 10 ML: 5 INJECTION INTRAVENOUS at 19:47

## 2025-01-16 RX ADMIN — MUPIROCIN: 20 OINTMENT TOPICAL at 19:46

## 2025-01-16 RX ADMIN — Medication 6 MG: at 21:55

## 2025-01-16 ASSESSMENT — PAIN DESCRIPTION - LOCATION
LOCATION: HEAD
LOCATION: HEAD

## 2025-01-16 ASSESSMENT — PAIN DESCRIPTION - PAIN TYPE
TYPE: ACUTE PAIN
TYPE: ACUTE PAIN

## 2025-01-16 ASSESSMENT — PAIN SCALES - GENERAL
PAINLEVEL_OUTOF10: 4
PAINLEVEL_OUTOF10: 2
PAINLEVEL_OUTOF10: 4
PAINLEVEL_OUTOF10: 1
PAINLEVEL_OUTOF10: 2

## 2025-01-16 NOTE — PROGRESS NOTES
4 Eyes Skin Assessment     NAME:  Jaden Gauthier  YOB: 1945  MEDICAL RECORD NUMBER:  [id number]    The patient is being assessed for  Shift Handoff    I agree that at least one RN has performed a thorough Head to Toe Skin Assessment on the patient. ALL assessment sites listed below have been assessed.      Areas assessed by both nurses:    Head, Face, Ears, Shoulders, Back, Chest, Arms, Elbows, Hands, Sacrum. Buttock, Coccyx, Ischium, Legs. Feet and Heels, and Under Medical Devices         Does the Patient have a Wound? No noted wound(s)       Ajay Prevention initiated by RN: Yes  Wound Care Orders initiated by RN: No    Pressure Injury (Stage 3,4, Unstageable, DTI, NWPT, and Complex wounds) if present, place Wound referral order by RN under : No    New Ostomies, if present place, Ostomy referral order under : No     Nurse 1 eSignature: Electronically signed by Fallon Meeks RN on 1/16/25 at 7:46 AM EST    **SHARE this note so that the co-signing nurse can place an eSignature**    Nurse 2 eSignature: Electronically signed by Angela Renae RN on 1/17/25 at 7:15 AM EST

## 2025-01-16 NOTE — PROGRESS NOTES
Hospitalist Pearl served concerning cardiology consult for patient. Currently on Nitro gtt for elevated b/p. New order noted. Fallon Meeks RN

## 2025-01-16 NOTE — PROGRESS NOTES
Occupational Therapy  Facility/Department: [facility] C2 CARD TELEMETRY  Occupational Therapy Initial Assessment, Treatment, and Discharge    Name: Jaden Gauthier  : 1945  MRN: [id number]  Date of Service: 2025    Discharge Recommendations:  Home with assist PRN  OT Equipment Recommendations  Equipment Needed: No     If pt is unable to be seen after this session, please let this note serve as discharge summary.  Please see case management note for discharge disposition.  Thank you.    Patient Diagnosis(es): The primary encounter diagnosis was Other fatigue. Diagnoses of Abnormal EKG, Elevated troponin, Hypokalemia, Hypomagnesemia, Chest pain, unspecified type, and Hypertensive emergency were also pertinent to this visit.  Past Medical History:  has a past medical history of , Acute renal failure (ARF) (HCC), Acute renal failure superimposed on stage 3 chronic kidney disease (HCC), Anxiety, Awareness under anesthesia, BPH (benign prostatic hyperplasia), Bradycardia, Carotid artery occlusion without infarction, unspecified laterality, ED (erectile dysfunction), GERD (gastroesophageal reflux disease), Hx of blood clots, Hypercholesteremia, Hypertension, Low back pain, Major depressive disorder, recurrent, mild, Osteoarthritis, Strabismus, TIA (transient ischemic attack), and Wears glasses.  Past Surgical History:  has a past surgical history that includes cyst removal; Esophagogastric fundoplication; Tonsillectomy; Strabismus surgery; External ear surgery; Colonoscopy (); Upper gastrointestinal endoscopy (); Upper gastrointestinal endoscopy (2014); eye surgery (Left); Knee arthroscopy (Right, 2020); Carotid endarterectomy (Right, 2021); Knee Arthroplasty (); Wrist surgery (Right, 7/3/2024); and TURP (N/A, 2024).           Assessment  Assessment: Co-tx collaboration this date to safely meet goals and will have better occupational performance outcomes with in a co-treatment  times  Hearing  Hearing: Within functional limits  Cognition  Overall Cognitive Status: WFL  Orientation  Overall Orientation Status: Within Normal Limits  Orientation Level: Oriented X4                  Education Given To: Patient  Education Provided: Role of Therapy;Transfer Training;Plan of Care;Equipment;Precautions;Orientation;Mobility Training  Education Provided Comments: Pt educated on importance of OOB mobility, prevention of complications of bedrest, and general safety during hospitalization  Education Method: Demonstration;Verbal  Barriers to Learning: None  Education Outcome: Verbalized understanding;Demonstrated understanding  LUE AROM (degrees)  LUE AROM : WFL  Left Hand AROM (degrees)  Left Hand AROM: WFL  RUE AROM (degrees)  RUE AROM : WFL  Right Hand AROM (degrees)  Right Hand AROM: WFL                  AM-PAC - ADL  AM-PAC Daily Activity - Inpatient   How much help is needed for putting on and taking off regular lower body clothing?: None  How much help is needed for bathing (which includes washing, rinsing, drying)?: A Little  How much help is needed for toileting (which includes using toilet, bedpan, or urinal)?: None  How much help is needed for putting on and taking off regular upper body clothing?: None  How much help is needed for taking care of personal grooming?: None  How much help for eating meals?: None  AM-PAC Inpatient Daily Activity Raw Score: 23  AM-PAC Inpatient ADL T-Scale Score : 51.12  ADL Inpatient CMS 0-100% Score: 15.86  ADL Inpatient CMS G-Code Modifier : CI    Goals  Short Term Goals  Time Frame for Short Term Goals: Short term goals to be met by 1/16  Short Term Goal 1: Pt will perform bed mobility IND- GOAL MET  Short Term Goal 2: Pt will perform LB dressing IND- GOAL MET  Short Term Goal 3: Pt will perform household distance mobility w/ SPV- GOAL MET  Patient Goals   Patient goals : \"I want to go home\"      Therapy Time   Individual Concurrent Group Co-treatment   Time In

## 2025-01-16 NOTE — PLAN OF CARE
Problem: Chronic Conditions and Co-morbidities  Goal: Patient's chronic conditions and co-morbidity symptoms are monitored and maintained or improved  1/16/2025 1157 by Fallon Meeks RN  Outcome: Progressing     Problem: Discharge Planning  Goal: Discharge to home or other facility with appropriate resources  1/16/2025 1157 by Fallon Meeks, RN  Outcome: Progressing     Problem: Safety - Adult  Goal: Free from fall injury  Outcome: Progressing     Problem: Pain  Goal: Verbalizes/displays adequate comfort level or baseline comfort level  Outcome: Progressing  Flowsheets (Taken 1/16/2025 0800)  Verbalizes/displays adequate comfort level or baseline comfort level:   Encourage patient to monitor pain and request assistance   Assess pain using appropriate pain scale   Administer analgesics based on type and severity of pain and evaluate response   Implement non-pharmacological measures as appropriate and evaluate response

## 2025-01-16 NOTE — CARE COORDINATION
Case Management Assessment  Initial Evaluation    Date/Time of Evaluation: 1/16/2025 9:47 AM  Assessment Completed by: Malissa Mcfarland RN    If patient is discharged prior to next notation, then this note serves as note for discharge by case management.    Patient Name: Jaden Gauthier                   YOB: 1945  Diagnosis: Hypokalemia [E87.6]  Hypomagnesemia [E83.42]  Abnormal EKG [R94.31]  Elevated troponin [R79.89]  Hypertensive emergency [I16.1]  Other fatigue [R53.83]  Chest pain, unspecified type [R07.9]                   Date / Time: 1/15/2025  2:17 PM    Patient Admission Status: Inpatient   Readmission Risk (Low < 19, Mod (19-27), High > 27): Readmission Risk Score: 11.1    Current PCP: Quinn Bradley MD  PCP verified by CM? Yes    Chart Reviewed: Yes      History Provided by: Patient  Patient Orientation: Alert and Oriented    Patient Cognition: Alert    Hospitalization in the last 30 days (Readmission):  No    If yes, Readmission Assessment in  Navigator will be completed.    Advance Directives:      Code Status: Full Code   Patient's Primary Decision Maker is: Legal Next of Kin    Primary Decision Maker: Ca Santana - [place] - [phone]    Discharge Planning:    Patient lives with: Alone Type of Home: House  Primary Care Giver:    Patient Support Systems include: Friends/Neighbors   Current Financial resources: Medicare  Current community resources: None  Current services prior to admission: Durable Medical Equipment            Current DME: Walker, Cane            Type of Home Care services:  None    ADLS  Prior functional level: Independent in ADLs/IADLs  Current functional level: Shopping, Housework, Cooking    PT AM-PAC:   /24  OT AM-PAC:   /24    Family can provide assistance at DC: No  Would you like Case Management to discuss the discharge plan with any other family members/significant others, and if so, who? No  Plans to Return to Present Housing: Yes  Other Identified

## 2025-01-16 NOTE — PROGRESS NOTES
[facility] Medicine Progress Note  V 1.6      Date of Admission: 1/15/2025    Hospital Day: 2      Chief Admission Complaint:  HTN    Subjective:  Patient seen at bedside this morning. Patient mentions no acute concerns. No nausea, vomiting, fevers, chills, pain anywhere, trouble urinating, defecating.    Presenting Admission History:       79 y.o. male who presented to [facility] with abnormal EKG from PCP office.  PMHx significant for history of CVA and TIA without residual symptoms, MDD, essential hypertension and history of prostate cancer.       Patient had presented to her PCP office for follow-up and there they obtained EKG which had shown ST depression and therefore patient was referred to ED.  In ED patient was having still persistent ST depression in anterior leads and did have transient left-sided chest pain.  During this time patient had significant hypertension with systolic in 200s.  Patient also had elevated troponin which was downtrending.  Patient upon my evaluation denies any chest pain, shortness of breath palpitations, orthopnea or any other symptoms.  Patient reports that he only takes hydralazine for hypertension and the nifedipine that was prescribed for him he has not been taking for several months as it was not refilled.  Patient denies any history of heart attack.  Denies any stents in the heart or any other cardiac conditions.  In ED patient was also found to have severely hypokalemic and hypomagnesemic and was also found to have elevated BNP chest x-ray was unremarkable.    Assessment/Plan:      Current Principal Problem:  Hypertensive emergency    #HTN emergency  #Chest pain, troponin elevation  #Electrolyte abnormalities  #CVA hx  Plan:  Telemetry in place  Continue cardiopulmonary monitoring  Continue Nitro drip, Nifedipine 60mg daily, Hydralazine 100mg BID, PRN Hydralazine 10mg IV  Adjusting regimen as needed  Goal blood pressure < 140/90, decrease by 25% today from

## 2025-01-16 NOTE — PROGRESS NOTES
Shift: [id number]    Admitting diagnosis: hypokalemia, HTN crisis vrs MI with demand ischemia     Presentation to hospital: ED from PCP    Surgery: no     Nursing assessment at handoff  stable    Emergency Contact/POA:bertram gomez friend [phone]  Family updated: no    Most recent vitals: BP (!) 207/93   Pulse 61   Temp 97.6 °F (36.4 °C) (Oral)   Resp 11   Wt 101.9 kg (224 lb 10.4 oz)   SpO2 96%   BMI 28.84 kg/m²      Rhythm: Normal Sinus Rhythm depressed st w/pvc's     NC/HFNC- 0 lpm  Respiratory support: - No ventilator support  -      Vent days: Day 0    Increased O2 requirements: no    Admission weight Weight - Scale: 103.7 kg (228 lb 9.6 oz)  Today's weight   Wt Readings from Last 1 Encounters:   01/16/25 101.9 kg (224 lb 10.4 oz)         UOP >30ml/hr: yes -      Adams need assessed each shift: no    Restraints: no  Order current and documentation up to date?    Lines/Drains  LDA Insertion Date Discontinued Date Dressing Changes   PIV       TLC       Arterial       Adams       Vas Cath      ETT       Surgical drains        Night Shift Hospitalist Interventions    Problem(Brief) Date Time Intervention Physician contacted                                               Drip rates at handoff:    sodium chloride      nitroGLYCERIN 30 mcg/min (01/16/25 0615)       Hospital Course Daily Updates:  Admit Day# 0 1/15/2025  Night shift admit 1905  - nitro drip at 5 all night until 5am   - replaced potassium all night both oral and iv  - Mag replaced in ED       Lab Data:   CBC:   Recent Labs     01/15/25  1454   WBC 5.0   HGB 12.5*   HCT 37.0*   MCV 96.5        BMP:    Recent Labs     01/15/25  1930 01/15/25  2346    143   K 2.4* 2.7*   CO2 31 31   BUN 10 13   CREATININE 0.7* 1.1     LIVR:   Recent Labs     01/15/25  1454   AST 22   ALT 23     PT/INR: No results for input(s): \"INR\" in the last 72 hours.    Invalid input(s): \"PROT\"  APTT: No results for input(s): \"APTT\" in the last 72 hours.  ABG:

## 2025-01-16 NOTE — PROGRESS NOTES
Physical Therapy  Facility/Department: [facility] C2 CARD TELEMETRY  Physical Therapy Initial Assessment/Treatment/Discharge     Name: Jaden Gauthier  : 1945  MRN: [id number]  Date of Service: 2025    Discharge Recommendations:  Home independently   PT Equipment Recommendations  Equipment Needed: No      Patient Diagnosis(es): The primary encounter diagnosis was Other fatigue. Diagnoses of Abnormal EKG, Elevated troponin, Hypokalemia, Hypomagnesemia, Chest pain, unspecified type, and Hypertensive emergency were also pertinent to this visit.  Past Medical History:  has a past medical history of , Acute renal failure (ARF) (HCC), Acute renal failure superimposed on stage 3 chronic kidney disease (HCC), Anxiety, Awareness under anesthesia, BPH (benign prostatic hyperplasia), Bradycardia, Carotid artery occlusion without infarction, unspecified laterality, ED (erectile dysfunction), GERD (gastroesophageal reflux disease), Hx of blood clots, Hypercholesteremia, Hypertension, Low back pain, Major depressive disorder, recurrent, mild, Osteoarthritis, Strabismus, TIA (transient ischemic attack), and Wears glasses.  Past Surgical History:  has a past surgical history that includes cyst removal; Esophagogastric fundoplication; Tonsillectomy; Strabismus surgery; External ear surgery; Colonoscopy (); Upper gastrointestinal endoscopy (); Upper gastrointestinal endoscopy (2014); eye surgery (Left); Knee arthroscopy (Right, 2020); Carotid endarterectomy (Right, 2021); Knee Arthroplasty (); Wrist surgery (Right, 7/3/2024); and TURP (N/A, 2024).    Assessment  Assessment: Pt to [facility] with diagnosis of hypertensive emergency. PTA pt lives alone in a single story house with 2 SHERRILL. Pt was independent with transfers and ambulation with no AD. Pt currently presents near baseline function. Completes bed mobility with mod I, transfers independently, ambulates with supervision and stairs with  supervison for safety with IV pole managment. Pt with no further acute PT needs at this time ans is safe to DC home when medically appropriate.  Decision Making: Low Complexity  Barriers to Learning: None  No Skilled PT: Independent with functional mobility   Requires PT Follow-Up: No    Plan  Physical Therapy Plan  General Plan: Discharge with evaluation only  Safety Devices  Type of Devices: All fall risk precautions in place, Call light within reach, Nurse notified, Gait belt, Left in bed, Patient at risk for falls  Restraints  Restraints Initially in Place: No    Restrictions  Restrictions/Precautions  Restrictions/Precautions: Fall Risk  Activity Level: Up as Tolerated  Position Activity Restriction  Other Position/Activity Restrictions: IV, ICU monitoring     Subjective  General  Chart Reviewed: Yes  Patient assessed for rehabilitation services?: Yes  Response To Previous Treatment: Not applicable  Family/Caregiver Present: No  Referring Practitioner: Patt Layton MD  Referral Date : 01/16/25  Diagnosis: Hypertensive emergency  Follows Commands: Within Functional Limits  General  General Comments: Pt in bed upon arrival, RN cleared for therapy  Subjective  Subjective: pt pleasant and agreeable, denies pain at rest          Social/Functional History  Social/Functional History  Lives With: Alone  Type of Home: House  Home Layout: One level  Home Access: Stairs to enter without rails  Entrance Stairs - Number of Steps: 2  Bathroom Shower/Tub: Tub/Shower unit  Bathroom Toilet: Standard (sink next to toilet)  Bathroom Equipment: None  Home Equipment: Cane, Walker - Standard (hurricane; SW is from previous knee replacement)  Has the patient had two or more falls in the past year or any fall with injury in the past year?: Yes (2, fell in June 2024, missed step while walking w/ SW, fell and broke R wrist; other fall was from slipping on ice, no injury)  Prior Level of Assist for ADLs: Independent  Prior Level

## 2025-01-17 VITALS
TEMPERATURE: 97.5 F | OXYGEN SATURATION: 96 % | HEART RATE: 64 BPM | WEIGHT: 224.65 LBS | RESPIRATION RATE: 18 BRPM | HEIGHT: 74 IN | DIASTOLIC BLOOD PRESSURE: 59 MMHG | SYSTOLIC BLOOD PRESSURE: 124 MMHG | BODY MASS INDEX: 28.83 KG/M2

## 2025-01-17 LAB
ANION GAP SERPL CALCULATED.3IONS-SCNC: 8 MMOL/L (ref 3–16)
BASOPHILS # BLD: 0 K/UL (ref 0–0.2)
BASOPHILS NFR BLD: 0.5 %
BUN SERPL-MCNC: 12 MG/DL (ref 7–20)
CALCIUM SERPL-MCNC: 9.1 MG/DL (ref 8.3–10.6)
CHLORIDE SERPL-SCNC: 105 MMOL/L (ref 99–110)
CO2 SERPL-SCNC: 28 MMOL/L (ref 21–32)
CREAT SERPL-MCNC: 0.8 MG/DL (ref 0.8–1.3)
DEPRECATED RDW RBC AUTO: 14 % (ref 12.4–15.4)
EOSINOPHIL # BLD: 0.1 K/UL (ref 0–0.6)
EOSINOPHIL NFR BLD: 2.3 %
GFR SERPLBLD CREATININE-BSD FMLA CKD-EPI: 90 ML/MIN/{1.73_M2}
GLUCOSE SERPL-MCNC: 113 MG/DL (ref 70–99)
HCT VFR BLD AUTO: 35.5 % (ref 40.5–52.5)
HGB BLD-MCNC: 11.9 G/DL (ref 13.5–17.5)
LYMPHOCYTES # BLD: 1.1 K/UL (ref 1–5.1)
LYMPHOCYTES NFR BLD: 21.7 %
MAGNESIUM SERPL-MCNC: 1.74 MG/DL (ref 1.8–2.4)
MAGNESIUM SERPL-MCNC: 1.86 MG/DL (ref 1.8–2.4)
MCH RBC QN AUTO: 32.4 PG (ref 26–34)
MCHC RBC AUTO-ENTMCNC: 33.7 G/DL (ref 31–36)
MCV RBC AUTO: 96.3 FL (ref 80–100)
MONOCYTES # BLD: 0.6 K/UL (ref 0–1.3)
MONOCYTES NFR BLD: 11.1 %
NEUTROPHILS # BLD: 3.4 K/UL (ref 1.7–7.7)
NEUTROPHILS NFR BLD: 64.4 %
PHOSPHATE SERPL-MCNC: 3.1 MG/DL (ref 2.5–4.9)
PLATELET # BLD AUTO: 197 K/UL (ref 135–450)
PMV BLD AUTO: 8 FL (ref 5–10.5)
POTASSIUM SERPL-SCNC: 3 MMOL/L (ref 3.5–5.1)
POTASSIUM SERPL-SCNC: 3.5 MMOL/L (ref 3.5–5.1)
RBC # BLD AUTO: 3.68 M/UL (ref 4.2–5.9)
SODIUM SERPL-SCNC: 141 MMOL/L (ref 136–145)
WBC # BLD AUTO: 5.2 K/UL (ref 4–11)

## 2025-01-17 PROCEDURE — 2580000003 HC RX 258: Performed by: STUDENT IN AN ORGANIZED HEALTH CARE EDUCATION/TRAINING PROGRAM

## 2025-01-17 PROCEDURE — 2500000003 HC RX 250 WO HCPCS: Performed by: STUDENT IN AN ORGANIZED HEALTH CARE EDUCATION/TRAINING PROGRAM

## 2025-01-17 PROCEDURE — 80048 BASIC METABOLIC PNL TOTAL CA: CPT

## 2025-01-17 PROCEDURE — 84244 ASSAY OF RENIN: CPT

## 2025-01-17 PROCEDURE — 84100 ASSAY OF PHOSPHORUS: CPT

## 2025-01-17 PROCEDURE — 6370000000 HC RX 637 (ALT 250 FOR IP): Performed by: STUDENT IN AN ORGANIZED HEALTH CARE EDUCATION/TRAINING PROGRAM

## 2025-01-17 PROCEDURE — 84132 ASSAY OF SERUM POTASSIUM: CPT

## 2025-01-17 PROCEDURE — 6370000000 HC RX 637 (ALT 250 FOR IP)

## 2025-01-17 PROCEDURE — 36415 COLL VENOUS BLD VENIPUNCTURE: CPT

## 2025-01-17 PROCEDURE — 99233 SBSQ HOSP IP/OBS HIGH 50: CPT | Performed by: INTERNAL MEDICINE

## 2025-01-17 PROCEDURE — 83735 ASSAY OF MAGNESIUM: CPT

## 2025-01-17 PROCEDURE — 6370000000 HC RX 637 (ALT 250 FOR IP): Performed by: INTERNAL MEDICINE

## 2025-01-17 PROCEDURE — 85025 COMPLETE CBC W/AUTO DIFF WBC: CPT

## 2025-01-17 PROCEDURE — 6360000002 HC RX W HCPCS: Performed by: STUDENT IN AN ORGANIZED HEALTH CARE EDUCATION/TRAINING PROGRAM

## 2025-01-17 PROCEDURE — 6360000002 HC RX W HCPCS

## 2025-01-17 PROCEDURE — 82088 ASSAY OF ALDOSTERONE: CPT

## 2025-01-17 RX ORDER — POTASSIUM CHLORIDE 7.45 MG/ML
10 INJECTION INTRAVENOUS
Status: DISCONTINUED | OUTPATIENT
Start: 2025-01-17 | End: 2025-01-17

## 2025-01-17 RX ORDER — NIFEDIPINE 30 MG/1
60 TABLET, EXTENDED RELEASE ORAL DAILY
Qty: 30 TABLET | Refills: 3 | Status: SHIPPED | OUTPATIENT
Start: 2025-01-18

## 2025-01-17 RX ORDER — CLOPIDOGREL BISULFATE 75 MG/1
75 TABLET ORAL DAILY
Qty: 30 TABLET | Refills: 3 | Status: SHIPPED | OUTPATIENT
Start: 2025-01-18

## 2025-01-17 RX ORDER — HYDRALAZINE HYDROCHLORIDE 100 MG/1
100 TABLET, FILM COATED ORAL EVERY 8 HOURS SCHEDULED
Qty: 90 TABLET | Refills: 3 | Status: SHIPPED | OUTPATIENT
Start: 2025-01-17

## 2025-01-17 RX ORDER — POTASSIUM CHLORIDE 7.45 MG/ML
10 INJECTION INTRAVENOUS
Status: COMPLETED | OUTPATIENT
Start: 2025-01-17 | End: 2025-01-17

## 2025-01-17 RX ORDER — POTASSIUM CHLORIDE 1500 MG/1
40 TABLET, EXTENDED RELEASE ORAL ONCE
Status: COMPLETED | OUTPATIENT
Start: 2025-01-17 | End: 2025-01-17

## 2025-01-17 RX ORDER — ISOSORBIDE MONONITRATE 60 MG/1
60 TABLET, EXTENDED RELEASE ORAL DAILY
Qty: 30 TABLET | Refills: 3 | Status: SHIPPED | OUTPATIENT
Start: 2025-01-18

## 2025-01-17 RX ORDER — NIFEDIPINE 30 MG/1
90 TABLET, EXTENDED RELEASE ORAL DAILY
Status: DISCONTINUED | OUTPATIENT
Start: 2025-01-18 | End: 2025-01-17

## 2025-01-17 RX ORDER — NIFEDIPINE 30 MG/1
30 TABLET, EXTENDED RELEASE ORAL NIGHTLY
Status: DISCONTINUED | OUTPATIENT
Start: 2025-01-17 | End: 2025-01-17 | Stop reason: HOSPADM

## 2025-01-17 RX ORDER — NIFEDIPINE 30 MG/1
60 TABLET, EXTENDED RELEASE ORAL DAILY
Status: DISCONTINUED | OUTPATIENT
Start: 2025-01-18 | End: 2025-01-17 | Stop reason: HOSPADM

## 2025-01-17 RX ORDER — PANTOPRAZOLE SODIUM 40 MG/1
40 TABLET, DELAYED RELEASE ORAL
Qty: 30 TABLET | Refills: 3 | Status: SHIPPED | OUTPATIENT
Start: 2025-01-17

## 2025-01-17 RX ORDER — NIFEDIPINE 30 MG/1
30 TABLET, EXTENDED RELEASE ORAL NIGHTLY
Qty: 30 TABLET | Refills: 3 | Status: SHIPPED | OUTPATIENT
Start: 2025-01-17

## 2025-01-17 RX ORDER — MAGNESIUM SULFATE IN WATER 40 MG/ML
2000 INJECTION, SOLUTION INTRAVENOUS ONCE
Status: COMPLETED | OUTPATIENT
Start: 2025-01-17 | End: 2025-01-17

## 2025-01-17 RX ADMIN — HYDRALAZINE HYDROCHLORIDE 100 MG: 50 TABLET ORAL at 06:32

## 2025-01-17 RX ADMIN — MUPIROCIN: 20 OINTMENT TOPICAL at 08:49

## 2025-01-17 RX ADMIN — CLOPIDOGREL BISULFATE 75 MG: 75 TABLET ORAL at 08:48

## 2025-01-17 RX ADMIN — NIFEDIPINE 60 MG: 30 TABLET, FILM COATED, EXTENDED RELEASE ORAL at 08:49

## 2025-01-17 RX ADMIN — HYDRALAZINE HYDROCHLORIDE 100 MG: 50 TABLET ORAL at 13:28

## 2025-01-17 RX ADMIN — POTASSIUM CHLORIDE 10 MEQ: 7.46 INJECTION, SOLUTION INTRAVENOUS at 06:43

## 2025-01-17 RX ADMIN — POTASSIUM CHLORIDE 40 MEQ: 1500 TABLET, EXTENDED RELEASE ORAL at 06:32

## 2025-01-17 RX ADMIN — POTASSIUM CHLORIDE 10 MEQ: 7.46 INJECTION, SOLUTION INTRAVENOUS at 08:38

## 2025-01-17 RX ADMIN — MAGNESIUM SULFATE HEPTAHYDRATE 2000 MG: 40 INJECTION, SOLUTION INTRAVENOUS at 13:41

## 2025-01-17 RX ADMIN — SODIUM CHLORIDE: 9 INJECTION, SOLUTION INTRAVENOUS at 13:37

## 2025-01-17 RX ADMIN — POTASSIUM CHLORIDE 40 MEQ: 1500 TABLET, EXTENDED RELEASE ORAL at 13:29

## 2025-01-17 RX ADMIN — ACETAMINOPHEN 650 MG: 325 TABLET ORAL at 08:47

## 2025-01-17 RX ADMIN — POTASSIUM CHLORIDE 40 MEQ: 1500 TABLET, EXTENDED RELEASE ORAL at 08:56

## 2025-01-17 RX ADMIN — ISOSORBIDE MONONITRATE 60 MG: 30 TABLET, EXTENDED RELEASE ORAL at 08:48

## 2025-01-17 RX ADMIN — SODIUM CHLORIDE, PRESERVATIVE FREE 10 ML: 5 INJECTION INTRAVENOUS at 08:50

## 2025-01-17 RX ADMIN — ENOXAPARIN SODIUM 30 MG: 100 INJECTION SUBCUTANEOUS at 08:48

## 2025-01-17 ASSESSMENT — PAIN SCALES - GENERAL
PAINLEVEL_OUTOF10: 3
PAINLEVEL_OUTOF10: 3
PAINLEVEL_OUTOF10: 0

## 2025-01-17 ASSESSMENT — PAIN DESCRIPTION - LOCATION
LOCATION: HEAD
LOCATION: HEAD

## 2025-01-17 ASSESSMENT — PAIN DESCRIPTION - PAIN TYPE
TYPE: ACUTE PAIN
TYPE: ACUTE PAIN

## 2025-01-17 NOTE — PROGRESS NOTES
continue medical management  --Continue supportive care per oncology  --Continue ASA/statin/Plavix  --Continue Protonix  --Replete electrolytes as needed    Critical Care   Due to the high probability of clinically significant life threating deterioration of the patient's condition that required my urgent intervention, a total critical care time of >35 minutes was used. This time excludes any time that may have been spent performing procedures. This includes, but is not limited to, vital sign monitoring, telemetry monitoring, continuous pulse oximety, IV medication, clinical response to the IV medications, documentation time, consultation time, interpretation of lab data, review of nursing notes and old record review.   High MDM.    Cardiology will sign off at this time and arrange for cardiology clinic FU in 2 weeks.    I will address the patient's cardiac risk factors and adjusted pharmacologic treatment as needed. In addition, I have reinforced the need for patient directed risk factor modification.  All questions and concerns were addressed to the patient/family. Alternatives to my treatment were discussed.     Thank you for allowing us to participate in the care of Jaden Gauthier. Please call me with any questions [phone].    Jos Capellan MD [facility]  Cardiovascular Disease  [facility] [place]  [phone] [place] Office  [phone] [place] Office  1/17/2025 8:55 AM

## 2025-01-17 NOTE — CARE COORDINATION
Met with the pt at the bedside. DC order noted. Consult received for possible home care needs. However, therapy recommending home independently. Pt declines HHC at this time. Denies any additional needs. Will sign off. Please consult if needs arise.

## 2025-01-17 NOTE — DISCHARGE INSTR - COC
Continuity of Care Form    Patient Name: Jaden Gauthier   :  1945  MRN:  [id number]    Admit date:  1/15/2025  Discharge date:  ***    Code Status Order: Full Code   Advance Directives:   Advance Care Flowsheet Documentation             Admitting Physician:  Reza Brandon DO  PCP: Quinn Bradley MD    Discharging Nurse: ***  Discharging Hospital Unit/Room#: 0231/0231-01  Discharging Unit Phone Number: ***    Emergency Contact:   Extended Emergency Contact Information  Primary Emergency Contact: Ca Santana  Address: [address] [place] [address] of [facility]  Home Phone: [phone]  Mobile Phone: [phone]  Relation: Friend   needed? No    Past Surgical History:  Past Surgical History:   Procedure Laterality Date    CAROTID ENDARTERECTOMY Right 2021    RIGHT SIDED CAROTID ENDARTERECTOMY performed by Merritt Arias MD at [facility] OR    COLONOSCOPY      CYST REMOVAL      ESOPHAGOGASTRIC FUNDOPLICATION      EXTERNAL EAR SURGERY      cancer spots    EYE SURGERY Left     for strabsismus    KNEE ARTHROPLASTY  2018    KNEE ARTHROSCOPY Right 2020    EXAM UNDER ANESTHESIA VIDEO ARTHROSCOPY RIGHT KNEE, PARTIAL MEDIAL MENISCECTOMY, PATELLA FEMORAL CHONDROPLASTY, SYNOVECTOMY performed by Caden Veliz MD at [facility] OR    STRABISMUS SURGERY      TONSILLECTOMY      TURP N/A 2024    CYSTOSCOPY, TRANSURETHRAL RESECTION OF THE PROSTATE performed by Mello Alejandro MD at [facility] OR    UPPER GASTROINTESTINAL ENDOSCOPY      UPPER GASTROINTESTINAL ENDOSCOPY  2014    gastritis barretts biopsy taken    WRIST SURGERY Right 7/3/2024    OPEN REDUCTION INTERNAL FIXATION RIGHT DISTAL RADIUS FRACTURE, RIGHT CARPAL TUNNEL RELEASE performed by Elton Glass MD at [facility] OR       Immunization History:   Immunization History   Administered Date(s) Administered    COVID-19, MODERNA BLUE border, Primary or Immunocompromised, (age 12y+), IM, 100

## 2025-01-17 NOTE — PROGRESS NOTES
4 Eyes Skin Assessment     NAME:  Jaden Gauthier  YOB: 1945  MEDICAL RECORD NUMBER:  [id number]    The patient is being assessed for  {Reason for Assessment:06308}    I agree that at least one RN has performed a thorough Head to Toe Skin Assessment on the patient. ALL assessment sites listed below have been assessed.      Areas assessed by both nurses:    Head, Face, Ears, Shoulders, Back, Chest, Arms, Elbows, Hands, Sacrum. Buttock, Coccyx, Ischium, Legs. Feet and Heels, and Under Medical Devices         Does the Patient have a Wound? No noted wound(s)       Ajay Prevention initiated by RN: Yes  Wound Care Orders initiated by RN: No    Pressure Injury (Stage 3,4, Unstageable, DTI, NWPT, and Complex wounds) if present, place Wound referral order by RN under : No    New Ostomies, if present place, Ostomy referral order under : No     Nurse 1 eSignature: Electronically signed by Fallon Meeks RN on 1/16/25 at 7:26 PM EST    **SHARE this note so that the co-signing nurse can place an eSignature**    Nurse 2 eSignature: {Esignature:[id number]}

## 2025-01-17 NOTE — CONSULTS
CARDIOLOGY CONSULTATION        Patient Name: Jaden Gauthier  Date of admission: 1/15/2025  2:17 PM  Admission Dx: Hypokalemia [E87.6]  Hypomagnesemia [E83.42]  Abnormal EKG [R94.31]  Elevated troponin [R79.89]  Hypertensive emergency [I16.1]  Other fatigue [R53.83]  Chest pain, unspecified type [R07.9]  Requesting Physician: Reza Brandon DO  Primary Care physician: Quinn Bradley MD    Reason for Consultation/Chief Complaint: Uncontrolled HTN, abnormal EKG    History of Present Illness:     Jaden Gauthier is a 79 y.o. man with uncontrolled HTN, h/o CVA, HLP, CKD and BPH admitted for hypertensive emergency and abnormal EKG.  Patient reports he was seen at his primary who noted his elevated BP and abnormal EKG.  She sent him to the ED for further evaluation.  Patient reports poor BP control at home and allergies to several BP meds.  He is a nonsmoker and states he was on hydralazine and nifedipine XL at home.  He denies chest pain, SOB, palpitations and dizziness.  Labs in ED showed hypokalemia, elevated BNP and troponin peak of 29.  Patient was noted to have SBP>200, so he was placed on nitro gtt and transferred to the ICU.  Cardiology consulted to assist with management.    Past Medical History:   has a past medical history of 2021, Acute renal failure (ARF) (HCC), Acute renal failure superimposed on stage 3 chronic kidney disease (HCC), Anxiety, Awareness under anesthesia, BPH (benign prostatic hyperplasia), Bradycardia, Carotid artery occlusion without infarction, unspecified laterality, ED (erectile dysfunction), GERD (gastroesophageal reflux disease), Hx of blood clots, Hypercholesteremia, Hypertension, Low back pain, Major depressive disorder, recurrent, mild, Osteoarthritis, Strabismus, TIA (transient ischemic attack), and Wears glasses.    Surgical History:   has a past surgical history that includes cyst removal; Esophagogastric fundoplication; Tonsillectomy; Strabismus surgery; External ear surgery; 
adenocarcinoma, ductal type, Fortuna score 9 (5+4).    - Imaging and tumor markers at diagnosis showed:  A. PSA 6.03 ng/mL, 3/04/2024.  B. PSMA PET/CT, 9/12/2024, showed intense uptake throughout the prostate gland with extension into the left seminal vesicle.  Multiple pelvic and retroperitoneal lymph nodes demonstrated abnormal uptake.  A solitary right inferior pubic ramus metastasis showed abnormal uptake.  - Treatment   - started Orgovyx on 08/28/2024 via [facility]  - Started Orgovyx with AA/Prednisone 01/13/2025  - seen by radiation oncology as well with plans for RT to prostate and possible pubic ramus mets   - Patient reports that he notes a correlation with restarting his abiraterone and his BP increasing almost immediately within a day or two. Discussed with patient and he does not want to resume Abiraterone at any point in time. Will have him follow up outpatient to discuss alternative options.   - no hem/onc barriers to d/c     HTN Urgency   - admission with /100 and headache  - EKG prior to admission showed ST depression in anterior leads and patient had chest pain   - on imdur 60mg daily, hydralazine 100mg BID, nifedipine 60mg daily  - concern this could possibly be correlated to Abiraterone based on timing of symptom onset per patient report.   - required nitro gtt   - cardiology recommended outpatient evaluation     Depression  - on fluoxetine 40 mg daily and aripiprazole 15 mg daily     60 minutes spent reviewing outside records, labs, and discussion with patient, IM, nursing.     Thank you for asking me to see the patient.       KETAN DOLAN  Please Contact Through [employer]

## 2025-01-17 NOTE — PROGRESS NOTES
Shift:3771-4747  Admitting diagnosis: hypokalemia, HTN crisis vrs MI with demand ischemia     Presentation to hospital: ED from PCP    Surgery: no     Nursing assessment at handoff  stable    Emergency Contact/POA:bertram gomez friend [phone]  Family updated: no    Most recent vitals: BP (!) 153/84   Pulse 64   Temp 98 °F (36.7 °C)   Resp 16   Ht 1.88 m (6' 2\")   Wt 101.9 kg (224 lb 10.4 oz)   SpO2 94%   BMI 28.84 kg/m²      Rhythm: Normal Sinus Rhythm depressed st w/pvc's     NC/HFNC- 0 lpm  Respiratory support: - No ventilator support  -      Vent days: Day 0    Increased O2 requirements: no    Admission weight Weight - Scale: 103.7 kg (228 lb 9.6 oz)  Today's weight   Wt Readings from Last 1 Encounters:   01/16/25 101.9 kg (224 lb 10.4 oz)         UOP >30ml/hr: yes -      Adams need assessed each shift: no    Restraints: no  Order current and documentation up to date?    Lines/Drains  LDA Insertion Date Discontinued Date Dressing Changes   PIV  1/15 x2     TLC       Arterial       Adams       Vas Cath      ETT       Surgical drains        Night Shift Hospitalist Interventions    Problem(Brief) Date Time Intervention Physician contacted                                               Drip rates at handoff:    sodium chloride      nitroGLYCERIN Stopped (01/16/25 1521)       Hospital Course Daily Updates:  Admit Day# 0 1/15/2025  Night shift admit 1905  - nitro drip at 5 all night until 5am   - replaced potassium all night both oral and iv  - Mag replaced in ED      Admit Day #1 Days 1/16/25  -weaned off Nitro  - PT & OT eval & treat  - Tylenol PRN for HA x2  - Voids QS  - Cardiology Consulted for Medication adjustment  - ECHO done  - Potassium replaced  [id number]  -am K+ 3.0 replacement ordered      Lab Data:   CBC:   Recent Labs     01/16/25  0641 01/17/25  0420   WBC 3.8* 5.2   HGB 12.2* 11.9*   HCT 35.8* 35.5*   MCV 96.5 96.3    197     BMP:    Recent Labs     01/16/25  0641 01/17/25  0420

## 2025-01-17 NOTE — DISCHARGE SUMMARY
Hospital Medicine Discharge Summary    Patient: Jaden Gauthier   : 1945     Hospital:  [facility]  Admit Date: 1/15/2025   Discharge Date: 2025    Disposition:  [x]Home   []HHC  []SNF  []Acute Rehab  []LTAC  []Hospice  Code status:  [x]Full  []DNR/CCA  []Limited (DNR/CCA with Do Not Intubate)  []DNRCC  Condition at Discharge: Stable  Primary Care Provider: Quinn Bradley MD    Admitting Provider: Reza Brandon DO  Discharge Provider: Patt Layton MD     Discharge Diagnoses:      Active Hospital Problems    Diagnosis     Hypertensive emergency [I16.1]        Presenting Admission History:      79 y.o. male who presented to [facility] with abnormal EKG from PCP office.  PMHx significant for history of CVA and TIA without residual symptoms, MDD, essential hypertension and history of prostate cancer.       Patient had presented to her PCP office for follow-up and there they obtained EKG which had shown ST depression and therefore patient was referred to ED.  In ED patient was having still persistent ST depression in anterior leads and did have transient left-sided chest pain.  During this time patient had significant hypertension with systolic in 200s.  Patient also had elevated troponin which was downtrending.  Patient upon my evaluation denies any chest pain, shortness of breath palpitations, orthopnea or any other symptoms.  Patient reports that he only takes hydralazine for hypertension and the nifedipine that was prescribed for him he has not been taking for several months as it was not refilled.  Patient denies any history of heart attack.  Denies any stents in the heart or any other cardiac conditions.  In ED patient was also found to have severely hypokalemic and hypomagnesemic and was also found to have elevated BNP chest x-ray was unremarkable.     Assessment/Plan:      #HTN emergency  #Chest pain, troponin elevation  #Electrolyte abnormalities  #CVA

## 2025-01-17 NOTE — PROGRESS NOTES
Zenaida LIZARRAGA from [facility] into see patient and okay with discharge to home.  Patient to follow up with Dr Downing as an outpatient. Fallon Meeks RN

## 2025-01-17 NOTE — PLAN OF CARE
Problem: Chronic Conditions and Co-morbidities  Goal: Patient's chronic conditions and co-morbidity symptoms are monitored and maintained or improved  Outcome: Progressing     Problem: Discharge Planning  Goal: Discharge to home or other facility with appropriate resources  Outcome: Progressing     Problem: Safety - Adult  Goal: Free from fall injury  Outcome: Progressing     Problem: Pain  Goal: Verbalizes/displays adequate comfort level or baseline comfort level  Outcome: Progressing  Flowsheets (Taken 1/17/2025 0800)  Verbalizes/displays adequate comfort level or baseline comfort level:   Encourage patient to monitor pain and request assistance   Assess pain using appropriate pain scale   Administer analgesics based on type and severity of pain and evaluate response   Implement non-pharmacological measures as appropriate and evaluate response  Tylenol PRN for headache

## 2025-01-17 NOTE — PROGRESS NOTES
New order for oral potassium & IV mag replacement noted.  Patient currently waiting for Oncology to visit pending discharge.  Home medications obtained from outpatient pharmacy prior to discharge.  Fallon Meeks RN

## 2025-01-17 NOTE — PROGRESS NOTES
Cardiology rounded & updated at bedside. New order for Procardia for home dose.  Oncology NP called & updated, will see patient today.  Replacement potassium infusing. Fallon Meeks RN

## 2025-01-17 NOTE — PROGRESS NOTES
Shift: 0700 -1900  Admitting diagnosis: hypokalemia, HTN crisis vrs MI with demand ischemia     Presentation to hospital: ED from PCP    Surgery: no     Nursing assessment at handoff  stable    Emergency Contact/POA:bertram gomez friend [phone]  Family updated: no    Most recent vitals: /64   Pulse 71   Temp 97.9 °F (36.6 °C) (Axillary)   Resp 18   Ht 1.88 m (6' 2\")   Wt 101.9 kg (224 lb 10.4 oz)   SpO2 95%   BMI 28.84 kg/m²      Rhythm: Normal Sinus Rhythm depressed st w/pvc's     NC/HFNC- 0 lpm  Respiratory support: - No ventilator support  -      Vent days: Day 0    Increased O2 requirements: no    Admission weight Weight - Scale: 103.7 kg (228 lb 9.6 oz)  Today's weight   Wt Readings from Last 1 Encounters:   01/16/25 101.9 kg (224 lb 10.4 oz)         UOP >30ml/hr: yes -      Adams need assessed each shift: no    Restraints: no  Order current and documentation up to date?    Lines/Drains  LDA Insertion Date Discontinued Date Dressing Changes   PIV  1/15 x2     TLC       Arterial       Adams       Vas Cath      ETT       Surgical drains        Night Shift Hospitalist Interventions    Problem(Brief) Date Time Intervention Physician contacted                                               Drip rates at handoff:    sodium chloride      nitroGLYCERIN Stopped (01/16/25 1521)       Hospital Course Daily Updates:  Admit Day# 0 1/15/2025  Night shift admit 1905  - nitro drip at 5 all night until 5am   - replaced potassium all night both oral and iv  - Mag replaced in ED      Admit Day #1 Days 1/16/25  -weaned off Nitro  - PT & OT eval & treat  - Tylenol PRN for HA x2  - Voids QS  - Cardiology Consulted for Medication adjustment  - ECHO done  - Potassium replaced      Lab Data:   CBC:   Recent Labs     01/15/25  1454 01/16/25  0641   WBC 5.0 3.8*   HGB 12.5* 12.2*   HCT 37.0* 35.8*   MCV 96.5 96.5    198     BMP:    Recent Labs     01/15/25  2346 01/16/25  0641    145   K 2.7* 3.2*   CO2

## 2025-01-17 NOTE — PROGRESS NOTES
Repeat Labs- potassium 3.5 & mag 1.74. Hospitalist perfect served & waiting new orders. Fallon Meeks RN

## 2025-01-17 NOTE — PROGRESS NOTES
Hospitalist notified and okay for discharge to home tonight.  Discharge instructions reviewed with patient and family member.  Patient and family verbalized understanding.  All home medications have been reviewed, questions answered and patient voiced understanding. Given prescriptions, discharge instructions, and appointment times.   Discharged via w/c to front entrance to go home. Walked to his car. Fallon Meeks RN

## 2025-01-17 NOTE — PROGRESS NOTES
4 Eyes Skin Assessment     NAME:  Jaden Gauthier  YOB: 1945  MEDICAL RECORD NUMBER:  [id number]    The patient is being assessed for  Shift Handoff    I agree that at least one RN has performed a thorough Head to Toe Skin Assessment on the patient. ALL assessment sites listed below have been assessed.      Areas assessed by both nurses:    Head, Face, Ears, Shoulders, Back, Chest, Arms, Elbows, Hands, Sacrum. Buttock, Coccyx, Ischium, Legs. Feet and Heels, and Under Medical Devices         Does the Patient have a Wound? No noted wound(s)       Ajay Prevention initiated by RN: Yes  Wound Care Orders initiated by RN: No    Pressure Injury (Stage 3,4, Unstageable, DTI, NWPT, and Complex wounds) if present, place Wound referral order by RN under : No    New Ostomies, if present place, Ostomy referral order under : No     Nurse 1 eSignature: Electronically signed by Fallon Meeks RN on 1/17/25 at 7:12 AM EST    **SHARE this note so that the co-signing nurse can place an eSignature**    Nurse 2 eSignature: Electronically signed by Angela Renae RN on 1/17/25 at 7:15 AM EST

## 2025-01-21 LAB
ALDOST SERPL-MCNC: 5.7 NG/DL
ALDOST/RENIN PLAS-RTO: NORMAL RATIO
RENIN PLAS-CCNC: <0.1 NG/ML/HR

## 2025-01-29 ENCOUNTER — OFFICE VISIT (OUTPATIENT)
Dept: FAMILY MEDICINE CLINIC | Age: 80
End: 2025-01-29
Payer: MEDICARE

## 2025-01-29 VITALS
SYSTOLIC BLOOD PRESSURE: 140 MMHG | WEIGHT: 229 LBS | HEART RATE: 69 BPM | OXYGEN SATURATION: 97 % | DIASTOLIC BLOOD PRESSURE: 68 MMHG | BODY MASS INDEX: 29.39 KG/M2 | HEIGHT: 74 IN

## 2025-01-29 DIAGNOSIS — F33.1 MAJOR DEPRESSIVE DISORDER, RECURRENT, MODERATE (HCC): ICD-10-CM

## 2025-01-29 DIAGNOSIS — E83.42 HYPOMAGNESEMIA: ICD-10-CM

## 2025-01-29 DIAGNOSIS — I49.9 IRREGULAR CARDIAC RHYTHM: ICD-10-CM

## 2025-01-29 DIAGNOSIS — E78.5 DYSLIPIDEMIA: ICD-10-CM

## 2025-01-29 DIAGNOSIS — E83.42 HYPOMAGNESEMIA: Primary | ICD-10-CM

## 2025-01-29 DIAGNOSIS — E87.6 HYPOKALEMIA: ICD-10-CM

## 2025-01-29 DIAGNOSIS — I20.0 UNSTABLE ANGINA (HCC): ICD-10-CM

## 2025-01-29 DIAGNOSIS — I10 HTN (HYPERTENSION), BENIGN: ICD-10-CM

## 2025-01-29 DIAGNOSIS — Z71.89 ACP (ADVANCE CARE PLANNING): ICD-10-CM

## 2025-01-29 PROCEDURE — 3077F SYST BP >= 140 MM HG: CPT | Performed by: REGISTERED NURSE

## 2025-01-29 PROCEDURE — 1036F TOBACCO NON-USER: CPT | Performed by: REGISTERED NURSE

## 2025-01-29 PROCEDURE — 99214 OFFICE O/P EST MOD 30 MIN: CPT | Performed by: REGISTERED NURSE

## 2025-01-29 PROCEDURE — 3078F DIAST BP <80 MM HG: CPT | Performed by: REGISTERED NURSE

## 2025-01-29 PROCEDURE — 1123F ACP DISCUSS/DSCN MKR DOCD: CPT | Performed by: REGISTERED NURSE

## 2025-01-29 PROCEDURE — 1111F DSCHRG MED/CURRENT MED MERGE: CPT | Performed by: REGISTERED NURSE

## 2025-01-29 PROCEDURE — G8427 DOCREV CUR MEDS BY ELIG CLIN: HCPCS | Performed by: REGISTERED NURSE

## 2025-01-29 PROCEDURE — G8417 CALC BMI ABV UP PARAM F/U: HCPCS | Performed by: REGISTERED NURSE

## 2025-01-29 PROCEDURE — 1159F MED LIST DOCD IN RCRD: CPT | Performed by: REGISTERED NURSE

## 2025-01-29 RX ORDER — FLUOXETINE 40 MG/1
40 CAPSULE ORAL DAILY
Qty: 30 CAPSULE | Refills: 2 | Status: SHIPPED | OUTPATIENT
Start: 2025-01-29 | End: 2025-04-29

## 2025-01-29 RX ORDER — HYDRALAZINE HYDROCHLORIDE 100 MG/1
100 TABLET, FILM COATED ORAL EVERY 8 HOURS SCHEDULED
Qty: 270 TABLET | Refills: 3 | Status: SHIPPED | OUTPATIENT
Start: 2025-01-29

## 2025-01-29 RX ORDER — NIFEDIPINE 30 MG/1
30 TABLET, EXTENDED RELEASE ORAL 2 TIMES DAILY
Qty: 180 TABLET | Refills: 3 | Status: SHIPPED | OUTPATIENT
Start: 2025-01-29

## 2025-01-29 ASSESSMENT — ENCOUNTER SYMPTOMS
GASTROINTESTINAL NEGATIVE: 1
RESPIRATORY NEGATIVE: 1
EYES NEGATIVE: 1
SHORTNESS OF BREATH: 0

## 2025-01-29 NOTE — TELEPHONE ENCOUNTER
Last Office Visit  -  1/15/25  Next Office Visit  -  1/29/25    Last Filled  -  11/5/24  Last UDS -    Contract -

## 2025-01-29 NOTE — PATIENT INSTRUCTIONS

## 2025-01-29 NOTE — PROGRESS NOTES
Patient: Jaden Gauthier is a 79 y.o. male who presents today with the following Chief Complaint(s):  Chief Complaint   Patient presents with    2 Week Follow-Up       Assessment/Plan:    Assessment & Plan  1. Hypokalemia and hypomagnesemia.  A re-evaluation of his potassium and magnesium levels will be conducted today to ensure they remain within the desired range.    2. Hypertension.  Prescriptions for nifedipine 30 mg twice daily and hydralazine every 8 hours have been renewed for 90 days. He is advised to continue with these medications as prescribed.    3. Post-surgical knee swelling.  He is advised to monitor the swelling and report any changes. If the swelling persists beyond 5 more months, he will follow up with his surgeon.    4. Health maintenance.  A lipid panel will be obtained today. An annual wellness visit with Dr. Bradley has been scheduled.      1. Hypomagnesemia  - Magnesium; Future    2. Hypokalemia  - Comprehensive Metabolic Panel; Future    3. HTN (hypertension), benign  Stable. Follow up with cardiology as planned.   - NIFEdipine (PROCARDIA XL) 30 MG extended release tablet; Take 1 tablet by mouth in the morning and at bedtime  Dispense: 180 tablet; Refill: 3  - hydrALAZINE (APRESOLINE) 100 MG tablet; Take 1 tablet by mouth every 8 hours  Dispense: 270 tablet; Refill: 3    4. Irregular cardiac rhythm  Follow up with cardiology as planned.   - NIFEdipine (PROCARDIA XL) 30 MG extended release tablet; Take 1 tablet by mouth in the morning and at bedtime  Dispense: 180 tablet; Refill: 3  - hydrALAZINE (APRESOLINE) 100 MG tablet; Take 1 tablet by mouth every 8 hours  Dispense: 270 tablet; Refill: 3    5. Unstable angina (HCC)  Follow up with cardiology as planned.   - NIFEdipine (PROCARDIA XL) 30 MG extended release tablet; Take 1 tablet by mouth in the morning and at bedtime  Dispense: 180 tablet; Refill: 3  - hydrALAZINE (APRESOLINE) 100 MG tablet; Take 1 tablet by mouth every 8 hours  Dispense: 270

## 2025-01-30 LAB
ALBUMIN SERPL-MCNC: 3.9 G/DL (ref 3.4–5)
ALBUMIN/GLOB SERPL: 1.9 {RATIO} (ref 1.1–2.2)
ALP SERPL-CCNC: 88 U/L (ref 40–129)
ALT SERPL-CCNC: 29 U/L (ref 10–40)
ANION GAP SERPL CALCULATED.3IONS-SCNC: 9 MMOL/L (ref 3–16)
AST SERPL-CCNC: 22 U/L (ref 15–37)
BILIRUB SERPL-MCNC: 0.4 MG/DL (ref 0–1)
BUN SERPL-MCNC: 16 MG/DL (ref 7–20)
CALCIUM SERPL-MCNC: 9.8 MG/DL (ref 8.3–10.6)
CHLORIDE SERPL-SCNC: 106 MMOL/L (ref 99–110)
CHOLEST SERPL-MCNC: 134 MG/DL (ref 0–199)
CO2 SERPL-SCNC: 27 MMOL/L (ref 21–32)
CREAT SERPL-MCNC: 1 MG/DL (ref 0.8–1.3)
GFR SERPLBLD CREATININE-BSD FMLA CKD-EPI: 76 ML/MIN/{1.73_M2}
GLUCOSE SERPL-MCNC: 93 MG/DL (ref 70–99)
HDLC SERPL-MCNC: 63 MG/DL (ref 40–60)
LDLC SERPL CALC-MCNC: 57 MG/DL
MAGNESIUM SERPL-MCNC: 1.89 MG/DL (ref 1.8–2.4)
POTASSIUM SERPL-SCNC: 3.8 MMOL/L (ref 3.5–5.1)
PROT SERPL-MCNC: 6 G/DL (ref 6.4–8.2)
SODIUM SERPL-SCNC: 142 MMOL/L (ref 136–145)
TRIGL SERPL-MCNC: 70 MG/DL (ref 0–150)
VLDLC SERPL CALC-MCNC: 14 MG/DL

## 2025-01-31 DIAGNOSIS — I10 HTN (HYPERTENSION), BENIGN: ICD-10-CM

## 2025-01-31 DIAGNOSIS — I49.9 IRREGULAR CARDIAC RHYTHM: ICD-10-CM

## 2025-01-31 DIAGNOSIS — I20.0 UNSTABLE ANGINA (HCC): ICD-10-CM

## 2025-01-31 RX ORDER — NIFEDIPINE 30 MG/1
30 TABLET, EXTENDED RELEASE ORAL 2 TIMES DAILY
Qty: 180 TABLET | Refills: 3 | Status: CANCELLED | OUTPATIENT
Start: 2025-01-31

## 2025-01-31 RX ORDER — HYDRALAZINE HYDROCHLORIDE 100 MG/1
100 TABLET, FILM COATED ORAL EVERY 8 HOURS SCHEDULED
Qty: 270 TABLET | Refills: 3 | Status: CANCELLED | OUTPATIENT
Start: 2025-01-31

## 2025-02-03 ENCOUNTER — PATIENT MESSAGE (OUTPATIENT)
Dept: FAMILY MEDICINE CLINIC | Age: 80
End: 2025-02-03

## 2025-02-04 ENCOUNTER — OFFICE VISIT (OUTPATIENT)
Age: 80
End: 2025-02-04
Payer: MEDICARE

## 2025-02-04 VITALS
SYSTOLIC BLOOD PRESSURE: 164 MMHG | WEIGHT: 226.4 LBS | OXYGEN SATURATION: 97 % | DIASTOLIC BLOOD PRESSURE: 80 MMHG | BODY MASS INDEX: 29.05 KG/M2 | HEART RATE: 61 BPM | HEIGHT: 74 IN

## 2025-02-04 DIAGNOSIS — R93.1 ABNORMAL FINDINGS ON DIAGNOSTIC IMAGING OF HEART AND CORONARY CIRCULATION: ICD-10-CM

## 2025-02-04 DIAGNOSIS — I25.10 CAD IN NATIVE ARTERY: ICD-10-CM

## 2025-02-04 DIAGNOSIS — I10 HTN (HYPERTENSION), BENIGN: ICD-10-CM

## 2025-02-04 DIAGNOSIS — I70.1 RENAL ARTERY STENOSIS (HCC): ICD-10-CM

## 2025-02-04 DIAGNOSIS — I10 UNCONTROLLED HYPERTENSION: Primary | ICD-10-CM

## 2025-02-04 DIAGNOSIS — I25.119 CORONARY ARTERY DISEASE WITH ANGINA PECTORIS, UNSPECIFIED VESSEL OR LESION TYPE, UNSPECIFIED WHETHER NATIVE OR TRANSPLANTED HEART (HCC): ICD-10-CM

## 2025-02-04 DIAGNOSIS — C61 PROSTATE CANCER (HCC): ICD-10-CM

## 2025-02-04 DIAGNOSIS — I73.9 PAD (PERIPHERAL ARTERY DISEASE) (HCC): ICD-10-CM

## 2025-02-04 DIAGNOSIS — I20.0 UNSTABLE ANGINA (HCC): ICD-10-CM

## 2025-02-04 PROCEDURE — G2211 COMPLEX E/M VISIT ADD ON: HCPCS | Performed by: INTERNAL MEDICINE

## 2025-02-04 PROCEDURE — 1111F DSCHRG MED/CURRENT MED MERGE: CPT | Performed by: INTERNAL MEDICINE

## 2025-02-04 PROCEDURE — 1159F MED LIST DOCD IN RCRD: CPT | Performed by: INTERNAL MEDICINE

## 2025-02-04 PROCEDURE — 1123F ACP DISCUSS/DSCN MKR DOCD: CPT | Performed by: INTERNAL MEDICINE

## 2025-02-04 PROCEDURE — G8417 CALC BMI ABV UP PARAM F/U: HCPCS | Performed by: INTERNAL MEDICINE

## 2025-02-04 PROCEDURE — 3077F SYST BP >= 140 MM HG: CPT | Performed by: INTERNAL MEDICINE

## 2025-02-04 PROCEDURE — 99214 OFFICE O/P EST MOD 30 MIN: CPT | Performed by: INTERNAL MEDICINE

## 2025-02-04 PROCEDURE — G8427 DOCREV CUR MEDS BY ELIG CLIN: HCPCS | Performed by: INTERNAL MEDICINE

## 2025-02-04 PROCEDURE — 3079F DIAST BP 80-89 MM HG: CPT | Performed by: INTERNAL MEDICINE

## 2025-02-04 PROCEDURE — 1036F TOBACCO NON-USER: CPT | Performed by: INTERNAL MEDICINE

## 2025-02-04 RX ORDER — SODIUM CHLORIDE 9 MG/ML
INJECTION, SOLUTION INTRAVENOUS PRN
OUTPATIENT
Start: 2025-02-04

## 2025-02-04 RX ORDER — NITROGLYCERIN 0.4 MG/1
0.4 TABLET SUBLINGUAL
OUTPATIENT
Start: 2025-02-04 | End: 2025-02-05

## 2025-02-04 RX ORDER — METOPROLOL TARTRATE 1 MG/ML
5 INJECTION, SOLUTION INTRAVENOUS EVERY 5 MIN PRN
OUTPATIENT
Start: 2025-02-04

## 2025-02-04 RX ORDER — NITROGLYCERIN 0.4 MG/1
0.8 TABLET SUBLINGUAL
OUTPATIENT
Start: 2025-02-04 | End: 2025-02-05

## 2025-02-04 RX ORDER — METOPROLOL SUCCINATE 25 MG/1
25 TABLET, EXTENDED RELEASE ORAL 2 TIMES DAILY
Qty: 60 TABLET | Refills: 3 | Status: SHIPPED | OUTPATIENT
Start: 2025-02-04

## 2025-02-04 RX ORDER — SODIUM CHLORIDE 0.9 % (FLUSH) 0.9 %
5-40 SYRINGE (ML) INJECTION EVERY 12 HOURS SCHEDULED
OUTPATIENT
Start: 2025-02-04

## 2025-02-04 RX ORDER — METOPROLOL TARTRATE 50 MG
TABLET ORAL
Qty: 3 TABLET | Refills: 0 | Status: SHIPPED | OUTPATIENT
Start: 2025-02-04

## 2025-02-04 RX ORDER — SODIUM CHLORIDE 0.9 % (FLUSH) 0.9 %
5-40 SYRINGE (ML) INJECTION PRN
OUTPATIENT
Start: 2025-02-04

## 2025-02-04 NOTE — PATIENT INSTRUCTIONS
Plan:  Start metoprolol succinate (Toprol XL) 25 mg in the morning and evening.   Monitor your blood pressure and heart rate between 12 pm -2 pm for the next 2 weeks to see how this medication works for you.  Order Cardiac CTA which is a noninvasive test to visualize your coronary arteries.   Lopressor 50 mg (Metoprolol tartrate) was sent to your pharmacy, this medication is used to lower your heart rate to get adequate pictures of your heart.   Two hours before your cardiac CTA check your heart rate.If your heart rate is less than 55 bpm do not take any tablets. If your heart rate is 55-70 bpm take 1 tablet. If your heart rate is 71-80 bpm take 2 tablets. If your heart rate is greater than 80 bpm take 3 tablets.   You should be NPO (nothing to eat or drink) 3-4 hours prior to test.    Take all other regularly scheduled medications as normal.    Discussed referral to vascular for renal artery stenosis, we understand you do not wish to proceed with this.   Schedule BP/HR visit in 2 weeks.   Follow up with me in 2 months. Call [phone] to schedule.     Your provider has ordered testing for further evaluation.  An order/prescription has been included in your paper work.   To schedule outpatient testing, contact [facility] by [facility] ([phone]).

## 2025-02-04 NOTE — PROGRESS NOTES
168/89, with left ventricular pressure of 160/7.  No  gradient across pullback and wall motion was normal.  At the beginning of the  procedure, blood pressure was 126/73.    The vessels distally taper to smaller vessels, and there is probably  microvascular and endothelial dysfunction.    CONCLUSIONS:  No critical obstructive coronary artery disease, with preserved  left ventricular function.    RECOMMENDATIONS:  At this point, _____ evaluation for evaluation of his ulcer  disease, and then also possibly microvascular and endovascular dysfunction.    Additional studies:   Renal duplex 1/16/25    Right renal vein is patent and demonstrates normal phasicity.    Left renal vein is patent and demonstrates normal phasicity.    There is <60% stenosis demonstrated in the bilateral renal arteries.    Carotid doppler 1/25/24  Summary   The right internal carotid artery reveals a <50% diameter reducing stenosis, post endarterectomy.   The right external carotid artery reveals a >50% diameter reducing stenosis by velocity criteria.   The left internal carotid artery reveals a <50% diameter reducing stenosis.   The bilateral vertebral arteries demonstrate normal antegrade flow.    Holter monitor 5/27/20  This 48 hour test was scanned by Adrianna Dhaliwal 5/26/2020 10:05.Tech comments:1. The rhythm was sinus. Average DC interval 0.20 average QRS duration 0.08.Average daily heart rate 58 ranging from 45 to 99 bpm. Bradycardia for 66%test duration.2. Occasional premature supraventricular ectopic beats total 296 consistingof 234 isolated PACs, 24 atrial pairs and 3 atrial runs. The longest and fastest run was 8 beats HR - 150 bpm at 12:29:15 5/23.3. Rare premature ventricular ectopic beats consisting of 65 isolated PVCs.4. There were 2 patient events recorded with no ectopy noted     Impression and Plan:      Uncontrolled HTN  Precordial chest pain  Abnormal EKG  Nonobstructive CAD ([facility] 2014)  PAD -- R external carotid stenosis  Renal

## 2025-02-17 ENCOUNTER — LAB (OUTPATIENT)
Dept: CARDIOLOGY CLINIC | Age: 80
End: 2025-02-17

## 2025-02-17 ENCOUNTER — TELEPHONE (OUTPATIENT)
Dept: CARDIOLOGY CLINIC | Age: 80
End: 2025-02-17

## 2025-02-17 VITALS — DIASTOLIC BLOOD PRESSURE: 66 MMHG | HEART RATE: 45 BPM | SYSTOLIC BLOOD PRESSURE: 142 MMHG | OXYGEN SATURATION: 98 %

## 2025-02-17 NOTE — TELEPHONE ENCOUNTER
Pt in for vitals check with COURTNEY ALMAZAN. Per NORAH pt to come in to [facility] OV 2/18/2025 at 10:00. Pt to hold metoprolol until NORAH OV. Pt informed by COURTNEY

## 2025-02-18 ENCOUNTER — OFFICE VISIT (OUTPATIENT)
Age: 80
End: 2025-02-18
Payer: MEDICARE

## 2025-02-18 ENCOUNTER — TELEPHONE (OUTPATIENT)
Age: 80
End: 2025-02-18

## 2025-02-18 VITALS
HEIGHT: 74 IN | OXYGEN SATURATION: 98 % | SYSTOLIC BLOOD PRESSURE: 150 MMHG | WEIGHT: 225.8 LBS | HEART RATE: 49 BPM | DIASTOLIC BLOOD PRESSURE: 70 MMHG | BODY MASS INDEX: 28.98 KG/M2

## 2025-02-18 DIAGNOSIS — I10 UNCONTROLLED HYPERTENSION: ICD-10-CM

## 2025-02-18 DIAGNOSIS — I10 HTN (HYPERTENSION), BENIGN: ICD-10-CM

## 2025-02-18 DIAGNOSIS — I20.0 UNSTABLE ANGINA (HCC): ICD-10-CM

## 2025-02-18 DIAGNOSIS — I49.9 IRREGULAR CARDIAC RHYTHM: ICD-10-CM

## 2025-02-18 DIAGNOSIS — R00.1 BRADYCARDIA: Primary | ICD-10-CM

## 2025-02-18 DIAGNOSIS — I25.10 CAD IN NATIVE ARTERY: ICD-10-CM

## 2025-02-18 DIAGNOSIS — R42 DIZZINESS: ICD-10-CM

## 2025-02-18 PROCEDURE — G8427 DOCREV CUR MEDS BY ELIG CLIN: HCPCS | Performed by: INTERNAL MEDICINE

## 2025-02-18 PROCEDURE — 1159F MED LIST DOCD IN RCRD: CPT | Performed by: INTERNAL MEDICINE

## 2025-02-18 PROCEDURE — 1036F TOBACCO NON-USER: CPT | Performed by: INTERNAL MEDICINE

## 2025-02-18 PROCEDURE — G2211 COMPLEX E/M VISIT ADD ON: HCPCS | Performed by: INTERNAL MEDICINE

## 2025-02-18 PROCEDURE — 3078F DIAST BP <80 MM HG: CPT | Performed by: INTERNAL MEDICINE

## 2025-02-18 PROCEDURE — 1123F ACP DISCUSS/DSCN MKR DOCD: CPT | Performed by: INTERNAL MEDICINE

## 2025-02-18 PROCEDURE — 99214 OFFICE O/P EST MOD 30 MIN: CPT | Performed by: INTERNAL MEDICINE

## 2025-02-18 PROCEDURE — G8417 CALC BMI ABV UP PARAM F/U: HCPCS | Performed by: INTERNAL MEDICINE

## 2025-02-18 PROCEDURE — 3077F SYST BP >= 140 MM HG: CPT | Performed by: INTERNAL MEDICINE

## 2025-02-18 RX ORDER — VALSARTAN 160 MG/1
160 TABLET ORAL 2 TIMES DAILY
Qty: 60 TABLET | Refills: 3 | Status: SHIPPED | OUTPATIENT
Start: 2025-02-18

## 2025-02-18 RX ORDER — NIFEDIPINE 30 MG/1
30 TABLET, EXTENDED RELEASE ORAL DAILY
Qty: 30 TABLET | Refills: 1
Start: 2025-02-18

## 2025-02-18 NOTE — PATIENT INSTRUCTIONS
Plan:  Change nifedipine ( Procardia XL) 30 mg daily ~ bradycardia   Discontinue metoprolol ~ bradycardia   Start Valsartan 160 mg twice a day   Continue hydralazine (Apresoline) 100 mg every 8 hours  Recommend monitoring blood pressure and heart rate at home daily. Keep a log of blood pressure and heart rate. Goal 130/80 or less. Heart rate goal above 50.  Call office for further intervention if consistently elevated above goal.    Great job on not smoking. Continue to avoid as this is risk factor for heart attack, stroke, and/or cancer.    Follow up in 4 weeks

## 2025-02-18 NOTE — PROGRESS NOTES
participate in the care of Jaden Gauthier. Please call me with any questions [phone].    Jos Capellan MD, [facility]  Cardiovascular Disease  [facility]  [phone] [place] Office  [phone] [place] Office  2/18/2025 10:07 AM

## 2025-02-24 RX ORDER — ATORVASTATIN CALCIUM 80 MG/1
80 TABLET, FILM COATED ORAL DAILY
Qty: 90 TABLET | Refills: 1 | Status: SHIPPED | OUTPATIENT
Start: 2025-02-24

## 2025-03-12 ENCOUNTER — TELEPHONE (OUTPATIENT)
Dept: INTERVENTIONAL RADIOLOGY/VASCULAR | Age: 80
End: 2025-03-12

## 2025-03-12 NOTE — TELEPHONE ENCOUNTER
Called and spoke to Jaden about upcoming procedure. Phone number used: [phone]  Procedure: CCTA  Approving Radiologist: N/A    Pt informed of the following:  Date of procedure: 3/13/2025  Arrival time of procedure: 1230  Time of procedure: 1330

## 2025-03-13 ENCOUNTER — HOSPITAL ENCOUNTER (OUTPATIENT)
Dept: CT IMAGING | Age: 80
Discharge: HOME | End: 2025-03-13
Attending: INTERNAL MEDICINE
Payer: MEDICARE

## 2025-03-13 VITALS — DIASTOLIC BLOOD PRESSURE: 66 MMHG | HEART RATE: 64 BPM | OXYGEN SATURATION: 97 % | SYSTOLIC BLOOD PRESSURE: 165 MMHG

## 2025-03-13 DIAGNOSIS — I20.0 UNSTABLE ANGINA (HCC): ICD-10-CM

## 2025-03-13 DIAGNOSIS — I25.119 CORONARY ARTERY DISEASE WITH ANGINA PECTORIS, UNSPECIFIED VESSEL OR LESION TYPE, UNSPECIFIED WHETHER NATIVE OR TRANSPLANTED HEART: ICD-10-CM

## 2025-03-13 DIAGNOSIS — R93.1 ABNORMAL FINDINGS ON DIAGNOSTIC IMAGING OF HEART AND CORONARY CIRCULATION: ICD-10-CM

## 2025-03-13 PROCEDURE — 75580 N-INVAS EST C FFR SW ALY CTA: CPT

## 2025-03-13 PROCEDURE — 6360000004 HC RX CONTRAST MEDICATION: Performed by: INTERNAL MEDICINE

## 2025-03-13 PROCEDURE — 6370000000 HC RX 637 (ALT 250 FOR IP): Performed by: INTERNAL MEDICINE

## 2025-03-13 PROCEDURE — 75574 CT ANGIO HRT W/3D IMAGE: CPT

## 2025-03-13 RX ORDER — SODIUM CHLORIDE 0.9 % (FLUSH) 0.9 %
5-40 SYRINGE (ML) INJECTION PRN
Status: DISCONTINUED | OUTPATIENT
Start: 2025-03-13 | End: 2025-03-14 | Stop reason: HOSPADM

## 2025-03-13 RX ORDER — SODIUM CHLORIDE 9 MG/ML
INJECTION, SOLUTION INTRAVENOUS PRN
Status: DISCONTINUED | OUTPATIENT
Start: 2025-03-13 | End: 2025-03-14 | Stop reason: HOSPADM

## 2025-03-13 RX ORDER — METOPROLOL TARTRATE 1 MG/ML
5 INJECTION, SOLUTION INTRAVENOUS EVERY 5 MIN PRN
Status: DISCONTINUED | OUTPATIENT
Start: 2025-03-13 | End: 2025-03-14 | Stop reason: HOSPADM

## 2025-03-13 RX ORDER — ENZALUTAMIDE 40 MG/1
160 TABLET ORAL
COMMUNITY

## 2025-03-13 RX ORDER — NITROGLYCERIN 0.4 MG/1
0.4 TABLET SUBLINGUAL
Status: COMPLETED | OUTPATIENT
Start: 2025-03-13 | End: 2025-03-13

## 2025-03-13 RX ORDER — NITROGLYCERIN 0.4 MG/1
0.8 TABLET SUBLINGUAL
Status: COMPLETED | OUTPATIENT
Start: 2025-03-13 | End: 2025-03-13

## 2025-03-13 RX ORDER — IOPAMIDOL 755 MG/ML
100 INJECTION, SOLUTION INTRAVASCULAR
Status: COMPLETED | OUTPATIENT
Start: 2025-03-13 | End: 2025-03-13

## 2025-03-13 RX ORDER — SODIUM CHLORIDE 0.9 % (FLUSH) 0.9 %
5-40 SYRINGE (ML) INJECTION EVERY 12 HOURS SCHEDULED
Status: DISCONTINUED | OUTPATIENT
Start: 2025-03-13 | End: 2025-03-14 | Stop reason: HOSPADM

## 2025-03-13 RX ADMIN — IOPAMIDOL 100 ML: 755 INJECTION, SOLUTION INTRAVENOUS at 13:26

## 2025-03-13 RX ADMIN — NITROGLYCERIN 0.8 MG: 0.4 TABLET SUBLINGUAL at 13:13

## 2025-03-13 NOTE — PROGRESS NOTES
Patient arrived for Coronary CTA.     History and medications were reviewed with patient. Patient denies questions or concerns at this time.     Sulfa antibiotics, Amlodipine, and Lisinopril    Past Medical History:   Diagnosis Date    2021     Acute renal failure (ARF) 04/12/2022    Acute renal failure superimposed on stage 3 chronic kidney disease (HCC) 04/12/2022    Anxiety 2010    Awareness under anesthesia     BPH (benign prostatic hyperplasia)     Bradycardia     Carotid artery occlusion without infarction, unspecified laterality 05/30/2021    ED (erectile dysfunction)     GERD (gastroesophageal reflux disease) 1992    Hx of blood clots     Hypercholesteremia     Hypertension     Low back pain 07/07/2014    Major depressive disorder, recurrent, mild 05/05/2023    Osteoarthritis 2010    PAD (peripheral artery disease) 2/4/2025    Strabismus     TIA (transient ischemic attack) 04/12/2022    Wears glasses        Prior to Admission medications    Medication Sig Start Date End Date Taking? Authorizing Provider   Enzalutamide (XTANDI) 40 MG TABS Take 160 mg by mouth   Yes Aisha Plummer MD   atorvastatin (LIPITOR) 80 MG tablet TAKE 1 TABLET BY MOUTH DAILY 2/24/25   Quinn Bradley MD   valsartan (DIOVAN) 160 MG tablet Take 1 tablet by mouth 2 times daily 2/18/25   Jos Capellan MD   NIFEdipine (PROCARDIA XL) 30 MG extended release tablet Take 1 tablet by mouth daily 2/18/25   Jos Capellan MD   Esomeprazole Magnesium (NEXIUM 24HR PO) Take by mouth Pt states he takes 40 mg    Aisha Plummer MD   FLUoxetine (PROZAC) 40 MG capsule TAKE 1 CAPSULE BY MOUTH DAILY 1/29/25 4/29/25  Ernestine Fagan APRN - CNP   hydrALAZINE (APRESOLINE) 100 MG tablet Take 1 tablet by mouth every 8 hours 1/29/25   Rachel Jung APRN - CNP   clopidogrel (PLAVIX) 75 MG tablet Take 1 tablet by mouth daily 1/18/25   Patt Layton MD   isosorbide mononitrate (IMDUR) 60 MG extended release tablet Take 1 tablet

## 2025-03-14 ENCOUNTER — RESULTS FOLLOW-UP (OUTPATIENT)
Dept: CT IMAGING | Age: 80
End: 2025-03-14

## 2025-03-18 SDOH — HEALTH STABILITY: PHYSICAL HEALTH: ON AVERAGE, HOW MANY MINUTES DO YOU ENGAGE IN EXERCISE AT THIS LEVEL?: 30 MIN

## 2025-03-18 SDOH — HEALTH STABILITY: PHYSICAL HEALTH: ON AVERAGE, HOW MANY DAYS PER WEEK DO YOU ENGAGE IN MODERATE TO STRENUOUS EXERCISE (LIKE A BRISK WALK)?: 4 DAYS

## 2025-03-18 ASSESSMENT — LIFESTYLE VARIABLES
HOW OFTEN DURING THE LAST YEAR HAVE YOU BEEN UNABLE TO REMEMBER WHAT HAPPENED THE NIGHT BEFORE BECAUSE YOU HAD BEEN DRINKING: NEVER
HOW OFTEN DURING THE LAST YEAR HAVE YOU HAD A FEELING OF GUILT OR REMORSE AFTER DRINKING: NEVER
HOW OFTEN DO YOU HAVE SIX OR MORE DRINKS ON ONE OCCASION: 2
HAVE YOU OR SOMEONE ELSE BEEN INJURED AS A RESULT OF YOUR DRINKING: NO
HOW OFTEN DURING THE LAST YEAR HAVE YOU FOUND THAT YOU WERE NOT ABLE TO STOP DRINKING ONCE YOU HAD STARTED: NEVER
HAVE YOU OR SOMEONE ELSE BEEN INJURED AS A RESULT OF YOUR DRINKING: NO
HOW MANY STANDARD DRINKS CONTAINING ALCOHOL DO YOU HAVE ON A TYPICAL DAY: 1
HOW OFTEN DURING THE LAST YEAR HAVE YOU HAD A FEELING OF GUILT OR REMORSE AFTER DRINKING: NEVER
HAS A RELATIVE, FRIEND, DOCTOR, OR ANOTHER HEALTH PROFESSIONAL EXPRESSED CONCERN ABOUT YOUR DRINKING OR SUGGESTED YOU CUT DOWN: NO
HOW OFTEN DURING THE LAST YEAR HAVE YOU FOUND THAT YOU WERE NOT ABLE TO STOP DRINKING ONCE YOU HAD STARTED: NEVER
HAS A RELATIVE, FRIEND, DOCTOR, OR ANOTHER HEALTH PROFESSIONAL EXPRESSED CONCERN ABOUT YOUR DRINKING OR SUGGESTED YOU CUT DOWN: NO
HOW OFTEN DURING THE LAST YEAR HAVE YOU NEEDED AN ALCOHOLIC DRINK FIRST THING IN THE MORNING TO GET YOURSELF GOING AFTER A NIGHT OF HEAVY DRINKING: NEVER
HOW OFTEN DO YOU HAVE A DRINK CONTAINING ALCOHOL: 3
HOW OFTEN DURING THE LAST YEAR HAVE YOU FAILED TO DO WHAT WAS NORMALLY EXPECTED FROM YOU BECAUSE OF DRINKING: NEVER
HOW OFTEN DO YOU HAVE A DRINK CONTAINING ALCOHOL: 2-4 TIMES A MONTH
HOW OFTEN DURING THE LAST YEAR HAVE YOU BEEN UNABLE TO REMEMBER WHAT HAPPENED THE NIGHT BEFORE BECAUSE YOU HAD BEEN DRINKING: NEVER
HOW OFTEN DURING THE LAST YEAR HAVE YOU FAILED TO DO WHAT WAS NORMALLY EXPECTED FROM YOU BECAUSE OF DRINKING: NEVER
HOW MANY STANDARD DRINKS CONTAINING ALCOHOL DO YOU HAVE ON A TYPICAL DAY: 1 OR 2
HOW OFTEN DURING THE LAST YEAR HAVE YOU NEEDED AN ALCOHOLIC DRINK FIRST THING IN THE MORNING TO GET YOURSELF GOING AFTER A NIGHT OF HEAVY DRINKING: NEVER

## 2025-03-18 ASSESSMENT — PATIENT HEALTH QUESTIONNAIRE - PHQ9
SUM OF ALL RESPONSES TO PHQ QUESTIONS 1-9: 2
2. FEELING DOWN, DEPRESSED OR HOPELESS: SEVERAL DAYS
1. LITTLE INTEREST OR PLEASURE IN DOING THINGS: SEVERAL DAYS

## 2025-03-19 ENCOUNTER — OFFICE VISIT (OUTPATIENT)
Dept: CARDIOLOGY CLINIC | Age: 80
End: 2025-03-19

## 2025-03-19 ENCOUNTER — OFFICE VISIT (OUTPATIENT)
Dept: FAMILY MEDICINE CLINIC | Age: 80
End: 2025-03-19
Payer: MEDICARE

## 2025-03-19 VITALS
BODY MASS INDEX: 28.98 KG/M2 | HEART RATE: 55 BPM | WEIGHT: 225.8 LBS | DIASTOLIC BLOOD PRESSURE: 68 MMHG | HEIGHT: 74 IN | SYSTOLIC BLOOD PRESSURE: 130 MMHG | OXYGEN SATURATION: 98 %

## 2025-03-19 VITALS
HEART RATE: 55 BPM | SYSTOLIC BLOOD PRESSURE: 132 MMHG | HEIGHT: 74 IN | DIASTOLIC BLOOD PRESSURE: 76 MMHG | BODY MASS INDEX: 29.07 KG/M2 | WEIGHT: 226.5 LBS | OXYGEN SATURATION: 99 %

## 2025-03-19 DIAGNOSIS — I20.0 UNSTABLE ANGINA (HCC): ICD-10-CM

## 2025-03-19 DIAGNOSIS — I10 HTN (HYPERTENSION), BENIGN: ICD-10-CM

## 2025-03-19 DIAGNOSIS — R00.1 BRADYCARDIA: ICD-10-CM

## 2025-03-19 DIAGNOSIS — I73.9 PAD (PERIPHERAL ARTERY DISEASE): ICD-10-CM

## 2025-03-19 DIAGNOSIS — I70.1 RENAL ARTERY STENOSIS: ICD-10-CM

## 2025-03-19 DIAGNOSIS — C61 PROSTATE CANCER (HCC): ICD-10-CM

## 2025-03-19 DIAGNOSIS — Z00.00 MEDICARE ANNUAL WELLNESS VISIT, SUBSEQUENT: Primary | ICD-10-CM

## 2025-03-19 DIAGNOSIS — E87.6 HYPOKALEMIA: ICD-10-CM

## 2025-03-19 DIAGNOSIS — I25.83 CORONARY ARTERY DISEASE DUE TO LIPID RICH PLAQUE: ICD-10-CM

## 2025-03-19 DIAGNOSIS — I25.10 CORONARY ARTERY DISEASE DUE TO LIPID RICH PLAQUE: ICD-10-CM

## 2025-03-19 DIAGNOSIS — I49.9 IRREGULAR CARDIAC RHYTHM: ICD-10-CM

## 2025-03-19 DIAGNOSIS — R93.1 ABNORMAL COMPUTED TOMOGRAPHY ANGIOGRAPHY OF HEART: Primary | ICD-10-CM

## 2025-03-19 DIAGNOSIS — I25.10 CAD IN NATIVE ARTERY: ICD-10-CM

## 2025-03-19 LAB
ANION GAP SERPL CALCULATED.3IONS-SCNC: 10 MMOL/L (ref 3–16)
BUN SERPL-MCNC: 17 MG/DL (ref 7–20)
CALCIUM SERPL-MCNC: 10.2 MG/DL (ref 8.3–10.6)
CHLORIDE SERPL-SCNC: 100 MMOL/L (ref 99–110)
CO2 SERPL-SCNC: 29 MMOL/L (ref 21–32)
CREAT SERPL-MCNC: 1 MG/DL (ref 0.8–1.3)
GFR SERPLBLD CREATININE-BSD FMLA CKD-EPI: 76 ML/MIN/{1.73_M2}
GLUCOSE SERPL-MCNC: 93 MG/DL (ref 70–99)
POTASSIUM SERPL-SCNC: 5.2 MMOL/L (ref 3.5–5.1)
SODIUM SERPL-SCNC: 139 MMOL/L (ref 136–145)

## 2025-03-19 PROCEDURE — 1123F ACP DISCUSS/DSCN MKR DOCD: CPT | Performed by: FAMILY MEDICINE

## 2025-03-19 PROCEDURE — 3078F DIAST BP <80 MM HG: CPT | Performed by: FAMILY MEDICINE

## 2025-03-19 PROCEDURE — 3075F SYST BP GE 130 - 139MM HG: CPT | Performed by: FAMILY MEDICINE

## 2025-03-19 PROCEDURE — G0439 PPPS, SUBSEQ VISIT: HCPCS | Performed by: FAMILY MEDICINE

## 2025-03-19 RX ORDER — CHOLECALCIFEROL (VITAMIN D3) 1250 MCG
CAPSULE ORAL
COMMUNITY

## 2025-03-19 RX ORDER — CLOPIDOGREL BISULFATE 75 MG/1
75 TABLET ORAL DAILY
Qty: 30 TABLET | Refills: 3 | Status: SHIPPED | OUTPATIENT
Start: 2025-03-19

## 2025-03-19 RX ORDER — CALCIUM CARBONATE 500(1250)
500 TABLET ORAL DAILY
COMMUNITY

## 2025-03-19 RX ORDER — NIFEDIPINE 30 MG/1
30 TABLET, EXTENDED RELEASE ORAL DAILY
Qty: 90 TABLET | Refills: 1 | Status: CANCELLED | OUTPATIENT
Start: 2025-03-19

## 2025-03-19 RX ORDER — VALSARTAN 160 MG/1
160 TABLET ORAL 2 TIMES DAILY
Qty: 180 TABLET | Refills: 3 | Status: CANCELLED | OUTPATIENT
Start: 2025-03-19

## 2025-03-19 RX ORDER — ISOSORBIDE MONONITRATE 60 MG/1
60 TABLET, EXTENDED RELEASE ORAL DAILY
Qty: 30 TABLET | Refills: 3 | Status: SHIPPED | OUTPATIENT
Start: 2025-03-19

## 2025-03-19 ASSESSMENT — PATIENT HEALTH QUESTIONNAIRE - PHQ9
SUM OF ALL RESPONSES TO PHQ QUESTIONS 1-9: 7
3. TROUBLE FALLING OR STAYING ASLEEP: SEVERAL DAYS
7. TROUBLE CONCENTRATING ON THINGS, SUCH AS READING THE NEWSPAPER OR WATCHING TELEVISION: NOT AT ALL
6. FEELING BAD ABOUT YOURSELF - OR THAT YOU ARE A FAILURE OR HAVE LET YOURSELF OR YOUR FAMILY DOWN: NOT AT ALL
2. FEELING DOWN, DEPRESSED OR HOPELESS: SEVERAL DAYS
9. THOUGHTS THAT YOU WOULD BE BETTER OFF DEAD, OR OF HURTING YOURSELF: NOT AT ALL
10. IF YOU CHECKED OFF ANY PROBLEMS, HOW DIFFICULT HAVE THESE PROBLEMS MADE IT FOR YOU TO DO YOUR WORK, TAKE CARE OF THINGS AT HOME, OR GET ALONG WITH OTHER PEOPLE: NOT DIFFICULT AT ALL
5. POOR APPETITE OR OVEREATING: NOT AT ALL
SUM OF ALL RESPONSES TO PHQ QUESTIONS 1-9: 7
4. FEELING TIRED OR HAVING LITTLE ENERGY: SEVERAL DAYS
SUM OF ALL RESPONSES TO PHQ QUESTIONS 1-9: 7
1. LITTLE INTEREST OR PLEASURE IN DOING THINGS: SEVERAL DAYS
8. MOVING OR SPEAKING SO SLOWLY THAT OTHER PEOPLE COULD HAVE NOTICED. OR THE OPPOSITE, BEING SO FIGETY OR RESTLESS THAT YOU HAVE BEEN MOVING AROUND A LOT MORE THAN USUAL: NEARLY EVERY DAY
SUM OF ALL RESPONSES TO PHQ QUESTIONS 1-9: 7

## 2025-03-19 ASSESSMENT — ANXIETY QUESTIONNAIRES
2. NOT BEING ABLE TO STOP OR CONTROL WORRYING: NOT AT ALL
3. WORRYING TOO MUCH ABOUT DIFFERENT THINGS: NOT AT ALL
6. BECOMING EASILY ANNOYED OR IRRITABLE: NOT AT ALL
7. FEELING AFRAID AS IF SOMETHING AWFUL MIGHT HAPPEN: NOT AT ALL
1. FEELING NERVOUS, ANXIOUS, OR ON EDGE: NOT AT ALL
IF YOU CHECKED OFF ANY PROBLEMS ON THIS QUESTIONNAIRE, HOW DIFFICULT HAVE THESE PROBLEMS MADE IT FOR YOU TO DO YOUR WORK, TAKE CARE OF THINGS AT HOME, OR GET ALONG WITH OTHER PEOPLE: NOT DIFFICULT AT ALL
5. BEING SO RESTLESS THAT IT IS HARD TO SIT STILL: SEVERAL DAYS
GAD7 TOTAL SCORE: 2
4. TROUBLE RELAXING: SEVERAL DAYS

## 2025-03-19 NOTE — PATIENT INSTRUCTIONS
Plan:  Recommend left heart catheterization to further evaluate your heart for blockages.  Gabrielle our  will call you to get on the schedule.   NPO (nothing to eat or drink) after midnight the night before your procedure. A sip of water with your medications is okay.  Hold vitamins/supplements and over the counter medications the morning of procedure.   Pack an overnight bag and have a  with you.   Avoid lotions, oils and creams 1 day prior to your procedure.    Follow up with me in 6 weeks.

## 2025-03-19 NOTE — PROGRESS NOTES
CARDIOLOGY OFFICE VISIT        Patient Name: Jaden Gauthier  Primary Care physician: Quinn Bradley MD    Reason for Referral/Chief Complaint: Jaden Gauthier is a 79 y.o. patient who is presenting to cardiology clinic today for follow up.     History of Present Illness:   Jaden Gauthier is a 79 y.o. with a past medical history of uncontrolled HTN, h/o CVA, HLP, CKD and BPH. He was recently admitted 1/15/25-1/17/25 for hypertensive emergency and abnormal EKG. He was sent to ER by PCP. Labs in ED showed hypokalemia, elevated BNP and troponin peak of 29. Patient was noted to have SBP>200, so he was placed on nitro gtt and transferred to the ICU. Patient was noted to be on chemo that could cause hypertensive emergency. He was discharged home.  At LOV, 2/4/25, he states he is doing well. He reports he has been eating a high in potassium diet due to chronic low K+ levels while in the hospital. He reports occasional palpitations and mild shortness of breath.  Of note, he reports his SOB is also due to a mechanical obstruction to his nare due to a MOES procedure two years ago to remove a skin cancer. He is undergoing chemo and radiation for his prostate cancer. He has since stopped Zytiga for his cancer treatment, and he is going to start a new chemo medication tomorrow. He reports he had an aortic endarterectomy in June of 2021 but cannot recall all details. Discussed renal artery ultrasound results. Discussed referral to vascular surgeon, and the patient has declined a referral at this time. He is a non smoker. He monitors his blood pressure at home and states it runs 140's/70's at home.    At conclusion of last office visit an cardiac CTA was ordered, but this has not been performed yet. Last office visit, 02/18/25, he presented to re-evaluate his medications due to markedly low heart rate. He stated he had readings as low as 42 BPM at home. He was dizzy at that time and had near syncope. He had held Toprol XL last

## 2025-03-19 NOTE — PROGRESS NOTES
Medicare Annual Wellness Visit    Jaden Gauthier is here for Medicare AWV    Assessment & Plan   Medicare annual wellness visit, subsequent  Coronary artery disease due to lipid rich plaque  -     clopidogrel (PLAVIX) 75 MG tablet; Take 1 tablet by mouth daily, Disp-30 tablet, R-3Normal  -     isosorbide mononitrate (IMDUR) 60 MG extended release tablet; Take 1 tablet by mouth daily, Disp-30 tablet, R-3Normal  Hypokalemia  -     Basic Metabolic Panel; Future     No follow-ups on file.     Subjective   The following acute and/or chronic problems were also addressed today:  Had recently had low potassium.  Needs rechecked    Patient's complete Health Risk Assessment and screening values have been reviewed and are found in Flowsheets. The following problems were reviewed today and where indicated follow up appointments were made and/or referrals ordered.    Positive Risk Factor Screenings with Interventions:    Fall Risk:  Do you feel unsteady or are you worried about falling? : (!) (Patient-Rptd) yes  2 or more falls in past year?: (Patient-Rptd) no  Fall with injury in past year?: (!) (Patient-Rptd) yes     Interventions:    Reviewed medications, home hazards, visual acuity, and co-morbidities that can increase risk for falls       Drug Use:   DAST-10 Score: (Patient-Rptd) 1  Interpretation: 1-2 indicates low level use. Recommendation: monitor and re-assess at a later date  Interventions:  Patient declined any further intervention or treatment          Poor Eating Habits/Diet:  Do you eat balanced/healthy meals regularly?: (!) (Patient-Rptd) No  Interventions:  Patient declines any further evaluation or treatment        Safety:  Do you have any tripping hazards - loose or unsecured carpets or rugs?: (!) (Patient-Rptd) Yes  Do you have non-slip mats or non-slip surfaces or shower bars or grab bars in your shower or bathtub?: (!) (Patient-Rptd) No  Interventions:  Patient declined any further interventions or

## 2025-03-20 ENCOUNTER — TELEPHONE (OUTPATIENT)
Dept: CARDIOLOGY CLINIC | Age: 80
End: 2025-03-20

## 2025-03-20 ENCOUNTER — RESULTS FOLLOW-UP (OUTPATIENT)
Dept: FAMILY MEDICINE CLINIC | Age: 80
End: 2025-03-20

## 2025-03-20 DIAGNOSIS — I77.1 STRICTURE OF ARTERY: ICD-10-CM

## 2025-03-20 DIAGNOSIS — I73.9 PAD (PERIPHERAL ARTERY DISEASE): ICD-10-CM

## 2025-03-20 DIAGNOSIS — I65.29 CAROTID ARTERY OCCLUSION WITHOUT INFARCTION, UNSPECIFIED LATERALITY: ICD-10-CM

## 2025-03-20 DIAGNOSIS — I70.1 RENAL ARTERY STENOSIS: ICD-10-CM

## 2025-03-20 DIAGNOSIS — I25.10 CAD IN NATIVE ARTERY: ICD-10-CM

## 2025-03-20 DIAGNOSIS — R93.1 ABNORMAL COMPUTED TOMOGRAPHY ANGIOGRAPHY OF HEART: Primary | ICD-10-CM

## 2025-03-20 NOTE — TELEPHONE ENCOUNTER
Cardiac Cath orders: C and Renal artery arteriogram   Ordering Provider: NORAH  Performing Provider: Next available  Length of time for procedure:  IV Sedation: Yes  Reps needed:  Specific equip needed:  Medications to hold: Hold vitamins/supplements and over the counter medications the morning of procedure.   Pt aware of meds to hold?: Yes at OV 3/19  Urgent? Next available   (All echos should be completed prior to the scheduled procedure date, preferably [place] or other outreach)

## 2025-03-20 NOTE — TELEPHONE ENCOUNTER
Procedure:  C/Renal  Doctor:  Dr. Trammell (Ekwenibe)  Date:  3/27/25  Time:  9am  Arrival:  7:30am  Reps:  n/a  Anesthesia:  n/a      Spoke with patient. Please have patient arrive to the main entrance of [facility] ([address] [place] [id number]) and check in with the registration desk.  They will be directed to the Cath Lab.  Remind patient to be NPO after midnight (8 hours prior). Do not apply lotions/creams on skin the day of procedure.    NORAH KEENAN.

## 2025-03-26 DIAGNOSIS — F33.1 MAJOR DEPRESSIVE DISORDER, RECURRENT, MODERATE (HCC): ICD-10-CM

## 2025-03-26 RX ORDER — ARIPIPRAZOLE 15 MG/1
15 TABLET ORAL DAILY
Qty: 30 TABLET | Refills: 3 | Status: SHIPPED | OUTPATIENT
Start: 2025-03-26

## 2025-03-26 NOTE — TELEPHONE ENCOUNTER
Last Office Visit  -  03/19/2025  Next Office Visit  -  n/a    Last Filled  -    Last UDS -    Contract -

## 2025-03-27 ENCOUNTER — HOSPITAL ENCOUNTER (OUTPATIENT)
Age: 80
Setting detail: OBSERVATION
Discharge: HOME | End: 2025-03-28
Attending: INTERNAL MEDICINE | Admitting: INTERNAL MEDICINE
Payer: MEDICARE

## 2025-03-27 DIAGNOSIS — I70.1 RENAL ARTERY STENOSIS: ICD-10-CM

## 2025-03-27 DIAGNOSIS — R93.1 ABNORMAL COMPUTED TOMOGRAPHY ANGIOGRAPHY OF HEART: ICD-10-CM

## 2025-03-27 PROBLEM — Z98.890 STATUS POST CARDIAC CATHETERIZATION: Status: ACTIVE | Noted: 2025-03-27

## 2025-03-27 PROBLEM — R09.89 LABILE HYPERTENSION: Status: ACTIVE | Noted: 2025-03-27

## 2025-03-27 LAB
ANION GAP SERPL CALCULATED.3IONS-SCNC: 10 MMOL/L (ref 3–16)
BUN SERPL-MCNC: 25 MG/DL (ref 7–20)
CALCIUM SERPL-MCNC: 9.6 MG/DL (ref 8.3–10.6)
CHLORIDE SERPL-SCNC: 100 MMOL/L (ref 99–110)
CHOLEST SERPL-MCNC: 157 MG/DL (ref 0–199)
CO2 SERPL-SCNC: 26 MMOL/L (ref 21–32)
CREAT SERPL-MCNC: 1 MG/DL (ref 0.8–1.3)
DEPRECATED RDW RBC AUTO: 15.3 % (ref 12.4–15.4)
ECHO BSA: 2.32 M2
ECHO BSA: 2.32 M2
EKG ATRIAL RATE: 51 BPM
EKG DIAGNOSIS: NORMAL
EKG P AXIS: 33 DEGREES
EKG P-R INTERVAL: 174 MS
EKG Q-T INTERVAL: 452 MS
EKG QRS DURATION: 88 MS
EKG QTC CALCULATION (BAZETT): 416 MS
EKG R AXIS: 43 DEGREES
EKG T AXIS: 52 DEGREES
EKG VENTRICULAR RATE: 51 BPM
GFR SERPLBLD CREATININE-BSD FMLA CKD-EPI: 76 ML/MIN/{1.73_M2}
GLUCOSE SERPL-MCNC: 106 MG/DL (ref 70–99)
HCT VFR BLD AUTO: 38.4 % (ref 40.5–52.5)
HDLC SERPL-MCNC: 71 MG/DL (ref 40–60)
HGB BLD-MCNC: 12.9 G/DL (ref 13.5–17.5)
INR PPP: 0.9 (ref 0.85–1.15)
LDLC SERPL CALC-MCNC: 74 MG/DL
MCH RBC QN AUTO: 32.9 PG (ref 26–34)
MCHC RBC AUTO-ENTMCNC: 33.5 G/DL (ref 31–36)
MCV RBC AUTO: 98.3 FL (ref 80–100)
PLATELET # BLD AUTO: 207 K/UL (ref 135–450)
PMV BLD AUTO: 7.9 FL (ref 5–10.5)
POTASSIUM SERPL-SCNC: 4.4 MMOL/L (ref 3.5–5.1)
PROTHROMBIN TIME: 12.3 SEC (ref 11.9–14.9)
RBC # BLD AUTO: 3.91 M/UL (ref 4.2–5.9)
SODIUM SERPL-SCNC: 136 MMOL/L (ref 136–145)
TRIGL SERPL-MCNC: 62 MG/DL (ref 0–150)
VLDLC SERPL CALC-MCNC: 12 MG/DL
WBC # BLD AUTO: 3 K/UL (ref 4–11)

## 2025-03-27 PROCEDURE — 6360000004 HC RX CONTRAST MEDICATION: Performed by: INTERNAL MEDICINE

## 2025-03-27 PROCEDURE — 6370000000 HC RX 637 (ALT 250 FOR IP): Performed by: INTERNAL MEDICINE

## 2025-03-27 PROCEDURE — 2709999900 HC NON-CHARGEABLE SUPPLY: Performed by: INTERNAL MEDICINE

## 2025-03-27 PROCEDURE — 85027 COMPLETE CBC AUTOMATED: CPT

## 2025-03-27 PROCEDURE — C1887 CATHETER, GUIDING: HCPCS | Performed by: INTERNAL MEDICINE

## 2025-03-27 PROCEDURE — 80061 LIPID PANEL: CPT

## 2025-03-27 PROCEDURE — C1894 INTRO/SHEATH, NON-LASER: HCPCS | Performed by: INTERNAL MEDICINE

## 2025-03-27 PROCEDURE — 93010 ELECTROCARDIOGRAM REPORT: CPT | Performed by: INTERNAL MEDICINE

## 2025-03-27 PROCEDURE — G0378 HOSPITAL OBSERVATION PER HR: HCPCS

## 2025-03-27 PROCEDURE — C1769 GUIDE WIRE: HCPCS | Performed by: INTERNAL MEDICINE

## 2025-03-27 PROCEDURE — 99152 MOD SED SAME PHYS/QHP 5/>YRS: CPT | Performed by: INTERNAL MEDICINE

## 2025-03-27 PROCEDURE — 99153 MOD SED SAME PHYS/QHP EA: CPT | Performed by: INTERNAL MEDICINE

## 2025-03-27 PROCEDURE — 6360000002 HC RX W HCPCS: Performed by: INTERNAL MEDICINE

## 2025-03-27 PROCEDURE — 7100000010 HC PHASE II RECOVERY - FIRST 15 MIN: Performed by: INTERNAL MEDICINE

## 2025-03-27 PROCEDURE — 80048 BASIC METABOLIC PNL TOTAL CA: CPT

## 2025-03-27 PROCEDURE — 7100000011 HC PHASE II RECOVERY - ADDTL 15 MIN: Performed by: INTERNAL MEDICINE

## 2025-03-27 PROCEDURE — 93458 L HRT ARTERY/VENTRICLE ANGIO: CPT | Performed by: INTERNAL MEDICINE

## 2025-03-27 PROCEDURE — 85610 PROTHROMBIN TIME: CPT

## 2025-03-27 PROCEDURE — 75625 CONTRAST EXAM ABDOMINL AORTA: CPT | Performed by: INTERNAL MEDICINE

## 2025-03-27 PROCEDURE — 2500000003 HC RX 250 WO HCPCS: Performed by: INTERNAL MEDICINE

## 2025-03-27 PROCEDURE — 93005 ELECTROCARDIOGRAM TRACING: CPT | Performed by: INTERNAL MEDICINE

## 2025-03-27 RX ORDER — CLOPIDOGREL BISULFATE 75 MG/1
75 TABLET ORAL DAILY
Status: DISCONTINUED | OUTPATIENT
Start: 2025-03-27 | End: 2025-03-28 | Stop reason: HOSPADM

## 2025-03-27 RX ORDER — NIFEDIPINE 30 MG/1
30 TABLET, EXTENDED RELEASE ORAL DAILY
Status: DISCONTINUED | OUTPATIENT
Start: 2025-03-28 | End: 2025-03-28 | Stop reason: HOSPADM

## 2025-03-27 RX ORDER — LORAZEPAM 0.5 MG/1
0.5 TABLET ORAL
Status: DISCONTINUED | OUTPATIENT
Start: 2025-03-27 | End: 2025-03-27 | Stop reason: HOSPADM

## 2025-03-27 RX ORDER — SODIUM CHLORIDE 9 MG/ML
INJECTION, SOLUTION INTRAVENOUS PRN
Status: DISCONTINUED | OUTPATIENT
Start: 2025-03-27 | End: 2025-03-27 | Stop reason: HOSPADM

## 2025-03-27 RX ORDER — ASPIRIN 325 MG
325 TABLET ORAL ONCE
Status: COMPLETED | OUTPATIENT
Start: 2025-03-27 | End: 2025-03-27

## 2025-03-27 RX ORDER — ONDANSETRON 2 MG/ML
4 INJECTION INTRAMUSCULAR; INTRAVENOUS EVERY 6 HOURS PRN
Status: DISCONTINUED | OUTPATIENT
Start: 2025-03-27 | End: 2025-03-27 | Stop reason: HOSPADM

## 2025-03-27 RX ORDER — MIDAZOLAM 1 MG/ML
INJECTION INTRAMUSCULAR; INTRAVENOUS PRN
Status: DISCONTINUED | OUTPATIENT
Start: 2025-03-27 | End: 2025-03-27 | Stop reason: HOSPADM

## 2025-03-27 RX ORDER — SODIUM CHLORIDE 9 MG/ML
INJECTION, SOLUTION INTRAVENOUS PRN
Status: DISCONTINUED | OUTPATIENT
Start: 2025-03-27 | End: 2025-03-28 | Stop reason: HOSPADM

## 2025-03-27 RX ORDER — IOPAMIDOL 755 MG/ML
INJECTION, SOLUTION INTRAVASCULAR PRN
Status: DISCONTINUED | OUTPATIENT
Start: 2025-03-27 | End: 2025-03-27 | Stop reason: HOSPADM

## 2025-03-27 RX ORDER — SODIUM CHLORIDE 0.9 % (FLUSH) 0.9 %
5-40 SYRINGE (ML) INJECTION PRN
Status: DISCONTINUED | OUTPATIENT
Start: 2025-03-27 | End: 2025-03-28 | Stop reason: HOSPADM

## 2025-03-27 RX ORDER — ACETAMINOPHEN 325 MG/1
650 TABLET ORAL EVERY 4 HOURS PRN
Status: DISCONTINUED | OUTPATIENT
Start: 2025-03-27 | End: 2025-03-28 | Stop reason: HOSPADM

## 2025-03-27 RX ORDER — ARIPIPRAZOLE 5 MG/1
15 TABLET ORAL NIGHTLY
Status: DISCONTINUED | OUTPATIENT
Start: 2025-03-28 | End: 2025-03-28 | Stop reason: HOSPADM

## 2025-03-27 RX ORDER — HYDRALAZINE HYDROCHLORIDE 20 MG/ML
INJECTION INTRAMUSCULAR; INTRAVENOUS PRN
Status: DISCONTINUED | OUTPATIENT
Start: 2025-03-27 | End: 2025-03-27 | Stop reason: HOSPADM

## 2025-03-27 RX ORDER — HYDRALAZINE HYDROCHLORIDE 50 MG/1
100 TABLET, FILM COATED ORAL EVERY 8 HOURS SCHEDULED
Status: DISCONTINUED | OUTPATIENT
Start: 2025-03-27 | End: 2025-03-28 | Stop reason: HOSPADM

## 2025-03-27 RX ORDER — SODIUM CHLORIDE 0.9 % (FLUSH) 0.9 %
5-40 SYRINGE (ML) INJECTION PRN
Status: DISCONTINUED | OUTPATIENT
Start: 2025-03-27 | End: 2025-03-27 | Stop reason: HOSPADM

## 2025-03-27 RX ORDER — ISOSORBIDE MONONITRATE 60 MG/1
60 TABLET, EXTENDED RELEASE ORAL DAILY
Status: DISCONTINUED | OUTPATIENT
Start: 2025-03-28 | End: 2025-03-28 | Stop reason: HOSPADM

## 2025-03-27 RX ORDER — ESOMEPRAZOLE MAGNESIUM 20 MG/1
40 TABLET, DELAYED RELEASE ORAL DAILY
Status: DISCONTINUED | OUTPATIENT
Start: 2025-03-28 | End: 2025-03-27 | Stop reason: SDUPTHER

## 2025-03-27 RX ORDER — SODIUM CHLORIDE 0.9 % (FLUSH) 0.9 %
5-40 SYRINGE (ML) INJECTION EVERY 12 HOURS SCHEDULED
Status: DISCONTINUED | OUTPATIENT
Start: 2025-03-27 | End: 2025-03-28 | Stop reason: HOSPADM

## 2025-03-27 RX ORDER — ATORVASTATIN CALCIUM 80 MG/1
80 TABLET, FILM COATED ORAL NIGHTLY
Status: DISCONTINUED | OUTPATIENT
Start: 2025-03-28 | End: 2025-03-28 | Stop reason: HOSPADM

## 2025-03-27 RX ORDER — LABETALOL HYDROCHLORIDE 5 MG/ML
INJECTION, SOLUTION INTRAVENOUS PRN
Status: DISCONTINUED | OUTPATIENT
Start: 2025-03-27 | End: 2025-03-27 | Stop reason: HOSPADM

## 2025-03-27 RX ORDER — VALSARTAN 80 MG/1
160 TABLET ORAL 2 TIMES DAILY
Status: DISCONTINUED | OUTPATIENT
Start: 2025-03-27 | End: 2025-03-28 | Stop reason: HOSPADM

## 2025-03-27 RX ORDER — SODIUM CHLORIDE 0.9 % (FLUSH) 0.9 %
5-40 SYRINGE (ML) INJECTION EVERY 12 HOURS SCHEDULED
Status: DISCONTINUED | OUTPATIENT
Start: 2025-03-27 | End: 2025-03-27 | Stop reason: HOSPADM

## 2025-03-27 RX ADMIN — HYDRALAZINE HYDROCHLORIDE 100 MG: 50 TABLET ORAL at 22:34

## 2025-03-27 RX ADMIN — VALSARTAN 160 MG: 80 TABLET, FILM COATED ORAL at 20:41

## 2025-03-27 RX ADMIN — CLOPIDOGREL BISULFATE 75 MG: 75 TABLET, FILM COATED ORAL at 13:39

## 2025-03-27 RX ADMIN — Medication 10 ML: at 20:41

## 2025-03-27 RX ADMIN — ASPIRIN 325 MG: 325 TABLET ORAL at 08:40

## 2025-03-27 RX ADMIN — HYDRALAZINE HYDROCHLORIDE 100 MG: 50 TABLET ORAL at 13:34

## 2025-03-27 NOTE — PROGRESS NOTES
Pt adm to rm 211.  VSS.  R groin site is clean, dry, and intact.  Pt with no complaints at this time

## 2025-03-27 NOTE — DISCHARGE INSTRUCTIONS
appliances.  Do not drink alcohol; including beer or wine.  Do not make any important decisions or sign any important papers.  For the next 24 hours you can expect drowsiness, light-headed or dizziness, nausea/ vomiting, inability to concentrate, fatigue and desire to sleep.  We strongly suggest that a responsible adult be with for the next 24 hours, for your protection and safety.  You are not allowed to drive yourself home, or take any type of public transportation.      Cardiac Rehab: If your doctor recommends Cardiac rehab, you will get the referral to call that department to schedule your 1st appointment.     Your Care Instructions  There are many things that cause chest pain. Some are not serious and will get better on their own in a few days. But some kinds of chest pain need more testing and treatment. Your doctor may have recommended follow-up visit in the next 4 to 8 weeks. If you are not feeling better, you may need more tests or treatment. Even though your doctor has released you, you still need to watch for any problems. The doctor carefully checked you, but sometimes problems can develop later. If you have new symptoms or if your symptoms do not improve, get medical help right away. If you have worse or different chest pain or pressure that lasts more than 5 minutes or you passed out (lost consciouess), call 911 or seek other emergency help right away.  A medical visit is only one step in your treatment. Even if you feel better, you still need to do what your doctor recommends, such as going to all suggested follow-up appointments and taking medications exactly as directed. This will help you recover and help prevent future problems.  How can you care for yourself at home?  Rest until you feel better.  Take your medicine exactly as prescribed. Call your doctor if you think you are having problems with your medicine.  Do not drive after taking a prescription pain medication.  When should you call for

## 2025-03-27 NOTE — H&P
Brief Pre-Op Note/Sedation Assessment      Jaden BROWN Abrahan  1945  [id number]  10:51 AM    Planned Procedure: Cardiac Catheterization Procedure  Post Procedure Plan: Return to same level of care  Consent: I have discussed with the patient and/or the patient representative the indication, alternatives, and the possible risks and/or complications of the planned procedure and the anesthesia methods. The patient and/or patient representative appear to understand and agree to proceed.      Chief Complaint:   Chest pain, abnormal cardiac CTA, labile hypertension     Indications for Cath Procedure:  Presentation:  New Onset Angina <= 2 months and Suspected CAD  2.  Anginal Classification within 2 weeks:  CCS III - Symptoms with everyday living activities, i.e., moderate limitation  3.  Angina Symptoms Assessment:  Typical Chest Pain  4.  Heart Failure Class within last 2 weeks:  No symptoms  5.  Cardiovascular Instability:  No    Prior Ischemic Workup/Eval:  Pre-Procedural Medications: Yes: ACE/ARB/ARNI, Ca Channel Blockers, Long Acting Nitrates (Any), and STATIN  2.   Stress Test Completed?  Yes:  Stress or Imaging Studies Performed (within ANY time period):   Type:  Cardiac CTA  Results:  Positive:  Abnormal CTA/MRI Extent of Ischemia:  Intermediate    Does Patient need surgery?  Cath Valve Surgery:  No    Pre-Procedure Medical History:  Vital Signs:  BP (!) 146/67   Pulse 63   Resp 13   Ht 1.88 m (6' 2\")   Wt 103.4 kg (228 lb)   SpO2 95%   BMI 29.27 kg/m²     Allergies:    Allergies   Allergen Reactions    Sulfa Antibiotics Anaphylaxis     hives    Amlodipine Hives     Severe hives  Patient tolerates nicardipine    Lisinopril Swelling     Face swelled/arms and legs swelled     Medications:    Current Facility-Administered Medications   Medication Dose Route Frequency Provider Last Rate Last Admin    sodium chloride flush 0.9 % injection 5-40 mL  5-40 mL IntraVENous 2 times per day Kelby Trammell DO

## 2025-03-27 NOTE — PROCEDURES
CARDIAC CATHETERIZATION REPORT    Date of Procedure: 3/27/2025  : Kelby Trammell DO  Primary Indication: Chest pain, abnormal cardiac CTA, labile hypertension    Procedures Performed:  1. Coronary angiography  2. Left heart catheterization  3. Descending aortography/non-selective bilateral renal artery angiography  4. Ultrasound-guided right femoral artery access  5. Right femoral angiography  6. Moderate conscious sedation    Procedural Details:  Access: Local anesthetic was given and access was obtained in the right femoral artery using a micropuncture technique and ultrasound guidance and a 6F sheath was placed without difficulty.  Diagnostic: Both a 5F JL4 catheter and a 6F XB3.5 guide catheter were used to perform selective left coronary angiography.  A 5F JR4 catheter was used to perform selective right coronary angiography. The 5F JR4 catheter was used to perform the left heart catheterization.  There was an 8 mmHg gradient on catheter pull-back across the aortic valve.   Hemostasis: At the end of the procedure, the right femoral arterial sheath was removed and manual pressure applied to maintain hemostasis.    Findings:  Hemodynamics:     A. Opening arterial pressure: 181/66 (112) mmHg      B. LVEDP: 6 mmHg   C. There was an 8 mmHg gradient on catheter pull-back across the aortic valve.    2.  Coronary anatomy:  A. Left main artery: The left main artery trifurcates into the left anterior descending artery, ramus intermedius artery, and left circumflex artery.  The left main artery has minor luminal irregularities.  B. Left anterior descending artery: Transapical vessel which gives rise to 3 small diagonal arteries.  The LAD has a 30% ostial/proximal stenosis followed by another 30% stenosis immediately before the bifurcation of the first septal , and a 40% mid-vessel stenosis.  C. Ramus intermedius artery: Large vessel with 20% ostial stenosis and 30-40% proximal stenosis.  D. Left

## 2025-03-27 NOTE — PROGRESS NOTES
Report given to patients nurse Roge RN. Pt transferred to room 211. [facility] called and monitor is on and verified.   unknown

## 2025-03-28 VITALS
DIASTOLIC BLOOD PRESSURE: 74 MMHG | BODY MASS INDEX: 29.26 KG/M2 | HEART RATE: 55 BPM | OXYGEN SATURATION: 99 % | RESPIRATION RATE: 18 BRPM | WEIGHT: 228 LBS | HEIGHT: 74 IN | TEMPERATURE: 97.7 F | SYSTOLIC BLOOD PRESSURE: 151 MMHG

## 2025-03-28 PROCEDURE — 99239 HOSP IP/OBS DSCHRG MGMT >30: CPT | Performed by: NURSE PRACTITIONER

## 2025-03-28 PROCEDURE — G0378 HOSPITAL OBSERVATION PER HR: HCPCS

## 2025-03-28 PROCEDURE — 6370000000 HC RX 637 (ALT 250 FOR IP): Performed by: INTERNAL MEDICINE

## 2025-03-28 RX ADMIN — HYDRALAZINE HYDROCHLORIDE 100 MG: 50 TABLET ORAL at 04:57

## 2025-03-28 RX ADMIN — CLOPIDOGREL BISULFATE 75 MG: 75 TABLET, FILM COATED ORAL at 09:07

## 2025-03-28 RX ADMIN — FLUOXETINE HYDROCHLORIDE 40 MG: 20 CAPSULE ORAL at 09:07

## 2025-03-28 NOTE — PLAN OF CARE
Problem: Chronic Conditions and Co-morbidities  Goal: Patient's chronic conditions and co-morbidity symptoms are monitored and maintained or improved  3/28/2025 1139 by Roge Roldan, RN  Outcome: Adequate for Discharge     Problem: Discharge Planning  Goal: Discharge to home or other facility with appropriate resources  3/28/2025 1139 by Roge Roldan, RN  Outcome: Adequate for Discharge     Problem: Safety - Adult  Goal: Free from fall injury  3/28/2025 1139 by Roge Roldan, RN  Outcome: Adequate for Discharge     Problem: Pain  Goal: Verbalizes/displays adequate comfort level or baseline comfort level  3/28/2025 1139 by Roge Roldan, RN  Outcome: Adequate for Discharge

## 2025-03-28 NOTE — CARE COORDINATION
Discharge order noted.   Spoke with RN who states patient is from home and is independent at home and has no needs from CM.  Patient has insurance and a PCP.

## 2025-03-28 NOTE — PLAN OF CARE
Problem: Chronic Conditions and Co-morbidities  Goal: Patient's chronic conditions and co-morbidity symptoms are monitored and maintained or improved  3/28/2025 1053 by Roge Roldan, RN  Outcome: Progressing     Problem: Discharge Planning  Goal: Discharge to home or other facility with appropriate resources  3/28/2025 1053 by Roge Roldan, RN  Outcome: Progressing     Problem: Safety - Adult  Goal: Free from fall injury  3/28/2025 1053 by Roge Roldan, RN  Outcome: Progressing     Problem: Pain  Goal: Verbalizes/displays adequate comfort level or baseline comfort level  3/28/2025 1053 by Roge Roldan, RN  Outcome: Progressing

## 2025-03-28 NOTE — DISCHARGE SUMMARY
Pt d/c'd home.  Removed IV and stopped bleeding.  Catheter intact. Pt tolerated well. No redness noted at site.  Notified [facility] and removed tele box. Reviewed d/c instructions, home meds, and  f/u information utilizing teach-back method.

## 2025-03-28 NOTE — DISCHARGE SUMMARY
[facility]    DISCHARGE SUMMARY      Patient ID:  Jaden Gauthier  [id number] 79 y.o. 1945    Admit date: 3/27/2025    Discharge date:  03/28/25    Admitting Physician: Kelby Trammell DO     Discharge Physician: DIANE M ENZWEILER, APRN - CNP     Admission Diagnoses: Abnormal computed tomography angiography of heart [R93.1]  Renal artery stenosis [I70.1]  Status post cardiac catheterization [Z98.890]    Discharge Diagnoses:    1) Coronary artery disease (nonobstructive)  -abnormal CCTA in March 2025  -cardiac cath with mild-moderate disease     2) Essential HTN     3) Renal artery stenosis  -mild L renal artery stenosis and mild-mod stenosis of R renal artery      4) H/O CVA/TIA     5) PAD  -R external carotid stenosis     6) Hyperlipidemia  -continue high intensity statin     7) Prostate CA       Discharged Condition: good    Hospital Course: Jaden Gauthier was admitted for planned cardiac cath d/t an abnormal CCTA. He is a 78 y/o male with PMH notable for uncontrolled HTN, H/O CVA, prostate CA on chemo and radiation, HLD, CKD and BPH who was admitted in January 2025 with hypertensive emergency and abnormal ECG. He was discharged home and followed up in office by Dr. Capellan. CCTA was ordered and performed and was abnormal and LHC recommended. On 3/27/25 he underwent LHC and renal angiography which demonstrated mild-moderate disease and maintained on medical management. Has been ambulating and without complaints and plan for discharge today.     Consults:  None    Significant Diagnostic Studies:   3/27/25 Cardiac cath:  Findings:  Hemodynamics:              A. Opening arterial pressure: 181/66 (112) mmHg              B. LVEDP: 6 mmHg              C. There was an 8 mmHg gradient on catheter pull-back across the aortic valve.     2.  Coronary anatomy:  A. Left main artery: The left main artery trifurcates into the left anterior descending artery, ramus intermedius artery, and left circumflex artery.

## 2025-03-28 NOTE — PLAN OF CARE
Problem: Chronic Conditions and Co-morbidities  Goal: Patient's chronic conditions and co-morbidity symptoms are monitored and maintained or improved  Outcome: Progressing     Problem: Discharge Planning  Goal: Discharge to home or other facility with appropriate resources  Outcome: Progressing     Problem: Safety - Adult  Goal: Free from fall injury  Outcome: Progressing   Pt will remain free from falls throughout hospital stay. Fall precautions in place, bed alarm on, bed in lowest position with wheels locked and side rails 2/4 up. Room door open and hourly rounding completed. Will continue to monitor throughout shift.     Problem: Pain  Goal: Verbalizes/displays adequate comfort level or baseline comfort level  Outcome: Progressing   Pt will be satisfied with pain control. Pt uses numeric pain rating scale with reassessments after pain med administration. Will continue to monitor progression throughout shift.

## 2025-03-28 NOTE — PROGRESS NOTES
[facility]   Daily Progress Note      Admit Date:  3/27/2025    Reason for follow up visit: Abnormal CCTA    CC: \"I feel good. I have not had any chest pain or SOB.\"    78 y/o male with PMH notable for uncontrolled HTN, H/O CVA, prostate CA on chemo and radiation,  HLD, CKD and BPH who was admitted in January 2025 with hypertensive emergency and abnormal ECG. He was discharged home and followed up in office by Dr. Capellan. CCTA was ordered and performed and was abnormal and LHC recommended. On 3/27/25 he underwent LHC and renal angiography which demonstrated mild-moderate disease and maintained on medical management. Has been ambulating and without complaints and plan for discharge today.    Interval History:  Pt. seen and examined; records reviewed  BP stable. Remains on room air  Denies chest pain, SOB, cough, palpitations, dizziness  Ambulating without complaints    Subjective:  Pt with no acute overnight cardiac events.     Review of Systems:   Constitutional: no unanticipated weight loss. There's been no change in energy level, sleep pattern, or activity level.   No fevers, chills.   Eyes: No visual changes or diplopia. No scleral icterus.  ENT: No Headaches, hearing loss or vertigo. No mouth sores or sore throat.  Cardiovascular: as reviewed in HPI  Respiratory: No cough or wheezing, no sputum production. No hemoptysis.    Gastrointestinal: No abdominal pain, appetite loss, blood in stools. No change in bowel or bladder habits.  Genitourinary: No dysuria, trouble voiding, or hematuria.  Musculoskeletal:  No gait disturbance, no joint complaints.  Integumentary: No rash or pruritis.  Neurological: No headache, diplopia, change in muscle strength, numbness or tingling.   Psychiatric: No anxiety or depression.  Endocrine: No temperature intolerance. No excessive thirst, fluid intake, or urination. No tremor.  Hematologic/Lymphatic: No abnormal bruising or bleeding, blood clots or swollen lymph

## 2025-03-31 ENCOUNTER — TELEPHONE (OUTPATIENT)
Dept: FAMILY MEDICINE CLINIC | Age: 80
End: 2025-03-31

## 2025-03-31 LAB
ECHO BSA: 2.32 M2
ECHO BSA: 2.32 M2

## 2025-04-15 ENCOUNTER — OFFICE VISIT (OUTPATIENT)
Age: 80
End: 2025-04-15
Payer: MEDICARE

## 2025-04-15 VITALS
HEART RATE: 63 BPM | SYSTOLIC BLOOD PRESSURE: 130 MMHG | HEIGHT: 74 IN | OXYGEN SATURATION: 99 % | BODY MASS INDEX: 29.57 KG/M2 | DIASTOLIC BLOOD PRESSURE: 72 MMHG | WEIGHT: 230.4 LBS

## 2025-04-15 DIAGNOSIS — C61 PROSTATE CANCER (HCC): ICD-10-CM

## 2025-04-15 DIAGNOSIS — I25.10 CAD IN NATIVE ARTERY: Primary | ICD-10-CM

## 2025-04-15 DIAGNOSIS — I73.9 PAD (PERIPHERAL ARTERY DISEASE): ICD-10-CM

## 2025-04-15 DIAGNOSIS — I49.9 IRREGULAR CARDIAC RHYTHM: ICD-10-CM

## 2025-04-15 DIAGNOSIS — I10 HTN (HYPERTENSION), BENIGN: ICD-10-CM

## 2025-04-15 DIAGNOSIS — I70.1 RENAL ARTERY STENOSIS: ICD-10-CM

## 2025-04-15 PROCEDURE — 3078F DIAST BP <80 MM HG: CPT | Performed by: INTERNAL MEDICINE

## 2025-04-15 PROCEDURE — G8427 DOCREV CUR MEDS BY ELIG CLIN: HCPCS | Performed by: INTERNAL MEDICINE

## 2025-04-15 PROCEDURE — 1159F MED LIST DOCD IN RCRD: CPT | Performed by: INTERNAL MEDICINE

## 2025-04-15 PROCEDURE — 1123F ACP DISCUSS/DSCN MKR DOCD: CPT | Performed by: INTERNAL MEDICINE

## 2025-04-15 PROCEDURE — 99214 OFFICE O/P EST MOD 30 MIN: CPT | Performed by: INTERNAL MEDICINE

## 2025-04-15 PROCEDURE — 1036F TOBACCO NON-USER: CPT | Performed by: INTERNAL MEDICINE

## 2025-04-15 PROCEDURE — G8417 CALC BMI ABV UP PARAM F/U: HCPCS | Performed by: INTERNAL MEDICINE

## 2025-04-15 PROCEDURE — 3077F SYST BP >= 140 MM HG: CPT | Performed by: INTERNAL MEDICINE

## 2025-04-15 PROCEDURE — G2211 COMPLEX E/M VISIT ADD ON: HCPCS | Performed by: INTERNAL MEDICINE

## 2025-04-15 RX ORDER — ROSUVASTATIN CALCIUM 40 MG/1
40 TABLET, COATED ORAL DAILY
Qty: 90 TABLET | Refills: 3 | Status: SHIPPED | OUTPATIENT
Start: 2025-04-15

## 2025-04-15 RX ORDER — NIFEDIPINE 30 MG/1
30 TABLET, EXTENDED RELEASE ORAL 2 TIMES DAILY
Qty: 180 TABLET | Refills: 3 | Status: SHIPPED | OUTPATIENT
Start: 2025-04-15

## 2025-04-15 NOTE — PATIENT INSTRUCTIONS
Plan:  Stop atorvastatin.  Increase nifedipine (Procardia XL) to 30 mg twice per day.  Start rosuvastatin (Crestor) 40 mg nightly.  Follow up with me in 6 months. Call [phone] to schedule.

## 2025-04-15 NOTE — PROGRESS NOTES
CARDIOLOGY OFFICE VISIT        Patient Name: Jaden Gauthier  Primary Care physician: Quinn Bradley MD    Reason for Referral/Chief Complaint: Jaden Gauthier is a 79 y.o. patient who is presenting to cardiology clinic today for follow up.     History of Present Illness:   Jaden Gauthier is a 79 y.o. with a past medical history of uncontrolled HTN, h/o CVA, HLP, CKD and BPH. He was recently admitted 1/15/25-1/17/25 for hypertensive emergency and abnormal EKG. He was sent to ER by PCP. Labs in ED showed hypokalemia, elevated BNP and troponin peak of 29. Patient was noted to have SBP>200, so he was placed on nitro gtt and transferred to the ICU. Patient was noted to be on chemo that could cause hypertensive emergency. He was discharged home.  At LOV, 2/4/25, he states he is doing well. He reports he has been eating a high in potassium diet due to chronic low K+ levels while in the hospital. He reports occasional palpitations and mild shortness of breath.  Of note, he reports his SOB is also due to a mechanical obstruction to his nare due to a MOES procedure two years ago to remove a skin cancer. He is undergoing chemo and radiation for his prostate cancer. He has since stopped Zytiga for his cancer treatment, and he is going to start a new chemo medication tomorrow. He reports he had an aortic endarterectomy in June of 2021 but cannot recall all details. Discussed renal artery ultrasound results. Discussed referral to vascular surgeon, and the patient has declined a referral at this time. He is a non smoker. He monitors his blood pressure at home and states it runs 140's/70's at home.    At conclusion of last office visit an cardiac CTA was ordered, but this has not been performed yet. Office visit, 02/18/25, he presented to re-evaluate his medications due to markedly low heart rate. He stated he had readings as low as 42 BPM at home. He was dizzy at that time and had near syncope. He had held Toprol XL last night

## 2025-04-23 DIAGNOSIS — F33.1 MAJOR DEPRESSIVE DISORDER, RECURRENT, MODERATE (HCC): ICD-10-CM

## 2025-04-23 NOTE — TELEPHONE ENCOUNTER
Last Office Visit  -  3/19/25  Next Office Visit  -  n/a    Last Filled  -    Last UDS -    Contract -

## 2025-04-24 RX ORDER — FLUOXETINE HYDROCHLORIDE 40 MG/1
40 CAPSULE ORAL DAILY
Qty: 30 CAPSULE | Refills: 2 | Status: SHIPPED | OUTPATIENT
Start: 2025-04-24 | End: 2025-07-23

## 2025-06-10 DIAGNOSIS — I10 HTN (HYPERTENSION), BENIGN: ICD-10-CM

## 2025-06-11 NOTE — TELEPHONE ENCOUNTER
[facility] pending     Last Office Visit: 4/15/2025 Provider: [facility]  **Is provider OOT? No    Next Office Visit: 10/15/2025 Provider: [facility]      Lab orders needed? no   Encounter provider correct? Yes If not, change provider  Script changes since last refill? no    LAST LABS:   BMP:  Lab Results   Component Value Date/Time     03/27/2025 08:05 AM    K 4.4 03/27/2025 08:05 AM     03/27/2025 08:05 AM    CO2 26 03/27/2025 08:05 AM    BUN 25 03/27/2025 08:05 AM    CREATININE 1.0 03/27/2025 08:05 AM    GLUCOSE 106 03/27/2025 08:05 AM    CALCIUM 9.6 03/27/2025 08:05 AM    LABGLOM 76 03/27/2025 08:05 AM    LABGLOM >60 01/25/2024 06:05 AM

## 2025-06-13 RX ORDER — VALSARTAN 160 MG/1
160 TABLET ORAL 2 TIMES DAILY
Qty: 180 TABLET | Refills: 3 | Status: SHIPPED | OUTPATIENT
Start: 2025-06-13

## 2025-07-02 ENCOUNTER — HOSPITAL ENCOUNTER (OUTPATIENT)
Dept: LAB | Age: 80
Discharge: HOME | End: 2025-07-02
Payer: MEDICARE

## 2025-07-02 DIAGNOSIS — C61 MALIGNANT NEOPLASM OF PROSTATE (HCC): ICD-10-CM

## 2025-07-02 LAB
BUN SERPL-MCNC: 16 MG/DL (ref 7–20)
CREAT SERPL-MCNC: 1 MG/DL (ref 0.8–1.3)
GFR SERPLBLD CREATININE-BSD FMLA CKD-EPI: 76 ML/MIN/{1.73_M2}

## 2025-07-02 PROCEDURE — 36415 COLL VENOUS BLD VENIPUNCTURE: CPT

## 2025-07-02 PROCEDURE — 82565 ASSAY OF CREATININE: CPT

## 2025-07-02 PROCEDURE — 84520 ASSAY OF UREA NITROGEN: CPT

## 2025-07-03 ENCOUNTER — HOSPITAL ENCOUNTER (OUTPATIENT)
Dept: CT IMAGING | Age: 80
Discharge: HOME | End: 2025-07-03
Attending: INTERNAL MEDICINE
Payer: MEDICARE

## 2025-07-03 ENCOUNTER — HOSPITAL ENCOUNTER (OUTPATIENT)
Dept: NUCLEAR MEDICINE | Age: 80
Discharge: HOME | End: 2025-07-03
Attending: INTERNAL MEDICINE
Payer: MEDICARE

## 2025-07-03 DIAGNOSIS — C61 MALIGNANT NEOPLASM OF PROSTATE (HCC): ICD-10-CM

## 2025-07-03 LAB
PERFORMED ON: NORMAL
POC CREATININE: 1 MG/DL (ref 0.8–1.3)
POC SAMPLE TYPE: NORMAL

## 2025-07-03 PROCEDURE — A9503 TC99M MEDRONATE: HCPCS | Performed by: INTERNAL MEDICINE

## 2025-07-03 PROCEDURE — 6360000004 HC RX CONTRAST MEDICATION: Performed by: INTERNAL MEDICINE

## 2025-07-03 PROCEDURE — 3430000000 HC RX DIAGNOSTIC RADIOPHARMACEUTICAL: Performed by: INTERNAL MEDICINE

## 2025-07-03 PROCEDURE — 74177 CT ABD & PELVIS W/CONTRAST: CPT

## 2025-07-03 PROCEDURE — 78306 BONE IMAGING WHOLE BODY: CPT | Performed by: INTERNAL MEDICINE

## 2025-07-03 PROCEDURE — 82565 ASSAY OF CREATININE: CPT

## 2025-07-03 RX ORDER — DIATRIZOATE MEGLUMINE AND DIATRIZOATE SODIUM 660; 100 MG/ML; MG/ML
12 SOLUTION ORAL; RECTAL
Status: DISCONTINUED | OUTPATIENT
Start: 2025-07-03 | End: 2025-07-04 | Stop reason: HOSPADM

## 2025-07-03 RX ORDER — TC 99M MEDRONATE 20 MG/10ML
27.5 INJECTION, POWDER, LYOPHILIZED, FOR SOLUTION INTRAVENOUS
Status: COMPLETED | OUTPATIENT
Start: 2025-07-03 | End: 2025-07-03

## 2025-07-03 RX ORDER — IOPAMIDOL 755 MG/ML
75 INJECTION, SOLUTION INTRAVASCULAR
Status: COMPLETED | OUTPATIENT
Start: 2025-07-03 | End: 2025-07-03

## 2025-07-03 RX ADMIN — TC 99M MEDRONATE 27.5 MILLICURIE: 20 INJECTION, POWDER, LYOPHILIZED, FOR SOLUTION INTRAVENOUS at 08:15

## 2025-07-03 RX ADMIN — DIATRIZOATE MEGLUMINE AND DIATRIZOATE SODIUM 12 ML: 660; 100 LIQUID ORAL; RECTAL at 08:05

## 2025-07-03 RX ADMIN — IOPAMIDOL 75 ML: 755 INJECTION, SOLUTION INTRAVENOUS at 08:06

## 2025-07-21 ENCOUNTER — HOSPITAL ENCOUNTER (OUTPATIENT)
Dept: CT IMAGING | Age: 80
Discharge: HOME | End: 2025-07-21
Attending: INTERNAL MEDICINE

## 2025-07-21 DIAGNOSIS — I20.0 UNSTABLE ANGINA (HCC): ICD-10-CM

## 2025-07-21 DIAGNOSIS — F33.1 MAJOR DEPRESSIVE DISORDER, RECURRENT, MODERATE (HCC): ICD-10-CM

## 2025-07-21 DIAGNOSIS — I25.119 CORONARY ARTERY DISEASE WITH ANGINA PECTORIS, UNSPECIFIED VESSEL OR LESION TYPE, UNSPECIFIED WHETHER NATIVE OR TRANSPLANTED HEART: ICD-10-CM

## 2025-07-21 RX ORDER — ARIPIPRAZOLE 15 MG/1
15 TABLET ORAL DAILY
Qty: 90 TABLET | Refills: 3 | Status: SHIPPED | OUTPATIENT
Start: 2025-07-21

## 2025-08-20 DIAGNOSIS — F33.1 MAJOR DEPRESSIVE DISORDER, RECURRENT, MODERATE (HCC): ICD-10-CM

## 2025-08-20 RX ORDER — FLUOXETINE HYDROCHLORIDE 40 MG/1
40 CAPSULE ORAL DAILY
Qty: 30 CAPSULE | Refills: 2 | Status: SHIPPED | OUTPATIENT
Start: 2025-08-20 | End: 2025-11-18

## (undated) DEVICE — K WIRE FIX DIA1.6MM S STL FOR DST VOLAR PLATING SYS
Type: IMPLANTABLE DEVICE | Site: WRIST | Status: NON-FUNCTIONAL
Removed: 2024-07-03

## (undated) DEVICE — CATHETER GUID XB 3.5 6 FRX100 CM VISTA BRT TIP

## (undated) DEVICE — SOLUTION IV 1000ML 0.9% SOD CHL

## (undated) DEVICE — GLOVE SURG SZ 75 L12IN FNGR THK94MIL STD WHT LTX FREE

## (undated) DEVICE — AEGIS 1" DISK 4MM HOLE, PEEL OPEN: Brand: MEDLINE

## (undated) DEVICE — SCREW BNE L18MM DIA2.7MM DST RAD LOK FULL THRD SQ DRV HD LO
Type: IMPLANTABLE DEVICE | Site: WRIST | Status: NON-FUNCTIONAL
Removed: 2024-07-03

## (undated) DEVICE — CYSTO: Brand: MEDLINE INDUSTRIES, INC.

## (undated) DEVICE — ROYAL SILK SURGICAL GOWN, XL: Brand: CONVERTORS

## (undated) DEVICE — CANNULA PERF L1.3IN TIP L2MM S STL POLYUR TB ARTOTMY BLB

## (undated) DEVICE — 20 ML SYRINGE LUER-LOCK TIP: Brand: MONOJECT

## (undated) DEVICE — ELECTRODE,CUTTING,STERILE.24FR: Brand: N.A.

## (undated) DEVICE — STAPLER SKIN H3.9MM WIRE DIA0.58MM CRWN 6.9MM 35 STPL ROT

## (undated) DEVICE — BAG DRNGE COMB PK

## (undated) DEVICE — SUTURE MCRYL SZ 4-0 L18IN ABSRB UD L19MM PS-2 3/8 CIR PRIM Y496G

## (undated) DEVICE — Z DISCONTINUED USE 2131664 WIPE INSTR L4XW5IN SPNG MEROCEL

## (undated) DEVICE — Device

## (undated) DEVICE — STERILE POLYISOPRENE POWDER-FREE SURGICAL GLOVES WITH EMOLLIENT COATING: Brand: PROTEXIS

## (undated) DEVICE — TUBING, SUCTION, 3/16" X 12', STRAIGHT: Brand: MEDLINE

## (undated) DEVICE — SOLUTION IV HEPARIN SODIUM SODIUM CHL 0.9% 500 ML INJ VIAFLX

## (undated) DEVICE — GOWN,SIRUS,NON REINFRCD,LARGE,SET IN SL: Brand: MEDLINE

## (undated) DEVICE — Z CONVERTED USE 2273232 BANDAGE COMPR W6INXL11YD E KNIT DBL SELF CLSR EZE-BAND

## (undated) DEVICE — SET GRAV VENT NVENT CK VLV 3 NDL FREE PRT 10 GTT

## (undated) DEVICE — GAUZE,SPONGE,4"X4",16PLY,XRAY,STRL,LF: Brand: MEDLINE

## (undated) DEVICE — COVER LT HNDL BLU PLAS

## (undated) DEVICE — SURE SET-DOUBLE BASIN-LF: Brand: MEDLINE INDUSTRIES, INC.

## (undated) DEVICE — GUIDEWIRE VASC L260CM DIA0.035IN RAD 3MM J TIP L7CM PTFE

## (undated) DEVICE — NEEDLE HYPO 16GA L1.5IN PUR POLYPR HUB S STL REG BVL STR

## (undated) DEVICE — PACK PROCEDURE SURG ARTHSCP CUST

## (undated) DEVICE — TUBE SUCT LAPSCP

## (undated) DEVICE — GLIDESHEATH SLENDER STAINLESS STEEL KIT: Brand: GLIDESHEATH SLENDER

## (undated) DEVICE — PADDING CAST W6INXL4YD ST COT RAYON MICROPLEATED HIGHLY

## (undated) DEVICE — SPONGE,LAP,18"X18",DLX,XR,ST,5/PK,40/PK: Brand: MEDLINE

## (undated) DEVICE — SCREW BNE L20MM DIA2.7MM CORT DST RAD NONLOCKING FULL THRD
Type: IMPLANTABLE DEVICE | Site: WRIST | Status: NON-FUNCTIONAL
Removed: 2024-07-03

## (undated) DEVICE — BANDAGE COMPR W3INXL5YD HI E BGE W/ CLP SURE-WRAP

## (undated) DEVICE — E-Z CLEAN, NON-STICK, PTFE COATED, ELECTROSURGICAL BLADE ELECTRODE, 2.5 INCH (6.35 CM): Brand: EZ CLEAN

## (undated) DEVICE — APPLICATOR PREP 26ML 0.7% IOD POVACRYLEX 74% ISO ALC ST

## (undated) DEVICE — CURITY STRETCH BANDAGE: Brand: CURITY

## (undated) DEVICE — SUTURE PERMAHAND SZ 3-0 L30IN NONABSORBABLE BLK SILK BRAID A304H

## (undated) DEVICE — DRAPE,INSTRUMENT,MAGNETIC,10X16: Brand: MEDLINE

## (undated) DEVICE — SOLUTION IRRIG 5L LAC R BG

## (undated) DEVICE — SPONGE,NEURO,0.5"X3",XR,STRL,LF,10/PK: Brand: MEDLINE

## (undated) DEVICE — GLOVE,SURG,SENSICARE,ALOE,LF,PF,6: Brand: MEDLINE

## (undated) DEVICE — SOLUTION IV SODIUM CHL 0.9% 500 ML INJ FLX CONTAINER

## (undated) DEVICE — SUTURE PERMA-HAND SZ 0 L18IN NONABSORBABLE BLK L30MM FSL 678G

## (undated) DEVICE — INTENDED FOR TISSUE SEPARATION, AND OTHER PROCEDURES THAT REQUIRE A SHARP SURGICAL BLADE TO PUNCTURE OR CUT.: Brand: BARD-PARKER ® CARBON RIB-BACK BLADES

## (undated) DEVICE — SUTURE NONABSORBABLE MONOFILAMENT 4-0 PS-2 18 IN BLK ETHILON 1667G

## (undated) DEVICE — GLOVE SURG SZ 65 L12IN FNGR THK94MIL STD WHT LTX FREE

## (undated) DEVICE — 500ML,PRESSURE INFUSER W/STOPCOCK: Brand: MEDLINE

## (undated) DEVICE — LOOP VES W13MM THK09MM MINI RED SIL FLD REPELLENT

## (undated) DEVICE — BLANKET WRM W40.2XL55.9IN IORT LO BODY + MISTRAL AIR

## (undated) DEVICE — 3M™ TEGADERM™ TRANSPARENT FILM DRESSING FRAME STYLE, 1624W, 2-3/8 IN X 2-3/4 IN (6 CM X 7 CM), 100/CT 4CT/CASE: Brand: 3M™ TEGADERM™

## (undated) DEVICE — KIT DIAG CATH 5FR L110CM 145DEG COR NYL JUDKINS R 4 JUDKINS

## (undated) DEVICE — CORD ES L12FT BPLR FRCP

## (undated) DEVICE — SET ADMIN PRIMING 7ML L30IN 7.35LB 20 GTT 2ND RLER CLMP

## (undated) DEVICE — SUTURE PERMAHAND SZ 2-0 L30IN NONABSORBABLE BLK SILK W/O A305H

## (undated) DEVICE — CLIP LIG M BLU TI HRT SHP WIRE HORZ 600 PER BX

## (undated) DEVICE — TUBE IRRIG L8IN LNG PT W/ CONN FOR PMP SYS REDEUCE

## (undated) DEVICE — STERILE POLYISOPRENE POWDER-FREE SURGICAL GLOVES: Brand: PROTEXIS

## (undated) DEVICE — JEWISH HOSPITAL TURNOVER KIT: Brand: MEDLINE INDUSTRIES, INC.

## (undated) DEVICE — CATHETER CTRL VEN 5.5FR 20CM LEN SGL LUMN N TUNNELED BASIC

## (undated) DEVICE — DRAPE EQUIP C ARM MINI 10000100] TIDI PRODUCTS INC]

## (undated) DEVICE — ADAPTER, OES PRO OUTER SHEATH TO ELLIK AND SYRINGE: Brand: OLYMPUS

## (undated) DEVICE — SUTURE MONOCRYL + SZ 3-0 L27IN ABSRB UD L26MM SH 1/2 CIR MCP416H

## (undated) DEVICE — BIT DRL DIA2.2MM CROSSLOCK MOD TY DVR ANAT VOLAR PLATING

## (undated) DEVICE — SCANLAN® SUTURE BOOT™ INSTRUMENT JAW COVERS - ORIGINAL YELLOW, STANDARD PKG (5 PAIR/CARTRIDGE, 1 CARTRIDGE/PKG): Brand: SCANLAN® SUTURE BOOT™ INSTRUMENT JAW COVERS

## (undated) DEVICE — SET IRRIG L94IN DIA0.281IN L BOR W/ 2 N VENT SPIK 2 ON/OFF

## (undated) DEVICE — SOLUTION IRRIG 500ML 0.9% SOD CHLO USP POUR PLAS BTL

## (undated) DEVICE — SUTURE N ABSRB MONOFILAMENT 6-0 BV1 30 IN BLU PROLENE EPM8709

## (undated) DEVICE — ZIMMER® STERILE DISPOSABLE TOURNIQUET CUFF WITH PLC, DUAL PORT, SINGLE BLADDER, 30 IN. (76 CM)

## (undated) DEVICE — GLOVE SURG SZ 65 L12IN FNGR THK79MIL GRN LTX FREE

## (undated) DEVICE — DECANTER BAG 9": Brand: MEDLINE INDUSTRIES, INC.

## (undated) DEVICE — SUTURE VCRL SZ 2-0 L27IN ABSRB UD L26MM SH 1/2 CIR J417H

## (undated) DEVICE — GLOVE ORANGE PI 7   MSG9070

## (undated) DEVICE — 4.5 MM INCISOR PLUS STRAIGHT                                    BLADES, POWER/EP-1, VIOLET, PACKAGED                                    6 PER BOX, STERILE

## (undated) DEVICE — PROVE COVER: Brand: UNBRANDED

## (undated) DEVICE — SPLINT ORTH W3XL12IN LAYERED FBRGLS FOAM PD BRTH BK MOLD

## (undated) DEVICE — ZIMMER® STERILE DISPOSABLE TOURNIQUET CUFF WITH PLC, DUAL PORT, SINGLE BLADDER, 18 IN. (46 CM)

## (undated) DEVICE — LOOP,VESSEL,MAXI,BLUE,2/PK,STERILE: Brand: MEDLINE

## (undated) DEVICE — CATH LAB PACK: Brand: MEDLINE INDUSTRIES, INC.

## (undated) DEVICE — PINNACLE INTRODUCER SHEATH: Brand: PINNACLE

## (undated) DEVICE — 3M™ STERI-STRIP™ REINFORCED ADHESIVE SKIN CLOSURES, R1547, 1/2 IN X 4 IN (12 MM X 100 MM), 6 STRIPS/ENVELOPE: Brand: 3M™ STERI-STRIP™

## (undated) DEVICE — GARMENT,MEDLINE,DVT,INT,CALF,MED, GEN2: Brand: MEDLINE

## (undated) DEVICE — TOWEL,OR,DSP,ST,BLUE,STD,4/PK,20PK/CS: Brand: MEDLINE

## (undated) DEVICE — SUTURE PROL SZ 7-0 L18IN NONABSORBABLE BLU L9.3MM BV-1 3/8 8701H

## (undated) DEVICE — Z INACTIVE USE 2384703 CLIP INT SM WIDE RED TI TRNSVRS GRV CHEVRON SHP W PRECIS

## (undated) DEVICE — DRAIN SURG 7FR END PERF 1/8IN SIL RND SMOOTH HEAT POLISHED

## (undated) DEVICE — SYRINGE,TOOMEY,IRRIGATION,70CC,STERILE: Brand: MEDLINE

## (undated) DEVICE — PADDING CAST W4INXL4YD NONSTERILE COT RAYON MICROPLEATED

## (undated) DEVICE — CATHETER IV 20GA L1.25IN PNK FEP SFTY STR HUB RADPQ DISP

## (undated) DEVICE — SYSTEM SKIN CLSR 22CM DERMBND PRINEO

## (undated) DEVICE — SCREW BONE L26MM D2.7MM CRTCL DST RDL NNLCKNG FLLY THRDD SQR
Type: IMPLANTABLE DEVICE | Site: WRIST | Status: NON-FUNCTIONAL
Removed: 2024-07-03

## (undated) DEVICE — SCREW BNE L16MM DIA2.7MM DST VOLAR RAD NONLOCKING FULL THRD
Type: IMPLANTABLE DEVICE | Site: WRIST | Status: NON-FUNCTIONAL
Removed: 2024-07-03

## (undated) DEVICE — SUTURE VCRL SZ 3-0 L27IN ABSRB UD L26MM SH 1/2 CIR J416H

## (undated) DEVICE — TUBING SUCT 10FR MAL ALUM SHFT FN CAP VENT UNIV CONN W/ OBT

## (undated) DEVICE — SOLUTION IV 1000ML LAC RINGERS PH 6.5 INJ USP VIAFLX PLAS

## (undated) DEVICE — TRAY CATHETER 16FR F INCLUDE BARDX IC COMPLT CARE DRNGE BG

## (undated) DEVICE — PACK,UNIVERSAL,NO GOWNS: Brand: MEDLINE

## (undated) DEVICE — SYRINGE MED 10ML LUERLOCK TIP W/O SFTY DISP

## (undated) DEVICE — CATHETER URETH 24FR BLLN 30CC STD LTX 3 W TWO OPP DRNGE EYE

## (undated) DEVICE — SOLUTION IRRIG 2000ML 0.9% SOD CHL USP UROMATIC PLAS CONT

## (undated) DEVICE — CONVERTED USE 304929 SPONGE GAUZE CURITY 4X4IN 16-PLY ST

## (undated) DEVICE — GOWN,SIRUS,POLYRNF,SETINSLV,L,20/CS: Brand: MEDLINE

## (undated) DEVICE — ELECTROSURGICAL PENCIL ROCKER SWITCH NON COATED BLADE ELECTRODE 10 FT (3 M) CORD HOLSTER: Brand: MEGADYNE

## (undated) DEVICE — SURGICAL SET UP - SURE SET: Brand: MEDLINE INDUSTRIES, INC.

## (undated) DEVICE — GLOVE,SURG,SENSICARE,ALOE,LF,PF,7: Brand: MEDLINE

## (undated) DEVICE — 3M™ IOBAN™ 2 ANTIMICROBIAL INCISE DRAPE 6640EZ: Brand: IOBAN™ 2

## (undated) DEVICE — GLOVE SURG SZ 6 L11.2IN FNGR THK9.8MIL STRW LTX POLYMER

## (undated) DEVICE — SHEET,DRAPE,40X58,STERILE: Brand: MEDLINE

## (undated) DEVICE — PLATE ES AD W 9FT CRD 2

## (undated) DEVICE — SPONGE,PEANUT,XRAY,ST,SM,3/8",5/CARD: Brand: MEDLINE INDUSTRIES, INC.

## (undated) DEVICE — T-DRAPE,EXTREMITY,STERILE: Brand: MEDLINE

## (undated) DEVICE — 3M™ TEGADERM™ TRANSPARENT FILM DRESSING FRAME STYLE, 1626, 4 IN X 4-3/4 IN (10 CM X 12 CM), 50/CT 4CT/CASE: Brand: 3M™ TEGADERM™